# Patient Record
Sex: FEMALE | Race: OTHER | Employment: UNEMPLOYED | URBAN - METROPOLITAN AREA
[De-identification: names, ages, dates, MRNs, and addresses within clinical notes are randomized per-mention and may not be internally consistent; named-entity substitution may affect disease eponyms.]

---

## 2017-09-18 ENCOUNTER — APPOINTMENT (OUTPATIENT)
Dept: GENERAL RADIOLOGY | Age: 82
DRG: 871 | End: 2017-09-18
Payer: MEDICARE

## 2017-09-18 ENCOUNTER — HOSPITAL ENCOUNTER (INPATIENT)
Age: 82
LOS: 6 days | Discharge: HOME HEALTH | DRG: 871 | End: 2017-09-24
Admitting: INTERNAL MEDICINE
Payer: MEDICARE

## 2017-09-18 DIAGNOSIS — A41.9 SEPSIS, DUE TO UNSPECIFIED ORGANISM: ICD-10-CM

## 2017-09-18 DIAGNOSIS — J18.9 PNEUMONIA DUE TO ORGANISM: ICD-10-CM

## 2017-09-18 DIAGNOSIS — J44.1 COPD WITH ACUTE EXACERBATION (HCC): Primary | ICD-10-CM

## 2017-09-18 PROBLEM — R77.8 ELEVATED TROPONIN: Status: ACTIVE | Noted: 2017-09-18

## 2017-09-18 LAB
-: ABNORMAL
-: NORMAL
ABSOLUTE EOS #: 0.3 K/UL (ref 0–0.4)
ABSOLUTE LYMPH #: 2.5 K/UL (ref 1–4.8)
ABSOLUTE MONO #: 1.8 K/UL (ref 0.2–0.8)
AMORPHOUS: ABNORMAL
AMORPHOUS: NORMAL
ANION GAP SERPL CALCULATED.3IONS-SCNC: 18 MMOL/L (ref 9–17)
ANION GAP SERPL CALCULATED.3IONS-SCNC: 22 MMOL/L (ref 9–17)
BACTERIA: ABNORMAL
BACTERIA: NORMAL
BASOPHILS # BLD: 1 %
BASOPHILS ABSOLUTE: 0.2 K/UL (ref 0–0.2)
BILIRUBIN URINE: NEGATIVE
BILIRUBIN URINE: NEGATIVE
BNP INTERPRETATION: ABNORMAL
BUN BLDV-MCNC: 21 MG/DL (ref 8–23)
BUN BLDV-MCNC: 29 MG/DL (ref 8–23)
BUN/CREAT BLD: 24 (ref 9–20)
BUN/CREAT BLD: 25 (ref 9–20)
CALCIUM SERPL-MCNC: 8.4 MG/DL (ref 8.6–10.4)
CALCIUM SERPL-MCNC: 8.9 MG/DL (ref 8.6–10.4)
CASTS UA: ABNORMAL /LPF
CASTS UA: NORMAL /LPF
CHLORIDE BLD-SCNC: 92 MMOL/L (ref 98–107)
CHLORIDE BLD-SCNC: 96 MMOL/L (ref 98–107)
CK MB: 3.1 NG/ML
CO2: 19 MMOL/L (ref 20–31)
CO2: 23 MMOL/L (ref 20–31)
COLOR: YELLOW
COLOR: YELLOW
COMMENT UA: ABNORMAL
COMMENT UA: ABNORMAL
CREAT SERPL-MCNC: 0.83 MG/DL (ref 0.5–0.9)
CREAT SERPL-MCNC: 1.23 MG/DL (ref 0.5–0.9)
CRYSTALS, UA: ABNORMAL /HPF
CRYSTALS, UA: NORMAL /HPF
D-DIMER QUANTITATIVE: 0.51 MG/L FEU
DIFFERENTIAL TYPE: ABNORMAL
EOSINOPHILS RELATIVE PERCENT: 2 %
EPITHELIAL CELLS UA: ABNORMAL /HPF
EPITHELIAL CELLS UA: NORMAL /HPF
GFR AFRICAN AMERICAN: 49 ML/MIN
GFR AFRICAN AMERICAN: >60 ML/MIN
GFR NON-AFRICAN AMERICAN: 41 ML/MIN
GFR NON-AFRICAN AMERICAN: >60 ML/MIN
GFR SERPL CREATININE-BSD FRML MDRD: ABNORMAL ML/MIN/{1.73_M2}
GLUCOSE BLD-MCNC: 241 MG/DL (ref 70–99)
GLUCOSE BLD-MCNC: 333 MG/DL (ref 65–105)
GLUCOSE BLD-MCNC: 362 MG/DL (ref 65–105)
GLUCOSE BLD-MCNC: 384 MG/DL (ref 70–99)
GLUCOSE URINE: ABNORMAL
GLUCOSE URINE: NEGATIVE
HCT VFR BLD CALC: 43.2 % (ref 36–46)
HEMOGLOBIN: 14.5 G/DL (ref 12–16)
INR BLD: 1.1
KETONES, URINE: NEGATIVE
KETONES, URINE: NEGATIVE
LACTIC ACID, WHOLE BLOOD: ABNORMAL MMOL/L (ref 0.7–2.1)
LACTIC ACID: 2.4 MMOL/L (ref 0.5–2.2)
LACTIC ACID: 4.7 MMOL/L (ref 0.5–2.2)
LACTIC ACID: 5.7 MMOL/L (ref 0.5–2.2)
LACTIC ACID: 7.6 MMOL/L (ref 0.5–2.2)
LEUKOCYTE ESTERASE, URINE: NEGATIVE
LEUKOCYTE ESTERASE, URINE: NEGATIVE
LYMPHOCYTES # BLD: 14 %
MAGNESIUM: 2.1 MG/DL (ref 1.6–2.6)
MCH RBC QN AUTO: 32.1 PG (ref 26–34)
MCHC RBC AUTO-ENTMCNC: 33.6 G/DL (ref 31–37)
MCV RBC AUTO: 95.6 FL (ref 80–100)
MONOCYTES # BLD: 10 %
MUCUS: ABNORMAL
MUCUS: NORMAL
MYOGLOBIN: 108 NG/ML (ref 25–58)
NITRITE, URINE: NEGATIVE
NITRITE, URINE: NEGATIVE
OTHER OBSERVATIONS UA: ABNORMAL
OTHER OBSERVATIONS UA: NORMAL
PARTIAL THROMBOPLASTIN TIME: 32.2 SEC (ref 23–31)
PDW BLD-RTO: 13.2 % (ref 11.5–14.5)
PH UA: 5 (ref 5–8)
PH UA: 5.5 (ref 5–8)
PLATELET # BLD: 177 K/UL (ref 130–400)
PLATELET ESTIMATE: ABNORMAL
PMV BLD AUTO: ABNORMAL FL (ref 6–12)
POTASSIUM SERPL-SCNC: 4.3 MMOL/L (ref 3.7–5.3)
POTASSIUM SERPL-SCNC: 4.6 MMOL/L (ref 3.7–5.3)
PRO-BNP: 4442 PG/ML
PROTEIN UA: ABNORMAL
PROTEIN UA: ABNORMAL
PROTHROMBIN TIME: 11.5 SEC (ref 9.7–11.6)
RBC # BLD: 4.52 M/UL (ref 4–5.2)
RBC # BLD: ABNORMAL 10*6/UL
RBC UA: ABNORMAL /HPF (ref 0–2)
RBC UA: NORMAL /HPF (ref 0–2)
RENAL EPITHELIAL, UA: ABNORMAL /HPF
RENAL EPITHELIAL, UA: NORMAL /HPF
SEG NEUTROPHILS: 73 %
SEGMENTED NEUTROPHILS ABSOLUTE COUNT: 13.9 K/UL (ref 1.8–7.7)
SODIUM BLD-SCNC: 133 MMOL/L (ref 135–144)
SODIUM BLD-SCNC: 137 MMOL/L (ref 135–144)
SPECIFIC GRAVITY UA: 1.01 (ref 1–1.03)
SPECIFIC GRAVITY UA: 1.02 (ref 1–1.03)
TOTAL CK: 71 U/L (ref 26–192)
TRICHOMONAS: ABNORMAL
TRICHOMONAS: NORMAL
TROPONIN INTERP: ABNORMAL
TROPONIN INTERP: ABNORMAL
TROPONIN INTERP: NORMAL
TROPONIN INTERP: NORMAL
TROPONIN T: 0.06 NG/ML
TROPONIN T: 0.06 NG/ML
TROPONIN T: <0.03 NG/ML
TROPONIN T: <0.03 NG/ML
TSH SERPL DL<=0.05 MIU/L-ACNC: 0.38 MIU/L (ref 0.3–5)
TSH SERPL DL<=0.05 MIU/L-ACNC: 2.18 MIU/L (ref 0.3–5)
TURBIDITY: ABNORMAL
TURBIDITY: ABNORMAL
URINE HGB: ABNORMAL
URINE HGB: ABNORMAL
UROBILINOGEN, URINE: NORMAL
UROBILINOGEN, URINE: NORMAL
WBC # BLD: 18.7 K/UL (ref 3.5–11)
WBC # BLD: ABNORMAL 10*3/UL
WBC UA: ABNORMAL /HPF (ref 0–5)
WBC UA: NORMAL /HPF (ref 0–5)
YEAST: ABNORMAL
YEAST: NORMAL

## 2017-09-18 PROCEDURE — 87086 URINE CULTURE/COLONY COUNT: CPT

## 2017-09-18 PROCEDURE — 82947 ASSAY GLUCOSE BLOOD QUANT: CPT

## 2017-09-18 PROCEDURE — 99285 EMERGENCY DEPT VISIT HI MDM: CPT

## 2017-09-18 PROCEDURE — 94640 AIRWAY INHALATION TREATMENT: CPT

## 2017-09-18 PROCEDURE — 84484 ASSAY OF TROPONIN QUANT: CPT

## 2017-09-18 PROCEDURE — 83874 ASSAY OF MYOGLOBIN: CPT

## 2017-09-18 PROCEDURE — 36415 COLL VENOUS BLD VENIPUNCTURE: CPT

## 2017-09-18 PROCEDURE — 93005 ELECTROCARDIOGRAM TRACING: CPT

## 2017-09-18 PROCEDURE — 51702 INSERT TEMP BLADDER CATH: CPT

## 2017-09-18 PROCEDURE — 6360000002 HC RX W HCPCS: Performed by: INTERNAL MEDICINE

## 2017-09-18 PROCEDURE — 85730 THROMBOPLASTIN TIME PARTIAL: CPT

## 2017-09-18 PROCEDURE — 6370000000 HC RX 637 (ALT 250 FOR IP): Performed by: INTERNAL MEDICINE

## 2017-09-18 PROCEDURE — 83880 ASSAY OF NATRIURETIC PEPTIDE: CPT

## 2017-09-18 PROCEDURE — 85610 PROTHROMBIN TIME: CPT

## 2017-09-18 PROCEDURE — 71010 XR CHEST PORTABLE: CPT

## 2017-09-18 PROCEDURE — 2500000003 HC RX 250 WO HCPCS: Performed by: INTERNAL MEDICINE

## 2017-09-18 PROCEDURE — 85379 FIBRIN DEGRADATION QUANT: CPT

## 2017-09-18 PROCEDURE — 94761 N-INVAS EAR/PLS OXIMETRY MLT: CPT

## 2017-09-18 PROCEDURE — 82553 CREATINE MB FRACTION: CPT

## 2017-09-18 PROCEDURE — 84443 ASSAY THYROID STIM HORMONE: CPT

## 2017-09-18 PROCEDURE — 2580000003 HC RX 258: Performed by: INTERNAL MEDICINE

## 2017-09-18 PROCEDURE — 71010 XR CHEST LIMITED: CPT

## 2017-09-18 PROCEDURE — 2000000000 HC ICU R&B

## 2017-09-18 PROCEDURE — 87040 BLOOD CULTURE FOR BACTERIA: CPT

## 2017-09-18 PROCEDURE — 81001 URINALYSIS AUTO W/SCOPE: CPT

## 2017-09-18 PROCEDURE — 2580000003 HC RX 258

## 2017-09-18 PROCEDURE — 6360000002 HC RX W HCPCS

## 2017-09-18 PROCEDURE — 82550 ASSAY OF CK (CPK): CPT

## 2017-09-18 PROCEDURE — 96374 THER/PROPH/DIAG INJ IV PUSH: CPT

## 2017-09-18 PROCEDURE — 83605 ASSAY OF LACTIC ACID: CPT

## 2017-09-18 PROCEDURE — 83036 HEMOGLOBIN GLYCOSYLATED A1C: CPT

## 2017-09-18 PROCEDURE — 85025 COMPLETE CBC W/AUTO DIFF WBC: CPT

## 2017-09-18 PROCEDURE — 2700000000 HC OXYGEN THERAPY PER DAY

## 2017-09-18 PROCEDURE — 80048 BASIC METABOLIC PNL TOTAL CA: CPT

## 2017-09-18 PROCEDURE — 83735 ASSAY OF MAGNESIUM: CPT

## 2017-09-18 PROCEDURE — 2500000003 HC RX 250 WO HCPCS

## 2017-09-18 RX ORDER — ALPRAZOLAM 0.25 MG/1
0.25 TABLET ORAL 2 TIMES DAILY PRN
Status: DISCONTINUED | OUTPATIENT
Start: 2017-09-18 | End: 2017-09-24 | Stop reason: HOSPADM

## 2017-09-18 RX ORDER — GLIMEPIRIDE 2 MG/1
2 TABLET ORAL
Status: DISCONTINUED | OUTPATIENT
Start: 2017-09-19 | End: 2017-09-24 | Stop reason: HOSPADM

## 2017-09-18 RX ORDER — GABAPENTIN 100 MG/1
100 CAPSULE ORAL 2 TIMES DAILY
COMMUNITY

## 2017-09-18 RX ORDER — SODIUM CHLORIDE 9 MG/ML
INJECTION, SOLUTION INTRAVENOUS CONTINUOUS
Status: DISCONTINUED | OUTPATIENT
Start: 2017-09-18 | End: 2017-09-21

## 2017-09-18 RX ORDER — ACETAMINOPHEN AND CODEINE PHOSPHATE 300; 30 MG/1; MG/1
1 TABLET ORAL EVERY 4 HOURS PRN
COMMUNITY
End: 2018-08-31 | Stop reason: ALTCHOICE

## 2017-09-18 RX ORDER — SODIUM CHLORIDE 0.9 % (FLUSH) 0.9 %
10 SYRINGE (ML) INJECTION PRN
Status: DISCONTINUED | OUTPATIENT
Start: 2017-09-18 | End: 2017-09-18

## 2017-09-18 RX ORDER — GLIMEPIRIDE 2 MG/1
2 TABLET ORAL
Status: ON HOLD | COMMUNITY
End: 2019-04-09 | Stop reason: HOSPADM

## 2017-09-18 RX ORDER — DILTIAZEM HCL 90 MG
90 TABLET ORAL DAILY
Status: ON HOLD | COMMUNITY
End: 2017-09-20 | Stop reason: DRUGHIGH

## 2017-09-18 RX ORDER — FAMOTIDINE 20 MG/1
20 TABLET, FILM COATED ORAL NIGHTLY
Status: DISCONTINUED | OUTPATIENT
Start: 2017-09-18 | End: 2017-09-24 | Stop reason: HOSPADM

## 2017-09-18 RX ORDER — ATORVASTATIN CALCIUM 20 MG/1
20 TABLET, FILM COATED ORAL NIGHTLY
Status: DISCONTINUED | OUTPATIENT
Start: 2017-09-18 | End: 2017-09-24 | Stop reason: HOSPADM

## 2017-09-18 RX ORDER — ACETAMINOPHEN AND CODEINE PHOSPHATE 300; 30 MG/1; MG/1
1 TABLET ORAL EVERY 8 HOURS PRN
Status: DISCONTINUED | OUTPATIENT
Start: 2017-09-18 | End: 2017-09-24 | Stop reason: HOSPADM

## 2017-09-18 RX ORDER — SODIUM CHLORIDE 0.9 % (FLUSH) 0.9 %
10 SYRINGE (ML) INJECTION PRN
Status: DISCONTINUED | OUTPATIENT
Start: 2017-09-18 | End: 2017-09-24 | Stop reason: HOSPADM

## 2017-09-18 RX ORDER — MONTELUKAST SODIUM 10 MG/1
10 TABLET ORAL NIGHTLY
COMMUNITY

## 2017-09-18 RX ORDER — HYDROCODONE BITARTRATE AND ACETAMINOPHEN 5; 325 MG/1; MG/1
1 TABLET ORAL EVERY 4 HOURS PRN
Status: DISCONTINUED | OUTPATIENT
Start: 2017-09-18 | End: 2017-09-18

## 2017-09-18 RX ORDER — FAMOTIDINE 40 MG/1
40 TABLET, FILM COATED ORAL DAILY
COMMUNITY
End: 2018-11-13 | Stop reason: ALTCHOICE

## 2017-09-18 RX ORDER — ALBUTEROL SULFATE 2.5 MG/3ML
2.5 SOLUTION RESPIRATORY (INHALATION) EVERY 4 HOURS PRN
Status: DISCONTINUED | OUTPATIENT
Start: 2017-09-18 | End: 2017-09-19

## 2017-09-18 RX ORDER — GUAIFENESIN 600 MG/1
600 TABLET, EXTENDED RELEASE ORAL 2 TIMES DAILY
Status: DISCONTINUED | OUTPATIENT
Start: 2017-09-18 | End: 2017-09-20

## 2017-09-18 RX ORDER — MAGNESIUM OXIDE 400 MG/1
400 TABLET ORAL 2 TIMES DAILY
COMMUNITY

## 2017-09-18 RX ORDER — LEVOFLOXACIN 5 MG/ML
750 INJECTION, SOLUTION INTRAVENOUS
Status: DISCONTINUED | OUTPATIENT
Start: 2017-09-20 | End: 2017-09-21

## 2017-09-18 RX ORDER — MULTIVIT-MIN/IRON/FOLIC ACID/K 18-600-40
2000 CAPSULE ORAL 2 TIMES DAILY
COMMUNITY

## 2017-09-18 RX ORDER — MONTELUKAST SODIUM 10 MG/1
10 TABLET ORAL NIGHTLY
Status: DISCONTINUED | OUTPATIENT
Start: 2017-09-18 | End: 2017-09-24 | Stop reason: HOSPADM

## 2017-09-18 RX ORDER — ACETAMINOPHEN 325 MG/1
650 TABLET ORAL EVERY 4 HOURS PRN
Status: DISCONTINUED | OUTPATIENT
Start: 2017-09-18 | End: 2017-09-24 | Stop reason: HOSPADM

## 2017-09-18 RX ORDER — ATORVASTATIN CALCIUM 20 MG/1
20 TABLET, FILM COATED ORAL DAILY
COMMUNITY

## 2017-09-18 RX ORDER — HYDROCODONE BITARTRATE AND ACETAMINOPHEN 5; 325 MG/1; MG/1
2 TABLET ORAL EVERY 4 HOURS PRN
Status: DISCONTINUED | OUTPATIENT
Start: 2017-09-18 | End: 2017-09-18

## 2017-09-18 RX ORDER — DEXTROSE MONOHYDRATE 50 MG/ML
100 INJECTION, SOLUTION INTRAVENOUS PRN
Status: DISCONTINUED | OUTPATIENT
Start: 2017-09-18 | End: 2017-09-24 | Stop reason: HOSPADM

## 2017-09-18 RX ORDER — GABAPENTIN 100 MG/1
100 CAPSULE ORAL 2 TIMES DAILY
Status: DISCONTINUED | OUTPATIENT
Start: 2017-09-18 | End: 2017-09-24 | Stop reason: HOSPADM

## 2017-09-18 RX ORDER — BUMETANIDE 1 MG/1
1 TABLET ORAL DAILY
Status: ON HOLD | COMMUNITY
End: 2017-09-24 | Stop reason: HOSPADM

## 2017-09-18 RX ORDER — METHYLPREDNISOLONE SODIUM SUCCINATE 125 MG/2ML
125 INJECTION, POWDER, LYOPHILIZED, FOR SOLUTION INTRAMUSCULAR; INTRAVENOUS ONCE
Status: COMPLETED | OUTPATIENT
Start: 2017-09-18 | End: 2017-09-18

## 2017-09-18 RX ORDER — SODIUM CHLORIDE 0.9 % (FLUSH) 0.9 %
10 SYRINGE (ML) INJECTION EVERY 12 HOURS SCHEDULED
Status: DISCONTINUED | OUTPATIENT
Start: 2017-09-18 | End: 2017-09-19 | Stop reason: SDUPTHER

## 2017-09-18 RX ORDER — SODIUM CHLORIDE 0.9 % (FLUSH) 0.9 %
10 SYRINGE (ML) INJECTION EVERY 12 HOURS SCHEDULED
Status: DISCONTINUED | OUTPATIENT
Start: 2017-09-18 | End: 2017-09-24 | Stop reason: HOSPADM

## 2017-09-18 RX ORDER — BUMETANIDE 0.25 MG/ML
0.5 INJECTION, SOLUTION INTRAMUSCULAR; INTRAVENOUS 2 TIMES DAILY
Status: DISCONTINUED | OUTPATIENT
Start: 2017-09-18 | End: 2017-09-22

## 2017-09-18 RX ORDER — RISEDRONATE SODIUM 35 MG/1
35 TABLET, FILM COATED ORAL
Status: ON HOLD | COMMUNITY
End: 2019-04-09 | Stop reason: HOSPADM

## 2017-09-18 RX ORDER — LEVOFLOXACIN 5 MG/ML
750 INJECTION, SOLUTION INTRAVENOUS ONCE
Status: COMPLETED | OUTPATIENT
Start: 2017-09-18 | End: 2017-09-18

## 2017-09-18 RX ORDER — 0.9 % SODIUM CHLORIDE 0.9 %
30 INTRAVENOUS SOLUTION INTRAVENOUS ONCE
Status: DISCONTINUED | OUTPATIENT
Start: 2017-09-18 | End: 2017-09-24 | Stop reason: HOSPADM

## 2017-09-18 RX ORDER — ALBUTEROL SULFATE 2.5 MG/3ML
2.5 SOLUTION RESPIRATORY (INHALATION) EVERY 6 HOURS PRN
Status: DISCONTINUED | OUTPATIENT
Start: 2017-09-18 | End: 2017-09-18

## 2017-09-18 RX ORDER — NICOTINE POLACRILEX 4 MG
15 LOZENGE BUCCAL PRN
Status: DISCONTINUED | OUTPATIENT
Start: 2017-09-18 | End: 2017-09-24 | Stop reason: HOSPADM

## 2017-09-18 RX ORDER — DEXTROSE MONOHYDRATE 25 G/50ML
12.5 INJECTION, SOLUTION INTRAVENOUS PRN
Status: DISCONTINUED | OUTPATIENT
Start: 2017-09-18 | End: 2017-09-24 | Stop reason: HOSPADM

## 2017-09-18 RX ADMIN — GUAIFENESIN 600 MG: 600 TABLET, EXTENDED RELEASE ORAL at 21:36

## 2017-09-18 RX ADMIN — MONTELUKAST SODIUM 10 MG: 10 TABLET, FILM COATED ORAL at 21:35

## 2017-09-18 RX ADMIN — AZTREONAM 1 G: 1 INJECTION, POWDER, LYOPHILIZED, FOR SOLUTION INTRAMUSCULAR; INTRAVENOUS at 21:38

## 2017-09-18 RX ADMIN — ATORVASTATIN CALCIUM 20 MG: 20 TABLET, FILM COATED ORAL at 21:54

## 2017-09-18 RX ADMIN — Medication 4 UNITS: at 18:33

## 2017-09-18 RX ADMIN — ALPRAZOLAM 0.25 MG: 0.25 TABLET ORAL at 21:36

## 2017-09-18 RX ADMIN — INSULIN LISPRO 3 UNITS: 100 INJECTION, SOLUTION INTRAVENOUS; SUBCUTANEOUS at 21:58

## 2017-09-18 RX ADMIN — GABAPENTIN 100 MG: 100 CAPSULE ORAL at 21:37

## 2017-09-18 RX ADMIN — DILTIAZEM HYDROCHLORIDE 90 MG: 60 TABLET, FILM COATED ORAL at 18:32

## 2017-09-18 RX ADMIN — LEVOFLOXACIN 750 MG: 5 INJECTION, SOLUTION INTRAVENOUS at 15:02

## 2017-09-18 RX ADMIN — AZTREONAM 1 G: 1 INJECTION, POWDER, LYOPHILIZED, FOR SOLUTION INTRAMUSCULAR; INTRAVENOUS at 14:06

## 2017-09-18 RX ADMIN — SODIUM CHLORIDE: 9 INJECTION, SOLUTION INTRAVENOUS at 16:15

## 2017-09-18 RX ADMIN — FAMOTIDINE 20 MG: 20 TABLET ORAL at 21:37

## 2017-09-18 RX ADMIN — APIXABAN 2.5 MG: 2.5 TABLET, FILM COATED ORAL at 21:36

## 2017-09-18 RX ADMIN — ALBUTEROL SULFATE 2.5 MG: 2.5 SOLUTION RESPIRATORY (INHALATION) at 17:33

## 2017-09-18 RX ADMIN — ALBUTEROL SULFATE 2.5 MG: 2.5 SOLUTION RESPIRATORY (INHALATION) at 12:01

## 2017-09-18 RX ADMIN — BUMETANIDE 0.5 MG: 0.25 INJECTION INTRAMUSCULAR; INTRAVENOUS at 21:38

## 2017-09-18 RX ADMIN — ALBUTEROL SULFATE 2.5 MG: 2.5 SOLUTION RESPIRATORY (INHALATION) at 20:25

## 2017-09-18 RX ADMIN — METHYLPREDNISOLONE SODIUM SUCCINATE 125 MG: 125 INJECTION, POWDER, FOR SOLUTION INTRAMUSCULAR; INTRAVENOUS at 10:53

## 2017-09-18 ASSESSMENT — ENCOUNTER SYMPTOMS
CHEST TIGHTNESS: 1
ABDOMINAL PAIN: 0
VOMITING: 0
ABDOMINAL DISTENTION: 0
CONSTIPATION: 0
NAUSEA: 1
COUGH: 1
PHOTOPHOBIA: 0
WHEEZING: 1
SORE THROAT: 0
SHORTNESS OF BREATH: 1

## 2017-09-18 ASSESSMENT — PAIN SCALES - GENERAL
PAINLEVEL_OUTOF10: 0
PAINLEVEL_OUTOF10: 0

## 2017-09-19 ENCOUNTER — APPOINTMENT (OUTPATIENT)
Dept: GENERAL RADIOLOGY | Age: 82
DRG: 871 | End: 2017-09-19
Payer: MEDICARE

## 2017-09-19 LAB
ABSOLUTE EOS #: 0 K/UL (ref 0–0.4)
ABSOLUTE LYMPH #: 0.8 K/UL (ref 1–4.8)
ABSOLUTE MONO #: 0.4 K/UL (ref 0.2–0.8)
ALBUMIN SERPL-MCNC: 3.7 G/DL (ref 3.5–5.2)
ALBUMIN/GLOBULIN RATIO: ABNORMAL (ref 1–2.5)
ALP BLD-CCNC: 80 U/L (ref 35–104)
ALT SERPL-CCNC: 26 U/L (ref 5–33)
ANION GAP SERPL CALCULATED.3IONS-SCNC: 14 MMOL/L (ref 9–17)
AST SERPL-CCNC: 39 U/L
BASOPHILS # BLD: 0 %
BASOPHILS ABSOLUTE: 0 K/UL (ref 0–0.2)
BILIRUB SERPL-MCNC: 0.38 MG/DL (ref 0.3–1.2)
BUN BLDV-MCNC: 29 MG/DL (ref 8–23)
BUN/CREAT BLD: 30 (ref 9–20)
CALCIUM IONIZED: 1.21 MMOL/L (ref 1.13–1.33)
CALCIUM SERPL-MCNC: 8.7 MG/DL (ref 8.6–10.4)
CHLORIDE BLD-SCNC: 97 MMOL/L (ref 98–107)
CHOLESTEROL/HDL RATIO: 1.5
CHOLESTEROL: 104 MG/DL
CO2: 22 MMOL/L (ref 20–31)
CREAT SERPL-MCNC: 0.97 MG/DL (ref 0.5–0.9)
DIFFERENTIAL TYPE: ABNORMAL
EOSINOPHILS RELATIVE PERCENT: 0 %
ESTIMATED AVERAGE GLUCOSE: 171 MG/DL
GFR AFRICAN AMERICAN: >60 ML/MIN
GFR NON-AFRICAN AMERICAN: 54 ML/MIN
GFR SERPL CREATININE-BSD FRML MDRD: ABNORMAL ML/MIN/{1.73_M2}
GFR SERPL CREATININE-BSD FRML MDRD: ABNORMAL ML/MIN/{1.73_M2}
GLUCOSE BLD-MCNC: 190 MG/DL (ref 65–105)
GLUCOSE BLD-MCNC: 300 MG/DL (ref 70–99)
GLUCOSE BLD-MCNC: 313 MG/DL (ref 65–105)
GLUCOSE BLD-MCNC: 97 MG/DL (ref 65–105)
HBA1C MFR BLD: 7.6 % (ref 4–6)
HCT VFR BLD CALC: 36.7 % (ref 36–46)
HDLC SERPL-MCNC: 71 MG/DL
HEMOGLOBIN: 12.4 G/DL (ref 12–16)
INR BLD: 1.1
LACTIC ACID: 2.2 MMOL/L (ref 0.5–2.2)
LDL CHOLESTEROL: 22 MG/DL (ref 0–130)
LV EF: 48 %
LVEF MODALITY: NORMAL
LYMPHOCYTES # BLD: 14 %
MAGNESIUM: 1.9 MG/DL (ref 1.6–2.6)
MCH RBC QN AUTO: 32.8 PG (ref 26–34)
MCHC RBC AUTO-ENTMCNC: 33.9 G/DL (ref 31–37)
MCV RBC AUTO: 97 FL (ref 80–100)
MONOCYTES # BLD: 7 %
MYOGLOBIN: 142 NG/ML (ref 25–58)
MYOGLOBIN: 93 NG/ML (ref 25–58)
PDW BLD-RTO: 13.6 % (ref 11.5–14.5)
PLATELET # BLD: 123 K/UL (ref 130–400)
PLATELET ESTIMATE: ABNORMAL
PMV BLD AUTO: 8.9 FL (ref 6–12)
POTASSIUM SERPL-SCNC: 4.2 MMOL/L (ref 3.7–5.3)
PROTHROMBIN TIME: 11.9 SEC (ref 9.7–11.6)
RBC # BLD: 3.78 M/UL (ref 4–5.2)
RBC # BLD: ABNORMAL 10*6/UL
SEG NEUTROPHILS: 79 %
SEGMENTED NEUTROPHILS ABSOLUTE COUNT: 4.7 K/UL (ref 1.8–7.7)
SODIUM BLD-SCNC: 133 MMOL/L (ref 135–144)
TOTAL PROTEIN: 6.4 G/DL (ref 6.4–8.3)
TRIGL SERPL-MCNC: 55 MG/DL
TROPONIN INTERP: ABNORMAL
TROPONIN INTERP: ABNORMAL
TROPONIN T: 0.05 NG/ML
TROPONIN T: 0.07 NG/ML
VLDLC SERPL CALC-MCNC: NORMAL MG/DL (ref 1–30)
WBC # BLD: 5.9 K/UL (ref 3.5–11)
WBC # BLD: ABNORMAL 10*3/UL

## 2017-09-19 PROCEDURE — 6360000002 HC RX W HCPCS: Performed by: INTERNAL MEDICINE

## 2017-09-19 PROCEDURE — 82330 ASSAY OF CALCIUM: CPT

## 2017-09-19 PROCEDURE — 71010 XR CHEST PORTABLE: CPT

## 2017-09-19 PROCEDURE — 80053 COMPREHEN METABOLIC PANEL: CPT

## 2017-09-19 PROCEDURE — 6370000000 HC RX 637 (ALT 250 FOR IP): Performed by: INTERNAL MEDICINE

## 2017-09-19 PROCEDURE — 36415 COLL VENOUS BLD VENIPUNCTURE: CPT

## 2017-09-19 PROCEDURE — 85025 COMPLETE CBC W/AUTO DIFF WBC: CPT

## 2017-09-19 PROCEDURE — 80061 LIPID PANEL: CPT

## 2017-09-19 PROCEDURE — 82947 ASSAY GLUCOSE BLOOD QUANT: CPT

## 2017-09-19 PROCEDURE — 83735 ASSAY OF MAGNESIUM: CPT

## 2017-09-19 PROCEDURE — 2700000000 HC OXYGEN THERAPY PER DAY

## 2017-09-19 PROCEDURE — 2580000003 HC RX 258

## 2017-09-19 PROCEDURE — 94761 N-INVAS EAR/PLS OXIMETRY MLT: CPT

## 2017-09-19 PROCEDURE — 2000000000 HC ICU R&B

## 2017-09-19 PROCEDURE — 85610 PROTHROMBIN TIME: CPT

## 2017-09-19 PROCEDURE — 2580000003 HC RX 258: Performed by: INTERNAL MEDICINE

## 2017-09-19 PROCEDURE — 94640 AIRWAY INHALATION TREATMENT: CPT

## 2017-09-19 PROCEDURE — 93306 TTE W/DOPPLER COMPLETE: CPT

## 2017-09-19 PROCEDURE — 2500000003 HC RX 250 WO HCPCS: Performed by: INTERNAL MEDICINE

## 2017-09-19 PROCEDURE — 83605 ASSAY OF LACTIC ACID: CPT

## 2017-09-19 RX ORDER — ALBUTEROL SULFATE 2.5 MG/3ML
2.5 SOLUTION RESPIRATORY (INHALATION) EVERY 4 HOURS
Status: DISCONTINUED | OUTPATIENT
Start: 2017-09-19 | End: 2017-09-21

## 2017-09-19 RX ORDER — DIGOXIN 0.25 MG/ML
250 INJECTION INTRAMUSCULAR; INTRAVENOUS ONCE
Status: COMPLETED | OUTPATIENT
Start: 2017-09-19 | End: 2017-09-19

## 2017-09-19 RX ORDER — ALBUTEROL SULFATE 2.5 MG/3ML
2.5 SOLUTION RESPIRATORY (INHALATION)
Status: DISCONTINUED | OUTPATIENT
Start: 2017-09-19 | End: 2017-09-24 | Stop reason: HOSPADM

## 2017-09-19 RX ORDER — DILTIAZEM HYDROCHLORIDE 120 MG/1
120 CAPSULE, COATED, EXTENDED RELEASE ORAL DAILY
Status: DISCONTINUED | OUTPATIENT
Start: 2017-09-19 | End: 2017-09-24 | Stop reason: HOSPADM

## 2017-09-19 RX ADMIN — GABAPENTIN 100 MG: 100 CAPSULE ORAL at 08:56

## 2017-09-19 RX ADMIN — BUMETANIDE 0.5 MG: 0.25 INJECTION INTRAMUSCULAR; INTRAVENOUS at 00:00

## 2017-09-19 RX ADMIN — APIXABAN 2.5 MG: 2.5 TABLET, FILM COATED ORAL at 20:25

## 2017-09-19 RX ADMIN — Medication 4 UNITS: at 11:49

## 2017-09-19 RX ADMIN — ALBUTEROL SULFATE 2.5 MG: 2.5 SOLUTION RESPIRATORY (INHALATION) at 11:21

## 2017-09-19 RX ADMIN — MONTELUKAST SODIUM 10 MG: 10 TABLET, FILM COATED ORAL at 20:23

## 2017-09-19 RX ADMIN — ACETAMINOPHEN AND CODEINE PHOSPHATE 1 TABLET: 300; 30 TABLET ORAL at 10:32

## 2017-09-19 RX ADMIN — Medication 10 ML: at 20:24

## 2017-09-19 RX ADMIN — ALBUTEROL SULFATE 2.5 MG: 2.5 SOLUTION RESPIRATORY (INHALATION) at 07:29

## 2017-09-19 RX ADMIN — Medication 4 UNITS: at 08:59

## 2017-09-19 RX ADMIN — APIXABAN 2.5 MG: 2.5 TABLET, FILM COATED ORAL at 08:56

## 2017-09-19 RX ADMIN — AZTREONAM 1 G: 1 INJECTION, POWDER, LYOPHILIZED, FOR SOLUTION INTRAMUSCULAR; INTRAVENOUS at 21:23

## 2017-09-19 RX ADMIN — GABAPENTIN 100 MG: 100 CAPSULE ORAL at 20:23

## 2017-09-19 RX ADMIN — ATORVASTATIN CALCIUM 20 MG: 20 TABLET, FILM COATED ORAL at 20:24

## 2017-09-19 RX ADMIN — DIGOXIN 250 MCG: 0.25 INJECTION INTRAMUSCULAR; INTRAVENOUS at 09:08

## 2017-09-19 RX ADMIN — ALPRAZOLAM 0.25 MG: 0.25 TABLET ORAL at 21:23

## 2017-09-19 RX ADMIN — AZTREONAM 1 G: 1 INJECTION, POWDER, LYOPHILIZED, FOR SOLUTION INTRAMUSCULAR; INTRAVENOUS at 05:36

## 2017-09-19 RX ADMIN — DILTIAZEM HYDROCHLORIDE 120 MG: 120 CAPSULE, COATED, EXTENDED RELEASE ORAL at 09:16

## 2017-09-19 RX ADMIN — ALBUTEROL SULFATE 2.5 MG: 2.5 SOLUTION RESPIRATORY (INHALATION) at 23:56

## 2017-09-19 RX ADMIN — AZTREONAM 1 G: 1 INJECTION, POWDER, LYOPHILIZED, FOR SOLUTION INTRAMUSCULAR; INTRAVENOUS at 13:41

## 2017-09-19 RX ADMIN — INSULIN LISPRO 1 UNITS: 100 INJECTION, SOLUTION INTRAVENOUS; SUBCUTANEOUS at 22:10

## 2017-09-19 RX ADMIN — BUMETANIDE 0.5 MG: 0.25 INJECTION INTRAMUSCULAR; INTRAVENOUS at 20:24

## 2017-09-19 RX ADMIN — FAMOTIDINE 20 MG: 20 TABLET ORAL at 20:24

## 2017-09-19 RX ADMIN — ALBUTEROL SULFATE 2.5 MG: 2.5 SOLUTION RESPIRATORY (INHALATION) at 03:29

## 2017-09-19 RX ADMIN — GLIMEPIRIDE 2 MG: 2 TABLET ORAL at 08:57

## 2017-09-19 RX ADMIN — GUAIFENESIN 600 MG: 600 TABLET, EXTENDED RELEASE ORAL at 08:57

## 2017-09-19 RX ADMIN — GUAIFENESIN 600 MG: 600 TABLET, EXTENDED RELEASE ORAL at 20:23

## 2017-09-19 RX ADMIN — ALBUTEROL SULFATE 2.5 MG: 2.5 SOLUTION RESPIRATORY (INHALATION) at 16:04

## 2017-09-19 RX ADMIN — ALBUTEROL SULFATE 2.5 MG: 2.5 SOLUTION RESPIRATORY (INHALATION) at 21:34

## 2017-09-19 RX ADMIN — MOMETASONE FUROATE AND FORMOTEROL FUMARATE DIHYDRATE 2 PUFF: 100; 5 AEROSOL RESPIRATORY (INHALATION) at 21:34

## 2017-09-19 ASSESSMENT — PAIN SCALES - GENERAL
PAINLEVEL_OUTOF10: 0
PAINLEVEL_OUTOF10: 8

## 2017-09-20 LAB
ABSOLUTE EOS #: 0.1 K/UL (ref 0–0.4)
ABSOLUTE LYMPH #: 1.9 K/UL (ref 1–4.8)
ABSOLUTE MONO #: 1.5 K/UL (ref 0.2–0.8)
ALBUMIN SERPL-MCNC: 2.6 G/DL (ref 3.5–5.2)
ALBUMIN/GLOBULIN RATIO: ABNORMAL (ref 1–2.5)
ALP BLD-CCNC: 60 U/L (ref 35–104)
ALT SERPL-CCNC: 34 U/L (ref 5–33)
ANION GAP SERPL CALCULATED.3IONS-SCNC: 12 MMOL/L (ref 9–17)
AST SERPL-CCNC: 38 U/L
BASOPHILS # BLD: 0 %
BASOPHILS ABSOLUTE: 0 K/UL (ref 0–0.2)
BILIRUB SERPL-MCNC: 0.27 MG/DL (ref 0.3–1.2)
BUN BLDV-MCNC: 22 MG/DL (ref 8–23)
BUN/CREAT BLD: 39 (ref 9–20)
CALCIUM IONIZED: 1.04 MMOL/L (ref 1.13–1.33)
CALCIUM SERPL-MCNC: 6.3 MG/DL (ref 8.6–10.4)
CHLORIDE BLD-SCNC: 113 MMOL/L (ref 98–107)
CO2: 19 MMOL/L (ref 20–31)
CREAT SERPL-MCNC: 0.56 MG/DL (ref 0.5–0.9)
CULTURE: NO GROWTH
CULTURE: NORMAL
DIFFERENTIAL TYPE: ABNORMAL
EOSINOPHILS RELATIVE PERCENT: 0 %
GFR AFRICAN AMERICAN: >60 ML/MIN
GFR NON-AFRICAN AMERICAN: >60 ML/MIN
GFR SERPL CREATININE-BSD FRML MDRD: ABNORMAL ML/MIN/{1.73_M2}
GFR SERPL CREATININE-BSD FRML MDRD: ABNORMAL ML/MIN/{1.73_M2}
GLUCOSE BLD-MCNC: 131 MG/DL (ref 70–99)
GLUCOSE BLD-MCNC: 154 MG/DL (ref 65–105)
GLUCOSE BLD-MCNC: 164 MG/DL (ref 65–105)
GLUCOSE BLD-MCNC: 165 MG/DL (ref 65–105)
GLUCOSE BLD-MCNC: 93 MG/DL (ref 65–105)
HCT VFR BLD CALC: 33.5 % (ref 36–46)
HEMOGLOBIN: 11.1 G/DL (ref 12–16)
LYMPHOCYTES # BLD: 10 %
Lab: NORMAL
MAGNESIUM: 1.5 MG/DL (ref 1.6–2.6)
MCH RBC QN AUTO: 31.7 PG (ref 26–34)
MCHC RBC AUTO-ENTMCNC: 33.1 G/DL (ref 31–37)
MCV RBC AUTO: 95.6 FL (ref 80–100)
MONOCYTES # BLD: 8 %
PDW BLD-RTO: 12.9 % (ref 11.5–14.5)
PLATELET # BLD: 135 K/UL (ref 130–400)
PLATELET ESTIMATE: ABNORMAL
PMV BLD AUTO: ABNORMAL FL (ref 6–12)
POTASSIUM SERPL-SCNC: 3.2 MMOL/L (ref 3.7–5.3)
RBC # BLD: 3.5 M/UL (ref 4–5.2)
RBC # BLD: ABNORMAL 10*6/UL
SEG NEUTROPHILS: 82 %
SEGMENTED NEUTROPHILS ABSOLUTE COUNT: 14.5 K/UL (ref 1.8–7.7)
SODIUM BLD-SCNC: 144 MMOL/L (ref 135–144)
SPECIMEN DESCRIPTION: NORMAL
SPECIMEN DESCRIPTION: NORMAL
STATUS: NORMAL
TOTAL PROTEIN: 4.5 G/DL (ref 6.4–8.3)
WBC # BLD: 18 K/UL (ref 3.5–11)
WBC # BLD: ABNORMAL 10*3/UL

## 2017-09-20 PROCEDURE — 6370000000 HC RX 637 (ALT 250 FOR IP): Performed by: INTERNAL MEDICINE

## 2017-09-20 PROCEDURE — 6360000002 HC RX W HCPCS: Performed by: INTERNAL MEDICINE

## 2017-09-20 PROCEDURE — 2580000003 HC RX 258: Performed by: INTERNAL MEDICINE

## 2017-09-20 PROCEDURE — 2700000000 HC OXYGEN THERAPY PER DAY

## 2017-09-20 PROCEDURE — 82947 ASSAY GLUCOSE BLOOD QUANT: CPT

## 2017-09-20 PROCEDURE — 83735 ASSAY OF MAGNESIUM: CPT

## 2017-09-20 PROCEDURE — 36415 COLL VENOUS BLD VENIPUNCTURE: CPT

## 2017-09-20 PROCEDURE — 2060000000 HC ICU INTERMEDIATE R&B

## 2017-09-20 PROCEDURE — 2580000003 HC RX 258

## 2017-09-20 PROCEDURE — 2500000003 HC RX 250 WO HCPCS: Performed by: INTERNAL MEDICINE

## 2017-09-20 PROCEDURE — 94640 AIRWAY INHALATION TREATMENT: CPT

## 2017-09-20 PROCEDURE — 82330 ASSAY OF CALCIUM: CPT

## 2017-09-20 PROCEDURE — 80053 COMPREHEN METABOLIC PANEL: CPT

## 2017-09-20 PROCEDURE — 85025 COMPLETE CBC W/AUTO DIFF WBC: CPT

## 2017-09-20 PROCEDURE — 94761 N-INVAS EAR/PLS OXIMETRY MLT: CPT

## 2017-09-20 RX ORDER — POTASSIUM CHLORIDE 20MEQ/15ML
40 LIQUID (ML) ORAL PRN
Status: DISCONTINUED | OUTPATIENT
Start: 2017-09-20 | End: 2017-09-20

## 2017-09-20 RX ORDER — DORZOLAMIDE HYDROCHLORIDE AND TIMOLOL MALEATE 20; 5 MG/ML; MG/ML
1 SOLUTION/ DROPS OPHTHALMIC 2 TIMES DAILY
COMMUNITY
Start: 2017-08-25

## 2017-09-20 RX ORDER — DORZOLAMIDE HYDROCHLORIDE AND TIMOLOL MALEATE 20; 5 MG/ML; MG/ML
1 SOLUTION/ DROPS OPHTHALMIC 2 TIMES DAILY
Status: DISCONTINUED | OUTPATIENT
Start: 2017-09-20 | End: 2017-09-24 | Stop reason: HOSPADM

## 2017-09-20 RX ORDER — METFORMIN HYDROCHLORIDE 500 MG/1
500 TABLET, EXTENDED RELEASE ORAL 2 TIMES DAILY WITH MEALS
Status: ON HOLD | COMMUNITY
End: 2017-09-24 | Stop reason: HOSPADM

## 2017-09-20 RX ORDER — POTASSIUM CHLORIDE 20MEQ/15ML
40 LIQUID (ML) ORAL ONCE
Status: COMPLETED | OUTPATIENT
Start: 2017-09-20 | End: 2017-09-20

## 2017-09-20 RX ORDER — POTASSIUM CHLORIDE 7.45 MG/ML
10 INJECTION INTRAVENOUS PRN
Status: DISCONTINUED | OUTPATIENT
Start: 2017-09-20 | End: 2017-09-20

## 2017-09-20 RX ORDER — POTASSIUM CHLORIDE 20 MEQ/1
40 TABLET, EXTENDED RELEASE ORAL PRN
Status: DISCONTINUED | OUTPATIENT
Start: 2017-09-20 | End: 2017-09-20

## 2017-09-20 RX ORDER — POTASSIUM CHLORIDE 20 MEQ/1
40 TABLET, EXTENDED RELEASE ORAL ONCE
Status: DISCONTINUED | OUTPATIENT
Start: 2017-09-20 | End: 2017-09-20

## 2017-09-20 RX ORDER — DILTIAZEM HYDROCHLORIDE 60 MG/1
60 CAPSULE, EXTENDED RELEASE ORAL DAILY
Status: ON HOLD | COMMUNITY
Start: 2017-06-19 | End: 2017-09-24 | Stop reason: HOSPADM

## 2017-09-20 RX ADMIN — Medication 1 UNITS: at 16:43

## 2017-09-20 RX ADMIN — GLIMEPIRIDE 2 MG: 2 TABLET ORAL at 08:25

## 2017-09-20 RX ADMIN — ALBUTEROL SULFATE 2.5 MG: 2.5 SOLUTION RESPIRATORY (INHALATION) at 07:26

## 2017-09-20 RX ADMIN — Medication 1 UNITS: at 08:25

## 2017-09-20 RX ADMIN — MOMETASONE FUROATE AND FORMOTEROL FUMARATE DIHYDRATE 2 PUFF: 100; 5 AEROSOL RESPIRATORY (INHALATION) at 19:33

## 2017-09-20 RX ADMIN — AZTREONAM 1 G: 1 INJECTION, POWDER, LYOPHILIZED, FOR SOLUTION INTRAMUSCULAR; INTRAVENOUS at 21:28

## 2017-09-20 RX ADMIN — AZTREONAM 1 G: 1 INJECTION, POWDER, LYOPHILIZED, FOR SOLUTION INTRAMUSCULAR; INTRAVENOUS at 05:37

## 2017-09-20 RX ADMIN — APIXABAN 2.5 MG: 2.5 TABLET, FILM COATED ORAL at 08:26

## 2017-09-20 RX ADMIN — APIXABAN 2.5 MG: 2.5 TABLET, FILM COATED ORAL at 21:33

## 2017-09-20 RX ADMIN — ALBUTEROL SULFATE 2.5 MG: 2.5 SOLUTION RESPIRATORY (INHALATION) at 11:23

## 2017-09-20 RX ADMIN — ALPRAZOLAM 0.25 MG: 0.25 TABLET ORAL at 23:25

## 2017-09-20 RX ADMIN — FAMOTIDINE 20 MG: 20 TABLET ORAL at 21:34

## 2017-09-20 RX ADMIN — POTASSIUM CHLORIDE 40 MEQ: 40 SOLUTION ORAL at 12:53

## 2017-09-20 RX ADMIN — ALBUTEROL SULFATE 2.5 MG: 2.5 SOLUTION RESPIRATORY (INHALATION) at 23:05

## 2017-09-20 RX ADMIN — DORZOLAMIDE HYDROCHLORIDE AND TIMOLOL MALEATE 1 DROP: 20; 5 SOLUTION/ DROPS OPHTHALMIC at 21:30

## 2017-09-20 RX ADMIN — DILTIAZEM HYDROCHLORIDE 120 MG: 120 CAPSULE, COATED, EXTENDED RELEASE ORAL at 08:25

## 2017-09-20 RX ADMIN — GABAPENTIN 100 MG: 100 CAPSULE ORAL at 08:25

## 2017-09-20 RX ADMIN — MOMETASONE FUROATE AND FORMOTEROL FUMARATE DIHYDRATE 2 PUFF: 100; 5 AEROSOL RESPIRATORY (INHALATION) at 07:27

## 2017-09-20 RX ADMIN — ALBUTEROL SULFATE 2.5 MG: 2.5 SOLUTION RESPIRATORY (INHALATION) at 14:32

## 2017-09-20 RX ADMIN — MONTELUKAST SODIUM 10 MG: 10 TABLET, FILM COATED ORAL at 21:34

## 2017-09-20 RX ADMIN — GABAPENTIN 100 MG: 100 CAPSULE ORAL at 21:34

## 2017-09-20 RX ADMIN — AZTREONAM 1 G: 1 INJECTION, POWDER, LYOPHILIZED, FOR SOLUTION INTRAMUSCULAR; INTRAVENOUS at 13:49

## 2017-09-20 RX ADMIN — BUMETANIDE 0.5 MG: 0.25 INJECTION INTRAMUSCULAR; INTRAVENOUS at 08:25

## 2017-09-20 RX ADMIN — Medication 2 UNITS: at 12:52

## 2017-09-20 RX ADMIN — ALBUTEROL SULFATE 2.5 MG: 2.5 SOLUTION RESPIRATORY (INHALATION) at 03:55

## 2017-09-20 RX ADMIN — LEVOFLOXACIN 750 MG: 5 INJECTION, SOLUTION INTRAVENOUS at 15:12

## 2017-09-20 RX ADMIN — GUAIFENESIN 600 MG: 600 TABLET, EXTENDED RELEASE ORAL at 08:25

## 2017-09-20 RX ADMIN — ATORVASTATIN CALCIUM 20 MG: 20 TABLET, FILM COATED ORAL at 21:33

## 2017-09-20 RX ADMIN — ALBUTEROL SULFATE 2.5 MG: 2.5 SOLUTION RESPIRATORY (INHALATION) at 19:33

## 2017-09-20 RX ADMIN — SODIUM CHLORIDE: 9 INJECTION, SOLUTION INTRAVENOUS at 04:26

## 2017-09-20 RX ADMIN — Medication 10 ML: at 08:59

## 2017-09-20 RX ADMIN — BUMETANIDE 0.5 MG: 0.25 INJECTION INTRAMUSCULAR; INTRAVENOUS at 21:32

## 2017-09-20 ASSESSMENT — PAIN SCALES - GENERAL: PAINLEVEL_OUTOF10: 0

## 2017-09-21 LAB
ABSOLUTE EOS #: 0 K/UL (ref 0–0.4)
ABSOLUTE LYMPH #: 2.7 K/UL (ref 1–4.8)
ABSOLUTE MONO #: 1.4 K/UL (ref 0.2–0.8)
ALBUMIN SERPL-MCNC: 3.1 G/DL (ref 3.5–5.2)
ALBUMIN/GLOBULIN RATIO: ABNORMAL (ref 1–2.5)
ALP BLD-CCNC: 84 U/L (ref 35–104)
ALT SERPL-CCNC: 44 U/L (ref 5–33)
ANION GAP SERPL CALCULATED.3IONS-SCNC: 12 MMOL/L (ref 9–17)
AST SERPL-CCNC: 41 U/L
BASOPHILS # BLD: 0 %
BASOPHILS ABSOLUTE: 0 K/UL (ref 0–0.2)
BILIRUB SERPL-MCNC: 0.36 MG/DL (ref 0.3–1.2)
BUN BLDV-MCNC: 28 MG/DL (ref 8–23)
BUN/CREAT BLD: 33 (ref 9–20)
CALCIUM IONIZED: 1.2 MMOL/L (ref 1.13–1.33)
CALCIUM SERPL-MCNC: 8.4 MG/DL (ref 8.6–10.4)
CHLORIDE BLD-SCNC: 105 MMOL/L (ref 98–107)
CO2: 25 MMOL/L (ref 20–31)
CREAT SERPL-MCNC: 0.86 MG/DL (ref 0.5–0.9)
DIFFERENTIAL TYPE: ABNORMAL
EOSINOPHILS RELATIVE PERCENT: 0 %
GFR AFRICAN AMERICAN: >60 ML/MIN
GFR NON-AFRICAN AMERICAN: >60 ML/MIN
GFR SERPL CREATININE-BSD FRML MDRD: ABNORMAL ML/MIN/{1.73_M2}
GFR SERPL CREATININE-BSD FRML MDRD: ABNORMAL ML/MIN/{1.73_M2}
GLUCOSE BLD-MCNC: 124 MG/DL (ref 70–99)
GLUCOSE BLD-MCNC: 165 MG/DL (ref 65–105)
GLUCOSE BLD-MCNC: 229 MG/DL (ref 65–105)
GLUCOSE BLD-MCNC: 238 MG/DL (ref 65–105)
HCT VFR BLD CALC: 40.6 % (ref 36–46)
HEMOGLOBIN: 13.7 G/DL (ref 12–16)
LYMPHOCYTES # BLD: 18 %
MAGNESIUM: 1.9 MG/DL (ref 1.6–2.6)
MCH RBC QN AUTO: 32.3 PG (ref 26–34)
MCHC RBC AUTO-ENTMCNC: 33.8 G/DL (ref 31–37)
MCV RBC AUTO: 95.5 FL (ref 80–100)
MONOCYTES # BLD: 9 %
PDW BLD-RTO: 13.2 % (ref 11.5–14.5)
PLATELET # BLD: 161 K/UL (ref 130–400)
PLATELET ESTIMATE: ABNORMAL
PMV BLD AUTO: ABNORMAL FL (ref 6–12)
POTASSIUM SERPL-SCNC: 4.1 MMOL/L (ref 3.7–5.3)
PREALBUMIN: 16.7 MG/DL (ref 20–40)
RBC # BLD: 4.25 M/UL (ref 4–5.2)
RBC # BLD: ABNORMAL 10*6/UL
SEG NEUTROPHILS: 73 %
SEGMENTED NEUTROPHILS ABSOLUTE COUNT: 10.9 K/UL (ref 1.8–7.7)
SODIUM BLD-SCNC: 142 MMOL/L (ref 135–144)
TOTAL PROTEIN: 5.7 G/DL (ref 6.4–8.3)
WBC # BLD: 15 K/UL (ref 3.5–11)
WBC # BLD: ABNORMAL 10*3/UL

## 2017-09-21 PROCEDURE — 36415 COLL VENOUS BLD VENIPUNCTURE: CPT

## 2017-09-21 PROCEDURE — 6370000000 HC RX 637 (ALT 250 FOR IP): Performed by: INTERNAL MEDICINE

## 2017-09-21 PROCEDURE — 84134 ASSAY OF PREALBUMIN: CPT

## 2017-09-21 PROCEDURE — 2580000003 HC RX 258: Performed by: INTERNAL MEDICINE

## 2017-09-21 PROCEDURE — 94761 N-INVAS EAR/PLS OXIMETRY MLT: CPT

## 2017-09-21 PROCEDURE — 83735 ASSAY OF MAGNESIUM: CPT

## 2017-09-21 PROCEDURE — 82330 ASSAY OF CALCIUM: CPT

## 2017-09-21 PROCEDURE — 82947 ASSAY GLUCOSE BLOOD QUANT: CPT

## 2017-09-21 PROCEDURE — 85025 COMPLETE CBC W/AUTO DIFF WBC: CPT

## 2017-09-21 PROCEDURE — 97530 THERAPEUTIC ACTIVITIES: CPT

## 2017-09-21 PROCEDURE — 2700000000 HC OXYGEN THERAPY PER DAY

## 2017-09-21 PROCEDURE — G8979 MOBILITY GOAL STATUS: HCPCS

## 2017-09-21 PROCEDURE — 6360000002 HC RX W HCPCS: Performed by: INTERNAL MEDICINE

## 2017-09-21 PROCEDURE — 94640 AIRWAY INHALATION TREATMENT: CPT

## 2017-09-21 PROCEDURE — 2500000003 HC RX 250 WO HCPCS: Performed by: INTERNAL MEDICINE

## 2017-09-21 PROCEDURE — 97161 PT EVAL LOW COMPLEX 20 MIN: CPT

## 2017-09-21 PROCEDURE — G8978 MOBILITY CURRENT STATUS: HCPCS

## 2017-09-21 PROCEDURE — 2580000003 HC RX 258

## 2017-09-21 PROCEDURE — 80053 COMPREHEN METABOLIC PANEL: CPT

## 2017-09-21 PROCEDURE — 2060000000 HC ICU INTERMEDIATE R&B

## 2017-09-21 PROCEDURE — 97116 GAIT TRAINING THERAPY: CPT

## 2017-09-21 RX ORDER — GUAIFENESIN 600 MG/1
600 TABLET, EXTENDED RELEASE ORAL 2 TIMES DAILY
Status: DISCONTINUED | OUTPATIENT
Start: 2017-09-21 | End: 2017-09-24 | Stop reason: HOSPADM

## 2017-09-21 RX ORDER — LEVOFLOXACIN 250 MG/1
250 TABLET ORAL DAILY
Status: COMPLETED | OUTPATIENT
Start: 2017-09-22 | End: 2017-09-24

## 2017-09-21 RX ORDER — ALBUTEROL SULFATE 2.5 MG/3ML
2.5 SOLUTION RESPIRATORY (INHALATION) 3 TIMES DAILY
Status: DISCONTINUED | OUTPATIENT
Start: 2017-09-21 | End: 2017-09-24 | Stop reason: HOSPADM

## 2017-09-21 RX ADMIN — Medication 2 UNITS: at 17:28

## 2017-09-21 RX ADMIN — FAMOTIDINE 20 MG: 20 TABLET ORAL at 21:07

## 2017-09-21 RX ADMIN — MONTELUKAST SODIUM 10 MG: 10 TABLET, FILM COATED ORAL at 21:07

## 2017-09-21 RX ADMIN — DORZOLAMIDE HYDROCHLORIDE AND TIMOLOL MALEATE 1 DROP: 20; 5 SOLUTION/ DROPS OPHTHALMIC at 09:15

## 2017-09-21 RX ADMIN — AZTREONAM 1 G: 1 INJECTION, POWDER, LYOPHILIZED, FOR SOLUTION INTRAMUSCULAR; INTRAVENOUS at 05:33

## 2017-09-21 RX ADMIN — DORZOLAMIDE HYDROCHLORIDE AND TIMOLOL MALEATE 1 DROP: 20; 5 SOLUTION/ DROPS OPHTHALMIC at 21:08

## 2017-09-21 RX ADMIN — ALPRAZOLAM 0.25 MG: 0.25 TABLET ORAL at 21:11

## 2017-09-21 RX ADMIN — Medication 10 ML: at 09:16

## 2017-09-21 RX ADMIN — ALBUTEROL SULFATE 2.5 MG: 2.5 SOLUTION RESPIRATORY (INHALATION) at 20:25

## 2017-09-21 RX ADMIN — DILTIAZEM HYDROCHLORIDE 120 MG: 120 CAPSULE, COATED, EXTENDED RELEASE ORAL at 09:15

## 2017-09-21 RX ADMIN — BUMETANIDE 0.5 MG: 0.25 INJECTION INTRAMUSCULAR; INTRAVENOUS at 21:08

## 2017-09-21 RX ADMIN — ALBUTEROL SULFATE 2.5 MG: 2.5 SOLUTION RESPIRATORY (INHALATION) at 11:21

## 2017-09-21 RX ADMIN — APIXABAN 2.5 MG: 2.5 TABLET, FILM COATED ORAL at 09:15

## 2017-09-21 RX ADMIN — BUMETANIDE 0.5 MG: 0.25 INJECTION INTRAMUSCULAR; INTRAVENOUS at 09:15

## 2017-09-21 RX ADMIN — GABAPENTIN 100 MG: 100 CAPSULE ORAL at 21:07

## 2017-09-21 RX ADMIN — ATORVASTATIN CALCIUM 20 MG: 20 TABLET, FILM COATED ORAL at 21:07

## 2017-09-21 RX ADMIN — GLIMEPIRIDE 2 MG: 2 TABLET ORAL at 09:15

## 2017-09-21 RX ADMIN — APIXABAN 2.5 MG: 2.5 TABLET, FILM COATED ORAL at 21:07

## 2017-09-21 RX ADMIN — GUAIFENESIN 600 MG: 600 TABLET, EXTENDED RELEASE ORAL at 21:08

## 2017-09-21 RX ADMIN — MOMETASONE FUROATE AND FORMOTEROL FUMARATE DIHYDRATE 2 PUFF: 100; 5 AEROSOL RESPIRATORY (INHALATION) at 20:25

## 2017-09-21 RX ADMIN — Medication 2 UNITS: at 12:15

## 2017-09-21 RX ADMIN — INSULIN LISPRO 1 UNITS: 100 INJECTION, SOLUTION INTRAVENOUS; SUBCUTANEOUS at 21:13

## 2017-09-21 RX ADMIN — GABAPENTIN 100 MG: 100 CAPSULE ORAL at 09:15

## 2017-09-21 RX ADMIN — ALBUTEROL SULFATE 2.5 MG: 2.5 SOLUTION RESPIRATORY (INHALATION) at 07:26

## 2017-09-21 RX ADMIN — MOMETASONE FUROATE AND FORMOTEROL FUMARATE DIHYDRATE 2 PUFF: 100; 5 AEROSOL RESPIRATORY (INHALATION) at 07:26

## 2017-09-21 RX ADMIN — ALBUTEROL SULFATE 2.5 MG: 2.5 SOLUTION RESPIRATORY (INHALATION) at 15:03

## 2017-09-21 ASSESSMENT — PAIN SCALES - GENERAL: PAINLEVEL_OUTOF10: 0

## 2017-09-22 LAB
ABSOLUTE EOS #: 0.2 K/UL (ref 0–0.4)
ABSOLUTE LYMPH #: 3.7 K/UL (ref 1–4.8)
ABSOLUTE MONO #: 1.2 K/UL (ref 0.2–0.8)
ALBUMIN SERPL-MCNC: 3.3 G/DL (ref 3.5–5.2)
ALBUMIN/GLOBULIN RATIO: ABNORMAL (ref 1–2.5)
ALP BLD-CCNC: 86 U/L (ref 35–104)
ALT SERPL-CCNC: 33 U/L (ref 5–33)
ANION GAP SERPL CALCULATED.3IONS-SCNC: 12 MMOL/L (ref 9–17)
AST SERPL-CCNC: 25 U/L
BASOPHILS # BLD: 0 %
BASOPHILS ABSOLUTE: 0.1 K/UL (ref 0–0.2)
BILIRUB SERPL-MCNC: 0.38 MG/DL (ref 0.3–1.2)
BUN BLDV-MCNC: 26 MG/DL (ref 8–23)
BUN/CREAT BLD: 35 (ref 9–20)
CALCIUM IONIZED: 1.2 MMOL/L (ref 1.13–1.33)
CALCIUM SERPL-MCNC: 8.6 MG/DL (ref 8.6–10.4)
CHLORIDE BLD-SCNC: 103 MMOL/L (ref 98–107)
CO2: 26 MMOL/L (ref 20–31)
CREAT SERPL-MCNC: 0.74 MG/DL (ref 0.5–0.9)
DIFFERENTIAL TYPE: ABNORMAL
EOSINOPHILS RELATIVE PERCENT: 2 %
GFR AFRICAN AMERICAN: >60 ML/MIN
GFR NON-AFRICAN AMERICAN: >60 ML/MIN
GFR SERPL CREATININE-BSD FRML MDRD: ABNORMAL ML/MIN/{1.73_M2}
GFR SERPL CREATININE-BSD FRML MDRD: ABNORMAL ML/MIN/{1.73_M2}
GLUCOSE BLD-MCNC: 119 MG/DL (ref 70–99)
GLUCOSE BLD-MCNC: 179 MG/DL (ref 65–105)
GLUCOSE BLD-MCNC: 183 MG/DL (ref 65–105)
GLUCOSE BLD-MCNC: 199 MG/DL (ref 65–105)
HCT VFR BLD CALC: 41.3 % (ref 36–46)
HEMOGLOBIN: 13.6 G/DL (ref 12–16)
LYMPHOCYTES # BLD: 28 %
MAGNESIUM: 1.8 MG/DL (ref 1.6–2.6)
MCH RBC QN AUTO: 31.8 PG (ref 26–34)
MCHC RBC AUTO-ENTMCNC: 32.9 G/DL (ref 31–37)
MCV RBC AUTO: 96.5 FL (ref 80–100)
MONOCYTES # BLD: 9 %
PDW BLD-RTO: 14.1 % (ref 11.5–14.5)
PLATELET # BLD: 186 K/UL (ref 130–400)
PLATELET ESTIMATE: ABNORMAL
PMV BLD AUTO: 8.3 FL (ref 6–12)
POTASSIUM SERPL-SCNC: 3.4 MMOL/L (ref 3.7–5.3)
RBC # BLD: 4.28 M/UL (ref 4–5.2)
RBC # BLD: ABNORMAL 10*6/UL
SEG NEUTROPHILS: 61 %
SEGMENTED NEUTROPHILS ABSOLUTE COUNT: 7.9 K/UL (ref 1.8–7.7)
SODIUM BLD-SCNC: 141 MMOL/L (ref 135–144)
TOTAL PROTEIN: 5.7 G/DL (ref 6.4–8.3)
WBC # BLD: 13.1 K/UL (ref 3.5–11)
WBC # BLD: ABNORMAL 10*3/UL

## 2017-09-22 PROCEDURE — 6370000000 HC RX 637 (ALT 250 FOR IP): Performed by: INTERNAL MEDICINE

## 2017-09-22 PROCEDURE — 36415 COLL VENOUS BLD VENIPUNCTURE: CPT

## 2017-09-22 PROCEDURE — 82330 ASSAY OF CALCIUM: CPT

## 2017-09-22 PROCEDURE — 82947 ASSAY GLUCOSE BLOOD QUANT: CPT

## 2017-09-22 PROCEDURE — 2500000003 HC RX 250 WO HCPCS: Performed by: INTERNAL MEDICINE

## 2017-09-22 PROCEDURE — 80053 COMPREHEN METABOLIC PANEL: CPT

## 2017-09-22 PROCEDURE — 97530 THERAPEUTIC ACTIVITIES: CPT

## 2017-09-22 PROCEDURE — 83735 ASSAY OF MAGNESIUM: CPT

## 2017-09-22 PROCEDURE — 2580000003 HC RX 258

## 2017-09-22 PROCEDURE — 85025 COMPLETE CBC W/AUTO DIFF WBC: CPT

## 2017-09-22 PROCEDURE — 90670 PCV13 VACCINE IM: CPT | Performed by: INTERNAL MEDICINE

## 2017-09-22 PROCEDURE — 97116 GAIT TRAINING THERAPY: CPT

## 2017-09-22 PROCEDURE — 6360000002 HC RX W HCPCS: Performed by: INTERNAL MEDICINE

## 2017-09-22 PROCEDURE — 2060000000 HC ICU INTERMEDIATE R&B

## 2017-09-22 PROCEDURE — 94760 N-INVAS EAR/PLS OXIMETRY 1: CPT

## 2017-09-22 PROCEDURE — 94640 AIRWAY INHALATION TREATMENT: CPT

## 2017-09-22 PROCEDURE — G0009 ADMIN PNEUMOCOCCAL VACCINE: HCPCS | Performed by: INTERNAL MEDICINE

## 2017-09-22 RX ORDER — BUMETANIDE 1 MG/1
0.5 TABLET ORAL 2 TIMES DAILY
Status: DISCONTINUED | OUTPATIENT
Start: 2017-09-22 | End: 2017-09-24 | Stop reason: HOSPADM

## 2017-09-22 RX ORDER — POTASSIUM CHLORIDE 20 MEQ/1
40 TABLET, EXTENDED RELEASE ORAL ONCE
Status: COMPLETED | OUTPATIENT
Start: 2017-09-22 | End: 2017-09-22

## 2017-09-22 RX ORDER — POTASSIUM CHLORIDE 750 MG/1
20 CAPSULE, EXTENDED RELEASE ORAL ONCE
Status: COMPLETED | OUTPATIENT
Start: 2017-09-22 | End: 2017-09-22

## 2017-09-22 RX ADMIN — GUAIFENESIN 600 MG: 600 TABLET, EXTENDED RELEASE ORAL at 09:10

## 2017-09-22 RX ADMIN — ALPRAZOLAM 0.25 MG: 0.25 TABLET ORAL at 22:01

## 2017-09-22 RX ADMIN — ALBUTEROL SULFATE 2.5 MG: 2.5 SOLUTION RESPIRATORY (INHALATION) at 20:03

## 2017-09-22 RX ADMIN — APIXABAN 2.5 MG: 2.5 TABLET, FILM COATED ORAL at 09:10

## 2017-09-22 RX ADMIN — FAMOTIDINE 20 MG: 20 TABLET ORAL at 21:50

## 2017-09-22 RX ADMIN — POTASSIUM CHLORIDE 20 MEQ: 750 CAPSULE, EXTENDED RELEASE ORAL at 21:50

## 2017-09-22 RX ADMIN — POTASSIUM CHLORIDE 40 MEQ: 20 TABLET, EXTENDED RELEASE ORAL at 10:01

## 2017-09-22 RX ADMIN — Medication 10 ML: at 21:57

## 2017-09-22 RX ADMIN — BUMETANIDE 0.5 MG: 1 TABLET ORAL at 18:00

## 2017-09-22 RX ADMIN — GLIMEPIRIDE 2 MG: 2 TABLET ORAL at 09:10

## 2017-09-22 RX ADMIN — ATORVASTATIN CALCIUM 20 MG: 20 TABLET, FILM COATED ORAL at 21:50

## 2017-09-22 RX ADMIN — LEVOFLOXACIN 250 MG: 250 TABLET, FILM COATED ORAL at 09:10

## 2017-09-22 RX ADMIN — DORZOLAMIDE HYDROCHLORIDE AND TIMOLOL MALEATE 1 DROP: 20; 5 SOLUTION/ DROPS OPHTHALMIC at 09:09

## 2017-09-22 RX ADMIN — PNEUMOCOCCAL 13-VALENT CONJUGATE VACCINE 0.5 ML: 2.2; 2.2; 2.2; 2.2; 2.2; 4.4; 2.2; 2.2; 2.2; 2.2; 2.2; 2.2; 2.2 INJECTION, SUSPENSION INTRAMUSCULAR at 18:27

## 2017-09-22 RX ADMIN — Medication 1 UNITS: at 18:01

## 2017-09-22 RX ADMIN — BUMETANIDE 0.5 MG: 0.25 INJECTION INTRAMUSCULAR; INTRAVENOUS at 09:09

## 2017-09-22 RX ADMIN — DILTIAZEM HYDROCHLORIDE 120 MG: 120 CAPSULE, COATED, EXTENDED RELEASE ORAL at 09:10

## 2017-09-22 RX ADMIN — Medication 1 UNITS: at 13:09

## 2017-09-22 RX ADMIN — GABAPENTIN 100 MG: 100 CAPSULE ORAL at 21:50

## 2017-09-22 RX ADMIN — Medication 10 ML: at 09:23

## 2017-09-22 RX ADMIN — APIXABAN 2.5 MG: 2.5 TABLET, FILM COATED ORAL at 21:50

## 2017-09-22 RX ADMIN — MONTELUKAST SODIUM 10 MG: 10 TABLET, FILM COATED ORAL at 21:50

## 2017-09-22 RX ADMIN — GUAIFENESIN 600 MG: 600 TABLET, EXTENDED RELEASE ORAL at 21:50

## 2017-09-22 RX ADMIN — ALBUTEROL SULFATE 2.5 MG: 2.5 SOLUTION RESPIRATORY (INHALATION) at 15:27

## 2017-09-22 RX ADMIN — INSULIN LISPRO 1 UNITS: 100 INJECTION, SOLUTION INTRAVENOUS; SUBCUTANEOUS at 21:49

## 2017-09-22 RX ADMIN — DORZOLAMIDE HYDROCHLORIDE AND TIMOLOL MALEATE 1 DROP: 20; 5 SOLUTION/ DROPS OPHTHALMIC at 21:48

## 2017-09-22 RX ADMIN — GABAPENTIN 100 MG: 100 CAPSULE ORAL at 09:10

## 2017-09-22 ASSESSMENT — PAIN SCALES - GENERAL
PAINLEVEL_OUTOF10: 0
PAINLEVEL_OUTOF10: 0

## 2017-09-23 PROBLEM — I48.20 CHRONIC ATRIAL FIBRILLATION (HCC): Status: ACTIVE | Noted: 2017-09-23

## 2017-09-23 PROBLEM — E44.1 MILD MALNUTRITION (HCC): Status: ACTIVE | Noted: 2017-09-23

## 2017-09-23 PROBLEM — I27.20 PULMONARY HTN (HCC): Status: ACTIVE | Noted: 2017-09-23

## 2017-09-23 PROBLEM — E11.65 TYPE 2 DIABETES MELLITUS WITH HYPERGLYCEMIA (HCC): Status: ACTIVE | Noted: 2017-09-23

## 2017-09-23 PROBLEM — I50.43 ACUTE ON CHRONIC COMBINED SYSTOLIC AND DIASTOLIC CONGESTIVE HEART FAILURE (HCC): Status: ACTIVE | Noted: 2017-09-23

## 2017-09-23 PROBLEM — I34.0 NON-RHEUMATIC MITRAL REGURGITATION: Status: ACTIVE | Noted: 2017-09-23

## 2017-09-23 LAB
ABSOLUTE EOS #: 0.3 K/UL (ref 0–0.4)
ABSOLUTE LYMPH #: 3.3 K/UL (ref 1–4.8)
ABSOLUTE MONO #: 1.7 K/UL (ref 0.2–0.8)
ALBUMIN SERPL-MCNC: 3.6 G/DL (ref 3.5–5.2)
ALBUMIN/GLOBULIN RATIO: ABNORMAL (ref 1–2.5)
ALP BLD-CCNC: 89 U/L (ref 35–104)
ALT SERPL-CCNC: 31 U/L (ref 5–33)
ANION GAP SERPL CALCULATED.3IONS-SCNC: 13 MMOL/L (ref 9–17)
AST SERPL-CCNC: 26 U/L
BASOPHILS # BLD: 1 %
BASOPHILS ABSOLUTE: 0.1 K/UL (ref 0–0.2)
BILIRUB SERPL-MCNC: 0.66 MG/DL (ref 0.3–1.2)
BUN BLDV-MCNC: 26 MG/DL (ref 8–23)
BUN/CREAT BLD: 35 (ref 9–20)
CALCIUM IONIZED: 1.25 MMOL/L (ref 1.13–1.33)
CALCIUM SERPL-MCNC: 9.4 MG/DL (ref 8.6–10.4)
CHLORIDE BLD-SCNC: 102 MMOL/L (ref 98–107)
CO2: 26 MMOL/L (ref 20–31)
CREAT SERPL-MCNC: 0.75 MG/DL (ref 0.5–0.9)
DIFFERENTIAL TYPE: ABNORMAL
EOSINOPHILS RELATIVE PERCENT: 2 %
GFR AFRICAN AMERICAN: >60 ML/MIN
GFR NON-AFRICAN AMERICAN: >60 ML/MIN
GFR SERPL CREATININE-BSD FRML MDRD: ABNORMAL ML/MIN/{1.73_M2}
GFR SERPL CREATININE-BSD FRML MDRD: ABNORMAL ML/MIN/{1.73_M2}
GLUCOSE BLD-MCNC: 131 MG/DL (ref 65–105)
GLUCOSE BLD-MCNC: 143 MG/DL (ref 70–99)
GLUCOSE BLD-MCNC: 203 MG/DL (ref 65–105)
GLUCOSE BLD-MCNC: 223 MG/DL (ref 65–105)
GLUCOSE BLD-MCNC: 225 MG/DL (ref 65–105)
HCT VFR BLD CALC: 42.3 % (ref 36–46)
HEMOGLOBIN: 14.1 G/DL (ref 12–16)
LYMPHOCYTES # BLD: 22 %
MAGNESIUM: 1.8 MG/DL (ref 1.6–2.6)
MCH RBC QN AUTO: 32.2 PG (ref 26–34)
MCHC RBC AUTO-ENTMCNC: 33.3 G/DL (ref 31–37)
MCV RBC AUTO: 96.7 FL (ref 80–100)
MONOCYTES # BLD: 11 %
PDW BLD-RTO: 14.1 % (ref 11.5–14.5)
PLATELET # BLD: 207 K/UL (ref 130–400)
PLATELET ESTIMATE: ABNORMAL
PMV BLD AUTO: 8.5 FL (ref 6–12)
POTASSIUM SERPL-SCNC: 4.2 MMOL/L (ref 3.7–5.3)
RBC # BLD: 4.38 M/UL (ref 4–5.2)
RBC # BLD: ABNORMAL 10*6/UL
SEG NEUTROPHILS: 64 %
SEGMENTED NEUTROPHILS ABSOLUTE COUNT: 9.7 K/UL (ref 1.8–7.7)
SODIUM BLD-SCNC: 141 MMOL/L (ref 135–144)
TOTAL PROTEIN: 6.4 G/DL (ref 6.4–8.3)
WBC # BLD: 15.1 K/UL (ref 3.5–11)
WBC # BLD: ABNORMAL 10*3/UL

## 2017-09-23 PROCEDURE — 2060000000 HC ICU INTERMEDIATE R&B

## 2017-09-23 PROCEDURE — G8987 SELF CARE CURRENT STATUS: HCPCS

## 2017-09-23 PROCEDURE — 6370000000 HC RX 637 (ALT 250 FOR IP): Performed by: INTERNAL MEDICINE

## 2017-09-23 PROCEDURE — 83735 ASSAY OF MAGNESIUM: CPT

## 2017-09-23 PROCEDURE — 85025 COMPLETE CBC W/AUTO DIFF WBC: CPT

## 2017-09-23 PROCEDURE — 82330 ASSAY OF CALCIUM: CPT

## 2017-09-23 PROCEDURE — 82947 ASSAY GLUCOSE BLOOD QUANT: CPT

## 2017-09-23 PROCEDURE — G8988 SELF CARE GOAL STATUS: HCPCS

## 2017-09-23 PROCEDURE — 94762 N-INVAS EAR/PLS OXIMTRY CONT: CPT

## 2017-09-23 PROCEDURE — 2580000003 HC RX 258

## 2017-09-23 PROCEDURE — 97530 THERAPEUTIC ACTIVITIES: CPT

## 2017-09-23 PROCEDURE — 94640 AIRWAY INHALATION TREATMENT: CPT

## 2017-09-23 PROCEDURE — 97116 GAIT TRAINING THERAPY: CPT

## 2017-09-23 PROCEDURE — 36415 COLL VENOUS BLD VENIPUNCTURE: CPT

## 2017-09-23 PROCEDURE — 6360000002 HC RX W HCPCS: Performed by: INTERNAL MEDICINE

## 2017-09-23 PROCEDURE — 97535 SELF CARE MNGMENT TRAINING: CPT

## 2017-09-23 PROCEDURE — 97166 OT EVAL MOD COMPLEX 45 MIN: CPT

## 2017-09-23 PROCEDURE — 94620 HC 6-MINUTE WALK TEST/PULM STRESS TEST SIMPLE: CPT

## 2017-09-23 PROCEDURE — 80053 COMPREHEN METABOLIC PANEL: CPT

## 2017-09-23 RX ADMIN — GABAPENTIN 100 MG: 100 CAPSULE ORAL at 09:01

## 2017-09-23 RX ADMIN — ATORVASTATIN CALCIUM 20 MG: 20 TABLET, FILM COATED ORAL at 20:48

## 2017-09-23 RX ADMIN — Medication 2 UNITS: at 12:24

## 2017-09-23 RX ADMIN — INSULIN LISPRO 1 UNITS: 100 INJECTION, SOLUTION INTRAVENOUS; SUBCUTANEOUS at 20:58

## 2017-09-23 RX ADMIN — BUMETANIDE 0.5 MG: 1 TABLET ORAL at 09:02

## 2017-09-23 RX ADMIN — APIXABAN 2.5 MG: 2.5 TABLET, FILM COATED ORAL at 09:01

## 2017-09-23 RX ADMIN — GABAPENTIN 100 MG: 100 CAPSULE ORAL at 20:49

## 2017-09-23 RX ADMIN — Medication 10 ML: at 09:03

## 2017-09-23 RX ADMIN — DILTIAZEM HYDROCHLORIDE 120 MG: 120 CAPSULE, COATED, EXTENDED RELEASE ORAL at 09:01

## 2017-09-23 RX ADMIN — FAMOTIDINE 20 MG: 20 TABLET ORAL at 20:49

## 2017-09-23 RX ADMIN — Medication 2 UNITS: at 17:25

## 2017-09-23 RX ADMIN — DORZOLAMIDE HYDROCHLORIDE AND TIMOLOL MALEATE 1 DROP: 20; 5 SOLUTION/ DROPS OPHTHALMIC at 09:02

## 2017-09-23 RX ADMIN — GLIMEPIRIDE 2 MG: 2 TABLET ORAL at 09:01

## 2017-09-23 RX ADMIN — GUAIFENESIN 600 MG: 600 TABLET, EXTENDED RELEASE ORAL at 20:49

## 2017-09-23 RX ADMIN — ALBUTEROL SULFATE 2.5 MG: 2.5 SOLUTION RESPIRATORY (INHALATION) at 20:00

## 2017-09-23 RX ADMIN — LEVOFLOXACIN 250 MG: 250 TABLET, FILM COATED ORAL at 09:01

## 2017-09-23 RX ADMIN — APIXABAN 2.5 MG: 2.5 TABLET, FILM COATED ORAL at 20:48

## 2017-09-23 RX ADMIN — ALBUTEROL SULFATE 2.5 MG: 2.5 SOLUTION RESPIRATORY (INHALATION) at 14:15

## 2017-09-23 RX ADMIN — Medication 10 ML: at 20:49

## 2017-09-23 RX ADMIN — MONTELUKAST SODIUM 10 MG: 10 TABLET, FILM COATED ORAL at 20:49

## 2017-09-23 RX ADMIN — DORZOLAMIDE HYDROCHLORIDE AND TIMOLOL MALEATE 1 DROP: 20; 5 SOLUTION/ DROPS OPHTHALMIC at 20:49

## 2017-09-23 RX ADMIN — ALPRAZOLAM 0.25 MG: 0.25 TABLET ORAL at 20:49

## 2017-09-23 RX ADMIN — ALBUTEROL SULFATE 2.5 MG: 2.5 SOLUTION RESPIRATORY (INHALATION) at 09:45

## 2017-09-23 RX ADMIN — GUAIFENESIN 600 MG: 600 TABLET, EXTENDED RELEASE ORAL at 09:01

## 2017-09-24 ENCOUNTER — APPOINTMENT (OUTPATIENT)
Dept: GENERAL RADIOLOGY | Age: 82
DRG: 871 | End: 2017-09-24
Payer: MEDICARE

## 2017-09-24 VITALS
HEIGHT: 59 IN | WEIGHT: 102.95 LBS | HEART RATE: 91 BPM | TEMPERATURE: 98.1 F | OXYGEN SATURATION: 94 % | DIASTOLIC BLOOD PRESSURE: 54 MMHG | BODY MASS INDEX: 20.76 KG/M2 | RESPIRATION RATE: 20 BRPM | SYSTOLIC BLOOD PRESSURE: 95 MMHG

## 2017-09-24 PROBLEM — J20.9 ACUTE BRONCHITIS: Status: ACTIVE | Noted: 2017-09-24

## 2017-09-24 PROBLEM — E44.0 MODERATE MALNUTRITION (HCC): Status: ACTIVE | Noted: 2017-09-23

## 2017-09-24 LAB
ABSOLUTE EOS #: 0.3 K/UL (ref 0–0.4)
ABSOLUTE LYMPH #: 2.7 K/UL (ref 1–4.8)
ABSOLUTE MONO #: 1.5 K/UL (ref 0.2–0.8)
ALBUMIN SERPL-MCNC: 3.4 G/DL (ref 3.5–5.2)
ALBUMIN/GLOBULIN RATIO: ABNORMAL (ref 1–2.5)
ALP BLD-CCNC: 81 U/L (ref 35–104)
ALT SERPL-CCNC: 24 U/L (ref 5–33)
ANION GAP SERPL CALCULATED.3IONS-SCNC: 16 MMOL/L (ref 9–17)
AST SERPL-CCNC: 24 U/L
BASOPHILS # BLD: 0 %
BASOPHILS ABSOLUTE: 0 K/UL (ref 0–0.2)
BILIRUB SERPL-MCNC: 0.67 MG/DL (ref 0.3–1.2)
BUN BLDV-MCNC: 21 MG/DL (ref 8–23)
BUN/CREAT BLD: 30 (ref 9–20)
CALCIUM IONIZED: 1.26 MMOL/L (ref 1.13–1.33)
CALCIUM SERPL-MCNC: 9.2 MG/DL (ref 8.6–10.4)
CHLORIDE BLD-SCNC: 98 MMOL/L (ref 98–107)
CO2: 24 MMOL/L (ref 20–31)
CREAT SERPL-MCNC: 0.69 MG/DL (ref 0.5–0.9)
CULTURE: NORMAL
DIFFERENTIAL TYPE: ABNORMAL
EOSINOPHILS RELATIVE PERCENT: 2 %
GFR AFRICAN AMERICAN: >60 ML/MIN
GFR NON-AFRICAN AMERICAN: >60 ML/MIN
GFR SERPL CREATININE-BSD FRML MDRD: ABNORMAL ML/MIN/{1.73_M2}
GFR SERPL CREATININE-BSD FRML MDRD: ABNORMAL ML/MIN/{1.73_M2}
GLUCOSE BLD-MCNC: 122 MG/DL (ref 70–99)
GLUCOSE BLD-MCNC: 134 MG/DL (ref 65–105)
GLUCOSE BLD-MCNC: 352 MG/DL (ref 65–105)
HCT VFR BLD CALC: 42.5 % (ref 36–46)
HEMOGLOBIN: 14.2 G/DL (ref 12–16)
LYMPHOCYTES # BLD: 20 %
Lab: NORMAL
Lab: NORMAL
MAGNESIUM: 1.8 MG/DL (ref 1.6–2.6)
MCH RBC QN AUTO: 32.4 PG (ref 26–34)
MCHC RBC AUTO-ENTMCNC: 33.5 G/DL (ref 31–37)
MCV RBC AUTO: 96.9 FL (ref 80–100)
MONOCYTES # BLD: 11 %
PDW BLD-RTO: 14.1 % (ref 11.5–14.5)
PLATELET # BLD: 213 K/UL (ref 130–400)
PLATELET ESTIMATE: ABNORMAL
PMV BLD AUTO: 7.9 FL (ref 6–12)
POTASSIUM SERPL-SCNC: 4 MMOL/L (ref 3.7–5.3)
RBC # BLD: 4.39 M/UL (ref 4–5.2)
RBC # BLD: ABNORMAL 10*6/UL
SEG NEUTROPHILS: 67 %
SEGMENTED NEUTROPHILS ABSOLUTE COUNT: 9.4 K/UL (ref 1.8–7.7)
SODIUM BLD-SCNC: 138 MMOL/L (ref 135–144)
SPECIMEN DESCRIPTION: NORMAL
STATUS: NORMAL
STATUS: NORMAL
TOTAL PROTEIN: 6.4 G/DL (ref 6.4–8.3)
WBC # BLD: 13.9 K/UL (ref 3.5–11)
WBC # BLD: ABNORMAL 10*3/UL

## 2017-09-24 PROCEDURE — 6370000000 HC RX 637 (ALT 250 FOR IP): Performed by: INTERNAL MEDICINE

## 2017-09-24 PROCEDURE — 36415 COLL VENOUS BLD VENIPUNCTURE: CPT

## 2017-09-24 PROCEDURE — 80053 COMPREHEN METABOLIC PANEL: CPT

## 2017-09-24 PROCEDURE — 82947 ASSAY GLUCOSE BLOOD QUANT: CPT

## 2017-09-24 PROCEDURE — 83735 ASSAY OF MAGNESIUM: CPT

## 2017-09-24 PROCEDURE — 82330 ASSAY OF CALCIUM: CPT

## 2017-09-24 PROCEDURE — 2580000003 HC RX 258

## 2017-09-24 PROCEDURE — 85025 COMPLETE CBC W/AUTO DIFF WBC: CPT

## 2017-09-24 PROCEDURE — 71010 XR CHEST PORTABLE: CPT

## 2017-09-24 RX ORDER — GUAIFENESIN 600 MG/1
600 TABLET, EXTENDED RELEASE ORAL 2 TIMES DAILY
Qty: 20 TABLET | Refills: 0 | Status: SHIPPED | OUTPATIENT
Start: 2017-09-24 | End: 2018-08-31

## 2017-09-24 RX ORDER — DILTIAZEM HYDROCHLORIDE 120 MG/1
120 CAPSULE, COATED, EXTENDED RELEASE ORAL DAILY
Qty: 30 CAPSULE | Refills: 0 | Status: SHIPPED | OUTPATIENT
Start: 2017-09-24 | End: 2018-08-31 | Stop reason: DRUGHIGH

## 2017-09-24 RX ORDER — ALBUTEROL SULFATE 90 UG/1
2 AEROSOL, METERED RESPIRATORY (INHALATION) EVERY 6 HOURS PRN
Qty: 1 INHALER | Refills: 3 | Status: SHIPPED | OUTPATIENT
Start: 2017-09-24

## 2017-09-24 RX ORDER — BUMETANIDE 0.5 MG/1
0.5 TABLET ORAL 2 TIMES DAILY
Qty: 60 TABLET | Refills: 0 | Status: SHIPPED | OUTPATIENT
Start: 2017-09-24 | End: 2018-08-31 | Stop reason: DRUGHIGH

## 2017-09-24 RX ORDER — LEVOFLOXACIN 250 MG/1
250 TABLET ORAL DAILY
Qty: 4 TABLET | Refills: 0 | Status: SHIPPED | OUTPATIENT
Start: 2017-09-24 | End: 2017-10-04

## 2017-09-24 RX ADMIN — BUMETANIDE 0.5 MG: 1 TABLET ORAL at 08:49

## 2017-09-24 RX ADMIN — GUAIFENESIN 600 MG: 600 TABLET, EXTENDED RELEASE ORAL at 08:49

## 2017-09-24 RX ADMIN — APIXABAN 2.5 MG: 2.5 TABLET, FILM COATED ORAL at 08:49

## 2017-09-24 RX ADMIN — GLIMEPIRIDE 2 MG: 2 TABLET ORAL at 08:49

## 2017-09-24 RX ADMIN — Medication 5 UNITS: at 12:55

## 2017-09-24 RX ADMIN — LEVOFLOXACIN 250 MG: 250 TABLET, FILM COATED ORAL at 08:49

## 2017-09-24 RX ADMIN — DILTIAZEM HYDROCHLORIDE 120 MG: 120 CAPSULE, COATED, EXTENDED RELEASE ORAL at 08:49

## 2017-09-24 RX ADMIN — Medication 10 ML: at 09:50

## 2017-09-24 RX ADMIN — GABAPENTIN 100 MG: 100 CAPSULE ORAL at 08:49

## 2017-09-24 RX ADMIN — DORZOLAMIDE HYDROCHLORIDE AND TIMOLOL MALEATE 1 DROP: 20; 5 SOLUTION/ DROPS OPHTHALMIC at 08:49

## 2017-09-25 LAB
EKG ATRIAL RATE: 133 BPM
EKG Q-T INTERVAL: 310 MS
EKG QRS DURATION: 72 MS
EKG QTC CALCULATION (BAZETT): 436 MS
EKG R AXIS: -20 DEGREES
EKG T AXIS: -40 DEGREES
EKG VENTRICULAR RATE: 119 BPM

## 2018-04-12 PROBLEM — R77.8 ELEVATED TROPONIN: Status: RESOLVED | Noted: 2017-09-18 | Resolved: 2018-04-12

## 2018-05-09 ENCOUNTER — HOSPITAL ENCOUNTER (OUTPATIENT)
Age: 83
Discharge: HOME | End: 2018-05-09
Payer: MEDICARE

## 2018-05-09 LAB
-: NORMAL
ABSOLUTE EOS #: 0.3 K/UL (ref 0–0.4)
ABSOLUTE IMMATURE GRANULOCYTE: ABNORMAL K/UL (ref 0–0.3)
ABSOLUTE LYMPH #: 2.7 K/UL (ref 1–4.8)
ABSOLUTE MONO #: 1.2 K/UL (ref 0.2–0.8)
ALBUMIN SERPL-MCNC: 4.3 G/DL (ref 3.5–5.2)
ALBUMIN/GLOBULIN RATIO: ABNORMAL (ref 1–2.5)
ALP BLD-CCNC: 96 U/L (ref 35–104)
ALT SERPL-CCNC: 14 U/L (ref 5–33)
AMORPHOUS: NORMAL
ANION GAP SERPL CALCULATED.3IONS-SCNC: 16 MMOL/L (ref 9–17)
AST SERPL-CCNC: 23 U/L
BACTERIA: NORMAL
BASOPHILS # BLD: 1 % (ref 0–2)
BASOPHILS ABSOLUTE: 0 K/UL (ref 0–0.2)
BILIRUB SERPL-MCNC: 0.51 MG/DL (ref 0.3–1.2)
BILIRUBIN URINE: NEGATIVE
BUN BLDV-MCNC: 25 MG/DL (ref 8–23)
BUN/CREAT BLD: 28 (ref 9–20)
CALCIUM SERPL-MCNC: 9.4 MG/DL (ref 8.6–10.4)
CASTS UA: NORMAL /LPF
CHLORIDE BLD-SCNC: 99 MMOL/L (ref 98–107)
CHOLESTEROL/HDL RATIO: 1.7
CHOLESTEROL: 132 MG/DL
CO2: 26 MMOL/L (ref 20–31)
COLOR: YELLOW
COMMENT UA: ABNORMAL
CREAT SERPL-MCNC: 0.9 MG/DL (ref 0.5–0.9)
CRYSTALS, UA: NORMAL /HPF
DIFFERENTIAL TYPE: ABNORMAL
EOSINOPHILS RELATIVE PERCENT: 2 % (ref 1–4)
EPITHELIAL CELLS UA: NORMAL /HPF (ref 0–5)
ESTIMATED AVERAGE GLUCOSE: 163 MG/DL
GFR AFRICAN AMERICAN: >60 ML/MIN
GFR NON-AFRICAN AMERICAN: 58 ML/MIN
GFR SERPL CREATININE-BSD FRML MDRD: ABNORMAL ML/MIN/{1.73_M2}
GFR SERPL CREATININE-BSD FRML MDRD: ABNORMAL ML/MIN/{1.73_M2}
GLUCOSE BLD-MCNC: 138 MG/DL (ref 70–99)
GLUCOSE URINE: NEGATIVE
HBA1C MFR BLD: 7.3 % (ref 4–6)
HCT VFR BLD CALC: 41.8 % (ref 36–46)
HDLC SERPL-MCNC: 80 MG/DL
HEMOGLOBIN: 13.7 G/DL (ref 12–16)
IMMATURE GRANULOCYTES: ABNORMAL %
KETONES, URINE: NEGATIVE
LDL CHOLESTEROL: 35 MG/DL (ref 0–130)
LEUKOCYTE ESTERASE, URINE: NEGATIVE
LYMPHOCYTES # BLD: 26 % (ref 24–44)
MCH RBC QN AUTO: 32.2 PG (ref 26–34)
MCHC RBC AUTO-ENTMCNC: 32.7 G/DL (ref 31–37)
MCV RBC AUTO: 98.5 FL (ref 80–100)
MONOCYTES # BLD: 12 % (ref 1–7)
MUCUS: NORMAL
NITRITE, URINE: NEGATIVE
NRBC AUTOMATED: ABNORMAL PER 100 WBC
OTHER OBSERVATIONS UA: NORMAL
PDW BLD-RTO: 14.8 % (ref 11.5–14.5)
PH UA: 8 (ref 5–8)
PLATELET # BLD: 201 K/UL (ref 130–400)
PLATELET ESTIMATE: ABNORMAL
PMV BLD AUTO: 8.1 FL (ref 6–12)
POTASSIUM SERPL-SCNC: 4.3 MMOL/L (ref 3.7–5.3)
PROTEIN UA: ABNORMAL
RBC # BLD: 4.25 M/UL (ref 4–5.2)
RBC # BLD: ABNORMAL 10*6/UL
RBC UA: NORMAL /HPF (ref 0–2)
RENAL EPITHELIAL, UA: NORMAL /HPF
SEG NEUTROPHILS: 59 % (ref 36–66)
SEGMENTED NEUTROPHILS ABSOLUTE COUNT: 6.2 K/UL (ref 1.8–7.7)
SODIUM BLD-SCNC: 141 MMOL/L (ref 135–144)
SPECIFIC GRAVITY UA: 1.01 (ref 1–1.03)
TOTAL PROTEIN: 7.5 G/DL (ref 6.4–8.3)
TRICHOMONAS: NORMAL
TRIGL SERPL-MCNC: 85 MG/DL
TSH SERPL DL<=0.05 MIU/L-ACNC: 1.06 MIU/L (ref 0.3–5)
TURBIDITY: CLEAR
URINE HGB: ABNORMAL
UROBILINOGEN, URINE: NORMAL
VITAMIN D 25-HYDROXY: 80.1 NG/ML (ref 30–100)
VLDLC SERPL CALC-MCNC: NORMAL MG/DL (ref 1–30)
WBC # BLD: 10.5 K/UL (ref 3.5–11)
WBC # BLD: ABNORMAL 10*3/UL
WBC UA: NORMAL /HPF (ref 0–5)
YEAST: NORMAL

## 2018-05-09 PROCEDURE — 80053 COMPREHEN METABOLIC PANEL: CPT

## 2018-05-09 PROCEDURE — 80061 LIPID PANEL: CPT

## 2018-05-09 PROCEDURE — 85025 COMPLETE CBC W/AUTO DIFF WBC: CPT

## 2018-05-09 PROCEDURE — 81001 URINALYSIS AUTO W/SCOPE: CPT

## 2018-05-09 PROCEDURE — 84443 ASSAY THYROID STIM HORMONE: CPT

## 2018-05-09 PROCEDURE — 36415 COLL VENOUS BLD VENIPUNCTURE: CPT

## 2018-05-09 PROCEDURE — 82306 VITAMIN D 25 HYDROXY: CPT

## 2018-05-09 PROCEDURE — 83036 HEMOGLOBIN GLYCOSYLATED A1C: CPT

## 2018-08-31 ENCOUNTER — HOSPITAL ENCOUNTER (INPATIENT)
Age: 83
LOS: 5 days | Discharge: HOME HEALTH | DRG: 291 | End: 2018-09-05
Attending: EMERGENCY MEDICINE | Admitting: FAMILY MEDICINE
Payer: MEDICARE

## 2018-08-31 ENCOUNTER — APPOINTMENT (OUTPATIENT)
Dept: GENERAL RADIOLOGY | Age: 83
DRG: 291 | End: 2018-08-31
Payer: MEDICARE

## 2018-08-31 DIAGNOSIS — I50.9 ACUTE ON CHRONIC CONGESTIVE HEART FAILURE, UNSPECIFIED HEART FAILURE TYPE (HCC): Primary | ICD-10-CM

## 2018-08-31 LAB
-: ABNORMAL
ABSOLUTE EOS #: 0 K/UL (ref 0–0.4)
ABSOLUTE IMMATURE GRANULOCYTE: ABNORMAL K/UL (ref 0–0.3)
ABSOLUTE LYMPH #: 3.32 K/UL (ref 1–4.8)
ABSOLUTE MONO #: 0.95 K/UL (ref 0.2–0.8)
AMORPHOUS: ABNORMAL
ANION GAP SERPL CALCULATED.3IONS-SCNC: 16 MMOL/L (ref 9–17)
BACTERIA: ABNORMAL
BASOPHILS # BLD: 0 %
BASOPHILS ABSOLUTE: 0 K/UL (ref 0–0.2)
BILIRUBIN URINE: NEGATIVE
BNP INTERPRETATION: ABNORMAL
BUN BLDV-MCNC: 16 MG/DL (ref 8–23)
BUN/CREAT BLD: 25 (ref 9–20)
C-PEPTIDE: 1.7 NG/ML (ref 1.1–4.4)
CALCIUM SERPL-MCNC: 8.4 MG/DL (ref 8.6–10.4)
CASTS UA: ABNORMAL /LPF
CHLORIDE BLD-SCNC: 98 MMOL/L (ref 98–107)
CO2: 24 MMOL/L (ref 20–31)
COLOR: YELLOW
COMMENT UA: ABNORMAL
CREAT SERPL-MCNC: 0.65 MG/DL (ref 0.5–0.9)
CRYSTALS, UA: ABNORMAL /HPF
DIFFERENTIAL TYPE: ABNORMAL
EKG ATRIAL RATE: 131 BPM
EKG Q-T INTERVAL: 310 MS
EKG QRS DURATION: 72 MS
EKG QTC CALCULATION (BAZETT): 404 MS
EKG R AXIS: -20 DEGREES
EKG T AXIS: -127 DEGREES
EKG VENTRICULAR RATE: 102 BPM
EOSINOPHILS RELATIVE PERCENT: 0 % (ref 1–4)
EPITHELIAL CELLS UA: ABNORMAL /HPF (ref 0–5)
GFR AFRICAN AMERICAN: >60 ML/MIN
GFR NON-AFRICAN AMERICAN: >60 ML/MIN
GFR SERPL CREATININE-BSD FRML MDRD: ABNORMAL ML/MIN/{1.73_M2}
GFR SERPL CREATININE-BSD FRML MDRD: ABNORMAL ML/MIN/{1.73_M2}
GLUCOSE BLD-MCNC: 207 MG/DL (ref 70–99)
GLUCOSE BLD-MCNC: 298 MG/DL (ref 65–105)
GLUCOSE BLD-MCNC: 299 MG/DL (ref 65–105)
GLUCOSE URINE: NEGATIVE
HCT VFR BLD CALC: 36.1 % (ref 36–46)
HEMOGLOBIN: 11.5 G/DL (ref 12–16)
IMMATURE GRANULOCYTES: ABNORMAL %
KETONES, URINE: NEGATIVE
LEUKOCYTE ESTERASE, URINE: NEGATIVE
LV EF: 65 %
LVEF MODALITY: NORMAL
LYMPHOCYTES # BLD: 14 % (ref 24–44)
MCH RBC QN AUTO: 31.8 PG (ref 26–34)
MCHC RBC AUTO-ENTMCNC: 31.8 G/DL (ref 31–37)
MCV RBC AUTO: 99.9 FL (ref 80–100)
MONOCYTES # BLD: 4 % (ref 1–7)
MUCUS: ABNORMAL
NITRITE, URINE: NEGATIVE
NRBC AUTOMATED: ABNORMAL PER 100 WBC
OTHER OBSERVATIONS UA: ABNORMAL
PDW BLD-RTO: 16.2 % (ref 11.5–14.5)
PH UA: 8 (ref 5–8)
PLATELET # BLD: 235 K/UL (ref 130–400)
PLATELET ESTIMATE: ABNORMAL
PMV BLD AUTO: 7.7 FL (ref 6–12)
POTASSIUM SERPL-SCNC: 5 MMOL/L (ref 3.7–5.3)
PRO-BNP: 2380 PG/ML
PROTEIN UA: ABNORMAL
RBC # BLD: 3.61 M/UL (ref 4–5.2)
RBC # BLD: ABNORMAL 10*6/UL
RBC UA: ABNORMAL /HPF (ref 0–2)
RENAL EPITHELIAL, UA: ABNORMAL /HPF
SEG NEUTROPHILS: 82 % (ref 36–66)
SEGMENTED NEUTROPHILS ABSOLUTE COUNT: 19.43 K/UL (ref 1.8–7.7)
SODIUM BLD-SCNC: 138 MMOL/L (ref 135–144)
SPECIFIC GRAVITY UA: 1.02 (ref 1–1.03)
TRICHOMONAS: ABNORMAL
TROPONIN INTERP: NORMAL
TROPONIN T: <0.03 NG/ML
TURBIDITY: CLEAR
URINE HGB: NEGATIVE
UROBILINOGEN, URINE: NORMAL
WBC # BLD: 23.7 K/UL (ref 3.5–11)
WBC # BLD: ABNORMAL 10*3/UL
WBC UA: ABNORMAL /HPF (ref 0–5)
YEAST: ABNORMAL

## 2018-08-31 PROCEDURE — 94660 CPAP INITIATION&MGMT: CPT

## 2018-08-31 PROCEDURE — 96374 THER/PROPH/DIAG INJ IV PUSH: CPT

## 2018-08-31 PROCEDURE — 51702 INSERT TEMP BLADDER CATH: CPT

## 2018-08-31 PROCEDURE — 94761 N-INVAS EAR/PLS OXIMETRY MLT: CPT

## 2018-08-31 PROCEDURE — 71045 X-RAY EXAM CHEST 1 VIEW: CPT

## 2018-08-31 PROCEDURE — 93306 TTE W/DOPPLER COMPLETE: CPT

## 2018-08-31 PROCEDURE — 2500000003 HC RX 250 WO HCPCS: Performed by: INTERNAL MEDICINE

## 2018-08-31 PROCEDURE — 84484 ASSAY OF TROPONIN QUANT: CPT

## 2018-08-31 PROCEDURE — 6370000000 HC RX 637 (ALT 250 FOR IP): Performed by: INTERNAL MEDICINE

## 2018-08-31 PROCEDURE — 6370000000 HC RX 637 (ALT 250 FOR IP): Performed by: FAMILY MEDICINE

## 2018-08-31 PROCEDURE — 6360000002 HC RX W HCPCS: Performed by: EMERGENCY MEDICINE

## 2018-08-31 PROCEDURE — 99285 EMERGENCY DEPT VISIT HI MDM: CPT

## 2018-08-31 PROCEDURE — 94640 AIRWAY INHALATION TREATMENT: CPT

## 2018-08-31 PROCEDURE — 82947 ASSAY GLUCOSE BLOOD QUANT: CPT

## 2018-08-31 PROCEDURE — 83036 HEMOGLOBIN GLYCOSYLATED A1C: CPT

## 2018-08-31 PROCEDURE — 81001 URINALYSIS AUTO W/SCOPE: CPT

## 2018-08-31 PROCEDURE — 80048 BASIC METABOLIC PNL TOTAL CA: CPT

## 2018-08-31 PROCEDURE — 2500000003 HC RX 250 WO HCPCS: Performed by: EMERGENCY MEDICINE

## 2018-08-31 PROCEDURE — 83880 ASSAY OF NATRIURETIC PEPTIDE: CPT

## 2018-08-31 PROCEDURE — 2580000003 HC RX 258: Performed by: INTERNAL MEDICINE

## 2018-08-31 PROCEDURE — 85025 COMPLETE CBC W/AUTO DIFF WBC: CPT

## 2018-08-31 PROCEDURE — 93005 ELECTROCARDIOGRAM TRACING: CPT

## 2018-08-31 PROCEDURE — 2060000000 HC ICU INTERMEDIATE R&B

## 2018-08-31 PROCEDURE — 84681 ASSAY OF C-PEPTIDE: CPT

## 2018-08-31 RX ORDER — NICOTINE POLACRILEX 4 MG
15 LOZENGE BUCCAL PRN
Status: DISCONTINUED | OUTPATIENT
Start: 2018-08-31 | End: 2018-09-05 | Stop reason: HOSPADM

## 2018-08-31 RX ORDER — ALBUTEROL SULFATE 2.5 MG/3ML
5 SOLUTION RESPIRATORY (INHALATION)
Status: DISCONTINUED | OUTPATIENT
Start: 2018-08-31 | End: 2018-08-31

## 2018-08-31 RX ORDER — DILTIAZEM HYDROCHLORIDE 180 MG/1
180 CAPSULE, EXTENDED RELEASE ORAL DAILY
Status: ON HOLD | COMMUNITY
End: 2019-04-09 | Stop reason: HOSPADM

## 2018-08-31 RX ORDER — ONDANSETRON 4 MG/1
4 TABLET, ORALLY DISINTEGRATING ORAL EVERY 6 HOURS PRN
Status: DISCONTINUED | OUTPATIENT
Start: 2018-08-31 | End: 2018-09-05 | Stop reason: HOSPADM

## 2018-08-31 RX ORDER — BISACODYL 10 MG
10 SUPPOSITORY, RECTAL RECTAL DAILY PRN
Status: DISCONTINUED | OUTPATIENT
Start: 2018-08-31 | End: 2018-08-31

## 2018-08-31 RX ORDER — DILTIAZEM HYDROCHLORIDE 5 MG/ML
15 INJECTION INTRAVENOUS ONCE
Status: COMPLETED | OUTPATIENT
Start: 2018-08-31 | End: 2018-08-31

## 2018-08-31 RX ORDER — SODIUM CHLORIDE 0.9 % (FLUSH) 0.9 %
10 SYRINGE (ML) INJECTION PRN
Status: DISCONTINUED | OUTPATIENT
Start: 2018-08-31 | End: 2018-09-05 | Stop reason: HOSPADM

## 2018-08-31 RX ORDER — NICOTINE 21 MG/24HR
1 PATCH, TRANSDERMAL 24 HOURS TRANSDERMAL DAILY PRN
Status: DISCONTINUED | OUTPATIENT
Start: 2018-08-31 | End: 2018-08-31

## 2018-08-31 RX ORDER — ALBUTEROL SULFATE 2.5 MG/3ML
2.5 SOLUTION RESPIRATORY (INHALATION)
Status: DISCONTINUED | OUTPATIENT
Start: 2018-08-31 | End: 2018-08-31

## 2018-08-31 RX ORDER — LISINOPRIL 10 MG/1
5 TABLET ORAL DAILY
Status: DISCONTINUED | OUTPATIENT
Start: 2018-09-01 | End: 2018-08-31

## 2018-08-31 RX ORDER — CARVEDILOL 6.25 MG/1
6.25 TABLET ORAL 2 TIMES DAILY WITH MEALS
Status: DISCONTINUED | OUTPATIENT
Start: 2018-08-31 | End: 2018-09-05 | Stop reason: HOSPADM

## 2018-08-31 RX ORDER — IPRATROPIUM BROMIDE AND ALBUTEROL SULFATE 2.5; .5 MG/3ML; MG/3ML
1 SOLUTION RESPIRATORY (INHALATION)
Status: DISCONTINUED | OUTPATIENT
Start: 2018-08-31 | End: 2018-08-31

## 2018-08-31 RX ORDER — SODIUM CHLORIDE 0.9 % (FLUSH) 0.9 %
10 SYRINGE (ML) INJECTION PRN
Status: DISCONTINUED | OUTPATIENT
Start: 2018-08-31 | End: 2018-08-31

## 2018-08-31 RX ORDER — ALBUTEROL SULFATE 90 UG/1
2 AEROSOL, METERED RESPIRATORY (INHALATION)
Status: DISCONTINUED | OUTPATIENT
Start: 2018-08-31 | End: 2018-08-31

## 2018-08-31 RX ORDER — ONDANSETRON 2 MG/ML
4 INJECTION INTRAMUSCULAR; INTRAVENOUS EVERY 6 HOURS PRN
Status: DISCONTINUED | OUTPATIENT
Start: 2018-08-31 | End: 2018-09-05 | Stop reason: HOSPADM

## 2018-08-31 RX ORDER — HYDROCODONE BITARTRATE AND ACETAMINOPHEN 5; 325 MG/1; MG/1
1 TABLET ORAL EVERY 4 HOURS PRN
Status: DISCONTINUED | OUTPATIENT
Start: 2018-08-31 | End: 2018-08-31

## 2018-08-31 RX ORDER — FAMOTIDINE 20 MG/1
40 TABLET, FILM COATED ORAL DAILY
Status: DISCONTINUED | OUTPATIENT
Start: 2018-08-31 | End: 2018-08-31

## 2018-08-31 RX ORDER — BRIMONIDINE TARTRATE 2 MG/ML
1 SOLUTION/ DROPS OPHTHALMIC 2 TIMES DAILY
COMMUNITY

## 2018-08-31 RX ORDER — DEXTROSE MONOHYDRATE 50 MG/ML
100 INJECTION, SOLUTION INTRAVENOUS PRN
Status: DISCONTINUED | OUTPATIENT
Start: 2018-08-31 | End: 2018-09-05 | Stop reason: HOSPADM

## 2018-08-31 RX ORDER — ACETAMINOPHEN 325 MG/1
650 TABLET ORAL EVERY 4 HOURS PRN
Status: DISCONTINUED | OUTPATIENT
Start: 2018-08-31 | End: 2018-08-31

## 2018-08-31 RX ORDER — NICOTINE POLACRILEX 4 MG
15 LOZENGE BUCCAL PRN
Status: DISCONTINUED | OUTPATIENT
Start: 2018-08-31 | End: 2018-08-31

## 2018-08-31 RX ORDER — GLIMEPIRIDE 2 MG/1
2 TABLET ORAL
Status: DISCONTINUED | OUTPATIENT
Start: 2018-08-31 | End: 2018-08-31

## 2018-08-31 RX ORDER — RISEDRONATE SODIUM 35 MG/1
35 TABLET, FILM COATED ORAL
Status: DISCONTINUED | OUTPATIENT
Start: 2018-08-31 | End: 2018-08-31

## 2018-08-31 RX ORDER — DORZOLAMIDE HYDROCHLORIDE AND TIMOLOL MALEATE 20; 5 MG/ML; MG/ML
1 SOLUTION/ DROPS OPHTHALMIC 2 TIMES DAILY
Status: DISCONTINUED | OUTPATIENT
Start: 2018-08-31 | End: 2018-08-31

## 2018-08-31 RX ORDER — DEXTROSE MONOHYDRATE 50 MG/ML
100 INJECTION, SOLUTION INTRAVENOUS PRN
Status: DISCONTINUED | OUTPATIENT
Start: 2018-08-31 | End: 2018-08-31

## 2018-08-31 RX ORDER — DEXTROSE MONOHYDRATE 25 G/50ML
12.5 INJECTION, SOLUTION INTRAVENOUS PRN
Status: DISCONTINUED | OUTPATIENT
Start: 2018-08-31 | End: 2018-08-31

## 2018-08-31 RX ORDER — SODIUM CHLORIDE 0.9 % (FLUSH) 0.9 %
10 SYRINGE (ML) INJECTION EVERY 12 HOURS SCHEDULED
Status: DISCONTINUED | OUTPATIENT
Start: 2018-08-31 | End: 2018-09-05 | Stop reason: HOSPADM

## 2018-08-31 RX ORDER — METFORMIN HYDROCHLORIDE 500 MG/1
500 TABLET, EXTENDED RELEASE ORAL 2 TIMES DAILY WITH MEALS
Status: ON HOLD | COMMUNITY
End: 2018-09-05 | Stop reason: HOSPADM

## 2018-08-31 RX ORDER — FUROSEMIDE 10 MG/ML
40 INJECTION INTRAMUSCULAR; INTRAVENOUS 2 TIMES DAILY
Status: DISCONTINUED | OUTPATIENT
Start: 2018-08-31 | End: 2018-08-31

## 2018-08-31 RX ORDER — DOCUSATE SODIUM 100 MG/1
100 CAPSULE, LIQUID FILLED ORAL 2 TIMES DAILY
Status: DISCONTINUED | OUTPATIENT
Start: 2018-08-31 | End: 2018-08-31

## 2018-08-31 RX ORDER — ACETAMINOPHEN AND CODEINE PHOSPHATE 300; 60 MG/1; MG/1
1 TABLET ORAL DAILY PRN
Status: ON HOLD | COMMUNITY
End: 2019-06-25 | Stop reason: HOSPADM

## 2018-08-31 RX ORDER — ATORVASTATIN CALCIUM 20 MG/1
20 TABLET, FILM COATED ORAL DAILY
Status: DISCONTINUED | OUTPATIENT
Start: 2018-08-31 | End: 2018-08-31

## 2018-08-31 RX ORDER — GABAPENTIN 100 MG/1
100 CAPSULE ORAL 2 TIMES DAILY
Status: DISCONTINUED | OUTPATIENT
Start: 2018-08-31 | End: 2018-08-31

## 2018-08-31 RX ORDER — BUMETANIDE 0.25 MG/ML
1 INJECTION, SOLUTION INTRAMUSCULAR; INTRAVENOUS DAILY
Status: DISCONTINUED | OUTPATIENT
Start: 2018-08-31 | End: 2018-09-02

## 2018-08-31 RX ORDER — IPRATROPIUM BROMIDE AND ALBUTEROL SULFATE 2.5; .5 MG/3ML; MG/3ML
1 SOLUTION RESPIRATORY (INHALATION) EVERY 4 HOURS PRN
Status: DISCONTINUED | OUTPATIENT
Start: 2018-08-31 | End: 2018-09-03

## 2018-08-31 RX ORDER — BUMETANIDE 0.25 MG/ML
1 INJECTION, SOLUTION INTRAMUSCULAR; INTRAVENOUS ONCE
Status: COMPLETED | OUTPATIENT
Start: 2018-08-31 | End: 2018-08-31

## 2018-08-31 RX ORDER — DEXTROSE MONOHYDRATE 25 G/50ML
12.5 INJECTION, SOLUTION INTRAVENOUS PRN
Status: DISCONTINUED | OUTPATIENT
Start: 2018-08-31 | End: 2018-09-05 | Stop reason: HOSPADM

## 2018-08-31 RX ORDER — LISINOPRIL 5 MG/1
5 TABLET ORAL DAILY
Status: DISCONTINUED | OUTPATIENT
Start: 2018-08-31 | End: 2018-08-31

## 2018-08-31 RX ORDER — MAGNESIUM OXIDE 400 MG/1
400 TABLET ORAL 2 TIMES DAILY
Status: DISCONTINUED | OUTPATIENT
Start: 2018-08-31 | End: 2018-08-31

## 2018-08-31 RX ORDER — MONTELUKAST SODIUM 10 MG/1
10 TABLET ORAL NIGHTLY
Status: DISCONTINUED | OUTPATIENT
Start: 2018-08-31 | End: 2018-08-31

## 2018-08-31 RX ORDER — MULTIVIT-MIN/IRON/FOLIC ACID/K 18-600-40
2000 CAPSULE ORAL 2 TIMES DAILY
Status: DISCONTINUED | OUTPATIENT
Start: 2018-08-31 | End: 2018-08-31

## 2018-08-31 RX ORDER — BUMETANIDE 1 MG/1
0.5 TABLET ORAL 2 TIMES DAILY
Status: DISCONTINUED | OUTPATIENT
Start: 2018-08-31 | End: 2018-08-31

## 2018-08-31 RX ORDER — SODIUM CHLORIDE 0.9 % (FLUSH) 0.9 %
10 SYRINGE (ML) INJECTION EVERY 12 HOURS SCHEDULED
Status: DISCONTINUED | OUTPATIENT
Start: 2018-08-31 | End: 2018-08-31

## 2018-08-31 RX ORDER — BUMETANIDE 1 MG/1
1 TABLET ORAL DAILY
Status: ON HOLD | COMMUNITY
End: 2019-06-25 | Stop reason: HOSPADM

## 2018-08-31 RX ORDER — ONDANSETRON 2 MG/ML
4 INJECTION INTRAMUSCULAR; INTRAVENOUS EVERY 6 HOURS PRN
Status: DISCONTINUED | OUTPATIENT
Start: 2018-08-31 | End: 2018-08-31

## 2018-08-31 RX ORDER — DILTIAZEM HYDROCHLORIDE 120 MG/1
120 CAPSULE, COATED, EXTENDED RELEASE ORAL DAILY
Status: DISCONTINUED | OUTPATIENT
Start: 2018-08-31 | End: 2018-08-31

## 2018-08-31 RX ADMIN — INSULIN LISPRO 3 UNITS: 100 INJECTION, SOLUTION INTRAVENOUS; SUBCUTANEOUS at 21:28

## 2018-08-31 RX ADMIN — CARVEDILOL 6.25 MG: 6.25 TABLET, FILM COATED ORAL at 17:56

## 2018-08-31 RX ADMIN — ALBUTEROL SULFATE 5 MG: 5 SOLUTION RESPIRATORY (INHALATION) at 05:25

## 2018-08-31 RX ADMIN — BUMETANIDE 1 MG: 0.25 INJECTION INTRAMUSCULAR; INTRAVENOUS at 17:55

## 2018-08-31 RX ADMIN — INSULIN LISPRO 6 UNITS: 100 INJECTION, SOLUTION INTRAVENOUS; SUBCUTANEOUS at 17:56

## 2018-08-31 RX ADMIN — IPRATROPIUM BROMIDE AND ALBUTEROL SULFATE 1 AMPULE: .5; 3 SOLUTION RESPIRATORY (INHALATION) at 21:37

## 2018-08-31 RX ADMIN — DILTIAZEM HYDROCHLORIDE 5 MG/HR: 5 INJECTION INTRAVENOUS at 16:36

## 2018-08-31 RX ADMIN — DILTIAZEM HYDROCHLORIDE 15 MG: 5 INJECTION INTRAVENOUS at 16:15

## 2018-08-31 RX ADMIN — APIXABAN 2.5 MG: 2.5 TABLET, FILM COATED ORAL at 21:28

## 2018-08-31 RX ADMIN — BUMETANIDE 1 MG: 0.25 INJECTION INTRAMUSCULAR; INTRAVENOUS at 06:13

## 2018-08-31 RX ADMIN — ALBUTEROL SULFATE 5 MG: 5 SOLUTION RESPIRATORY (INHALATION) at 05:15

## 2018-08-31 ASSESSMENT — PAIN SCALES - GENERAL: PAINLEVEL_OUTOF10: 0

## 2018-08-31 NOTE — PROGRESS NOTES
Transitions of Care Pharmacy Service   Medication Review    The patient's list of current home medications has been reviewed. Source(s) of information: Patient, [facility], [facility]     Based on information provided by the above source(s), I have updated the patient's home med list as described below. Please review the ACTION REQUESTED BY PHYSICIAN section of this note for any discrepancies on current hospital orders. I changed or updated the following medications on the patient's home medication list:  Discontinued · NA     Added · Brimonidine 0.2% eye drops 1 drop in both eyes twice daily      Adjusted   · Tylenol #3 changed from 1 tab every 4 hours as needed for pain to Tylenol #4 1 tab three times daily as needed for pain   · Bumex 0.5mg twice daily changed to Bumex 1mg daily   · Diltiazem 120mg daily changed to Diltiazem 60mg 12hr ER once daily (exact capsule verified with [facility])   · Metformin 500mg tab twice daily changed to Metformin 500mg ER tab twice daily with meals      Other Notes · Patient has a incruse elipta inhaler at home filled 8/22/18 but does not use because she \"does not like it\"  · Patient states that they take Gabapentin 100mg capsules 1 twice daily but last fill was 11/7/17 #90           PHYSICIAN ACTION REQUESTED  Discrepancies on current hospital orders that need to be addressed by a physician:    Medication Action Requested        NA         Please feel free to call me with any questions about this encounter. Thank you. This note will be reviewed and co-signed by the [facility] Pharmacist.    Felicity King, PharmD student  [facility] [place] Pharmacy Service  Phone:  [phone]  Fax: [phone]      Electronically signed by Felicity King on 8/31/2018 at 9:51 AM       Prior to Admission medications    Medication Sig Start Date End Date Taking?  Authorizing Provider   acetaminophen-codeine (TYLENOL/CODEINE #4) 300-60 MG per tablet Take 1 tablet by mouth 3 times

## 2018-08-31 NOTE — CONSULTS
nondistended, no masses or organomegaly  Neurologic - CN II-XII are grossly intact. There are no focal motor or sensory deficits  Skin - No bruising or bleeding  Extremities - No cyanosis, clubbing or edema    Labs  - Old records and notes have been reviewed in [facility]   CBC   Lab Results   Component Value Date    WBC 23.7 08/31/2018    RBC 3.61 08/31/2018    HGB 11.5 08/31/2018    HCT 36.1 08/31/2018     08/31/2018    MCV 99.9 08/31/2018    MCH 31.8 08/31/2018    MCHC 31.8 08/31/2018    RDW 16.2 08/31/2018    LYMPHOPCT 14 08/31/2018    MONOPCT 4 08/31/2018    BASOPCT 0 08/31/2018    MONOSABS 0.95 08/31/2018    LYMPHSABS 3.32 08/31/2018    EOSABS 0.00 08/31/2018    BASOSABS 0.00 08/31/2018    DIFFTYPE NOT REPORTED 08/31/2018     BMP Lab Results   Component Value Date     08/31/2018    K 5.0 08/31/2018    CL 98 08/31/2018    CO2 24 08/31/2018    BUN 16 08/31/2018    CREATININE 0.65 08/31/2018    GLUCOSE 207 08/31/2018    GLUCOSE 117 09/02/2011    CALCIUM 8.4 08/31/2018    MG 1.8 09/24/2017     LFTS  Lab Results   Component Value Date    ALKPHOS 96 05/09/2018    ALT 14 05/09/2018    AST 23 05/09/2018    PROT 7.5 05/09/2018    BILITOT 0.51 05/09/2018    LABALBU 4.3 05/09/2018     ABG   No results found for: PHART, PSB3JPB, PO2ART, RKW6ZRF, BEART, THGBART, ZXE7ITX, T2HIYKYF  PTT  Lab Results   Component Value Date    APTT 32.2 (H) 09/18/2017     INR   Lab Results   Component Value Date    INR 1.1 09/19/2017    INR 1.1 09/18/2017    PROTIME 11.9 (H) 09/19/2017    PROTIME 11.5 09/18/2017       Radiology    CXR  8/31/18     (See actual reports for details)    \"Thank you for asking us to see this patient\"    Case discussed with nurse and patient. Questions and concerns addressed.     Electronically signed by     Po Hilario MD on 8/31/2018 at 1:41 PM  Pulmonary Critical Care and Sleep Medicine,  [facility]  Cell: [phone]  Office: [phone]

## 2018-08-31 NOTE — CONSULTS
Section of Cardiology   Consult Note      Reason for Consult:  Congestive heart failure  Requesting Physician: Cary Jonas MD    CHIEF COMPLAINT:  Shortness of breath    History Obtained From:  patient, family member - daughter, electronic medical record    HISTORY OF PRESENT ILLNESS:      The patient is a 80 y.o. female with significant past medical history of atrial fibrillation who presents with palpitation and increased swelling lower extremities. The patient follows with Dr. Alberta Mcdermott, she noticed increased swelling of lower extremities. There was increased dyspnea during last night. She was not able to sleep well and not able to lay flat. There was also increased swelling in both lower extremities patient on the left side. Denies any chest pain. She has palpitation. She has no dizziness or syncope or falls. Denies deviation from her medication or low-salt diet. No bleeding. No recent falls. She has no nausea vomiting or diaphoresis. She does not know if she gained weight. In the emergency room the patient received IV Bumex. She feels better but not completely back to normal.    Previous diagnostic testing for coronary artery disease includes: echocardiogram.   Previous history of cardiac disease includes: atrial fibrillation and CHF. Coronary artery disease risk factors include: advanced age (older than 54 for men, 72 for women) and sedentary lifestyle.     Past Medical History:    Past Medical History:   Diagnosis Date    Arthritis     [facility]     CAD (coronary artery disease)     CHF (congestive heart failure) (HCC)     COPD (chronic obstructive pulmonary disease) [facility]     Diabetes mellitus ([facility]     [facility]     Hyperlipidemia     Hypertension      Past Surgical History:    Past Surgical History:   Procedure Laterality Date    TOTAL HIP ARTHROPLASTY Left      Home Medications:  Prior to Admission medications    Medication Sig Start Date End Date Taking? Authorizing Provider   acetaminophen-codeine (TYLENOL/CODEINE #4) 300-60 MG per tablet Take 1 tablet by mouth 3 times daily as needed for Pain. .   Yes Historical Provider, MD   bumetanide (BUMEX) 1 MG tablet Take 1 mg by mouth daily   Yes Historical Provider, MD   diltiazem (CARDIZEM 12 HR) 60 MG extended release capsule Take 60 mg by mouth daily   Yes Historical Provider, MD   metFORMIN (GLUCOPHAGE XR) 500 MG extended release tablet Take 500 mg by mouth 2 times daily (with meals)   Yes Historical Provider, MD   brimonidine (ALPHAGAN) 0.2 % ophthalmic solution Place 1 drop into both eyes 2 times daily   Yes Historical Provider, MD   albuterol sulfate HFA (PROAIR HFA) 108 (90 Base) MCG/ACT inhaler Inhale 2 puffs into the lungs every 6 hours as needed for Wheezing 9/24/17  Yes Amada Bentley MD   dorzolamide-timolol (COSOPT) 22.3-6.8 MG/ML ophthalmic solution Place 1 drop into both eyes 2 times daily 8/25/17  Yes Historical Provider, MD   glimepiride (AMARYL) 2 MG tablet Take 2 mg by mouth every morning (before breakfast)   Yes Historical Provider, MD   gabapentin (NEURONTIN) 100 MG capsule Take 100 mg by mouth 2 times daily   Yes Historical Provider, MD   montelukast (SINGULAIR) 10 MG tablet Take 10 mg by mouth nightly   Yes Historical Provider, MD   atorvastatin (LIPITOR) 20 MG tablet Take 20 mg by mouth daily   Yes Historical Provider, MD   apixaban (ELIQUIS) 2.5 MG TABS tablet Take 2.5 mg by mouth 2 times daily    Yes Historical Provider, MD   famotidine (PEPCID) 40 MG tablet Take 40 mg by mouth daily   Yes Historical Provider, MD   Cholecalciferol (VITAMIN D) 2000 units CAPS capsule Take 2,000 Units by mouth 2 times daily    Yes Historical Provider, MD   magnesium oxide (MAG-OX) 400 MG tablet Take 400 mg by mouth 2 times daily   Yes Historical Provider, MD   risedronate (ACTONEL) 35 MG tablet Take 35 mg by mouth every 7 days   Yes Historical Provider, MD     Current Medications:    Current TSH 1.06 05/09/2018     BMP:  Lab Results   Component Value Date     08/31/2018    K 5.0 08/31/2018    CL 98 08/31/2018    CO2 24 08/31/2018    BUN 16 08/31/2018    LABALBU 4.3 05/09/2018    CREATININE 0.65 08/31/2018    CALCIUM 8.4 08/31/2018    GFRAA >60 08/31/2018    LABGLOM >60 08/31/2018    GLUCOSE 207 08/31/2018    GLUCOSE 117 09/02/2011     LIVER PROFILE:No results for input(s): AST, ALT, LABALBU, ALKPHOS, BILITOT, BILIDIR, IBILI, PROT, GLOB, ALBUMIN in the last 72 hours. FLP:  Lab Results   Component Value Date    CHOL 132 05/09/2018    TRIG 85 05/09/2018    HDL 80 05/09/2018    LDLCHOLESTEROL 35 05/09/2018       IMPRESSION  Patient Active Problem List   Diagnosis    Pneumonia    Sepsis ([address]    Moderate malnutrition ([address]    Pulmonary HTN ([address]    Non-rheumatic mitral regurgitation    Acute on chronic combined systolic and diastolic congestive heart failure (HCC)    Chronic atrial fibrillation (HCC)    Type 2 diabetes mellitus with hyperglycemia (HCC)    Acute bronchitis    Acute congestive heart failure ([address]           RECOMMENDATIONS:     Continue current medications  Management plan was discussed with patient     Medical therapy will be reinstituted. I will hold off on her oral Cardizem and start IV Cardizem with IV Cardizem bolus for better rate control which will be helpful in treating her CHF as well. Start IV Bumex 1 mg daily and adjusted according to her response  Add Coreg to her regimen due to diastolic CHF  Continue anticoagulation  CHF teaching  I told the daughter to weigh her mom on daily basis and if there is increased more than 2 pounds per day to give extra dose of diuretic. Case discussed with the patient's nurse. Thank you for the consultation.       Electronically signed by Blanche Grover MD on 8/31/2018 at 4:02 PM     CC: Dianne Katz MD

## 2018-08-31 NOTE — H&P
4' 3\" (1.295 m)   Wt 110 lb (49.9 kg)   SpO2 90%   BMI 29.73 kg/m²     CONSTITUTIONAL:  awake, alert, cooperative, no apparent distress, and appears stated age  EYES:  Lids and lashes normal, pupils equal, round and reactive to light, extra ocular muscles intact, sclera clear, conjunctiva normal  ENT:  Normocephalic, without obvious abnormality, atraumatic, sinuses nontender on palpation, external ears without lesions, oral pharynx with moist mucus membranes, tonsils without erythema or exudates, gums normal and good dentition. NECK:  Supple, symmetrical, trachea midline, no adenopathy, thyroid symmetric, not enlarged and no tenderness, skin normal  HEMATOLOGIC/LYMPHATICS:  no cervical lymphadenopathy and no supraclavicular lymphadenopathy  BACK:  Symmetric, no curvature, spinous processes are non-tender on palpation, paraspinous muscles are non-tender on palpation, no costal vertebral tenderness  LUNGS:  No increased work of breathing, good air exchange, clear to auscultation bilaterally, no crackles or wheezing  CARDIOVASCULAR:  Irregularly irregular rithm  ABDOMEN:  No scars, normal bowel sounds, soft, non-distended, non-tender, no masses palpated, no hepatosplenomegally  CHEST/BREASTS:  deferred  GENITAL/URINARY:  deferred  MUSCULOSKELETAL:  There is no redness, warmth, or swelling of the joints. Full range of motion noted. Motor strength is 5 out of 5 all extremities bilaterally. Tone is normal.  NEUROLOGIC:  Awake, alert, oriented to name, place and time. Cranial nerves II-XII are grossly intact. Motor is 5 out of 5 bilaterally. Cerebellar finger to nose, heel to shin intact. Sensory is intact.   Babinski down going, Romberg negative, and gait is normal.  SKIN:  normal skin color, texture, turgor  RECTAL: deferred    Labs:  Hematology:  Recent Labs      08/31/18   0528   WBC  23.7*   HGB  11.5*   HCT  36.1   PLT  235     Chemistry:  Recent Labs      08/31/18 0528   NA  138   K  5.0   CL  98   CO2

## 2018-08-31 NOTE — ED PROVIDER NOTES
ophthalmic solution Place 1 drop into both eyes 2 times daily      glimepiride (AMARYL) 2 MG tablet Take 2 mg by mouth every morning (before breakfast)      gabapentin (NEURONTIN) 100 MG capsule Take 100 mg by mouth 2 times daily      montelukast (SINGULAIR) 10 MG tablet Take 10 mg by mouth nightly      atorvastatin (LIPITOR) 20 MG tablet Take 20 mg by mouth daily      apixaban (ELIQUIS) 2.5 MG TABS tablet Take 2.5 mg by mouth 2 times daily       famotidine (PEPCID) 40 MG tablet Take 40 mg by mouth daily      Cholecalciferol (VITAMIN D) 2000 units CAPS capsule Take 2,000 Units by mouth 2 times daily       magnesium oxide (MAG-OX) 400 MG tablet Take 400 mg by mouth 2 times daily      risedronate (ACTONEL) 35 MG tablet Take 35 mg by mouth every 7 days             PAST MEDICAL HISTORY         Diagnosis Date    Arthritis     [facility]     CAD (coronary artery disease)     [facility]     COPD [facility]chronic obstructive [facility] disease) [facility]     Diabetes mellitus ([facility]     [facility]     Hyperlipidemia     Hypertension        SURGICAL HISTORY           Procedure Laterality Date    TOTAL HIP ARTHROPLASTY Left        FAMILY HISTORY     History reviewed. No pertinent family history. No family status information on file. SOCIAL HISTORY      reports that she has never smoked. She has never used smokeless tobacco. She reports that she does not drink alcohol or use drugs. REVIEW OF SYSTEMS    (2-9 systems for level 4, 10 or more for level 5)     Review of Systems  GEN: no fevers  CV: No CP  Pulm: +SOB, +wheezing, No chest tightness, No cough  GI: No abdominal pain, No Nausea, No Vomiting  MSK: No msk pain, No msk injuries    Except as noted above the remainder of the review of systems was reviewed and negative.      PHYSICAL EXAM    (up to 7 for level 4, 8 or more for level 5)     ED Triage Vitals   BP Temp Temp src Pulse Resp SpO2 Height Weight   -- -- -- -- -- -- -- --     Physical Exam   Constitutional: She is oriented to person, place, and time. She appears well-developed and well-nourished. No distress. HENT:   Head: Normocephalic and atraumatic. Eyes: EOM are normal.   Neck: Normal range of motion. Neck supple. Cardiovascular: Normal rate, regular rhythm, normal heart sounds and intact distal pulses. Pulmonary/Chest: No respiratory distress. She has wheezes (end expiratory). Prolonged expiratory phase   Abdominal: Soft. There is no tenderness. Musculoskeletal: Normal range of motion. She exhibits edema (b/l 2+ LE edema up to upper shins). She exhibits no deformity. Neurological: She is alert and oriented to person, place, and time. Skin: Skin is warm and dry. Psychiatric: She has a normal mood and affect. Her behavior is normal. Judgment and thought content normal.   Nursing note and vitals reviewed. DIAGNOSTIC RESULTS     EKG: All EKG's are interpreted by the Emergency Department Physician who either signs or Co-signs this chart in the absence of a cardiologist.    Interpretation: Atrial fibrillation with a rate of 101. Normal Axis. Normal intervals. No ST elevations. RADIOLOGY:   Non-plain film images such as CT, Ultrasound and MRI are read by the radiologist. Plain radiographic images are visualized and preliminarily interpreted by the emergency physician with the below findings:    Interpretation per the Radiologist below, if available at the time of this note:    XR CHEST PORTABLE   Final Result   Findings compatible with moderate CHF. Moderate bilateral pleural effusions   and bibasilar airspace disease are identified.          XR CHEST PORTABLE    (Results Pending)     ED BEDSIDE ULTRASOUND:   Performed by ED Physician - none    LABS:  Labs Reviewed   CBC WITH AUTO DIFFERENTIAL - Abnormal; Notable for the following:        Result Value    WBC 23.7 (*)     RBC 3.61 (*)     Hemoglobin 11.5 (*)     RDW 16.2 (*)     Seg Neutrophils has good O2 saturations. She is already received a dose of steroids for possible COPD he has a component of her shortness of breath. We'll admit to the inpatient unit for further care. CONSULTS:  IP CONSULT TO DIETITIAN  IP CONSULT TO CARDIOLOGY    PROCEDURES:  CRITICAL CARE TIME     Due to the high probability of sudden and clinically significant deterioration in the patient's condition she required highest level of my preparedness to intervene urgently. I provided critical care time including documentation time, medication orders and management, reevaluation, vital sign assessment, ordering and reviewing of of lab tests ordering and reviewing of x-ray studies, and admission orders. Aggregate critical care time is between 45-60 minutes including only time during which I was engaged in work directly related to her care and did not include time spent treating other patients simultaneously. FINAL IMPRESSION      1.  Acute on chronic congestive heart failure, unspecified heart failure type ([facility]        DISPOSITION/PLAN   DISPOSITION      (Please note that portions of this note were completed with a voice recognition program.  Efforts were made to edit the dictations but occasionally words are mis-transcribed.)    Reno Acosta MD  Attending Emergency Physician     Reno Acosta MD  08/31/18 2018

## 2018-08-31 NOTE — FLOWSHEET NOTE
Patient not available having a procedure done. Writer gave a prayer of comfort and rest.    Spiritual care will follow up as needed.      08/31/18 1619   Encounter Summary   Services provided to: Patient   Referral/Consult From: [facility] Family members   Continue Visiting (8/31/2018)   Complexity of Encounter Low   Length of Encounter 15 minutes   Routine   Type Initial   Assessment (Patient not available)   Intervention Prayer

## 2018-09-01 ENCOUNTER — APPOINTMENT (OUTPATIENT)
Dept: GENERAL RADIOLOGY | Age: 83
DRG: 291 | End: 2018-09-01
Payer: MEDICARE

## 2018-09-01 LAB
ABSOLUTE EOS #: 0 K/UL (ref 0–0.4)
ABSOLUTE IMMATURE GRANULOCYTE: ABNORMAL K/UL (ref 0–0.3)
ABSOLUTE LYMPH #: 1.2 K/UL (ref 1–4.8)
ABSOLUTE MONO #: 1.4 K/UL (ref 0.2–0.8)
ANION GAP SERPL CALCULATED.3IONS-SCNC: 14 MMOL/L (ref 9–17)
BASOPHILS # BLD: 0 % (ref 0–2)
BASOPHILS ABSOLUTE: 0 K/UL (ref 0–0.2)
BNP INTERPRETATION: ABNORMAL
BUN BLDV-MCNC: 23 MG/DL (ref 8–23)
BUN/CREAT BLD: 26 (ref 9–20)
CALCIUM SERPL-MCNC: 9.1 MG/DL (ref 8.6–10.4)
CHLORIDE BLD-SCNC: 96 MMOL/L (ref 98–107)
CO2: 29 MMOL/L (ref 20–31)
CREAT SERPL-MCNC: 0.87 MG/DL (ref 0.5–0.9)
DIFFERENTIAL TYPE: ABNORMAL
EOSINOPHILS RELATIVE PERCENT: 0 % (ref 1–4)
GFR AFRICAN AMERICAN: >60 ML/MIN
GFR NON-AFRICAN AMERICAN: >60 ML/MIN
GFR SERPL CREATININE-BSD FRML MDRD: ABNORMAL ML/MIN/{1.73_M2}
GFR SERPL CREATININE-BSD FRML MDRD: ABNORMAL ML/MIN/{1.73_M2}
GLUCOSE BLD-MCNC: 173 MG/DL (ref 65–105)
GLUCOSE BLD-MCNC: 208 MG/DL (ref 70–99)
GLUCOSE BLD-MCNC: 253 MG/DL (ref 65–105)
GLUCOSE BLD-MCNC: 87 MG/DL (ref 65–105)
HCT VFR BLD CALC: 37.9 % (ref 36–46)
HEMOGLOBIN: 12.3 G/DL (ref 12–16)
IMMATURE GRANULOCYTES: ABNORMAL %
LYMPHOCYTES # BLD: 9 % (ref 24–44)
MAGNESIUM: 2.2 MG/DL (ref 1.6–2.6)
MCH RBC QN AUTO: 32.2 PG (ref 26–34)
MCHC RBC AUTO-ENTMCNC: 32.3 G/DL (ref 31–37)
MCV RBC AUTO: 99.4 FL (ref 80–100)
MONOCYTES # BLD: 10 % (ref 1–7)
NRBC AUTOMATED: ABNORMAL PER 100 WBC
PDW BLD-RTO: 16.5 % (ref 11.5–14.5)
PLATELET # BLD: 261 K/UL (ref 130–400)
PLATELET ESTIMATE: ABNORMAL
PMV BLD AUTO: 7.6 FL (ref 6–12)
POTASSIUM SERPL-SCNC: 4.9 MMOL/L (ref 3.7–5.3)
PRO-BNP: 4536 PG/ML
RBC # BLD: 3.81 M/UL (ref 4–5.2)
RBC # BLD: ABNORMAL 10*6/UL
SEG NEUTROPHILS: 81 % (ref 36–66)
SEGMENTED NEUTROPHILS ABSOLUTE COUNT: 10.9 K/UL (ref 1.8–7.7)
SODIUM BLD-SCNC: 139 MMOL/L (ref 135–144)
WBC # BLD: 13.5 K/UL (ref 3.5–11)
WBC # BLD: ABNORMAL 10*3/UL

## 2018-09-01 PROCEDURE — 83880 ASSAY OF NATRIURETIC PEPTIDE: CPT

## 2018-09-01 PROCEDURE — 82947 ASSAY GLUCOSE BLOOD QUANT: CPT

## 2018-09-01 PROCEDURE — 85025 COMPLETE CBC W/AUTO DIFF WBC: CPT

## 2018-09-01 PROCEDURE — 71045 X-RAY EXAM CHEST 1 VIEW: CPT

## 2018-09-01 PROCEDURE — 2060000000 HC ICU INTERMEDIATE R&B

## 2018-09-01 PROCEDURE — 2500000003 HC RX 250 WO HCPCS: Performed by: INTERNAL MEDICINE

## 2018-09-01 PROCEDURE — 36415 COLL VENOUS BLD VENIPUNCTURE: CPT

## 2018-09-01 PROCEDURE — 83735 ASSAY OF MAGNESIUM: CPT

## 2018-09-01 PROCEDURE — 6360000002 HC RX W HCPCS

## 2018-09-01 PROCEDURE — 6370000000 HC RX 637 (ALT 250 FOR IP): Performed by: FAMILY MEDICINE

## 2018-09-01 PROCEDURE — 6370000000 HC RX 637 (ALT 250 FOR IP): Performed by: INTERNAL MEDICINE

## 2018-09-01 PROCEDURE — 80048 BASIC METABOLIC PNL TOTAL CA: CPT

## 2018-09-01 PROCEDURE — 2580000003 HC RX 258: Performed by: FAMILY MEDICINE

## 2018-09-01 RX ORDER — DILTIAZEM HYDROCHLORIDE 60 MG/1
60 CAPSULE, EXTENDED RELEASE ORAL DAILY
Status: DISCONTINUED | OUTPATIENT
Start: 2018-09-01 | End: 2018-09-05 | Stop reason: HOSPADM

## 2018-09-01 RX ORDER — INSULIN GLARGINE 100 [IU]/ML
15 INJECTION, SOLUTION SUBCUTANEOUS EVERY MORNING
Status: DISCONTINUED | OUTPATIENT
Start: 2018-09-01 | End: 2018-09-05 | Stop reason: HOSPADM

## 2018-09-01 RX ADMIN — INSULIN LISPRO 1 UNITS: 100 INJECTION, SOLUTION INTRAVENOUS; SUBCUTANEOUS at 21:20

## 2018-09-01 RX ADMIN — BUMETANIDE 1 MG: 0.25 INJECTION INTRAMUSCULAR; INTRAVENOUS at 07:56

## 2018-09-01 RX ADMIN — INSULIN LISPRO 6 UNITS: 100 INJECTION, SOLUTION INTRAVENOUS; SUBCUTANEOUS at 13:47

## 2018-09-01 RX ADMIN — INSULIN LISPRO 2 UNITS: 100 INJECTION, SOLUTION INTRAVENOUS; SUBCUTANEOUS at 09:48

## 2018-09-01 RX ADMIN — DILTIAZEM HYDROCHLORIDE 60 MG: 60 CAPSULE, EXTENDED RELEASE ORAL at 12:02

## 2018-09-01 RX ADMIN — CARVEDILOL 6.25 MG: 6.25 TABLET, FILM COATED ORAL at 07:56

## 2018-09-01 RX ADMIN — APIXABAN 2.5 MG: 2.5 TABLET, FILM COATED ORAL at 07:56

## 2018-09-01 RX ADMIN — ONDANSETRON 4 MG: 2 INJECTION INTRAMUSCULAR; INTRAVENOUS at 13:22

## 2018-09-01 RX ADMIN — CARVEDILOL 6.25 MG: 6.25 TABLET, FILM COATED ORAL at 18:00

## 2018-09-01 RX ADMIN — APIXABAN 2.5 MG: 2.5 TABLET, FILM COATED ORAL at 21:19

## 2018-09-01 RX ADMIN — INSULIN GLARGINE 15 UNITS: 100 INJECTION, SOLUTION SUBCUTANEOUS at 13:47

## 2018-09-01 RX ADMIN — Medication 10 ML: at 07:56

## 2018-09-01 ASSESSMENT — PAIN SCALES - GENERAL: PAINLEVEL_OUTOF10: 0

## 2018-09-01 NOTE — PROGRESS NOTES
[facility] Cardiology  Attending Progress Note    Chief Complaint: Shortness of breath    S: In bed, comfortable, denies chest pain, breathing is improving    O: Comfortable and not in distress    BP (!) 141/57   Pulse 88   Temp 98.1 °F (36.7 °C) (Oral)   Resp 16   Ht 4' 3\" (1.295 m)   Wt 110 lb (49.9 kg)   SpO2 92%   BMI 29.73 kg/m²     Intake/Output Summary (Last 24 hours) at 09/01/18 1059  Last data filed at 09/01/18 0947   Gross per 24 hour   Intake              680 ml   Output             2025 ml   Net            -1345 ml       CONSTITUTIONAL:  awake, alert, cooperative, no apparent distress, and appears stated age  NECK:  No JVD  LUNGS:  No crackles or wheezes. Clear to auscultation. CARDIOVASCULAR:  Irregular, S1 plus S2 with no gallops appreciated. 1/6 systolic murmur in the left sternal border. ABDOMEN:  Nontender with positive bowel sounds. MUSCULOSKELETAL:  Trace edema in both lower extremities. No tenderness. NEUROLOGIC:  Awake, alert, oriented ×3. No gross deficit.     Labs:  Lab Results   Component Value Date    WBC 13.5 09/01/2018    HGB 12.3 09/01/2018     09/01/2018     Lab Results   Component Value Date     09/01/2018    K 4.9 09/01/2018    CO2 29 09/01/2018    BUN 23 09/01/2018    CREATININE 0.87 09/01/2018     Lab Results   Component Value Date    PROTIME 11.9 09/19/2017    INR 1.1 09/19/2017     Lab Results   Component Value Date    MG 2.2 09/01/2018     Lab Results   Component Value Date     09/02/2011     Scheduled Meds:   sodium chloride flush  10 mL Intravenous 2 times per day    carvedilol  6.25 mg Oral BID WC    bumetanide  1 mg Intravenous Daily    apixaban  2.5 mg Oral BID    insulin lispro  0-12 Units Subcutaneous TID WC    insulin lispro  0-6 Units Subcutaneous Nightly     Continuous Infusions:   diltiazem (CARDIZEM) 125 mg in dextrose 5% 125 mL infusion 5 mg/hr (08/31/18 1636)    dextrose       PRN Meds:.sodium chloride flush, magnesium

## 2018-09-01 NOTE — PROGRESS NOTES
entry  Heart - normal rate, regular rhythm, normal S1, S2, no murmurs, rubs, clicks or gallops  Abdomen - soft, nontender, nondistended, no masses or organomegaly  Back exam - full range of motion, no tenderness, palpable spasm or pain on motion  Neurological - alert, oriented, normal speech, no focal findings or movement disorder noted  Musculoskeletal - no joint tenderness, deformity or swelling  Extremities - peripheral pulses normal, no pedal edema, no clubbing or cyanosis  Skin - normal coloration and turgor, no rashes, no suspicious skin lesions noted   Gu - deferred  Rectal - deferred    Labs:  Hematology:  Recent Labs      08/31/18 0528 09/01/18   0556   WBC  23.7*  13.5*   HGB  11.5*  12.3   HCT  36.1  37.9   PLT  235  261     Chemistry:  Recent Labs      08/31/18 0528  09/01/18   0556   NA  138  139   K  5.0  4.9   CL  98  96*   CO2  24  29   GLUCOSE  207*  208*   BUN  16  23   CREATININE  0.65  0.87   MG   --   2.2   CALCIUM  8.4*  9.1     No results for input(s): PROT, LABALBU, LABA1C, I9UQGUV, E4ITPCR, FT4, TSH, AST, ALT, LDH, GGT, ALKPHOS, BILITOT, BILIDIR, AMMONIA, AMYLASE, LIPASE, LACTATE, CHOL, HDL, LDLCHOLESTEROL, CHOLHDLRATIO, TRIG, VLDL, BNP, TROPONINI, CKTOTAL, CKMB, CKMBINDEX, RF, JARED in the last 72 hours. Plan:work up in progress. Start on Levamir 15 units qAM.  Electronically signed by Tyron Albright MD on 9/1/2018 at 10:42 AM

## 2018-09-02 ENCOUNTER — APPOINTMENT (OUTPATIENT)
Dept: GENERAL RADIOLOGY | Age: 83
DRG: 291 | End: 2018-09-02
Payer: MEDICARE

## 2018-09-02 LAB
-: NORMAL
ABSOLUTE EOS #: 0 K/UL (ref 0–0.4)
ABSOLUTE IMMATURE GRANULOCYTE: ABNORMAL K/UL (ref 0–0.3)
ABSOLUTE LYMPH #: 1.9 K/UL (ref 1–4.8)
ABSOLUTE MONO #: 1.6 K/UL (ref 0.2–0.8)
AMORPHOUS: NORMAL
ANION GAP SERPL CALCULATED.3IONS-SCNC: 11 MMOL/L (ref 9–17)
BACTERIA: NORMAL
BASOPHILS # BLD: 0 % (ref 0–2)
BASOPHILS ABSOLUTE: 0 K/UL (ref 0–0.2)
BILIRUBIN URINE: NEGATIVE
BUN BLDV-MCNC: 20 MG/DL (ref 8–23)
BUN/CREAT BLD: 27 (ref 9–20)
CALCIUM SERPL-MCNC: 8.9 MG/DL (ref 8.6–10.4)
CASTS UA: NORMAL /LPF
CHLORIDE BLD-SCNC: 99 MMOL/L (ref 98–107)
CO2: 30 MMOL/L (ref 20–31)
COLOR: YELLOW
COMMENT UA: ABNORMAL
CREAT SERPL-MCNC: 0.73 MG/DL (ref 0.5–0.9)
CRYSTALS, UA: NORMAL /HPF
DIFFERENTIAL TYPE: ABNORMAL
EOSINOPHILS RELATIVE PERCENT: 0 % (ref 1–4)
EPITHELIAL CELLS UA: NORMAL /HPF (ref 0–5)
ESTIMATED AVERAGE GLUCOSE: 169 MG/DL
GFR AFRICAN AMERICAN: >60 ML/MIN
GFR NON-AFRICAN AMERICAN: >60 ML/MIN
GFR SERPL CREATININE-BSD FRML MDRD: ABNORMAL ML/MIN/{1.73_M2}
GFR SERPL CREATININE-BSD FRML MDRD: ABNORMAL ML/MIN/{1.73_M2}
GLUCOSE BLD-MCNC: 144 MG/DL (ref 65–105)
GLUCOSE BLD-MCNC: 149 MG/DL (ref 65–105)
GLUCOSE BLD-MCNC: 187 MG/DL (ref 65–105)
GLUCOSE BLD-MCNC: 210 MG/DL (ref 65–105)
GLUCOSE BLD-MCNC: 85 MG/DL (ref 70–99)
GLUCOSE BLD-MCNC: 89 MG/DL (ref 65–105)
GLUCOSE URINE: NEGATIVE
HBA1C MFR BLD: 7.5 % (ref 4–6)
HCT VFR BLD CALC: 39.2 % (ref 36–46)
HEMOGLOBIN: 12.7 G/DL (ref 12–16)
IMMATURE GRANULOCYTES: ABNORMAL %
KETONES, URINE: NEGATIVE
LEUKOCYTE ESTERASE, URINE: ABNORMAL
LYMPHOCYTES # BLD: 10 % (ref 24–44)
MAGNESIUM: 1.9 MG/DL (ref 1.6–2.6)
MCH RBC QN AUTO: 32.1 PG (ref 26–34)
MCHC RBC AUTO-ENTMCNC: 32.3 G/DL (ref 31–37)
MCV RBC AUTO: 99.2 FL (ref 80–100)
MONOCYTES # BLD: 8 % (ref 1–7)
MUCUS: NORMAL
NITRITE, URINE: NEGATIVE
NRBC AUTOMATED: ABNORMAL PER 100 WBC
OTHER OBSERVATIONS UA: NORMAL
PDW BLD-RTO: 16.9 % (ref 11.5–14.5)
PH UA: 7 (ref 5–8)
PLATELET # BLD: 295 K/UL (ref 130–400)
PLATELET ESTIMATE: ABNORMAL
PMV BLD AUTO: 7.6 FL (ref 6–12)
POTASSIUM SERPL-SCNC: 3.6 MMOL/L (ref 3.7–5.3)
PROCALCITONIN: 0.26 NG/ML
PROTEIN UA: NEGATIVE
RBC # BLD: 3.96 M/UL (ref 4–5.2)
RBC # BLD: ABNORMAL 10*6/UL
RBC UA: NORMAL /HPF (ref 0–2)
RENAL EPITHELIAL, UA: NORMAL /HPF
SEG NEUTROPHILS: 82 % (ref 36–66)
SEGMENTED NEUTROPHILS ABSOLUTE COUNT: 15.8 K/UL (ref 1.8–7.7)
SODIUM BLD-SCNC: 140 MMOL/L (ref 135–144)
SPECIFIC GRAVITY UA: 1.01 (ref 1–1.03)
TRICHOMONAS: NORMAL
TURBIDITY: ABNORMAL
URINE HGB: ABNORMAL
UROBILINOGEN, URINE: NORMAL
WBC # BLD: 19.4 K/UL (ref 3.5–11)
WBC # BLD: ABNORMAL 10*3/UL
WBC UA: NORMAL /HPF (ref 0–5)
YEAST: NORMAL

## 2018-09-02 PROCEDURE — 87077 CULTURE AEROBIC IDENTIFY: CPT

## 2018-09-02 PROCEDURE — 2060000000 HC ICU INTERMEDIATE R&B

## 2018-09-02 PROCEDURE — 83735 ASSAY OF MAGNESIUM: CPT

## 2018-09-02 PROCEDURE — 84145 PROCALCITONIN (PCT): CPT

## 2018-09-02 PROCEDURE — 6370000000 HC RX 637 (ALT 250 FOR IP): Performed by: FAMILY MEDICINE

## 2018-09-02 PROCEDURE — 87086 URINE CULTURE/COLONY COUNT: CPT

## 2018-09-02 PROCEDURE — 2580000003 HC RX 258: Performed by: FAMILY MEDICINE

## 2018-09-02 PROCEDURE — 85025 COMPLETE CBC W/AUTO DIFF WBC: CPT

## 2018-09-02 PROCEDURE — 36415 COLL VENOUS BLD VENIPUNCTURE: CPT

## 2018-09-02 PROCEDURE — 80048 BASIC METABOLIC PNL TOTAL CA: CPT

## 2018-09-02 PROCEDURE — 6370000000 HC RX 637 (ALT 250 FOR IP): Performed by: INTERNAL MEDICINE

## 2018-09-02 PROCEDURE — 71045 X-RAY EXAM CHEST 1 VIEW: CPT

## 2018-09-02 PROCEDURE — 87186 SC STD MICRODIL/AGAR DIL: CPT

## 2018-09-02 PROCEDURE — 81001 URINALYSIS AUTO W/SCOPE: CPT

## 2018-09-02 PROCEDURE — 2500000003 HC RX 250 WO HCPCS: Performed by: INTERNAL MEDICINE

## 2018-09-02 RX ORDER — LORAZEPAM 0.5 MG/1
0.5 TABLET ORAL ONCE
Status: COMPLETED | OUTPATIENT
Start: 2018-09-02 | End: 2018-09-02

## 2018-09-02 RX ORDER — BUMETANIDE 1 MG/1
1 TABLET ORAL DAILY
Status: DISCONTINUED | OUTPATIENT
Start: 2018-09-03 | End: 2018-09-05 | Stop reason: HOSPADM

## 2018-09-02 RX ORDER — LEVOFLOXACIN 500 MG/1
500 TABLET, FILM COATED ORAL DAILY
Status: DISCONTINUED | OUTPATIENT
Start: 2018-09-02 | End: 2018-09-05 | Stop reason: HOSPADM

## 2018-09-02 RX ADMIN — Medication 10 ML: at 22:43

## 2018-09-02 RX ADMIN — INSULIN LISPRO 2 UNITS: 100 INJECTION, SOLUTION INTRAVENOUS; SUBCUTANEOUS at 17:53

## 2018-09-02 RX ADMIN — Medication 10 ML: at 10:24

## 2018-09-02 RX ADMIN — APIXABAN 2.5 MG: 2.5 TABLET, FILM COATED ORAL at 21:51

## 2018-09-02 RX ADMIN — INSULIN GLARGINE 15 UNITS: 100 INJECTION, SOLUTION SUBCUTANEOUS at 08:36

## 2018-09-02 RX ADMIN — INSULIN LISPRO 2 UNITS: 100 INJECTION, SOLUTION INTRAVENOUS; SUBCUTANEOUS at 21:51

## 2018-09-02 RX ADMIN — CARVEDILOL 6.25 MG: 6.25 TABLET, FILM COATED ORAL at 08:36

## 2018-09-02 RX ADMIN — DILTIAZEM HYDROCHLORIDE 60 MG: 60 CAPSULE, EXTENDED RELEASE ORAL at 08:36

## 2018-09-02 RX ADMIN — INSULIN LISPRO 2 UNITS: 100 INJECTION, SOLUTION INTRAVENOUS; SUBCUTANEOUS at 12:30

## 2018-09-02 RX ADMIN — LORAZEPAM 0.5 MG: 0.5 TABLET ORAL at 22:43

## 2018-09-02 RX ADMIN — APIXABAN 2.5 MG: 2.5 TABLET, FILM COATED ORAL at 08:36

## 2018-09-02 RX ADMIN — CARVEDILOL 6.25 MG: 6.25 TABLET, FILM COATED ORAL at 17:44

## 2018-09-02 RX ADMIN — BUMETANIDE 1 MG: 0.25 INJECTION INTRAMUSCULAR; INTRAVENOUS at 08:36

## 2018-09-02 RX ADMIN — LEVOFLOXACIN 500 MG: 500 TABLET, FILM COATED ORAL at 15:34

## 2018-09-02 ASSESSMENT — PAIN SCALES - GENERAL
PAINLEVEL_OUTOF10: 0
PAINLEVEL_OUTOF10: 0

## 2018-09-02 NOTE — PROGRESS NOTES
Pulmonary Critical Care Progress Note  Laurence Marie CNP / Dr. Bin Jimenez M.D. Patient seen for the follow up of Respiratory failure, pleural effusion, pleural edema, heart failure    Subjective:  She is feeling fairly comfortable. Her shortness of breath is still present though seems to be hearing. No significant cough, no chest pain. Examination:  Vitals: /61   Pulse 87   Temp 97.7 °F (36.5 °C) (Oral)   Resp 16   Ht 4' 3\" (1.295 m)   Wt 107 lb 11.2 oz (48.9 kg)   SpO2 98%   BMI 29.11 kg/m²     General appearance: alert and cooperative with exam, language barrier  Neck: No JVD  Lungs: Decreased air exchange  Heart: regular rate and rhythm, S1, S2 normal, no gallop  Abdomen: Soft, non tender, + BS  Extremities: no cyanosis or clubbing. + edema    LABs:  CBC:   Recent Labs      09/01/18   0556  09/02/18   0700   WBC  13.5*  19.4*   HGB  12.3  12.7   HCT  37.9  39.2   PLT  261  295     BMP:   Recent Labs      09/01/18   0556  09/02/18   0700   NA  139  140   K  4.9  3.6*   CO2  29  30   BUN  23  20   CREATININE  0.87  0.73   LABGLOM  >60  >60   GLUCOSE  208*  85     Radiology:  9/2/18      Impression:  · Acute respiratory failure  · Decompensated CHF  · Bilateral pleural effusion/pulmonary edema secondary to above  · Hypokalemia  · History of CAD/A.  Fib  · Diabetes mellitus  · History of HTN/HDL     Recommendations:  · 2 liters/min via nasal cannula  · Home O2 evaluation prior to discharge to determine the need for oxygen during the day, she currently has oxygen while sleeping  · Continue diuresis, switched to oral Bumex  · No need for thoracentesis at this time, will check decubitus films in the morning  · Check pro calcitonin  · Albuterol and Ipratropium Q 4 hours and prn  · Oral Levaquin empirically  · Cardizem drip  · Labs: CBC and BMP in am  · BiPAP, when necessary  · DVT prophylaxis, on Eliquis  · Will follow with you    Electronically signed by     AIDEE Menjivar - JOHNATHAN on

## 2018-09-03 ENCOUNTER — APPOINTMENT (OUTPATIENT)
Dept: GENERAL RADIOLOGY | Age: 83
DRG: 291 | End: 2018-09-03
Payer: MEDICARE

## 2018-09-03 LAB
BNP INTERPRETATION: ABNORMAL
GLUCOSE BLD-MCNC: 115 MG/DL (ref 65–105)
GLUCOSE BLD-MCNC: 294 MG/DL (ref 65–105)
GLUCOSE BLD-MCNC: 81 MG/DL (ref 65–105)
GLUCOSE BLD-MCNC: 84 MG/DL (ref 65–105)
MAGNESIUM: 1.8 MG/DL (ref 1.6–2.6)
PRO-BNP: 2068 PG/ML

## 2018-09-03 PROCEDURE — 2580000003 HC RX 258: Performed by: FAMILY MEDICINE

## 2018-09-03 PROCEDURE — 0W993ZZ DRAINAGE OF RIGHT PLEURAL CAVITY, PERCUTANEOUS APPROACH: ICD-10-PCS | Performed by: RADIOLOGY

## 2018-09-03 PROCEDURE — 6370000000 HC RX 637 (ALT 250 FOR IP): Performed by: INTERNAL MEDICINE

## 2018-09-03 PROCEDURE — 83735 ASSAY OF MAGNESIUM: CPT

## 2018-09-03 PROCEDURE — 6370000000 HC RX 637 (ALT 250 FOR IP): Performed by: FAMILY MEDICINE

## 2018-09-03 PROCEDURE — 2060000000 HC ICU INTERMEDIATE R&B

## 2018-09-03 PROCEDURE — 71045 X-RAY EXAM CHEST 1 VIEW: CPT

## 2018-09-03 PROCEDURE — 82947 ASSAY GLUCOSE BLOOD QUANT: CPT

## 2018-09-03 PROCEDURE — 36415 COLL VENOUS BLD VENIPUNCTURE: CPT

## 2018-09-03 PROCEDURE — 83880 ASSAY OF NATRIURETIC PEPTIDE: CPT

## 2018-09-03 RX ORDER — LEVALBUTEROL 1.25 MG/.5ML
1.25 SOLUTION, CONCENTRATE RESPIRATORY (INHALATION) 4 TIMES DAILY PRN
Status: DISCONTINUED | OUTPATIENT
Start: 2018-09-03 | End: 2018-09-05 | Stop reason: HOSPADM

## 2018-09-03 RX ORDER — LEVALBUTEROL 1.25 MG/.5ML
1.25 SOLUTION, CONCENTRATE RESPIRATORY (INHALATION) 4 TIMES DAILY
Status: DISCONTINUED | OUTPATIENT
Start: 2018-09-03 | End: 2018-09-03

## 2018-09-03 RX ORDER — LORAZEPAM 0.5 MG/1
0.5 TABLET ORAL NIGHTLY
Status: DISCONTINUED | OUTPATIENT
Start: 2018-09-03 | End: 2018-09-05 | Stop reason: HOSPADM

## 2018-09-03 RX ADMIN — Medication 10 ML: at 09:43

## 2018-09-03 RX ADMIN — CARVEDILOL 6.25 MG: 6.25 TABLET, FILM COATED ORAL at 17:11

## 2018-09-03 RX ADMIN — APIXABAN 2.5 MG: 2.5 TABLET, FILM COATED ORAL at 09:43

## 2018-09-03 RX ADMIN — LEVOFLOXACIN 500 MG: 500 TABLET, FILM COATED ORAL at 09:43

## 2018-09-03 RX ADMIN — INSULIN LISPRO 6 UNITS: 100 INJECTION, SOLUTION INTRAVENOUS; SUBCUTANEOUS at 12:13

## 2018-09-03 RX ADMIN — Medication 10 ML: at 22:47

## 2018-09-03 RX ADMIN — CARVEDILOL 6.25 MG: 6.25 TABLET, FILM COATED ORAL at 09:43

## 2018-09-03 RX ADMIN — BUMETANIDE 1 MG: 1 TABLET ORAL at 09:43

## 2018-09-03 RX ADMIN — DILTIAZEM HYDROCHLORIDE 60 MG: 60 CAPSULE, EXTENDED RELEASE ORAL at 09:42

## 2018-09-03 RX ADMIN — INSULIN GLARGINE 15 UNITS: 100 INJECTION, SOLUTION SUBCUTANEOUS at 09:42

## 2018-09-03 RX ADMIN — LORAZEPAM 0.5 MG: 0.5 TABLET ORAL at 22:45

## 2018-09-03 NOTE — PROGRESS NOTES
Progress Notes        PCP: Zhnae Berger MD  9/3/2018  9:34 AM    Admitting Diagnosis:  Dyspnea    Problem List:  Acute diastolic heart failure  AF w/ RVR  DM,type2,uncontrolled  Pulmonary hypertension  Mitral regurgitation,moderate  Multifocal airspace disease  Hematuria    Subjective: no complains;no physical or respiratory distress;talked to his son;u/c-Gram negative rods;on levaquin. Allergies:   Allergies   Allergen Reactions    Pcn [Penicillins]        Current Medications:    bumetanide (BUMEX) tablet 1 mg Daily   levofloxacin (LEVAQUIN) tablet 500 mg Daily   insulin glargine (LANTUS) injection vial 15 Units QAM   diltiazem (CARDIZEM 12 HR) extended release capsule 60 mg Daily   sodium chloride flush 0.9 % injection 10 mL 2 times per day   sodium chloride flush 0.9 % injection 10 mL PRN   magnesium hydroxide (MILK OF MAGNESIA) 400 MG/5ML suspension 30 mL Daily PRN   ondansetron (ZOFRAN-ODT) disintegrating tablet 4 mg Q6H PRN   Or    ondansetron (ZOFRAN) injection 4 mg Q6H PRN   ipratropium-albuterol (DUONEB) nebulizer solution 1 ampule Q4H PRN   diltiazem 125 mg in dextrose 5 % 125 mL infusion Continuous   carvedilol (COREG) tablet 6.25 mg BID WC   apixaban (ELIQUIS) tablet 2.5 mg BID   glucose (GLUTOSE) 40 % oral gel 15 g PRN   dextrose 50 % solution 12.5 g PRN   glucagon (rDNA) injection 1 mg PRN   dextrose 5 % solution PRN   insulin lispro (HUMALOG) injection vial 0-12 Units TID WC   insulin lispro (HUMALOG) injection vial 0-6 Units Nightly       Physical Examination:  BP (!) 177/63   Pulse 97   Temp 97.3 °F (36.3 °C) (Oral)   Resp 18   Ht 4' 3\" (1.295 m)   Wt 104 lb 11.2 oz (47.5 kg)   SpO2 99%   BMI 28.30 kg/m²     General appearance - alert, well appearing, and in no distress  Mental status - alert, oriented to person, place, and time  Eyes - pupils equal and reactive, extraocular eye movements intact  Nose - normal and patent, no erythema, discharge or polyps  Mouth - mucous membranes moist, pharynx normal without lesions  Neck - supple, no significant adenopathy  Lymphatics - no palpable lymphadenopathy, no hepatosplenomegaly  Chest - clear to auscultation, no wheezes, rales or rhonchi, symmetric air entry  Heart - normal rate, regular rhythm, normal S1, S2, no murmurs, rubs, clicks or gallops  Abdomen - soft, nontender, nondistended, no masses or organomegaly  Back exam - full range of motion, no tenderness, palpable spasm or pain on motion  Neurological - alert, oriented, normal speech, no focal findings or movement disorder noted  Musculoskeletal - no joint tenderness, deformity or swelling  Extremities - peripheral pulses normal, no pedal edema, no clubbing or cyanosis  Skin - normal coloration and turgor, no rashes, no suspicious skin lesions noted   Gu - deferred  Rectal - deferred    Labs:  Hematology:  Recent Labs      09/01/18   0556  09/02/18   0700   WBC  13.5*  19.4*   HGB  12.3  12.7   HCT  37.9  39.2   PLT  261  295     Chemistry:  Recent Labs      09/01/18   0556  09/02/18   0700  09/03/18   0600   NA  139  140   --    K  4.9  3.6*   --    CL  96*  99   --    CO2  29  30   --    GLUCOSE  208*  85   --    BUN  23  20   --    CREATININE  0.87  0.73   --    MG  2.2  1.9  1.8   CALCIUM  9.1  8.9   --      No results for input(s): PROT, LABALBU, LABA1C, I6VCNCD, W0EWIGG, FT4, TSH, AST, ALT, LDH, GGT, ALKPHOS, BILITOT, BILIDIR, AMMONIA, AMYLASE, LIPASE, LACTATE, CHOL, HDL, LDLCHOLESTEROL, CHOLHDLRATIO, TRIG, VLDL, BNP, TROPONINI, CKTOTAL, CKMB, CKMBINDEX, RF, JARED in the last 72 hours. Plan:same management.   Electronically signed by Mary Nugent MD on 9/3/2018 at 9:34 AM

## 2018-09-03 NOTE — PLAN OF CARE
Problem: Falls - Risk of:  Goal: Will remain free from falls  Will remain free from falls   Outcome: Ongoing  Pt fall risk, fall band present, falling star, safety alarm activated and in use as needed. Bed in lowest position, call light within reach. Hourly rounding performed. Pt encouraged to use call light. See Casandra March fall risk assessment. Patient offered toileting assistance during rounding. Hourly rounds performed.

## 2018-09-03 NOTE — PROGRESS NOTES
Pulmonary Critical Care Progress Note       Patient seen for the follow up of Respiratory failure, pleural effusion, pleural edema, heart failure    Subjective:    She has improved dyspnea. She has no cough . She has poor appetite . No chest pain . She  Feels weak    Examination:  Vitals: BP (!) 124/53   Pulse 98   Temp 97.3 °F (36.3 °C) (Oral)   Resp 18   Ht 4' 3\" (1.295 m)   Wt 104 lb 11.2 oz (47.5 kg)   SpO2 99%   BMI 28.30 kg/m²     General appearance: alert and cooperative with exam  Neck: No JVD  Lungs: Decreased air entry no crackles  Heart: regular rate and rhythm/ tachycardic , S1, S2 normal, no gallop  Abdomen: Soft, non tender, + BS  Extremities: no cyanosis or clubbing. 1+ edema    LABs:  CBC:   Recent Labs      09/01/18   0556  09/02/18   0700   WBC  13.5*  19.4*   HGB  12.3  12.7   HCT  37.9  39.2   PLT  261  295     BMP:   Recent Labs      09/01/18   0556  09/02/18   0700   NA  139  140   K  4.9  3.6*   CO2  29  30   BUN  23  20   CREATININE  0.87  0.73   LABGLOM  >60  >60   GLUCOSE  208*  85     Radiology:  9/2/18    CXR  Increasing multifocal airspace disease bilaterally.  Bilateral effusions                       Impression:  · Acute and chronic respiratory failure  · Decompensated CHF  · Bilateral pleural effusion/pulmonary edema   · Hypokalemia  · History of CAD/A.  Fib  · Diabetes mellitus  · History of HTN/HDL     Recommendations:  · O2 2 liters/min via nasal cannula  · Home O2 evaluation prior to discharge   · Continue diuresis/ Bumex  · Right thoracenteses by IR  · Check pro calcitonin  · Stop Albuterol and Ipratropium for elevated HR  · Xopenex and Atrovent every 4 hours PRN  · Oral Levaquin empirically  · Cardizem drip per cardiology  · hold Eliquis  · DVT prophylaxis  · Discussed with daughter        Gabby Blanca MD on 9/3/2018 at 1:49 PM  Pulmonary Critical Care and Sleep Medicine,  [facility] AT [place]: [phone]

## 2018-09-04 ENCOUNTER — APPOINTMENT (OUTPATIENT)
Dept: GENERAL RADIOLOGY | Age: 83
DRG: 291 | End: 2018-09-04
Payer: MEDICARE

## 2018-09-04 ENCOUNTER — APPOINTMENT (OUTPATIENT)
Dept: INTERVENTIONAL RADIOLOGY/VASCULAR | Age: 83
DRG: 291 | End: 2018-09-04
Payer: MEDICARE

## 2018-09-04 LAB
ABSOLUTE EOS #: 0.1 K/UL (ref 0–0.4)
ABSOLUTE IMMATURE GRANULOCYTE: ABNORMAL K/UL (ref 0–0.3)
ABSOLUTE LYMPH #: 2.1 K/UL (ref 1–4.8)
ABSOLUTE MONO #: 1.7 K/UL (ref 0.2–0.8)
BASOPHILS # BLD: 0 % (ref 0–2)
BASOPHILS ABSOLUTE: 0 K/UL (ref 0–0.2)
CASE NUMBER:: NORMAL
CULTURE: ABNORMAL
DIFFERENTIAL TYPE: ABNORMAL
EOSINOPHILS RELATIVE PERCENT: 1 % (ref 1–4)
GLUCOSE BLD-MCNC: 120 MG/DL (ref 65–105)
GLUCOSE BLD-MCNC: 156 MG/DL (ref 65–105)
GLUCOSE BLD-MCNC: 256 MG/DL (ref 65–105)
GLUCOSE BLD-MCNC: 55 MG/DL (ref 65–105)
GLUCOSE BLD-MCNC: 98 MG/DL (ref 65–105)
GLUCOSE, FLUID: 171 MG/DL
HCT VFR BLD CALC: 42.9 % (ref 36–46)
HEMOGLOBIN: 13.9 G/DL (ref 12–16)
IMMATURE GRANULOCYTES: ABNORMAL %
INR BLD: 1.2
LACTATE DEHYDROGENASE, FLUID: 118 U/L
LYMPHOCYTES # BLD: 15 % (ref 24–44)
Lab: ABNORMAL
MAGNESIUM: 1.7 MG/DL (ref 1.6–2.6)
MCH RBC QN AUTO: 32 PG (ref 26–34)
MCHC RBC AUTO-ENTMCNC: 32.3 G/DL (ref 31–37)
MCV RBC AUTO: 99.1 FL (ref 80–100)
MONOCYTES # BLD: 12 % (ref 1–7)
NRBC AUTOMATED: ABNORMAL PER 100 WBC
ORGANISM: ABNORMAL
PDW BLD-RTO: 16.6 % (ref 11.5–14.5)
PLATELET # BLD: 280 K/UL (ref 130–400)
PLATELET # BLD: 291 K/UL (ref 130–400)
PLATELET ESTIMATE: ABNORMAL
PMV BLD AUTO: 8 FL (ref 6–12)
PROTHROMBIN TIME: 12.5 SEC (ref 9.7–11.6)
RBC # BLD: 4.33 M/UL (ref 4–5.2)
RBC # BLD: ABNORMAL 10*6/UL
SEG NEUTROPHILS: 72 % (ref 36–66)
SEGMENTED NEUTROPHILS ABSOLUTE COUNT: 10.4 K/UL (ref 1.8–7.7)
SPECIMEN DESCRIPTION: ABNORMAL
SPECIMEN DESCRIPTION: NORMAL
SPECIMEN TYPE: NORMAL
STATUS: ABNORMAL
TOTAL PROTEIN, BODY FLUID: 2.7 G/DL
WBC # BLD: 14.4 K/UL (ref 3.5–11)
WBC # BLD: ABNORMAL 10*3/UL

## 2018-09-04 PROCEDURE — 6370000000 HC RX 637 (ALT 250 FOR IP): Performed by: INTERNAL MEDICINE

## 2018-09-04 PROCEDURE — 2580000003 HC RX 258: Performed by: FAMILY MEDICINE

## 2018-09-04 PROCEDURE — 32555 ASPIRATE PLEURA W/ IMAGING: CPT

## 2018-09-04 PROCEDURE — 83615 LACTATE (LD) (LDH) ENZYME: CPT

## 2018-09-04 PROCEDURE — 85610 PROTHROMBIN TIME: CPT

## 2018-09-04 PROCEDURE — 82945 GLUCOSE OTHER FLUID: CPT

## 2018-09-04 PROCEDURE — 87015 SPECIMEN INFECT AGNT CONCNTJ: CPT

## 2018-09-04 PROCEDURE — 88112 CYTOPATH CELL ENHANCE TECH: CPT

## 2018-09-04 PROCEDURE — 71045 X-RAY EXAM CHEST 1 VIEW: CPT

## 2018-09-04 PROCEDURE — 2060000000 HC ICU INTERMEDIATE R&B

## 2018-09-04 PROCEDURE — 83735 ASSAY OF MAGNESIUM: CPT

## 2018-09-04 PROCEDURE — 36415 COLL VENOUS BLD VENIPUNCTURE: CPT

## 2018-09-04 PROCEDURE — C1729 CATH, DRAINAGE: HCPCS

## 2018-09-04 PROCEDURE — 87116 MYCOBACTERIA CULTURE: CPT

## 2018-09-04 PROCEDURE — 85025 COMPLETE CBC W/AUTO DIFF WBC: CPT

## 2018-09-04 PROCEDURE — 87075 CULTR BACTERIA EXCEPT BLOOD: CPT

## 2018-09-04 PROCEDURE — 87102 FUNGUS ISOLATION CULTURE: CPT

## 2018-09-04 PROCEDURE — 87070 CULTURE OTHR SPECIMN AEROBIC: CPT

## 2018-09-04 PROCEDURE — 88305 TISSUE EXAM BY PATHOLOGIST: CPT

## 2018-09-04 PROCEDURE — 84157 ASSAY OF PROTEIN OTHER: CPT

## 2018-09-04 PROCEDURE — 87206 SMEAR FLUORESCENT/ACID STAI: CPT

## 2018-09-04 PROCEDURE — 85049 AUTOMATED PLATELET COUNT: CPT

## 2018-09-04 PROCEDURE — 89051 BODY FLUID CELL COUNT: CPT

## 2018-09-04 PROCEDURE — 6370000000 HC RX 637 (ALT 250 FOR IP): Performed by: FAMILY MEDICINE

## 2018-09-04 PROCEDURE — 87205 SMEAR GRAM STAIN: CPT

## 2018-09-04 PROCEDURE — 82947 ASSAY GLUCOSE BLOOD QUANT: CPT

## 2018-09-04 RX ADMIN — LEVOFLOXACIN 500 MG: 500 TABLET, FILM COATED ORAL at 09:16

## 2018-09-04 RX ADMIN — Medication 10 ML: at 22:30

## 2018-09-04 RX ADMIN — Medication 10 ML: at 09:16

## 2018-09-04 RX ADMIN — CARVEDILOL 6.25 MG: 6.25 TABLET, FILM COATED ORAL at 09:19

## 2018-09-04 RX ADMIN — DILTIAZEM HYDROCHLORIDE 60 MG: 60 CAPSULE, EXTENDED RELEASE ORAL at 09:16

## 2018-09-04 RX ADMIN — LORAZEPAM 0.5 MG: 0.5 TABLET ORAL at 22:30

## 2018-09-04 RX ADMIN — INSULIN LISPRO 6 UNITS: 100 INJECTION, SOLUTION INTRAVENOUS; SUBCUTANEOUS at 12:21

## 2018-09-04 RX ADMIN — APIXABAN 2.5 MG: 2.5 TABLET, FILM COATED ORAL at 22:30

## 2018-09-04 RX ADMIN — BUMETANIDE 1 MG: 1 TABLET ORAL at 09:20

## 2018-09-04 RX ADMIN — INSULIN GLARGINE 15 UNITS: 100 INJECTION, SOLUTION SUBCUTANEOUS at 09:16

## 2018-09-04 ASSESSMENT — PAIN SCALES - GENERAL
PAINLEVEL_OUTOF10: 0

## 2018-09-04 NOTE — PROGRESS NOTES
movements intact  Nose - normal and patent, no erythema, discharge or polyps  Mouth - mucous membranes moist, pharynx normal without lesions  Neck - supple, no significant adenopathy  Lymphatics - no palpable lymphadenopathy, no hepatosplenomegaly  Chest - clear to auscultation, no wheezes, rales or rhonchi, symmetric air entry  Heart - normal rate, regular rhythm, normal S1, S2, no murmurs, rubs, clicks or gallops  Abdomen - soft, nontender, nondistended, no masses or organomegaly  Back exam - full range of motion, no tenderness, palpable spasm or pain on motion  Neurological - alert, oriented, normal speech, no focal findings or movement disorder noted  Musculoskeletal - no joint tenderness, deformity or swelling  Extremities - peripheral pulses normal, no pedal edema, no clubbing or cyanosis  Skin - normal coloration and turgor, no rashes, no suspicious skin lesions noted   Gu - deferred  Rectal - deferred    Labs:  Hematology:  Recent Labs      09/02/18   0700 09/04/18   0556   WBC  19.4*   --    HGB  12.7   --    HCT  39.2   --    PLT  295  291   INR   --   1.2     Chemistry:  Recent Labs      09/02/18   0700 09/03/18   0600  09/04/18   0556   NA  140   --    --    K  3.6*   --    --    CL  99   --    --    CO2  30   --    --    GLUCOSE  85   --    --    BUN  20   --    --    CREATININE  0.73   --    --    MG  1.9  1.8  1.7   CALCIUM  8.9   --    --      No results for input(s): PROT, LABALBU, LABA1C, F2YNGNW, A1CSILZ, FT4, TSH, AST, ALT, LDH, GGT, ALKPHOS, BILITOT, BILIDIR, AMMONIA, AMYLASE, LIPASE, LACTATE, CHOL, HDL, LDLCHOLESTEROL, CHOLHDLRATIO, TRIG, VLDL, BNP, TROPONINI, CKTOTAL, CKMB, CKMBINDEX, RF, JARED in the last 72 hours. Plan:same management. Waiting for right thoracentesis.   Electronically signed by Jay Meredith MD on 9/4/2018 at 9:55 AM

## 2018-09-04 NOTE — BRIEF OP NOTE
Brief Postoperative Note    Tenzin Santiago  YOB: 1923  [id number]    Pre-operative Diagnosis: Right pleural effusion    Post-operative Diagnosis: Same    Procedure: US guided right thoracentesis    Anesthesia: Local    Surgeons/Assistants: Vnicent    Estimated Blood Loss: less than 50     Complications: None    Specimens: Was Obtained: 300 cc of clear serosanguinous fluid    Electronically signed by Samina Acevedo MD on 9/4/2018 at 11:06 AM

## 2018-09-04 NOTE — PLAN OF CARE
Problem: Falls - Risk of:  Goal: Will remain free from falls  Will remain free from falls   Outcome: Ongoing  Pt fall risk, fall band present, falling star, safety alarm activated and in use as needed. Hourly rounding performed. Pt encouraged to use call light. Bedside table and call button within reach; patient calls out appropriately. See Aubrie Miu fall risk assessment. Will continue to monitor closely.     Goal: Absence of physical injury  Absence of physical injury   Outcome: Ongoing

## 2018-09-04 NOTE — PROGRESS NOTES
Pulmonary Critical Care Progress Note       Patient seen for the follow up of Respiratory failure, pleural effusion, pleural edema, heart failure    Subjective:    She has improved dyspnea after thoracentesis today with 300 ,l of fluid removed . She has no cough . She has poor appetite . No chest pain . She  Feels weak and has not been ambultating    Examination:  Vitals: BP (!) 99/58   Pulse 92   Temp 98.3 °F (36.8 °C) (Oral)   Resp 16   Ht 4' 3\" (1.295 m)   Wt 104 lb 11.2 oz (47.5 kg)   SpO2 96%   BMI 28.30 kg/m²     General appearance: alert and cooperative with exam  Neck: No JVD  Lungs: Decreased air entry no crackles  Heart: regular rate and rhythm/ tachycardic , S1, S2 normal, no gallop  Abdomen: Soft, non tender, + BS  Extremities: no cyanosis or clubbing. 1+ edema    LABs:  CBC:   Recent Labs      09/02/18   0700  09/04/18   0556   WBC  19.4*  14.4*   HGB  12.7  13.9   HCT  39.2  42.9   PLT  295  280  291     BMP:   Recent Labs      09/02/18   0700   NA  140   K  3.6*   CO2  30   BUN  20   CREATININE  0.73   LABGLOM  >60   GLUCOSE  85     Radiology:  9/4/18    CXR    1. No pneumothorax. 2. Persistent opacification at the left lung base most likely representing a combination of atelectasis and pleural fluid.                          Impression:  · Acute and chronic respiratory failure  · Decompensated CHF  · Bilateral pleural effusion/pulmonary edema   · Hypokalemia  · History of CAD/A.  Fib  · Diabetes mellitus  · History of HTN/HDL     Recommendations:  · O2 2 liters/min via nasal cannula  · Home O2 evaluation prior to discharge   · Continue diuresis/ Bumex  · check PF studies  · Xopenex and Atrovent every 4 hours PRN  · Oral Levaquin empirically  · Cardizem drip per cardiology  · resume Eliquis  · Discussed with daughter  · DVT prophylaxis  · Discussed with daughter; consider rehab        Garry Lawson MD on 9/4/2018 at 5:07 PM  Pulmonary Critical Care and Sleep Medicine,  [facility]  Office: [phone]

## 2018-09-05 VITALS
BODY MASS INDEX: 23.57 KG/M2 | TEMPERATURE: 98.4 F | OXYGEN SATURATION: 97 % | HEIGHT: 55 IN | WEIGHT: 101.85 LBS | SYSTOLIC BLOOD PRESSURE: 98 MMHG | HEART RATE: 86 BPM | DIASTOLIC BLOOD PRESSURE: 55 MMHG | RESPIRATION RATE: 12 BRPM

## 2018-09-05 LAB
APPEARANCE FLUID: NORMAL
BASO FLUID: NORMAL %
BNP INTERPRETATION: ABNORMAL
COLOR FLUID: NORMAL
DIRECT EXAM: NORMAL
EOSINOPHIL FLUID: NORMAL %
FLUID DIFF COMMENT: NORMAL
GLUCOSE BLD-MCNC: 104 MG/DL (ref 65–105)
GLUCOSE BLD-MCNC: 222 MG/DL (ref 65–105)
GLUCOSE BLD-MCNC: 86 MG/DL (ref 65–105)
LYMPHOCYTES, BODY FLUID: 73 %
Lab: NORMAL
MAGNESIUM: 1.7 MG/DL (ref 1.6–2.6)
MONOCYTE, FLUID: NORMAL %
NEUTROPHIL, FLUID: 13 %
OTHER CELLS FLUID: NORMAL %
PRO-BNP: 1326 PG/ML
RBC FLUID: 6000 /MM3
SPECIMEN DESCRIPTION: NORMAL
SPECIMEN TYPE: NORMAL
STATUS: NORMAL
SURGICAL PATHOLOGY REPORT: NORMAL
WBC FLUID: 466 /MM3

## 2018-09-05 PROCEDURE — 2580000003 HC RX 258: Performed by: FAMILY MEDICINE

## 2018-09-05 PROCEDURE — 36415 COLL VENOUS BLD VENIPUNCTURE: CPT

## 2018-09-05 PROCEDURE — 83880 ASSAY OF NATRIURETIC PEPTIDE: CPT

## 2018-09-05 PROCEDURE — 97162 PT EVAL MOD COMPLEX 30 MIN: CPT

## 2018-09-05 PROCEDURE — 97535 SELF CARE MNGMENT TRAINING: CPT

## 2018-09-05 PROCEDURE — G8978 MOBILITY CURRENT STATUS: HCPCS

## 2018-09-05 PROCEDURE — G8988 SELF CARE GOAL STATUS: HCPCS

## 2018-09-05 PROCEDURE — 6370000000 HC RX 637 (ALT 250 FOR IP): Performed by: FAMILY MEDICINE

## 2018-09-05 PROCEDURE — 6370000000 HC RX 637 (ALT 250 FOR IP): Performed by: INTERNAL MEDICINE

## 2018-09-05 PROCEDURE — 97166 OT EVAL MOD COMPLEX 45 MIN: CPT

## 2018-09-05 PROCEDURE — 82947 ASSAY GLUCOSE BLOOD QUANT: CPT

## 2018-09-05 PROCEDURE — G8979 MOBILITY GOAL STATUS: HCPCS

## 2018-09-05 PROCEDURE — G8987 SELF CARE CURRENT STATUS: HCPCS

## 2018-09-05 PROCEDURE — 83735 ASSAY OF MAGNESIUM: CPT

## 2018-09-05 PROCEDURE — 97116 GAIT TRAINING THERAPY: CPT

## 2018-09-05 RX ORDER — LEVOFLOXACIN 500 MG/1
500 TABLET, FILM COATED ORAL DAILY
Qty: 3 TABLET | Refills: 0 | Status: SHIPPED | OUTPATIENT
Start: 2018-09-05 | End: 2018-09-08

## 2018-09-05 RX ADMIN — LEVOFLOXACIN 500 MG: 500 TABLET, FILM COATED ORAL at 11:25

## 2018-09-05 RX ADMIN — INSULIN GLARGINE 15 UNITS: 100 INJECTION, SOLUTION SUBCUTANEOUS at 11:25

## 2018-09-05 RX ADMIN — BUMETANIDE 1 MG: 1 TABLET ORAL at 11:24

## 2018-09-05 RX ADMIN — INSULIN LISPRO 4 UNITS: 100 INJECTION, SOLUTION INTRAVENOUS; SUBCUTANEOUS at 11:25

## 2018-09-05 RX ADMIN — CARVEDILOL 6.25 MG: 6.25 TABLET, FILM COATED ORAL at 11:25

## 2018-09-05 RX ADMIN — DILTIAZEM HYDROCHLORIDE 60 MG: 60 CAPSULE, EXTENDED RELEASE ORAL at 11:25

## 2018-09-05 RX ADMIN — APIXABAN 2.5 MG: 2.5 TABLET, FILM COATED ORAL at 11:25

## 2018-09-05 RX ADMIN — Medication 10 ML: at 11:28

## 2018-09-05 NOTE — PROGRESS NOTES
Pt discharged via wheelchair with Daughter. Discharge paperwork and instructions given to patient and daughter. Patient and daughter acknowledges understanding. Scripts given to daughter for levoquin and follow up chest xray at the [facility] clinic. Follow up appointments reviewed and directions to make follow up appointment given   Any questions answered.

## 2018-09-05 NOTE — PROGRESS NOTES
Pulmonary Critical Care Progress Note  Abel Camarillo CNP / Dr. Janee Barillas M.D. Patient seen for the follow up of Acute on chronic respiratory failure, pleural effusion, pleural edema, heart failure    Subjective:  She went for thoracentesis yesterday tolerated procedure well. At this time she is resting comfortably without any signs of distress. She has been restarted on her Eliquis. Plan will be to evaluate the need for home oxygen prior to her discharge. No acute events noted overnight. Examination:  Vitals: /63   Pulse 101   Temp 98.2 °F (36.8 °C) (Oral)   Resp 16   Ht 4' 3\" (1.295 m)   Wt 100 lb 4.8 oz (45.5 kg)   SpO2 98%   BMI 27.11 kg/m²     General appearance: alert and cooperative with exam, language barrier  Neck: No JVD  Lungs: Rhonchi posteriorly, no wheeze  Heart: regular rate and rhythm, S1, S2 normal, no gallop  Abdomen: Soft, non tender, + BS  Extremities: no cyanosis or clubbing. No significant edema    LABs:  CBC:   Recent Labs      09/04/18   0556   WBC  14.4*   HGB  13.9   HCT  42.9   PLT  280  291     PT/INR:   Recent Labs      09/04/18   0556   PROTIME  12.5*   INR  1.2       THORACENTESIS FLUID (RIGHT):  NEGATIVE FOR MALIGNANCY. Impression:  · Acute and chronic respiratory failure  · Decompensated CHF  · Bilateral pleural effusion/pulmonary edema   · S/p Right thoracentesis 9/4/18 with 300 mL of fluid removed  · Hypokalemia  · History of CAD/A.  Fib  · Diabetes mellitus  · History of HTN/HDL    Recommendations:  · O2 2 liters/min via nasal cannula  · Home O2 evaluation prior to discharge   · Continue diuresis/ Bumex  · Xopenex and Atrovent every 4 hours PRN  · CBC and BMP in a.m.  · Oral Levaquin empirically  · Off Cardizem drip, on oral Cardizem  · DVT prophylaxis with Eliquis   · Discharge planning ok from pulmonary standpoint    Electronically signed by     AIDEE Oliver CNP on 9/5/2018 at 2:16 PM  Patient was seen under the supervision of  Dorene Trimble  Pulmonary Critical Care and Sleep Medicine,    [facility] AT [place]: [phone]

## 2018-09-05 NOTE — CARE COORDINATION
Discharge planning    Asked per pulmonary regarding home 02 evauation. Call to [facility] and their most current order is for home 02 24/7.  Will place note in dc instructions for family to follow up if need any portability tanks
initiated.          Electronically signed by Aracelis Charles RN on 9/1/18 at 11:48 AM

## 2018-09-05 NOTE — PROGRESS NOTES
Progress Notes        PCP: Alba Menendez MD  9/5/2018  9:57 AM    Admitting Diagnosis:  Dyspnea    Problem List:  Acute diastolic heart failure  AF w/ RVR  DM,type2,uncontrolled  Pulmonary hypertension  Mitral regurgitation,moderate  Multifocal airspace disease  Hematuria  UTI due to E. Coli  S/F right thoracentesis  Subjective: no complains;no physical or respiratory distress; Thoracentesis fluid-no malignancy. Allergies:   Allergies   Allergen Reactions    Pcn [Penicillins]        Current Medications:    apixaban (ELIQUIS) tablet 2.5 mg BID   ipratropium (ATROVENT) 0.02 % nebulizer solution 0.5 mg 4x Daily PRN   levalbuterol (XOPENEX) nebulizer solution 1.25 mg 4x Daily PRN   LORazepam (ATIVAN) tablet 0.5 mg Nightly   bumetanide (BUMEX) tablet 1 mg Daily   levofloxacin (LEVAQUIN) tablet 500 mg Daily   insulin glargine (LANTUS) injection vial 15 Units QAM   diltiazem (CARDIZEM 12 HR) extended release capsule 60 mg Daily   sodium chloride flush 0.9 % injection 10 mL 2 times per day   sodium chloride flush 0.9 % injection 10 mL PRN   magnesium hydroxide (MILK OF MAGNESIA) 400 MG/5ML suspension 30 mL Daily PRN   ondansetron (ZOFRAN-ODT) disintegrating tablet 4 mg Q6H PRN   Or    ondansetron (ZOFRAN) injection 4 mg Q6H PRN   diltiazem 125 mg in dextrose 5 % 125 mL infusion Continuous   carvedilol (COREG) tablet 6.25 mg BID WC   glucose (GLUTOSE) 40 % oral gel 15 g PRN   dextrose 50 % solution 12.5 g PRN   glucagon (rDNA) injection 1 mg PRN   dextrose 5 % solution PRN   insulin lispro (HUMALOG) injection vial 0-12 Units TID WC   insulin lispro (HUMALOG) injection vial 0-6 Units Nightly       Physical Examination:  BP (!) 146/72   Pulse 92   Temp 98.2 °F (36.8 °C) (Oral)   Resp 16   Ht 4' 3\" (1.295 m)   Wt 100 lb 4.8 oz (45.5 kg)   SpO2 98%   BMI 27.11 kg/m²     General appearance - alert, well appearing, and in no distress  Mental status - alert, oriented to person, place, and time  Eyes - pupils equal and reactive, extraocular eye movements intact  Nose - normal and patent, no erythema, discharge or polyps  Mouth - mucous membranes moist, pharynx normal without lesions  Neck - supple, no significant adenopathy  Lymphatics - no palpable lymphadenopathy, no hepatosplenomegaly  Chest - clear to auscultation, no wheezes, rales or rhonchi, symmetric air entry  Heart - normal rate, regular rhythm, normal S1, S2, no murmurs, rubs, clicks or gallops  Abdomen - soft, nontender, nondistended, no masses or organomegaly  Back exam - full range of motion, no tenderness, palpable spasm or pain on motion  Neurological - alert, oriented, normal speech, no focal findings or movement disorder noted  Musculoskeletal - no joint tenderness, deformity or swelling  Extremities - peripheral pulses normal, no pedal edema, no clubbing or cyanosis  Skin - normal coloration and turgor, no rashes, no suspicious skin lesions noted   Gu - deferred  Rectal - deferred    Labs:  Hematology:  Recent Labs      09/04/18   0556   WBC  14.4*   HGB  13.9   HCT  42.9   PLT  280  291   INR  1.2     Chemistry:  Recent Labs      09/03/18   0600  09/04/18   0556  09/05/18   0540   MG  1.8  1.7  1.7     No results for input(s): PROT, LABALBU, LABA1C, J4SEEUD, P6CBZDC, FT4, TSH, AST, ALT, LDH, GGT, ALKPHOS, BILITOT, BILIDIR, AMMONIA, AMYLASE, LIPASE, LACTATE, CHOL, HDL, LDLCHOLESTEROL, CHOLHDLRATIO, TRIG, VLDL, BNP, TROPONINI, CKTOTAL, CKMB, CKMBINDEX, RF, JARED in the last 72 hours. Plan:same management. PT/OT.   Electronically signed by Denisha Jimenes MD on 9/5/2018 at 9:57 AM

## 2018-09-05 NOTE — PROGRESS NOTES
Follow up appointment made with Dr.D Omaira Garcia Sept. 14th @ 2:50pm     wanted F/U chest xray at [facility]. Attempted to make appointment.  They do walk in only

## 2018-09-05 NOTE — PROGRESS NOTES
Michelle on unit. Unable to see pt at this time.  Okay to D/C patient from cardiology standpoint if not able to round before pulm rounds

## 2018-09-05 NOTE — PLAN OF CARE
Problem: Falls - Risk of:  Goal: Will remain free from falls  Will remain free from falls   Outcome: Ongoing  Siderails up x 2  Hourly rounding. Call light in reach. Instructed to call for assist before attempting out of bed. Remains free from falls and accidental injury at this time. Floor free from obstacles, and bed is locked and in lowest position. Adequate lighting provided. Bed alarm on. Fall sticker on wristband.  Fall Sign posted in doorway    Goal: Absence of physical injury  Absence of physical injury   Outcome: Ongoing      Problem: SAFETY  Goal: Free from accidental physical injury  Outcome: Ongoing    Goal: Free from intentional harm  Outcome: Ongoing      Problem: DAILY CARE  Goal: Daily care needs are met  Outcome: Ongoing      Problem: PAIN  Goal: Patient's pain/discomfort is manageable  Outcome: Ongoing      Problem: DISCHARGE BARRIERS  Goal: Patient's continuum of care needs are met  Outcome: Ongoing

## 2018-09-05 NOTE — PROGRESS NOTES
Occupational Therapy   Occupational Therapy Initial Assessment  Date: 2018   Patient Name: Issa Galeas  MRN: [id number]     : 1923    Date of Service: 2018    Discharge Recommendations:  Home with assist PRN, Home with Home health OT     RN reports patient is medically stable for therapy treatment this date. Chart reviewed prior to treatment and patient is agreeable for therapy. All lines intact and patient positioned comfortably at end of treatment. All patient needs addressed prior to ending therapy session. Patient Diagnosis(es): The encounter diagnosis was Acute on chronic congestive heart failure, unspecified heart failure type ([facility] has a past medical history of Arthritis; Atrial fibrillation ([facility] CAD (coronary artery disease); CHF (congestive heart failure) ([facility] COPD (chronic obstructive pulmonary disease) ([facility] Diabetes mellitus ([facility] Hyperlipidemia; and Hypertension. has a past surgical history that includes Total hip arthroplasty (Left).            Restrictions  Restrictions/Precautions  Restrictions/Precautions: General Precautions, Fall Risk    Subjective   General  Patient assessed for rehabilitation services?: Yes  Family / Caregiver Present: Yes (daughter)  Pain Assessment  Patient Currently in Pain: Denies  Pain Assessment: 0-10  Pain Level: 0  Oxygen Therapy  SpO2: 98 %  O2 Device: Nasal cannula  Social/Functional History  Social/Functional History  Lives With: Daughter  Type of Home: House  Home Layout: One level  Home Access: Level entry  Bathroom Shower/Tub: Tub/Shower unit, Shower chair with back  Bathroom Toilet: Standard  Bathroom Equipment: Grab bars in shower  Home Equipment: Rolling walker, Cane, [employer]  ADL Assistance: Needs assistance  Homemaking Assistance: Needs assistance  Gardening: Stand by  Ambulation Assistance: Independent (RW)  Transfer Assistance: Independent  Active : No  Occupation: Retired  Additional Comments: 2 liters 02 at all times       Objective   Vision: Impaired (glaucoma)  Hearing: Within functional limits    Orientation  Overall Orientation Status: Within Functional Limits  Observation/Palpation  Posture: Poor (Kyphosis)  Balance  Sitting Balance: Stand by assistance  Standing Balance: Contact guard assistance  Standing Balance  Sit to stand: Contact guard assistance  Stand to sit: Contact guard assistance  Functional Mobility  Functional - Mobility Device: Rolling Walker  Assist Level: Contact guard assistance  Functional Mobility Comments: pt completed functional mob in room with CGA, min cues for safety but limited by lang barrier  Toilet Transfers  Equipment Used: Standard toilet  Toilet Transfer: Contact guard assistance  ADL  Feeding: Setup  Grooming: Minimal assistance;Stand by assistance  UE Bathing: Minimal assistance  LE Bathing: Minimal assistance  UE Dressing: Minimal assistance  LE Dressing: Minimal assistance  Toileting: Minimal assistance  Tone RUE  RUE Tone: Normotonic  Tone LUE  LUE Tone: Normotonic  Coordination  Movements Are Fluid And Coordinated: Yes     Bed mobility  Rolling to Left: Minimal assistance  Rolling to Right: Minimal assistance  Supine to Sit: Minimal assistance  Scooting: Minimal assistance  Transfers  Sit to stand: Contact guard assistance  Stand to sit: Contact guard assistance     Cognition  Overall Cognitive Status: WFL  Cognition Comment: difficult to fully assess d/t lang barrier but appears grossly intact  Perception  Overall Perceptual Status: WFL     Sensation  Overall Sensation Status: Impaired (altered sensation in feet)        LUE AROM (degrees)  LUE AROM : WFL  RUE AROM (degrees)  RUE AROM : WFL  LUE Strength  Gross LUE Strength: WFL (B UE's 4/5)  RUE Strength  Gross RUE Strength: WFL                  Assessment   Performance deficits / Impairments: Decreased functional mobility ; Decreased ADL status; Decreased safe awareness;Decreased Concurrent Group Co-treatment   Time In 1034 (+10 min chart review)         Time Out 1050         Minutes 16              Pt would benefit from skilled home OT services in order to maximize occupational performance, safety, and independence in the home environment.      Lluvia Graves, OT

## 2018-09-10 LAB
CULTURE: ABNORMAL
DIRECT EXAM: ABNORMAL
Lab: ABNORMAL
SPECIMEN DESCRIPTION: ABNORMAL
STATUS: ABNORMAL

## 2018-10-08 LAB
CULTURE: NORMAL
Lab: NORMAL
SPECIMEN DESCRIPTION: NORMAL
STATUS: NORMAL

## 2018-10-22 LAB
CULTURE: NORMAL
DIRECT EXAM: NORMAL
Lab: NORMAL
SPECIMEN DESCRIPTION: NORMAL
STATUS: NORMAL

## 2018-11-13 ENCOUNTER — APPOINTMENT (OUTPATIENT)
Dept: GENERAL RADIOLOGY | Age: 83
DRG: 291 | End: 2018-11-13
Payer: MEDICARE

## 2018-11-13 ENCOUNTER — HOSPITAL ENCOUNTER (INPATIENT)
Age: 83
LOS: 6 days | Discharge: HOME HEALTH | DRG: 291 | End: 2018-11-19
Attending: EMERGENCY MEDICINE | Admitting: FAMILY MEDICINE
Payer: MEDICARE

## 2018-11-13 DIAGNOSIS — N39.0 URINARY TRACT INFECTION WITHOUT HEMATURIA, SITE UNSPECIFIED: ICD-10-CM

## 2018-11-13 DIAGNOSIS — I95.9 HYPOTENSION, UNSPECIFIED HYPOTENSION TYPE: Primary | ICD-10-CM

## 2018-11-13 DIAGNOSIS — R06.02 SHORTNESS OF BREATH: ICD-10-CM

## 2018-11-13 DIAGNOSIS — E87.20 LACTIC ACIDOSIS: ICD-10-CM

## 2018-11-13 LAB
-: ABNORMAL
ABSOLUTE EOS #: 0.1 K/UL (ref 0–0.4)
ABSOLUTE IMMATURE GRANULOCYTE: ABNORMAL K/UL (ref 0–0.3)
ABSOLUTE LYMPH #: 1.4 K/UL (ref 1–4.8)
ABSOLUTE MONO #: 1.3 K/UL (ref 0.2–0.8)
ALBUMIN SERPL-MCNC: 4.2 G/DL (ref 3.5–5.2)
ALBUMIN/GLOBULIN RATIO: ABNORMAL (ref 1–2.5)
ALP BLD-CCNC: 108 U/L (ref 35–104)
ALT SERPL-CCNC: 21 U/L (ref 5–33)
AMORPHOUS: ABNORMAL
ANION GAP SERPL CALCULATED.3IONS-SCNC: 18 MMOL/L (ref 9–17)
AST SERPL-CCNC: 32 U/L
BACTERIA: ABNORMAL
BASOPHILS # BLD: 0 % (ref 0–2)
BASOPHILS ABSOLUTE: 0 K/UL (ref 0–0.2)
BILIRUB SERPL-MCNC: 0.84 MG/DL (ref 0.3–1.2)
BILIRUBIN URINE: NEGATIVE
BNP INTERPRETATION: ABNORMAL
BUN BLDV-MCNC: 31 MG/DL (ref 8–23)
BUN/CREAT BLD: 40 (ref 9–20)
CALCIUM SERPL-MCNC: 9.2 MG/DL (ref 8.6–10.4)
CASTS UA: ABNORMAL /LPF
CHLORIDE BLD-SCNC: 94 MMOL/L (ref 98–107)
CO2: 22 MMOL/L (ref 20–31)
COLOR: YELLOW
COMMENT UA: ABNORMAL
CREAT SERPL-MCNC: 0.78 MG/DL (ref 0.5–0.9)
CRYSTALS, UA: ABNORMAL /HPF
DIFFERENTIAL TYPE: ABNORMAL
EKG ATRIAL RATE: 267 BPM
EKG Q-T INTERVAL: 332 MS
EKG QRS DURATION: 78 MS
EKG QTC CALCULATION (BAZETT): 406 MS
EKG R AXIS: -25 DEGREES
EKG T AXIS: 2 DEGREES
EKG VENTRICULAR RATE: 90 BPM
EOSINOPHILS RELATIVE PERCENT: 1 % (ref 1–4)
EPITHELIAL CELLS UA: ABNORMAL /HPF (ref 0–5)
FIO2: ABNORMAL
GFR AFRICAN AMERICAN: >60 ML/MIN
GFR NON-AFRICAN AMERICAN: >60 ML/MIN
GFR SERPL CREATININE-BSD FRML MDRD: ABNORMAL ML/MIN/{1.73_M2}
GFR SERPL CREATININE-BSD FRML MDRD: ABNORMAL ML/MIN/{1.73_M2}
GLUCOSE BLD-MCNC: 160 MG/DL (ref 65–105)
GLUCOSE BLD-MCNC: 212 MG/DL (ref 65–105)
GLUCOSE BLD-MCNC: 316 MG/DL (ref 70–99)
GLUCOSE BLD-MCNC: 53 MG/DL (ref 65–105)
GLUCOSE URINE: NEGATIVE
HCO3 VENOUS: 24.3 MMOL/L (ref 24–30)
HCT VFR BLD CALC: 39.9 % (ref 36–46)
HEMOGLOBIN: 12.9 G/DL (ref 12–16)
IMMATURE GRANULOCYTES: ABNORMAL %
INR BLD: 1.2
KETONES, URINE: ABNORMAL
LACTIC ACID: 2.8 MMOL/L (ref 0.5–2.2)
LEUKOCYTE ESTERASE, URINE: ABNORMAL
LYMPHOCYTES # BLD: 8 % (ref 24–44)
MCH RBC QN AUTO: 31.6 PG (ref 26–34)
MCHC RBC AUTO-ENTMCNC: 32.4 G/DL (ref 31–37)
MCV RBC AUTO: 97.5 FL (ref 80–100)
MONOCYTES # BLD: 7 % (ref 1–7)
MUCUS: ABNORMAL
NEGATIVE BASE EXCESS, VEN: 1 (ref 0–2)
NITRITE, URINE: POSITIVE
NRBC AUTOMATED: ABNORMAL PER 100 WBC
O2 DEVICE/FLOW/%: ABNORMAL
O2 SAT, VEN: 49 %
OTHER OBSERVATIONS UA: ABNORMAL
PATIENT TEMP: ABNORMAL
PCO2, VEN: 43 MM HG (ref 39–55)
PDW BLD-RTO: 14.5 % (ref 11.5–14.5)
PH UA: 7 (ref 5–8)
PH VENOUS: 7.37 (ref 7.32–7.42)
PLATELET # BLD: 222 K/UL (ref 130–400)
PLATELET ESTIMATE: ABNORMAL
PMV BLD AUTO: 8.2 FL (ref 6–12)
PO2, VEN: 27 MM HG (ref 30–50)
POC PCO2 TEMP: ABNORMAL MM HG
POC PH TEMP: ABNORMAL
POC PO2 TEMP: ABNORMAL MM HG
POSITIVE BASE EXCESS, VEN: ABNORMAL (ref 0–2)
POTASSIUM SERPL-SCNC: 4.9 MMOL/L (ref 3.7–5.3)
PRO-BNP: 2094 PG/ML
PROTEIN UA: NEGATIVE
PROTHROMBIN TIME: 12.6 SEC (ref 9.7–11.6)
RBC # BLD: 4.09 M/UL (ref 4–5.2)
RBC # BLD: ABNORMAL 10*6/UL
RBC UA: ABNORMAL /HPF (ref 0–2)
RENAL EPITHELIAL, UA: ABNORMAL /HPF
SEG NEUTROPHILS: 84 % (ref 36–66)
SEGMENTED NEUTROPHILS ABSOLUTE COUNT: 14.6 K/UL (ref 1.8–7.7)
SODIUM BLD-SCNC: 134 MMOL/L (ref 135–144)
SPECIFIC GRAVITY UA: 1.02 (ref 1–1.03)
TOTAL CO2, VENOUS: 26 MMOL/L (ref 25–31)
TOTAL PROTEIN: 7.9 G/DL (ref 6.4–8.3)
TRICHOMONAS: ABNORMAL
TURBIDITY: ABNORMAL
URINE HGB: ABNORMAL
UROBILINOGEN, URINE: NORMAL
WBC # BLD: 17.3 K/UL (ref 3.5–11)
WBC # BLD: ABNORMAL 10*3/UL
WBC UA: ABNORMAL /HPF (ref 0–5)
YEAST: ABNORMAL

## 2018-11-13 PROCEDURE — 2580000003 HC RX 258: Performed by: EMERGENCY MEDICINE

## 2018-11-13 PROCEDURE — 87086 URINE CULTURE/COLONY COUNT: CPT

## 2018-11-13 PROCEDURE — 94760 N-INVAS EAR/PLS OXIMETRY 1: CPT

## 2018-11-13 PROCEDURE — 2580000003 HC RX 258: Performed by: FAMILY MEDICINE

## 2018-11-13 PROCEDURE — 80053 COMPREHEN METABOLIC PANEL: CPT

## 2018-11-13 PROCEDURE — 83880 ASSAY OF NATRIURETIC PEPTIDE: CPT

## 2018-11-13 PROCEDURE — 81001 URINALYSIS AUTO W/SCOPE: CPT

## 2018-11-13 PROCEDURE — 82947 ASSAY GLUCOSE BLOOD QUANT: CPT

## 2018-11-13 PROCEDURE — 85610 PROTHROMBIN TIME: CPT

## 2018-11-13 PROCEDURE — 85025 COMPLETE CBC W/AUTO DIFF WBC: CPT

## 2018-11-13 PROCEDURE — 6370000000 HC RX 637 (ALT 250 FOR IP): Performed by: FAMILY MEDICINE

## 2018-11-13 PROCEDURE — 87186 SC STD MICRODIL/AGAR DIL: CPT

## 2018-11-13 PROCEDURE — 99285 EMERGENCY DEPT VISIT HI MDM: CPT

## 2018-11-13 PROCEDURE — 93005 ELECTROCARDIOGRAM TRACING: CPT

## 2018-11-13 PROCEDURE — 82803 BLOOD GASES ANY COMBINATION: CPT

## 2018-11-13 PROCEDURE — 87077 CULTURE AEROBIC IDENTIFY: CPT

## 2018-11-13 PROCEDURE — 2060000000 HC ICU INTERMEDIATE R&B

## 2018-11-13 PROCEDURE — 94640 AIRWAY INHALATION TREATMENT: CPT

## 2018-11-13 PROCEDURE — 96374 THER/PROPH/DIAG INJ IV PUSH: CPT

## 2018-11-13 PROCEDURE — 6370000000 HC RX 637 (ALT 250 FOR IP): Performed by: EMERGENCY MEDICINE

## 2018-11-13 PROCEDURE — 94664 DEMO&/EVAL PT USE INHALER: CPT

## 2018-11-13 PROCEDURE — 6360000002 HC RX W HCPCS: Performed by: EMERGENCY MEDICINE

## 2018-11-13 PROCEDURE — 71045 X-RAY EXAM CHEST 1 VIEW: CPT

## 2018-11-13 PROCEDURE — 83605 ASSAY OF LACTIC ACID: CPT

## 2018-11-13 RX ORDER — 0.9 % SODIUM CHLORIDE 0.9 %
250 INTRAVENOUS SOLUTION INTRAVENOUS ONCE
Status: COMPLETED | OUTPATIENT
Start: 2018-11-13 | End: 2018-11-13

## 2018-11-13 RX ORDER — ATORVASTATIN CALCIUM 20 MG/1
20 TABLET, FILM COATED ORAL DAILY
Status: DISCONTINUED | OUTPATIENT
Start: 2018-11-13 | End: 2018-11-19 | Stop reason: HOSPADM

## 2018-11-13 RX ORDER — BRIMONIDINE TARTRATE 2 MG/ML
1 SOLUTION/ DROPS OPHTHALMIC 2 TIMES DAILY
Status: DISCONTINUED | OUTPATIENT
Start: 2018-11-13 | End: 2018-11-19 | Stop reason: HOSPADM

## 2018-11-13 RX ORDER — IPRATROPIUM BROMIDE AND ALBUTEROL SULFATE 2.5; .5 MG/3ML; MG/3ML
1 SOLUTION RESPIRATORY (INHALATION) EVERY 8 HOURS
Status: ON HOLD | COMMUNITY
End: 2018-11-19 | Stop reason: HOSPADM

## 2018-11-13 RX ORDER — DILTIAZEM HYDROCHLORIDE 60 MG/1
60 CAPSULE, EXTENDED RELEASE ORAL DAILY
Status: DISCONTINUED | OUTPATIENT
Start: 2018-11-13 | End: 2018-11-16

## 2018-11-13 RX ORDER — ACETAMINOPHEN 325 MG/1
650 TABLET ORAL EVERY 4 HOURS PRN
Status: DISCONTINUED | OUTPATIENT
Start: 2018-11-13 | End: 2018-11-13

## 2018-11-13 RX ORDER — TIMOLOL MALEATE 5 MG/ML
1 SOLUTION/ DROPS OPHTHALMIC 2 TIMES DAILY
Status: DISCONTINUED | OUTPATIENT
Start: 2018-11-13 | End: 2018-11-19 | Stop reason: HOSPADM

## 2018-11-13 RX ORDER — DORZOLAMIDE HCL 20 MG/ML
1 SOLUTION/ DROPS OPHTHALMIC 2 TIMES DAILY
Status: DISCONTINUED | OUTPATIENT
Start: 2018-11-13 | End: 2018-11-19 | Stop reason: HOSPADM

## 2018-11-13 RX ORDER — MONTELUKAST SODIUM 10 MG/1
10 TABLET ORAL NIGHTLY
Status: DISCONTINUED | OUTPATIENT
Start: 2018-11-13 | End: 2018-11-19 | Stop reason: HOSPADM

## 2018-11-13 RX ORDER — ONDANSETRON 2 MG/ML
4 INJECTION INTRAMUSCULAR; INTRAVENOUS EVERY 8 HOURS PRN
Status: DISCONTINUED | OUTPATIENT
Start: 2018-11-13 | End: 2018-11-13

## 2018-11-13 RX ORDER — IPRATROPIUM BROMIDE AND ALBUTEROL SULFATE 2.5; .5 MG/3ML; MG/3ML
1 SOLUTION RESPIRATORY (INHALATION) EVERY 4 HOURS PRN
Status: DISCONTINUED | OUTPATIENT
Start: 2018-11-13 | End: 2018-11-19

## 2018-11-13 RX ORDER — ONDANSETRON 2 MG/ML
4 INJECTION INTRAMUSCULAR; INTRAVENOUS EVERY 6 HOURS PRN
Status: DISCONTINUED | OUTPATIENT
Start: 2018-11-13 | End: 2018-11-19 | Stop reason: HOSPADM

## 2018-11-13 RX ORDER — MORPHINE SULFATE 4 MG/ML
4 INJECTION, SOLUTION INTRAMUSCULAR; INTRAVENOUS
Status: DISCONTINUED | OUTPATIENT
Start: 2018-11-13 | End: 2018-11-19 | Stop reason: HOSPADM

## 2018-11-13 RX ORDER — BUMETANIDE 1 MG/1
1 TABLET ORAL DAILY
Status: DISCONTINUED | OUTPATIENT
Start: 2018-11-13 | End: 2018-11-14

## 2018-11-13 RX ORDER — IPRATROPIUM BROMIDE AND ALBUTEROL SULFATE 2.5; .5 MG/3ML; MG/3ML
1 SOLUTION RESPIRATORY (INHALATION) EVERY 4 HOURS PRN
Status: COMPLETED | OUTPATIENT
Start: 2018-11-13 | End: 2018-11-13

## 2018-11-13 RX ORDER — METFORMIN HYDROCHLORIDE 500 MG/1
500 TABLET, EXTENDED RELEASE ORAL 2 TIMES DAILY WITH MEALS
Status: DISCONTINUED | OUTPATIENT
Start: 2018-11-13 | End: 2018-11-19 | Stop reason: HOSPADM

## 2018-11-13 RX ORDER — CEFTRIAXONE 1 G/1
1 INJECTION, POWDER, FOR SOLUTION INTRAMUSCULAR; INTRAVENOUS ONCE
Status: DISCONTINUED | OUTPATIENT
Start: 2018-11-13 | End: 2018-11-13

## 2018-11-13 RX ORDER — GABAPENTIN 100 MG/1
100 CAPSULE ORAL 2 TIMES DAILY PRN
Status: DISCONTINUED | OUTPATIENT
Start: 2018-11-13 | End: 2018-11-19 | Stop reason: HOSPADM

## 2018-11-13 RX ORDER — SODIUM CHLORIDE 0.9 % (FLUSH) 0.9 %
10 SYRINGE (ML) INJECTION PRN
Status: DISCONTINUED | OUTPATIENT
Start: 2018-11-13 | End: 2018-11-19 | Stop reason: HOSPADM

## 2018-11-13 RX ORDER — ACETAMINOPHEN AND CODEINE PHOSPHATE 300; 30 MG/1; MG/1
1 TABLET ORAL EVERY 8 HOURS PRN
Status: DISCONTINUED | OUTPATIENT
Start: 2018-11-13 | End: 2018-11-19 | Stop reason: HOSPADM

## 2018-11-13 RX ORDER — DORZOLAMIDE HYDROCHLORIDE AND TIMOLOL MALEATE 20; 5 MG/ML; MG/ML
1 SOLUTION/ DROPS OPHTHALMIC 2 TIMES DAILY
Status: DISCONTINUED | OUTPATIENT
Start: 2018-11-13 | End: 2018-11-13

## 2018-11-13 RX ORDER — MORPHINE SULFATE 2 MG/ML
2 INJECTION, SOLUTION INTRAMUSCULAR; INTRAVENOUS
Status: DISCONTINUED | OUTPATIENT
Start: 2018-11-13 | End: 2018-11-19 | Stop reason: HOSPADM

## 2018-11-13 RX ORDER — ONDANSETRON 4 MG/1
4 TABLET, ORALLY DISINTEGRATING ORAL EVERY 6 HOURS PRN
Status: DISCONTINUED | OUTPATIENT
Start: 2018-11-13 | End: 2018-11-19 | Stop reason: HOSPADM

## 2018-11-13 RX ORDER — SODIUM CHLORIDE 0.9 % (FLUSH) 0.9 %
10 SYRINGE (ML) INJECTION EVERY 12 HOURS SCHEDULED
Status: DISCONTINUED | OUTPATIENT
Start: 2018-11-13 | End: 2018-11-19 | Stop reason: HOSPADM

## 2018-11-13 RX ORDER — RISEDRONATE SODIUM 35 MG/1
35 TABLET, FILM COATED ORAL
Status: DISCONTINUED | OUTPATIENT
Start: 2018-11-13 | End: 2018-11-13

## 2018-11-13 RX ORDER — GLIMEPIRIDE 2 MG/1
2 TABLET ORAL
Status: DISCONTINUED | OUTPATIENT
Start: 2018-11-14 | End: 2018-11-19 | Stop reason: HOSPADM

## 2018-11-13 RX ORDER — ACETAMINOPHEN AND CODEINE PHOSPHATE 300; 60 MG/1; MG/1
1 TABLET ORAL 3 TIMES DAILY PRN
Status: DISCONTINUED | OUTPATIENT
Start: 2018-11-13 | End: 2018-11-13

## 2018-11-13 RX ORDER — MAGNESIUM OXIDE 400 MG/1
400 TABLET ORAL 2 TIMES DAILY
Status: DISCONTINUED | OUTPATIENT
Start: 2018-11-13 | End: 2018-11-13

## 2018-11-13 RX ORDER — METFORMIN HYDROCHLORIDE 500 MG/1
500 TABLET, EXTENDED RELEASE ORAL 2 TIMES DAILY WITH MEALS
Status: ON HOLD | COMMUNITY
End: 2019-04-09 | Stop reason: HOSPADM

## 2018-11-13 RX ADMIN — Medication 10 ML: at 20:12

## 2018-11-13 RX ADMIN — DORZOLAMIDE HYDROCHLORIDE 1 DROP: 20 SOLUTION/ DROPS OPHTHALMIC at 22:48

## 2018-11-13 RX ADMIN — VITAMIN D, TAB 1000IU (100/BT) 2000 UNITS: 25 TAB at 20:12

## 2018-11-13 RX ADMIN — METFORMIN HYDROCHLORIDE 500 MG: 500 TABLET, EXTENDED RELEASE ORAL at 18:29

## 2018-11-13 RX ADMIN — SODIUM CHLORIDE 250 ML: 0.9 INJECTION, SOLUTION INTRAVENOUS at 10:59

## 2018-11-13 RX ADMIN — APIXABAN 2.5 MG: 2.5 TABLET, FILM COATED ORAL at 20:12

## 2018-11-13 RX ADMIN — CEFTRIAXONE SODIUM 1 G: 1 INJECTION, POWDER, FOR SOLUTION INTRAMUSCULAR; INTRAVENOUS at 15:43

## 2018-11-13 RX ADMIN — BRIMONIDINE TARTRATE 1 DROP: 2 SOLUTION OPHTHALMIC at 22:49

## 2018-11-13 RX ADMIN — IPRATROPIUM BROMIDE AND ALBUTEROL SULFATE 1 AMPULE: .5; 3 SOLUTION RESPIRATORY (INHALATION) at 10:20

## 2018-11-13 RX ADMIN — TIMOLOL MALEATE 1 DROP: 5 SOLUTION/ DROPS OPHTHALMIC at 22:49

## 2018-11-13 RX ADMIN — ATORVASTATIN CALCIUM 20 MG: 20 TABLET, FILM COATED ORAL at 20:12

## 2018-11-13 RX ADMIN — MONTELUKAST SODIUM 10 MG: 10 TABLET, FILM COATED ORAL at 20:12

## 2018-11-13 RX ADMIN — INSULIN HUMAN 5 UNITS: 100 INJECTION, SOLUTION PARENTERAL at 10:39

## 2018-11-13 ASSESSMENT — PAIN SCALES - GENERAL
PAINLEVEL_OUTOF10: 0

## 2018-11-13 NOTE — ED NOTES
Report called to [facility] transported to [address] [place] [address] [facility]  11/13/18 4415

## 2018-11-13 NOTE — PROGRESS NOTES
Pharmacy Note  You patient, Marques Solis, has an order for the bisphosphonate Risedronate(Actonel)    Per [facility] approved policy, this maintenance medication is not continued while the patient is inpatient. Please resume this upon discharge if it remains appropriate.     Jenna Guan, PharmD   11/13/2018  4:31 PM

## 2018-11-14 ENCOUNTER — APPOINTMENT (OUTPATIENT)
Dept: GENERAL RADIOLOGY | Age: 83
DRG: 291 | End: 2018-11-14
Payer: MEDICARE

## 2018-11-14 LAB
ABSOLUTE EOS #: 0.2 K/UL (ref 0–0.4)
ABSOLUTE IMMATURE GRANULOCYTE: ABNORMAL K/UL (ref 0–0.3)
ABSOLUTE LYMPH #: 1.5 K/UL (ref 1–4.8)
ABSOLUTE MONO #: 1.3 K/UL (ref 0.2–0.8)
ANION GAP SERPL CALCULATED.3IONS-SCNC: 13 MMOL/L (ref 9–17)
BASOPHILS # BLD: 0 % (ref 0–2)
BASOPHILS ABSOLUTE: 0 K/UL (ref 0–0.2)
BUN BLDV-MCNC: 27 MG/DL (ref 8–23)
BUN/CREAT BLD: 35 (ref 9–20)
CALCIUM SERPL-MCNC: 9.2 MG/DL (ref 8.6–10.4)
CHLORIDE BLD-SCNC: 99 MMOL/L (ref 98–107)
CO2: 24 MMOL/L (ref 20–31)
CREAT SERPL-MCNC: 0.78 MG/DL (ref 0.5–0.9)
DIFFERENTIAL TYPE: ABNORMAL
EOSINOPHILS RELATIVE PERCENT: 2 % (ref 1–4)
GFR AFRICAN AMERICAN: >60 ML/MIN
GFR NON-AFRICAN AMERICAN: >60 ML/MIN
GFR SERPL CREATININE-BSD FRML MDRD: ABNORMAL ML/MIN/{1.73_M2}
GFR SERPL CREATININE-BSD FRML MDRD: ABNORMAL ML/MIN/{1.73_M2}
GLUCOSE BLD-MCNC: 124 MG/DL (ref 70–99)
GLUCOSE BLD-MCNC: 148 MG/DL (ref 65–105)
GLUCOSE BLD-MCNC: 204 MG/DL (ref 65–105)
GLUCOSE BLD-MCNC: 231 MG/DL (ref 65–105)
GLUCOSE BLD-MCNC: 243 MG/DL (ref 65–105)
HCT VFR BLD CALC: 35.8 % (ref 36–46)
HEMOGLOBIN: 11.6 G/DL (ref 12–16)
IMMATURE GRANULOCYTES: ABNORMAL %
LACTIC ACID: 2.8 MMOL/L (ref 0.5–2.2)
LYMPHOCYTES # BLD: 13 % (ref 24–44)
MCH RBC QN AUTO: 31.8 PG (ref 26–34)
MCHC RBC AUTO-ENTMCNC: 32.5 G/DL (ref 31–37)
MCV RBC AUTO: 97.9 FL (ref 80–100)
MONOCYTES # BLD: 10 % (ref 1–7)
NRBC AUTOMATED: ABNORMAL PER 100 WBC
PDW BLD-RTO: 14.2 % (ref 11.5–14.5)
PLATELET # BLD: 167 K/UL (ref 130–400)
PLATELET ESTIMATE: ABNORMAL
PMV BLD AUTO: 8.3 FL (ref 6–12)
POTASSIUM SERPL-SCNC: 5 MMOL/L (ref 3.7–5.3)
RBC # BLD: 3.66 M/UL (ref 4–5.2)
RBC # BLD: ABNORMAL 10*6/UL
SEG NEUTROPHILS: 75 % (ref 36–66)
SEGMENTED NEUTROPHILS ABSOLUTE COUNT: 9.1 K/UL (ref 1.8–7.7)
SODIUM BLD-SCNC: 136 MMOL/L (ref 135–144)
WBC # BLD: 12.1 K/UL (ref 3.5–11)
WBC # BLD: ABNORMAL 10*3/UL

## 2018-11-14 PROCEDURE — 2580000003 HC RX 258: Performed by: FAMILY MEDICINE

## 2018-11-14 PROCEDURE — 6370000000 HC RX 637 (ALT 250 FOR IP): Performed by: INTERNAL MEDICINE

## 2018-11-14 PROCEDURE — 2580000003 HC RX 258: Performed by: EMERGENCY MEDICINE

## 2018-11-14 PROCEDURE — 85025 COMPLETE CBC W/AUTO DIFF WBC: CPT

## 2018-11-14 PROCEDURE — 2060000000 HC ICU INTERMEDIATE R&B

## 2018-11-14 PROCEDURE — 6360000002 HC RX W HCPCS: Performed by: EMERGENCY MEDICINE

## 2018-11-14 PROCEDURE — 2700000000 HC OXYGEN THERAPY PER DAY

## 2018-11-14 PROCEDURE — 80048 BASIC METABOLIC PNL TOTAL CA: CPT

## 2018-11-14 PROCEDURE — 6370000000 HC RX 637 (ALT 250 FOR IP): Performed by: FAMILY MEDICINE

## 2018-11-14 PROCEDURE — 83605 ASSAY OF LACTIC ACID: CPT

## 2018-11-14 PROCEDURE — 94640 AIRWAY INHALATION TREATMENT: CPT

## 2018-11-14 PROCEDURE — 36415 COLL VENOUS BLD VENIPUNCTURE: CPT

## 2018-11-14 PROCEDURE — 94760 N-INVAS EAR/PLS OXIMETRY 1: CPT

## 2018-11-14 PROCEDURE — 71045 X-RAY EXAM CHEST 1 VIEW: CPT

## 2018-11-14 RX ORDER — BUMETANIDE 1 MG/1
1 TABLET ORAL 2 TIMES DAILY
Status: DISCONTINUED | OUTPATIENT
Start: 2018-11-14 | End: 2018-11-15

## 2018-11-14 RX ADMIN — MONTELUKAST SODIUM 10 MG: 10 TABLET, FILM COATED ORAL at 22:25

## 2018-11-14 RX ADMIN — GLIMEPIRIDE 2 MG: 2 TABLET ORAL at 09:20

## 2018-11-14 RX ADMIN — BUMETANIDE 1 MG: 1 TABLET ORAL at 18:44

## 2018-11-14 RX ADMIN — DORZOLAMIDE HYDROCHLORIDE 1 DROP: 20 SOLUTION/ DROPS OPHTHALMIC at 22:26

## 2018-11-14 RX ADMIN — APIXABAN 2.5 MG: 2.5 TABLET, FILM COATED ORAL at 09:10

## 2018-11-14 RX ADMIN — BRIMONIDINE TARTRATE 1 DROP: 2 SOLUTION OPHTHALMIC at 09:09

## 2018-11-14 RX ADMIN — DILTIAZEM HYDROCHLORIDE 60 MG: 60 CAPSULE, EXTENDED RELEASE ORAL at 09:10

## 2018-11-14 RX ADMIN — VITAMIN D, TAB 1000IU (100/BT) 2000 UNITS: 25 TAB at 22:25

## 2018-11-14 RX ADMIN — METFORMIN HYDROCHLORIDE 500 MG: 500 TABLET, EXTENDED RELEASE ORAL at 17:38

## 2018-11-14 RX ADMIN — BRIMONIDINE TARTRATE 1 DROP: 2 SOLUTION OPHTHALMIC at 22:26

## 2018-11-14 RX ADMIN — Medication 10 ML: at 22:26

## 2018-11-14 RX ADMIN — APIXABAN 2.5 MG: 2.5 TABLET, FILM COATED ORAL at 22:25

## 2018-11-14 RX ADMIN — TIMOLOL MALEATE 1 DROP: 5 SOLUTION/ DROPS OPHTHALMIC at 22:26

## 2018-11-14 RX ADMIN — DORZOLAMIDE HYDROCHLORIDE 1 DROP: 20 SOLUTION/ DROPS OPHTHALMIC at 09:10

## 2018-11-14 RX ADMIN — ATORVASTATIN CALCIUM 20 MG: 20 TABLET, FILM COATED ORAL at 22:25

## 2018-11-14 RX ADMIN — CEFTRIAXONE SODIUM 1 G: 1 INJECTION, POWDER, FOR SOLUTION INTRAMUSCULAR; INTRAVENOUS at 15:40

## 2018-11-14 RX ADMIN — MAGNESIUM OXIDE TAB 400 MG (241.3 MG ELEMENTAL MG) 400 MG: 400 (241.3 MG) TAB at 09:09

## 2018-11-14 RX ADMIN — IPRATROPIUM BROMIDE AND ALBUTEROL SULFATE 1 AMPULE: .5; 3 SOLUTION RESPIRATORY (INHALATION) at 09:28

## 2018-11-14 RX ADMIN — Medication 10 ML: at 09:10

## 2018-11-14 RX ADMIN — METFORMIN HYDROCHLORIDE 500 MG: 500 TABLET, EXTENDED RELEASE ORAL at 09:21

## 2018-11-14 RX ADMIN — VITAMIN D, TAB 1000IU (100/BT) 2000 UNITS: 25 TAB at 09:08

## 2018-11-14 RX ADMIN — BUMETANIDE 1 MG: 1 TABLET ORAL at 09:10

## 2018-11-14 RX ADMIN — TIMOLOL MALEATE 1 DROP: 5 SOLUTION/ DROPS OPHTHALMIC at 09:10

## 2018-11-14 ASSESSMENT — PAIN SCALES - WONG BAKER
WONGBAKER_NUMERICALRESPONSE: 0

## 2018-11-14 NOTE — CARE COORDINATION
and spoke with [facility] and patient has both portability and concentrator. Last delivered portable tanks on in June ( E tanks). Did discuss with patient and daughter that she should be wearing 02 all the time. Patient has also had home care with [facility] in the past and is agreeable again but only wants PT and RN. Epic referral sent and DANIELLA in and will need completed and signed.      Electronically signed by Mag Heath RN on 11/14/18 at 11:49 AM

## 2018-11-14 NOTE — PROGRESS NOTES
Pt admitted to room 1006, pt brought to unit wearing 2L oxygen per nasal cannula, telemetry monitor applied, pt's family at bedside, pt oriented to room and call light, bed alarm activated, will continue to monitor.

## 2018-11-14 NOTE — CONSULTS
Reason for Consult:  CHF  Requesting Physician: Jean Banks MD    CHIEF COMPLAINT:  Shortness of breath    History Obtained From:  patient, family at the bedside to help translate, and EMR    HISTORY OF PRESENT ILLNESS:      The patient is a 80 y.o. female with significant past medical history of CAD and diastolic heart failure that is a known patient to Dr. Rocky Aguilera was seen by Dr. Terrance Toth during a hospitalization in September for shortness of breath and CHF who presents with shortness of breath and edema. She reports her breathing has been worse for the past couple of weeks and requiring more oxygen. She then awoke from sleep yesterday with shortness of breath and worsening edema and congestive hospital for further treatment. Her breathing has improved since her admission. Denies any chest pain, palpitations, singly, or near-syncope. She has chronic atrial fibrillation on anticoagulation. She has not followed up with a cardiologist since her last discharge. Past Medical History:    Past Medical History:   Diagnosis Date    Arthritis     [facility]rial fibrillation [facility] [address] [facility]     CAD (coronary artery disease)     CHF (congestive heart failure) (HCC)     COPD (chronic obstructive pulmonary disease) ([facility] [address] [facility]     Diabetes mellitus ([facility] [address]     Femur fracture ([facility] [address]     Hyperlipidemia     Hypertension      Past Surgical History:    Past Surgical History:   Procedure Laterality Date    TOTAL HIP ARTHROPLASTY Left      Home Medications:  Prior to Admission medications    Medication Sig Start Date End Date Taking?  Authorizing Provider   metFORMIN (GLUCOPHAGE-XR) 500 MG extended release tablet Take 500 mg by mouth 2 times daily (with meals)   Yes Historical Provider, MD   ipratropium-albuterol (DUONEB) 0.5-2.5 (3) MG/3ML SOLN nebulizer solution Inhale 1 vial into the lungs every 8 hours   Yes Historical Provider, MD   acetaminophen-codeine (TYLENOL/CODEINE #4) 300-60 MG per tablet Take 1 tablet by mouth 3 times daily as needed for Pain. .   Yes Historical Provider, MD   bumetanide (BUMEX) 1 MG tablet Take 1 mg by mouth daily   Yes Historical Provider, MD   diltiazem (CARDIZEM 12 HR) 60 MG extended release capsule Take 60 mg by mouth daily   Yes Historical Provider, MD   albuterol sulfate HFA (PROAIR HFA) 108 (90 Base) MCG/ACT inhaler Inhale 2 puffs into the lungs every 6 hours as needed for Wheezing 9/24/17  Yes Sarah Majano MD   dorzolamide-timolol (COSOPT) 22.3-6.8 MG/ML ophthalmic solution Place 1 drop into both eyes 2 times daily 8/25/17  Yes Historical Provider, MD   glimepiride (AMARYL) 2 MG tablet Take 2 mg by mouth every morning (before breakfast)   Yes Historical Provider, MD   gabapentin (NEURONTIN) 100 MG capsule Take 100 mg by mouth 2 times daily as needed (nerve pain).  .   Yes Historical Provider, MD   montelukast (SINGULAIR) 10 MG tablet Take 10 mg by mouth nightly   Yes Historical Provider, MD   atorvastatin (LIPITOR) 20 MG tablet Take 20 mg by mouth daily   Yes Historical Provider, MD   apixaban (ELIQUIS) 2.5 MG TABS tablet Take 2.5 mg by mouth 2 times daily    Yes Historical Provider, MD   Cholecalciferol (VITAMIN D) 2000 units CAPS capsule Take 2,000 Units by mouth 2 times daily    Yes Historical Provider, MD   magnesium oxide (MAG-OX) 400 MG tablet Take 400 mg by mouth 2 times daily   Yes Historical Provider, MD   risedronate (ACTONEL) 35 MG tablet Take 35 mg by mouth every 7 days Wednesdays   Yes Historical Provider, MD   brimonidine (ALPHAGAN) 0.2 % ophthalmic solution Place 1 drop into both eyes 2 times daily    Historical Provider, MD     Current Medications:    Current Facility-Administered Medications   Medication Dose Route Frequency Provider Last Rate Last Dose    apixaban (ELIQUIS) tablet 2.5 mg  2.5 mg Oral BID Phuc Tirado MD   2.5 mg at 11/14/18 0910    atorvastatin (LIPITOR) tablet 20 mg  20 mg Oral Daily Phuc Tirado MD   20 mg at 11/13/18 2012    brimonidine (ALPHAGAN) 0.2 % ophthalmic

## 2018-11-14 NOTE — H&P
times daily 8/25/17  Yes Historical Provider, MD   glimepiride (AMARYL) 2 MG tablet Take 2 mg by mouth every morning (before breakfast)   Yes Historical Provider, MD   gabapentin (NEURONTIN) 100 MG capsule Take 100 mg by mouth 2 times daily as needed (nerve pain). .   Yes Historical Provider, MD   montelukast (SINGULAIR) 10 MG tablet Take 10 mg by mouth nightly   Yes Historical Provider, MD   atorvastatin (LIPITOR) 20 MG tablet Take 20 mg by mouth daily   Yes Historical Provider, MD   apixaban (ELIQUIS) 2.5 MG TABS tablet Take 2.5 mg by mouth 2 times daily    Yes Historical Provider, MD   Cholecalciferol (VITAMIN D) 2000 units CAPS capsule Take 2,000 Units by mouth 2 times daily    Yes Historical Provider, MD   magnesium oxide (MAG-OX) 400 MG tablet Take 400 mg by mouth 2 times daily   Yes Historical Provider, MD   risedronate (ACTONEL) 35 MG tablet Take 35 mg by mouth every 7 days Wednesdays   Yes Historical Provider, MD   brimonidine (ALPHAGAN) 0.2 % ophthalmic solution Place 1 drop into both eyes 2 times daily    Historical Provider, MD        Social History:   Social History     Social History    Marital status:      Spouse name: N/A    Number of children: N/A    Years of education: N/A     Occupational History    Not on file. Social History Main Topics    Smoking status: Never Smoker    Smokeless tobacco: Never Used    Alcohol use No    Drug use: No    Sexual activity: Not on file     Other Topics Concern    Not on file     Social History Narrative    No narrative on file        Family History:   History reviewed. No pertinent family history.     REVIEW OF SYSTEMS:  CONSTITUTIONAL:  negative  EYES:  negative  HEENT:  negative  RESPIRATORY:  negative  CARDIOVASCULAR:  negative  GASTROINTESTINAL:  negative  GENITOURINARY:  negative  INTEGUMENT/BREAST:  negative  HEMATOLOGIC/LYMPHATIC:  negative  ALLERGIC/IMMUNOLOGIC:  negative  ENDOCRINE:  negative  MUSCULOSKELETAL:  negative  NEUROLOGICAL: negative  BEHAVIOR/PSYCH:  negative    PHYSICAL EXAM:  Vitals:  /69   Pulse 89   Temp 98.3 °F (36.8 °C) (Oral)   Resp 16   Ht 4' 1.2\" (1.25 m)   Wt 110 lb 11.2 oz (50.2 kg)   SpO2 96%   BMI 32.15 kg/m²     CONSTITUTIONAL:  awake, alert, cooperative, no apparent distress, and appears stated age  EYES:  Lids and lashes normal, pupils equal, round and reactive to light, extra ocular muscles intact, sclera clear, conjunctiva normal  ENT:  Normocephalic, without obvious abnormality, atraumatic, sinuses nontender on palpation, external ears without lesions, oral pharynx with moist mucus membranes, tonsils without erythema or exudates, gums normal and good dentition. NECK:  Supple, symmetrical, trachea midline, no adenopathy, thyroid symmetric, not enlarged and no tenderness, skin normal  HEMATOLOGIC/LYMPHATICS:  no cervical lymphadenopathy and no supraclavicular lymphadenopathy  BACK:  kyphotic  LUNGS:  No increased work of breathing, good air exchange, clear to auscultation bilaterally, no crackles or wheezing  CARDIOVASCULAR:  Normal apical impulse, irregular rate and rhythm, normal S1 and S2, no S3 or S4, and no murmur noted  ABDOMEN:  No scars, normal bowel sounds, soft, non-distended, non-tender, no masses palpated, no hepatosplenomegally  CHEST/BREASTS:  deferred  GENITAL/URINARY:  deferred  MUSCULOSKELETAL:  There is no redness, warmth, or swelling of the joints. Full range of motion noted. Motor strength is 5 out of 5 all extremities bilaterally. Tone is normal.  NEUROLOGIC:  Awake, alert, oriented to name, place and time. Cranial nerves II-XII are grossly intact. Motor is 5 out of 5 bilaterally. Cerebellar finger to nose, heel to shin intact. Sensory is intact.   Babinski down going, Romberg negative, and gait is normal.  SKIN:  normal skin color, texture, turgor  RECTAL: deferred    Labs:  Hematology:  Recent Labs      11/13/18   0930  11/14/18   0603   WBC  17.3*  12.1*   HGB  12.9  11.6* HCT  39.9  35.8*   PLT  222  167   INR  1.2   --      Chemistry:  Recent Labs      11/13/18   0930  11/14/18   0603   NA  134*  136   K  4.9  5.0   CL  94*  99   CO2  22  24   GLUCOSE  316*  124*   BUN  31*  27*   CREATININE  0.78  0.78   CALCIUM  9.2  9.2     Recent Labs      11/13/18   0930   PROT  7.9   LABALBU  4.2   AST  32*   ALT  21   ALKPHOS  108*   BILITOT  0.84       ASSESSMENT:    Active Hospital Problems    Diagnosis Date Noted    Hypotensive episode [I95.9] 11/13/2018         AF w/ controlled rhythm        Chronic respiratory failure        Chronic anticoagulation    PLAN:  1. Continue present management,waiting for pulmonary and cardiac consultation.       Electronically signed by Carey Michele MD on 11/14/2018 at 3:37 PM     Copy sent to Paul Curry MD

## 2018-11-14 NOTE — DISCHARGE INSTR - COC
 Acute congestive heart failure (HCC) I50.9    Hypotensive episode I95.9       Isolation/Infection:   Isolation          No Isolation            Nurse Assessment:  Last Vital Signs: /69   Pulse 89   Temp 98.3 °F (36.8 °C) (Oral)   Resp 16   Ht 4' 1.2\" (1.25 m)   Wt 110 lb 11.2 oz (50.2 kg)   SpO2 96%   BMI 32.15 kg/m²     Last documented pain score (0-10 scale): Pain Level: 0  Last Weight:   Wt Readings from Last 1 Encounters:   11/14/18 110 lb 11.2 oz (50.2 kg)     Mental Status:  oriented and alert    IV Access:  - None    Nursing Mobility/ADLs:  Walking   Assisted  Transfer  Assisted  Bathing  Dependent  Dressing  Dependent  Toileting  Assisted  Feeding  Assisted  Med Admin  Independent  Med Delivery   whole    Wound Care Documentation and Therapy:        Elimination:  Continence:   · Bowel: Yes  · Bladder: Yes  Urinary Catheter: None   Colostomy/Ileostomy/Ileal Conduit: No       Date of Last BM: ***    Intake/Output Summary (Last 24 hours) at 11/14/18 1201  Last data filed at 11/14/18 1049   Gross per 24 hour   Intake              135 ml   Output              300 ml   Net             -165 ml     I/O last 3 completed shifts: In: 61 [P.O.:50; I.V.:10]  Out: -     Safety Concerns: At Risk for Falls    Impairments/Disabilities:      Language Barrier - Tajik     Nutrition Therapy:  Current Nutrition Therapy:   - Oral Diet:  Carb Control 4 carbs/meal (1800kcals/day) and Cardiac    Routes of Feeding: Oral  Liquids: Thin Liquids  Daily Fluid Restriction: no  Last Modified Barium Swallow with Video (Video Swallowing Test): not done    Treatments at the Time of Hospital Discharge:   Respiratory Treatments: neb as ordered   Oxygen Therapy:  is on oxygen at 2 L/min per nasal cannula. You have qualified for home oxygen all the time.    Ventilator:    - No ventilator support    Rehab Therapies: Physical Therapy  Weight Bearing Status/Restrictions: No weight bearing restirctions  Other Medical Equipment (for information only, NOT a DME order):  wheelchair, walker, bath bench and bedside commode  Other Treatments:   1. Skilled RN assessment   2. Medication reconciliation   3. When discharge from [facility] please [facility] with [facility] as prior to [facility]  4. Follow up with pcp on 11/29 at 120 if unable to keep please cancel      Patient's personal belongings (please select all that are sent with patient):  Glasses    RN SIGNATURE:  Electronically signed by Estela Quinones RN on 11/19/18 at 5:35 PM    CASE MANAGEMENT/SOCIAL WORK SECTION    Inpatient Status Date: 11/13    Readmission Risk Assessment Score:  Readmission Risk              Risk of Unplanned Readmission:        19          Discharging to Facility/ Agency   · Name: [facility]  · Phone: [phone]  · Fax: [phone]    Discharging to Facility/ Agency   · Name: [facility]   · Phone: [phone]  · Fax: [phone]    Dialysis Facility (if applicable)   · Name:  · Address:  · Dialysis Schedule:  · Phone:  · Fax:    / signature: Electronically signed by Efrem Sebastian RN on 11/14/18 at 12:08 PM    PHYSICIAN SECTION    Prognosis: Fair    Condition at Discharge: Stable    Rehab Potential (if transferring to Rehab): Good    Recommended Labs or Other Treatments After Discharge: BMP in a week    Physician Certification: I certify the above information and transfer of Vasyl Jain  is necessary for the continuing treatment of the diagnosis listed and that she requires [place] for less 30 days.      Update Admission H&P: No change in H&P    PHYSICIAN SIGNATURE:  Electronically signed by Arianna Mccarthy MD on 11/19/18 at 11:34 AM

## 2018-11-15 ENCOUNTER — APPOINTMENT (OUTPATIENT)
Dept: GENERAL RADIOLOGY | Age: 83
DRG: 291 | End: 2018-11-15
Payer: MEDICARE

## 2018-11-15 LAB
ABSOLUTE EOS #: 0.2 K/UL (ref 0–0.4)
ABSOLUTE IMMATURE GRANULOCYTE: ABNORMAL K/UL (ref 0–0.3)
ABSOLUTE LYMPH #: 2.3 K/UL (ref 1–4.8)
ABSOLUTE MONO #: 1.2 K/UL (ref 0.2–0.8)
ANION GAP SERPL CALCULATED.3IONS-SCNC: 14 MMOL/L (ref 9–17)
BASOPHILS # BLD: 0 % (ref 0–2)
BASOPHILS ABSOLUTE: 0 K/UL (ref 0–0.2)
BUN BLDV-MCNC: 22 MG/DL (ref 8–23)
BUN/CREAT BLD: 26 (ref 9–20)
CALCIUM SERPL-MCNC: 9 MG/DL (ref 8.6–10.4)
CHLORIDE BLD-SCNC: 98 MMOL/L (ref 98–107)
CO2: 28 MMOL/L (ref 20–31)
CREAT SERPL-MCNC: 0.84 MG/DL (ref 0.5–0.9)
CULTURE: ABNORMAL
CULTURE: ABNORMAL
DIFFERENTIAL TYPE: ABNORMAL
EOSINOPHILS RELATIVE PERCENT: 1 % (ref 1–4)
GFR AFRICAN AMERICAN: >60 ML/MIN
GFR NON-AFRICAN AMERICAN: >60 ML/MIN
GFR SERPL CREATININE-BSD FRML MDRD: ABNORMAL ML/MIN/{1.73_M2}
GFR SERPL CREATININE-BSD FRML MDRD: ABNORMAL ML/MIN/{1.73_M2}
GLUCOSE BLD-MCNC: 136 MG/DL (ref 65–105)
GLUCOSE BLD-MCNC: 149 MG/DL (ref 65–105)
GLUCOSE BLD-MCNC: 155 MG/DL (ref 65–105)
GLUCOSE BLD-MCNC: 157 MG/DL (ref 70–99)
GLUCOSE BLD-MCNC: 195 MG/DL (ref 65–105)
GLUCOSE BLD-MCNC: 215 MG/DL (ref 65–105)
HCT VFR BLD CALC: 37.5 % (ref 36–46)
HEMOGLOBIN: 12.4 G/DL (ref 12–16)
IMMATURE GRANULOCYTES: ABNORMAL %
LYMPHOCYTES # BLD: 18 % (ref 24–44)
Lab: ABNORMAL
MCH RBC QN AUTO: 31.6 PG (ref 26–34)
MCHC RBC AUTO-ENTMCNC: 33 G/DL (ref 31–37)
MCV RBC AUTO: 95.7 FL (ref 80–100)
MONOCYTES # BLD: 10 % (ref 1–7)
NRBC AUTOMATED: ABNORMAL PER 100 WBC
ORGANISM: ABNORMAL
ORGANISM: ABNORMAL
PDW BLD-RTO: 14.4 % (ref 11.5–14.5)
PLATELET # BLD: 215 K/UL (ref 130–400)
PLATELET ESTIMATE: ABNORMAL
PMV BLD AUTO: 8.3 FL (ref 6–12)
POTASSIUM SERPL-SCNC: 4.2 MMOL/L (ref 3.7–5.3)
PROCALCITONIN: 0.44 NG/ML
RBC # BLD: 3.92 M/UL (ref 4–5.2)
RBC # BLD: ABNORMAL 10*6/UL
SEG NEUTROPHILS: 71 % (ref 36–66)
SEGMENTED NEUTROPHILS ABSOLUTE COUNT: 9.1 K/UL (ref 1.8–7.7)
SODIUM BLD-SCNC: 140 MMOL/L (ref 135–144)
SPECIMEN DESCRIPTION: ABNORMAL
STATUS: ABNORMAL
WBC # BLD: 12.8 K/UL (ref 3.5–11)
WBC # BLD: ABNORMAL 10*3/UL

## 2018-11-15 PROCEDURE — 2580000003 HC RX 258: Performed by: NURSE PRACTITIONER

## 2018-11-15 PROCEDURE — 36415 COLL VENOUS BLD VENIPUNCTURE: CPT

## 2018-11-15 PROCEDURE — 80048 BASIC METABOLIC PNL TOTAL CA: CPT

## 2018-11-15 PROCEDURE — 2580000003 HC RX 258: Performed by: EMERGENCY MEDICINE

## 2018-11-15 PROCEDURE — 6360000002 HC RX W HCPCS: Performed by: EMERGENCY MEDICINE

## 2018-11-15 PROCEDURE — 84145 PROCALCITONIN (PCT): CPT

## 2018-11-15 PROCEDURE — 94664 DEMO&/EVAL PT USE INHALER: CPT

## 2018-11-15 PROCEDURE — 94640 AIRWAY INHALATION TREATMENT: CPT

## 2018-11-15 PROCEDURE — 82947 ASSAY GLUCOSE BLOOD QUANT: CPT

## 2018-11-15 PROCEDURE — 6370000000 HC RX 637 (ALT 250 FOR IP): Performed by: NURSE PRACTITIONER

## 2018-11-15 PROCEDURE — 85025 COMPLETE CBC W/AUTO DIFF WBC: CPT

## 2018-11-15 PROCEDURE — 6370000000 HC RX 637 (ALT 250 FOR IP): Performed by: INTERNAL MEDICINE

## 2018-11-15 PROCEDURE — 71045 X-RAY EXAM CHEST 1 VIEW: CPT

## 2018-11-15 PROCEDURE — 6360000002 HC RX W HCPCS: Performed by: NURSE PRACTITIONER

## 2018-11-15 PROCEDURE — 2060000000 HC ICU INTERMEDIATE R&B

## 2018-11-15 PROCEDURE — 6370000000 HC RX 637 (ALT 250 FOR IP): Performed by: FAMILY MEDICINE

## 2018-11-15 PROCEDURE — 2580000003 HC RX 258: Performed by: FAMILY MEDICINE

## 2018-11-15 PROCEDURE — 2700000000 HC OXYGEN THERAPY PER DAY

## 2018-11-15 RX ORDER — ALBUTEROL SULFATE 2.5 MG/3ML
2.5 SOLUTION RESPIRATORY (INHALATION) EVERY 4 HOURS PRN
Status: DISCONTINUED | OUTPATIENT
Start: 2018-11-15 | End: 2018-11-19 | Stop reason: HOSPADM

## 2018-11-15 RX ORDER — IPRATROPIUM BROMIDE AND ALBUTEROL SULFATE 2.5; .5 MG/3ML; MG/3ML
1 SOLUTION RESPIRATORY (INHALATION) 3 TIMES DAILY
Status: DISCONTINUED | OUTPATIENT
Start: 2018-11-15 | End: 2018-11-17

## 2018-11-15 RX ORDER — ALBUTEROL SULFATE 2.5 MG/3ML
2.5 SOLUTION RESPIRATORY (INHALATION)
Status: DISCONTINUED | OUTPATIENT
Start: 2018-11-15 | End: 2018-11-15

## 2018-11-15 RX ORDER — BUMETANIDE 1 MG/1
1 TABLET ORAL DAILY
Status: DISCONTINUED | OUTPATIENT
Start: 2018-11-16 | End: 2018-11-19 | Stop reason: HOSPADM

## 2018-11-15 RX ADMIN — VITAMIN D, TAB 1000IU (100/BT) 2000 UNITS: 25 TAB at 08:48

## 2018-11-15 RX ADMIN — APIXABAN 2.5 MG: 2.5 TABLET, FILM COATED ORAL at 20:46

## 2018-11-15 RX ADMIN — AZITHROMYCIN MONOHYDRATE 500 MG: 500 INJECTION, POWDER, LYOPHILIZED, FOR SOLUTION INTRAVENOUS at 10:40

## 2018-11-15 RX ADMIN — DORZOLAMIDE HYDROCHLORIDE 1 DROP: 20 SOLUTION/ DROPS OPHTHALMIC at 20:46

## 2018-11-15 RX ADMIN — MONTELUKAST SODIUM 10 MG: 10 TABLET, FILM COATED ORAL at 20:46

## 2018-11-15 RX ADMIN — ATORVASTATIN CALCIUM 20 MG: 20 TABLET, FILM COATED ORAL at 20:46

## 2018-11-15 RX ADMIN — TIMOLOL MALEATE 1 DROP: 5 SOLUTION/ DROPS OPHTHALMIC at 20:46

## 2018-11-15 RX ADMIN — GLIMEPIRIDE 2 MG: 2 TABLET ORAL at 06:26

## 2018-11-15 RX ADMIN — CEFTRIAXONE SODIUM 1 G: 1 INJECTION, POWDER, FOR SOLUTION INTRAMUSCULAR; INTRAVENOUS at 15:19

## 2018-11-15 RX ADMIN — IPRATROPIUM BROMIDE AND ALBUTEROL SULFATE 1 AMPULE: .5; 3 SOLUTION RESPIRATORY (INHALATION) at 14:02

## 2018-11-15 RX ADMIN — DORZOLAMIDE HYDROCHLORIDE 1 DROP: 20 SOLUTION/ DROPS OPHTHALMIC at 08:49

## 2018-11-15 RX ADMIN — METFORMIN HYDROCHLORIDE 500 MG: 500 TABLET, EXTENDED RELEASE ORAL at 08:48

## 2018-11-15 RX ADMIN — APIXABAN 2.5 MG: 2.5 TABLET, FILM COATED ORAL at 08:48

## 2018-11-15 RX ADMIN — BUMETANIDE 1 MG: 1 TABLET ORAL at 08:48

## 2018-11-15 RX ADMIN — BRIMONIDINE TARTRATE 1 DROP: 2 SOLUTION OPHTHALMIC at 08:49

## 2018-11-15 RX ADMIN — DILTIAZEM HYDROCHLORIDE 60 MG: 60 CAPSULE, EXTENDED RELEASE ORAL at 08:48

## 2018-11-15 RX ADMIN — VITAMIN D, TAB 1000IU (100/BT) 2000 UNITS: 25 TAB at 20:46

## 2018-11-15 RX ADMIN — MAGNESIUM OXIDE TAB 400 MG (241.3 MG ELEMENTAL MG) 400 MG: 400 (241.3 MG) TAB at 08:48

## 2018-11-15 RX ADMIN — IPRATROPIUM BROMIDE AND ALBUTEROL SULFATE 1 AMPULE: .5; 3 SOLUTION RESPIRATORY (INHALATION) at 18:53

## 2018-11-15 RX ADMIN — BRIMONIDINE TARTRATE 1 DROP: 2 SOLUTION OPHTHALMIC at 20:46

## 2018-11-15 RX ADMIN — Medication 10 ML: at 08:55

## 2018-11-15 RX ADMIN — METFORMIN HYDROCHLORIDE 500 MG: 500 TABLET, EXTENDED RELEASE ORAL at 17:07

## 2018-11-15 RX ADMIN — Medication 10 ML: at 20:46

## 2018-11-15 RX ADMIN — TIMOLOL MALEATE 1 DROP: 5 SOLUTION/ DROPS OPHTHALMIC at 08:49

## 2018-11-15 NOTE — PROGRESS NOTES
Pulmonary Critical Care Progress Note  Vladislav Skelton CNP / Dr. Scottie Brown M.D. Patient seen for the follow up of Chronic respiratory failure, acute on chronic diastolic heart failure, elevated lactic acid, pulmonary hypertension, possible pneumonia    Subjective:  She continues to have complaints of a cough, occasional sputum production. Her shortness of breath is still present, though improved compared to yesterday. No obvious signs of pain. Examination:  Vitals: /80   Pulse 102   Temp 97.5 °F (36.4 °C) (Oral)   Resp 16   Ht 4' 1.2\" (1.25 m)   Wt 100 lb (45.4 kg)   SpO2 100%   BMI 29.05 kg/m²     General appearance: alert and cooperative with exam, no distress  Neck: No JVD  Lungs: Crackles posteriorly, no wheezing  Heart: Irregular rhythm/A. fib  Abdomen: Soft, non tender, + BS  Extremities: no cyanosis or clubbing. Mild lower extremity edema    LABs:  CBC:   Recent Labs      11/14/18   0603  11/15/18   0517   WBC  12.1*  12.8*   HGB  11.6*  12.4   HCT  35.8*  37.5   PLT  167  215     BMP:   Recent Labs      11/14/18   0603  11/15/18   0517   NA  136  140   K  5.0  4.2   CO2  24  28   BUN  27*  22   CREATININE  0.78  0.84   LABGLOM  >60  >60   GLUCOSE  124*  157*     PT/INR:   Recent Labs      11/13/18   0930   PROTIME  12.6*   INR  1.2     LIVER PROFILE:  Recent Labs      11/13/18   0930   AST  32*   ALT  21   LABALBU  4.2     Lactic Acid 2.8   0.5 - 2.2 mmol/L Final 11/14/2018  4:26      Radiology:  11/15/18      Impression:  · Chronic respiratory failure  · Acute on chronic diastolic heart failure  · New Left upper lobe pulmonary infiltrate/pneumonia? 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  Fib  · History of HTN/HDL/DM    Recommendations:  · 2 liters/min via nasal cannula  · Continue IV Rocephin  · Add Zithromax  · Diuresis with PO

## 2018-11-15 NOTE — PLAN OF CARE
Problem: Falls - Risk of:  Goal: Will remain free from falls  Will remain free from falls   Outcome: Ongoing      Problem: HEMODYNAMIC STATUS  Goal: Patient has stable vital signs and fluid balance  Outcome: Ongoing

## 2018-11-16 LAB
BNP INTERPRETATION: ABNORMAL
LACTIC ACID: 2.4 MMOL/L (ref 0.5–2.2)
PRO-BNP: 8900 PG/ML

## 2018-11-16 PROCEDURE — 6370000000 HC RX 637 (ALT 250 FOR IP): Performed by: NURSE PRACTITIONER

## 2018-11-16 PROCEDURE — 2580000003 HC RX 258: Performed by: NURSE PRACTITIONER

## 2018-11-16 PROCEDURE — 6370000000 HC RX 637 (ALT 250 FOR IP): Performed by: FAMILY MEDICINE

## 2018-11-16 PROCEDURE — 83880 ASSAY OF NATRIURETIC PEPTIDE: CPT

## 2018-11-16 PROCEDURE — 2580000003 HC RX 258: Performed by: FAMILY MEDICINE

## 2018-11-16 PROCEDURE — 94640 AIRWAY INHALATION TREATMENT: CPT

## 2018-11-16 PROCEDURE — 2060000000 HC ICU INTERMEDIATE R&B

## 2018-11-16 PROCEDURE — 6360000002 HC RX W HCPCS: Performed by: NURSE PRACTITIONER

## 2018-11-16 PROCEDURE — 6370000000 HC RX 637 (ALT 250 FOR IP): Performed by: INTERNAL MEDICINE

## 2018-11-16 PROCEDURE — 36415 COLL VENOUS BLD VENIPUNCTURE: CPT

## 2018-11-16 PROCEDURE — 2580000003 HC RX 258: Performed by: EMERGENCY MEDICINE

## 2018-11-16 PROCEDURE — 83605 ASSAY OF LACTIC ACID: CPT

## 2018-11-16 PROCEDURE — 2700000000 HC OXYGEN THERAPY PER DAY

## 2018-11-16 PROCEDURE — 94760 N-INVAS EAR/PLS OXIMETRY 1: CPT

## 2018-11-16 PROCEDURE — 6360000002 HC RX W HCPCS: Performed by: EMERGENCY MEDICINE

## 2018-11-16 RX ORDER — DILTIAZEM HYDROCHLORIDE 60 MG/1
60 CAPSULE, EXTENDED RELEASE ORAL 2 TIMES DAILY
Status: DISCONTINUED | OUTPATIENT
Start: 2018-11-16 | End: 2018-11-19 | Stop reason: HOSPADM

## 2018-11-16 RX ADMIN — DORZOLAMIDE HYDROCHLORIDE 1 DROP: 20 SOLUTION/ DROPS OPHTHALMIC at 09:06

## 2018-11-16 RX ADMIN — MONTELUKAST SODIUM 10 MG: 10 TABLET, FILM COATED ORAL at 20:17

## 2018-11-16 RX ADMIN — AZITHROMYCIN MONOHYDRATE 500 MG: 500 INJECTION, POWDER, LYOPHILIZED, FOR SOLUTION INTRAVENOUS at 11:09

## 2018-11-16 RX ADMIN — DORZOLAMIDE HYDROCHLORIDE 1 DROP: 20 SOLUTION/ DROPS OPHTHALMIC at 20:25

## 2018-11-16 RX ADMIN — APIXABAN 2.5 MG: 2.5 TABLET, FILM COATED ORAL at 09:03

## 2018-11-16 RX ADMIN — VITAMIN D, TAB 1000IU (100/BT) 2000 UNITS: 25 TAB at 20:17

## 2018-11-16 RX ADMIN — ATORVASTATIN CALCIUM 20 MG: 20 TABLET, FILM COATED ORAL at 20:19

## 2018-11-16 RX ADMIN — MAGNESIUM OXIDE TAB 400 MG (241.3 MG ELEMENTAL MG) 400 MG: 400 (241.3 MG) TAB at 09:03

## 2018-11-16 RX ADMIN — APIXABAN 2.5 MG: 2.5 TABLET, FILM COATED ORAL at 20:17

## 2018-11-16 RX ADMIN — TIMOLOL MALEATE 1 DROP: 5 SOLUTION/ DROPS OPHTHALMIC at 09:06

## 2018-11-16 RX ADMIN — IPRATROPIUM BROMIDE AND ALBUTEROL SULFATE 1 AMPULE: .5; 3 SOLUTION RESPIRATORY (INHALATION) at 15:11

## 2018-11-16 RX ADMIN — GLIMEPIRIDE 2 MG: 2 TABLET ORAL at 06:27

## 2018-11-16 RX ADMIN — METFORMIN HYDROCHLORIDE 500 MG: 500 TABLET, EXTENDED RELEASE ORAL at 17:40

## 2018-11-16 RX ADMIN — BUMETANIDE 1 MG: 1 TABLET ORAL at 09:03

## 2018-11-16 RX ADMIN — IPRATROPIUM BROMIDE AND ALBUTEROL SULFATE 1 AMPULE: .5; 3 SOLUTION RESPIRATORY (INHALATION) at 08:16

## 2018-11-16 RX ADMIN — DILTIAZEM HYDROCHLORIDE 60 MG: 60 CAPSULE, EXTENDED RELEASE ORAL at 09:03

## 2018-11-16 RX ADMIN — BRIMONIDINE TARTRATE 1 DROP: 2 SOLUTION OPHTHALMIC at 20:19

## 2018-11-16 RX ADMIN — DILTIAZEM HYDROCHLORIDE 60 MG: 60 CAPSULE, EXTENDED RELEASE ORAL at 20:17

## 2018-11-16 RX ADMIN — Medication 10 ML: at 09:05

## 2018-11-16 RX ADMIN — TIMOLOL MALEATE 1 DROP: 5 SOLUTION/ DROPS OPHTHALMIC at 20:21

## 2018-11-16 RX ADMIN — CEFTRIAXONE SODIUM 1 G: 1 INJECTION, POWDER, FOR SOLUTION INTRAMUSCULAR; INTRAVENOUS at 17:40

## 2018-11-16 RX ADMIN — VITAMIN D, TAB 1000IU (100/BT) 2000 UNITS: 25 TAB at 09:03

## 2018-11-16 RX ADMIN — BRIMONIDINE TARTRATE 1 DROP: 2 SOLUTION OPHTHALMIC at 09:06

## 2018-11-16 RX ADMIN — METFORMIN HYDROCHLORIDE 500 MG: 500 TABLET, EXTENDED RELEASE ORAL at 09:03

## 2018-11-16 ASSESSMENT — PULMONARY FUNCTION TESTS: PEFR_L/MIN: 18

## 2018-11-16 NOTE — PROGRESS NOTES
apixaban  2.5 mg Oral BID    atorvastatin  20 mg Oral Daily    brimonidine  1 drop Both Eyes BID    vitamin D  2,000 Units Oral BID    diltiazem  60 mg Oral Daily    glimepiride  2 mg Oral QAM AC    montelukast  10 mg Oral Nightly    metFORMIN  500 mg Oral BID WC    sodium chloride flush  10 mL Intravenous 2 times per day    cefTRIAXone (ROCEPHIN) IV  1 g Intravenous Q24H    magnesium oxide  400 mg Oral Daily    dorzolamide  1 drop Both Eyes BID    timolol  1 drop Both Eyes BID       IV Infusions (if any):      Diagnostics:   EKG: . ECHO: .   Ejection fraction: %  Stress Test: .  Cardiac Angiography: .    Labs:   CBC:   Recent Labs      11/14/18   0603  11/15/18   0517   WBC  12.1*  12.8*   HGB  11.6*  12.4   HCT  35.8*  37.5   PLT  167  215     BMP: Recent Labs      11/14/18   0603  11/15/18   0517   NA  136  140   K  5.0  4.2   CO2  24  28   BUN  27*  22   CREATININE  0.78  0.84   LABGLOM  >60  >60   GLUCOSE  124*  157*     Lab Results   Component Value Date     09/02/2011     PT/INR:   Recent Labs      11/13/18   0930   PROTIME  12.6*   INR  1.2     APTT:No results for input(s): APTT in the last 72 hours. CARDIAC ENZYMES:No results for input(s): CKTOTAL, CKMB, CKMBINDEX, TROPONINT in the last 72 hours. FASTING LIPID PANEL:  Lab Results   Component Value Date    HDL 80 05/09/2018    TRIG 85 05/09/2018     LIVER PROFILE:  Recent Labs      11/13/18   0930   AST  32*   ALT  21   LABALBU  4.2       ASSESSMENT:  1. Acute on chronic diastolic heart failure, clinically improving and is back to her normal weight, preserved LV systolic function on echocardiogram done August 2018  2. Chronic atrial fibrillation with controlled ventricular response on chronic Eliquis therapy, asymptomatic  3. Valvular heart disease, history of mild to moderate mitral regurgitation and moderate tricuspid regurgitation with moderate pulmonary hypertension on echocardiogram done August 2018  4.  Essential hypertension, 5. Coronary artery disease, remains free of angina  6. UTI/possible pneumonia, managed by others    Patient Active Problem List   Diagnosis    Pneumonia    [facility] [address] [facility]    Moderate malnutrition ([facility] [address] [facility]    Pulmonary HTN ([facility] [address] [facility]    Non-rheumatic mitral regurgitation    Acute on chronic combined systolic and diastolic congestive heart failure (HCC)    Chronic atrial fibrillation (HCC)    Type 2 diabetes mellitus with hyperglycemia (HCC)    Acute bronchitis    Acute congestive heart failure (HCC)    Hypotensive episode       PLAN:    1. Recommend to ambulate  2. Increase Cardizem to twice a day to better control her hypertension and heart rate. Please see orders. Discussed with patient and other pul  service and nursing.     Rita Lindo MD

## 2018-11-16 NOTE — FLOWSHEET NOTE
·  rounding on unit. · Patient was sitting in chair and smiled at . Patient stated that she did not speak [place]. ·  expressed words of support and shook patient's hand. Patient seemed grateful for visit. Plan: Chaplains will remain available to offer spiritual and emotional support as needed.        11/16/18 1346   Encounter Summary   Services provided to: Patient   Referral/Consult From: Rounding   Continue Visiting (11/16/18)   Complexity of Encounter Low   Length of Encounter 15 minutes   Spiritual/Mu-ism   Type Spiritual support   Assessment Approachable;Calm   Intervention Active listening   Outcome Receptive     Electronically signed by Fly Jacob on 11/16/2018 at 1:51 PM

## 2018-11-17 ENCOUNTER — APPOINTMENT (OUTPATIENT)
Dept: GENERAL RADIOLOGY | Age: 83
DRG: 291 | End: 2018-11-17
Payer: MEDICARE

## 2018-11-17 PROCEDURE — 6370000000 HC RX 637 (ALT 250 FOR IP): Performed by: NURSE PRACTITIONER

## 2018-11-17 PROCEDURE — 2580000003 HC RX 258: Performed by: EMERGENCY MEDICINE

## 2018-11-17 PROCEDURE — 6360000002 HC RX W HCPCS: Performed by: EMERGENCY MEDICINE

## 2018-11-17 PROCEDURE — 2580000003 HC RX 258: Performed by: NURSE PRACTITIONER

## 2018-11-17 PROCEDURE — 2580000003 HC RX 258: Performed by: FAMILY MEDICINE

## 2018-11-17 PROCEDURE — 2060000000 HC ICU INTERMEDIATE R&B

## 2018-11-17 PROCEDURE — 71046 X-RAY EXAM CHEST 2 VIEWS: CPT

## 2018-11-17 PROCEDURE — 6360000002 HC RX W HCPCS: Performed by: NURSE PRACTITIONER

## 2018-11-17 PROCEDURE — 6370000000 HC RX 637 (ALT 250 FOR IP): Performed by: INTERNAL MEDICINE

## 2018-11-17 PROCEDURE — 6370000000 HC RX 637 (ALT 250 FOR IP): Performed by: FAMILY MEDICINE

## 2018-11-17 RX ORDER — GLIMEPIRIDE 1 MG/1
1 TABLET ORAL
Status: DISCONTINUED | OUTPATIENT
Start: 2018-11-17 | End: 2018-11-19 | Stop reason: HOSPADM

## 2018-11-17 RX ORDER — NICOTINE POLACRILEX 4 MG
15 LOZENGE BUCCAL PRN
Status: DISCONTINUED | OUTPATIENT
Start: 2018-11-17 | End: 2018-11-19 | Stop reason: HOSPADM

## 2018-11-17 RX ORDER — DEXTROSE MONOHYDRATE 25 G/50ML
12.5 INJECTION, SOLUTION INTRAVENOUS PRN
Status: DISCONTINUED | OUTPATIENT
Start: 2018-11-17 | End: 2018-11-19 | Stop reason: HOSPADM

## 2018-11-17 RX ORDER — DEXTROSE MONOHYDRATE 50 MG/ML
100 INJECTION, SOLUTION INTRAVENOUS PRN
Status: DISCONTINUED | OUTPATIENT
Start: 2018-11-17 | End: 2018-11-19 | Stop reason: HOSPADM

## 2018-11-17 RX ADMIN — MAGNESIUM OXIDE TAB 400 MG (241.3 MG ELEMENTAL MG) 400 MG: 400 (241.3 MG) TAB at 09:06

## 2018-11-17 RX ADMIN — BRIMONIDINE TARTRATE 1 DROP: 2 SOLUTION OPHTHALMIC at 09:18

## 2018-11-17 RX ADMIN — TIMOLOL MALEATE 1 DROP: 5 SOLUTION/ DROPS OPHTHALMIC at 20:41

## 2018-11-17 RX ADMIN — BUMETANIDE 1 MG: 1 TABLET ORAL at 09:06

## 2018-11-17 RX ADMIN — GLIMEPIRIDE 1 MG: 1 TABLET ORAL at 16:24

## 2018-11-17 RX ADMIN — MONTELUKAST SODIUM 10 MG: 10 TABLET, FILM COATED ORAL at 20:21

## 2018-11-17 RX ADMIN — DORZOLAMIDE HYDROCHLORIDE 1 DROP: 20 SOLUTION/ DROPS OPHTHALMIC at 09:16

## 2018-11-17 RX ADMIN — ACETAMINOPHEN AND CODEINE PHOSPHATE 1 TABLET: 300; 30 TABLET ORAL at 20:22

## 2018-11-17 RX ADMIN — BRIMONIDINE TARTRATE 1 DROP: 2 SOLUTION OPHTHALMIC at 20:28

## 2018-11-17 RX ADMIN — DILTIAZEM HYDROCHLORIDE 60 MG: 60 CAPSULE, EXTENDED RELEASE ORAL at 09:06

## 2018-11-17 RX ADMIN — GLIMEPIRIDE 2 MG: 2 TABLET ORAL at 09:16

## 2018-11-17 RX ADMIN — METFORMIN HYDROCHLORIDE 500 MG: 500 TABLET, EXTENDED RELEASE ORAL at 16:24

## 2018-11-17 RX ADMIN — Medication 10 ML: at 09:07

## 2018-11-17 RX ADMIN — AZITHROMYCIN MONOHYDRATE 500 MG: 500 INJECTION, POWDER, LYOPHILIZED, FOR SOLUTION INTRAVENOUS at 12:33

## 2018-11-17 RX ADMIN — ATORVASTATIN CALCIUM 20 MG: 20 TABLET, FILM COATED ORAL at 20:21

## 2018-11-17 RX ADMIN — TIMOLOL MALEATE 1 DROP: 5 SOLUTION/ DROPS OPHTHALMIC at 09:16

## 2018-11-17 RX ADMIN — Medication 10 ML: at 20:41

## 2018-11-17 RX ADMIN — VITAMIN D, TAB 1000IU (100/BT) 2000 UNITS: 25 TAB at 20:21

## 2018-11-17 RX ADMIN — APIXABAN 2.5 MG: 2.5 TABLET, FILM COATED ORAL at 20:21

## 2018-11-17 RX ADMIN — CEFTRIAXONE SODIUM 1 G: 1 INJECTION, POWDER, FOR SOLUTION INTRAMUSCULAR; INTRAVENOUS at 16:24

## 2018-11-17 RX ADMIN — VITAMIN D, TAB 1000IU (100/BT) 2000 UNITS: 25 TAB at 09:06

## 2018-11-17 RX ADMIN — DORZOLAMIDE HYDROCHLORIDE 1 DROP: 20 SOLUTION/ DROPS OPHTHALMIC at 20:43

## 2018-11-17 RX ADMIN — APIXABAN 2.5 MG: 2.5 TABLET, FILM COATED ORAL at 09:06

## 2018-11-17 RX ADMIN — DILTIAZEM HYDROCHLORIDE 60 MG: 60 CAPSULE, EXTENDED RELEASE ORAL at 20:22

## 2018-11-17 RX ADMIN — METFORMIN HYDROCHLORIDE 500 MG: 500 TABLET, EXTENDED RELEASE ORAL at 09:16

## 2018-11-17 ASSESSMENT — PAIN SCALES - GENERAL
PAINLEVEL_OUTOF10: 0

## 2018-11-17 NOTE — PROGRESS NOTES
Provider, MD   magnesium oxide (MAG-OX) 400 MG tablet Take 400 mg by mouth 2 times daily   Yes Historical Provider, MD   risedronate (ACTONEL) 35 MG tablet Take 35 mg by mouth every 7 days Wednesdays   Yes Historical Provider, MD   brimonidine (ALPHAGAN) 0.2 % ophthalmic solution Place 1 drop into both eyes 2 times daily    Historical Provider, MD   .  Recent Surgical History: None = 0     Assessment     Peak Flow (asthma only)    Predicted:   Personal Best:   PEF   % Predicted   Peak Flow :     FEV1/FVC    FEV1 Predicted       FEV1 unable does not speak english    FEV1 % Predicted   FVC   IS volume   IBW   FIO2% 2lpm  SPO2 100%  RR 18  Breath Sounds: Diminished clear      · Bronchodilator assessment at level 1 takes prn at home  · Hyperinflation assessment at level   · Secretion Management assessment at level    ·   · []    Bronchodilator Assessment  BRONCHODILATOR ASSESSMENT SCORE  Score 0 1 2 3 4 5   Breath Sounds   [x]  Patient Baseline []  No Wheeze good aeration []  Faint, scattered wheezing, good aeration []  Expiratory Wheezing and or moderately diminished []  Insp/Exp wheeze and/or very diminished []  Insp/Exp and/ or marked distress   Respiratory Rate   [x]  Patient Baseline []  Less than 20 []  Less than 20 []  20-25 []  Greater than 25 []  Greater than 25   Peak flow % of Pred or PB [x]  NA   []  Greater than 90%  []  81-90% []  71-80% []  Less than or equal to 70%  or unable to perform []  Unable due to Respiratory Distress   Dyspnea re []  Patient Baseline []  No SOB [x]  No SOB []  SOB on exertion []  SOB min activity []  At rest/acute   e FEV% Predicted       []  NA []  Above 69%  []  Unable []  Above 60-69%  [x]  Unable []  Above 50-59%  []  Unable []  Above 35-49%  []  Unable []  Less than 35%  []  Unable                 []  Hyperinflation Assessment  Score 1 2 3   CXR and Breath Sounds   []  Clear []  No atelectasis  Basilar aeration []  Atelectasis or absent basilar breath sounds   Incentive Spirometry Volume  (Per IBW)   []  Greater than or equal to 15ml/Kg []  less than 15ml/Kg []  less than 15ml/Kg   Surgery within last 2 weeks []  None or general   []  Abdominal or thoracic surgery  []  Abdominal or thoracic   Chronic Pulmonary Historyre []  No []  Yes []  Yes     []  Secretion Management Assessment  Score 1 2 3   Bilateral Breath Sounds   []  Occasional Rhonchi []  Scattered Rhonchi []  Course Rhonchi and/or poor aeration   Sputum    []  Small amount of thin secretions []  Moderate amount of viscous secretions []  Copius, Viscious Yellow/ Secretions   CXR as reported by physician []  clear  []  Unavailable []  Infiltrates and/or consolidation  []  Unavailable []  Mucus Plugging and or lobar consolidation  []  Unavailable   Cough []  Strong, productive cough []  Weak productive cough []  No cough or weak non-productive cough

## 2018-11-17 NOTE — PROGRESS NOTES
[facility]   Section of Cardiology  Progress Note      Date:  11/17/2018  Patient: Darline Burton  Admission:  11/13/2018  9:19 AM  Admit DX: Hypotensive episode [I95.9]  Hypotensive episode [I95.9]  Age:  80 y.o., 12/31/1923     LOS: 4 days     Reason for evaluation:   CHF      SUBJECTIVE:     The patient was seen and examined. Notes and labs reviewed. There were not complications over night. Patient's cardiac review of systems: negative for chest pain, dyspnea and palpitations. Sitting up in chair. The patient is generally feeling gradually improving. OBJECTIVE:    Telemetry: Atrial fibrillation with controlled VR  /75   Pulse 97   Temp 97.3 °F (36.3 °C) (Oral)   Resp 18   Ht 4' 1.2\" (1.25 m)   Wt 100 lb (45.4 kg)   SpO2 97%   BMI 29.05 kg/m²   No intake or output data in the 24 hours ending 11/17/18 0804    EXAM:   CONSTITUTIONAL:  awake, alert, cooperative, no apparent distress, and appears stated age. HEENT: Normal jugular venous pulsations  LUNGS: Good respiratory effort. No for increased work of breathing. On auscultation: with rales in the left base and diminished breath sounds in the right base. CARDIOVASCULAR:  Normal apical impulse, irregular rate and rhythm, normal S1 and S2, 2/6 MR murmur  ABDOMEN: Soft, nontender, nondistended. Bowel sounds present. SKIN: Warm and dry. EXTREMITIES: No lower extremity edema.      Current Inpatient Medications:   diltiazem  60 mg Oral BID    azithromycin  500 mg Intravenous Q24H    ipratropium-albuterol  1 ampule Inhalation TID    bumetanide  1 mg Oral Daily    apixaban  2.5 mg Oral BID    atorvastatin  20 mg Oral Daily    brimonidine  1 drop Both Eyes BID    vitamin D  2,000 Units Oral BID    glimepiride  2 mg Oral QAM AC    montelukast  10 mg Oral Nightly    metFORMIN  500 mg Oral BID WC    sodium chloride flush  10 mL Intravenous 2 times per day    cefTRIAXone (ROCEPHIN) IV  1 g Intravenous Q24H    magnesium oxide PLAN:    1. Continue current meds. 2. I&O's and weights    Discussed with patient and nursing. I have discussed the care of Ms. Nakia Ackerman, including pertinent history and exam findings with my Affiliate Brook Gaitan RN. I have seen and examined Yvonne and the elements of all parts of the encounter have been performed by me. I agree with the assessment, plan, and orders as documented by Brook Gaitan RN; after I have modified the exam findings, the plan of treatment, and the final version is my approved version of the assessment. GARRETT Torres, RN, CCRN/ ANTONELLA Rey M.D. Patient seen and examined. Discussed the case with the patient's son who was at bedside. It's hard to get a straight answer from the patient. She walks to the bathroom and she feels tired. Heart rate is stable. Stable cardiac status at this point in time. I explained to the son that she has multiple valvular disease and that can affect her CHF recurrence. We need to restrict her diet with very low salt at home. He admitted that she is eating more chips lately.

## 2018-11-17 NOTE — PROGRESS NOTES
Pulmonary Critical Care Progress Note       Patient seen for the follow up of Chronic respiratory failure, acute on chronic diastolic heart failure, elevated lactic acid, pulmonary hypertension, possible pneumonia    Subjective:  She has improved dyspnea but still feels short of breath on ambulation . Matt Martinez She has minimal occasional cough. She has fair appetite. .  She denies any pain. Examination:  Vitals: /75   Pulse 97   Temp 97.3 °F (36.3 °C) (Oral)   Resp 18   Ht 4' 1.2\" (1.25 m)   Wt 100 lb (45.4 kg)   SpO2 97%   BMI 29.05 kg/m²     General appearance: alert and cooperative with exam, no distress  Neck: No JVD  Lungs: decreased breath sounds mild Crackles  Heart: Irregular rhythm/A. Fib no murmur  Abdomen: Soft, non tender, + BS  Extremities: no cyanosis or clubbing. no edema    LABs:  CBC:   Recent Labs      11/15/18   0517   WBC  12.8*   HGB  12.4   HCT  37.5   PLT  215     BMP:   Recent Labs      11/15/18   0517   NA  140   K  4.2   CO2  28   BUN  22   CREATININE  0.84   LABGLOM  >60   GLUCOSE  157*         CXR 11/15  Interval resolution of small right effusion.  Improved opacity right lung   base.  Persistent left effusion and left basilar opacity with interval   development of left upper lobe opacity.               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  Fib  · History of HTN/HDL/DM    Recommendations:  · O2 2 liters/min via nasal cannula  · Incentive Spirometer every hour WA  ·  IV Rocephin/Zithromax  · DuoNeb aerosols every 8 hours   · CXR   · PO Bumex  · Cardiology following   · Albuterol and Ipratropium Q 4 hours prn  · Singulair   · DVT prophylaxis, on Eliquis   · PTOT ; refuses inpatient Rehab   · Discharge

## 2018-11-18 LAB
ABSOLUTE EOS #: 0.2 K/UL (ref 0–0.4)
ABSOLUTE IMMATURE GRANULOCYTE: ABNORMAL K/UL (ref 0–0.3)
ABSOLUTE LYMPH #: 1.5 K/UL (ref 1–4.8)
ABSOLUTE MONO #: 1.1 K/UL (ref 0.2–0.8)
ANION GAP SERPL CALCULATED.3IONS-SCNC: 16 MMOL/L (ref 9–17)
BASOPHILS # BLD: 0 % (ref 0–2)
BASOPHILS ABSOLUTE: 0.1 K/UL (ref 0–0.2)
BUN BLDV-MCNC: 18 MG/DL (ref 8–23)
BUN/CREAT BLD: 24 (ref 9–20)
CALCIUM SERPL-MCNC: 9.1 MG/DL (ref 8.6–10.4)
CHLORIDE BLD-SCNC: 92 MMOL/L (ref 98–107)
CO2: 28 MMOL/L (ref 20–31)
CREAT SERPL-MCNC: 0.76 MG/DL (ref 0.5–0.9)
DIFFERENTIAL TYPE: ABNORMAL
EOSINOPHILS RELATIVE PERCENT: 1 % (ref 1–4)
GFR AFRICAN AMERICAN: >60 ML/MIN
GFR NON-AFRICAN AMERICAN: >60 ML/MIN
GFR SERPL CREATININE-BSD FRML MDRD: ABNORMAL ML/MIN/{1.73_M2}
GFR SERPL CREATININE-BSD FRML MDRD: ABNORMAL ML/MIN/{1.73_M2}
GLUCOSE BLD-MCNC: 355 MG/DL (ref 70–99)
HCT VFR BLD CALC: 40.3 % (ref 36–46)
HEMOGLOBIN: 13 G/DL (ref 12–16)
IMMATURE GRANULOCYTES: ABNORMAL %
LYMPHOCYTES # BLD: 11 % (ref 24–44)
MCH RBC QN AUTO: 31.9 PG (ref 26–34)
MCHC RBC AUTO-ENTMCNC: 32.3 G/DL (ref 31–37)
MCV RBC AUTO: 98.9 FL (ref 80–100)
MONOCYTES # BLD: 8 % (ref 1–7)
NRBC AUTOMATED: ABNORMAL PER 100 WBC
PDW BLD-RTO: 15.2 % (ref 11.5–14.5)
PLATELET # BLD: 223 K/UL (ref 130–400)
PLATELET ESTIMATE: ABNORMAL
PMV BLD AUTO: 7.9 FL (ref 6–12)
POTASSIUM SERPL-SCNC: 4.1 MMOL/L (ref 3.7–5.3)
RBC # BLD: 4.08 M/UL (ref 4–5.2)
RBC # BLD: ABNORMAL 10*6/UL
SEG NEUTROPHILS: 80 % (ref 36–66)
SEGMENTED NEUTROPHILS ABSOLUTE COUNT: 11.1 K/UL (ref 1.8–7.7)
SODIUM BLD-SCNC: 136 MMOL/L (ref 135–144)
WBC # BLD: 13.9 K/UL (ref 3.5–11)
WBC # BLD: ABNORMAL 10*3/UL

## 2018-11-18 PROCEDURE — 6360000002 HC RX W HCPCS: Performed by: NURSE PRACTITIONER

## 2018-11-18 PROCEDURE — 2580000003 HC RX 258: Performed by: NURSE PRACTITIONER

## 2018-11-18 PROCEDURE — 6360000002 HC RX W HCPCS: Performed by: INTERNAL MEDICINE

## 2018-11-18 PROCEDURE — 80048 BASIC METABOLIC PNL TOTAL CA: CPT

## 2018-11-18 PROCEDURE — 2580000003 HC RX 258: Performed by: EMERGENCY MEDICINE

## 2018-11-18 PROCEDURE — 36415 COLL VENOUS BLD VENIPUNCTURE: CPT

## 2018-11-18 PROCEDURE — 85025 COMPLETE CBC W/AUTO DIFF WBC: CPT

## 2018-11-18 PROCEDURE — 6370000000 HC RX 637 (ALT 250 FOR IP): Performed by: FAMILY MEDICINE

## 2018-11-18 PROCEDURE — 6370000000 HC RX 637 (ALT 250 FOR IP): Performed by: NURSE PRACTITIONER

## 2018-11-18 PROCEDURE — 6360000002 HC RX W HCPCS: Performed by: EMERGENCY MEDICINE

## 2018-11-18 PROCEDURE — 6370000000 HC RX 637 (ALT 250 FOR IP): Performed by: INTERNAL MEDICINE

## 2018-11-18 PROCEDURE — 2580000003 HC RX 258: Performed by: FAMILY MEDICINE

## 2018-11-18 PROCEDURE — 2060000000 HC ICU INTERMEDIATE R&B

## 2018-11-18 PROCEDURE — 2700000000 HC OXYGEN THERAPY PER DAY

## 2018-11-18 RX ORDER — FUROSEMIDE 10 MG/ML
20 INJECTION INTRAMUSCULAR; INTRAVENOUS ONCE
Status: COMPLETED | OUTPATIENT
Start: 2018-11-18 | End: 2018-11-18

## 2018-11-18 RX ADMIN — MONTELUKAST SODIUM 10 MG: 10 TABLET, FILM COATED ORAL at 20:38

## 2018-11-18 RX ADMIN — TIMOLOL MALEATE 1 DROP: 5 SOLUTION/ DROPS OPHTHALMIC at 09:47

## 2018-11-18 RX ADMIN — VITAMIN D, TAB 1000IU (100/BT) 2000 UNITS: 25 TAB at 20:38

## 2018-11-18 RX ADMIN — AZITHROMYCIN MONOHYDRATE 500 MG: 500 INJECTION, POWDER, LYOPHILIZED, FOR SOLUTION INTRAVENOUS at 10:17

## 2018-11-18 RX ADMIN — BRIMONIDINE TARTRATE 1 DROP: 2 SOLUTION OPHTHALMIC at 20:38

## 2018-11-18 RX ADMIN — APIXABAN 2.5 MG: 2.5 TABLET, FILM COATED ORAL at 20:38

## 2018-11-18 RX ADMIN — Medication 10 ML: at 09:01

## 2018-11-18 RX ADMIN — DORZOLAMIDE HYDROCHLORIDE 1 DROP: 20 SOLUTION/ DROPS OPHTHALMIC at 20:38

## 2018-11-18 RX ADMIN — GLIMEPIRIDE 1 MG: 1 TABLET ORAL at 16:56

## 2018-11-18 RX ADMIN — CEFTRIAXONE SODIUM 1 G: 1 INJECTION, POWDER, FOR SOLUTION INTRAMUSCULAR; INTRAVENOUS at 15:34

## 2018-11-18 RX ADMIN — Medication 10 ML: at 20:38

## 2018-11-18 RX ADMIN — BUMETANIDE 1 MG: 1 TABLET ORAL at 09:01

## 2018-11-18 RX ADMIN — BRIMONIDINE TARTRATE 1 DROP: 2 SOLUTION OPHTHALMIC at 09:48

## 2018-11-18 RX ADMIN — DORZOLAMIDE HYDROCHLORIDE 1 DROP: 20 SOLUTION/ DROPS OPHTHALMIC at 09:47

## 2018-11-18 RX ADMIN — MAGNESIUM OXIDE TAB 400 MG (241.3 MG ELEMENTAL MG) 400 MG: 400 (241.3 MG) TAB at 09:01

## 2018-11-18 RX ADMIN — DILTIAZEM HYDROCHLORIDE 60 MG: 60 CAPSULE, EXTENDED RELEASE ORAL at 09:01

## 2018-11-18 RX ADMIN — METFORMIN HYDROCHLORIDE 500 MG: 500 TABLET, EXTENDED RELEASE ORAL at 09:01

## 2018-11-18 RX ADMIN — DILTIAZEM HYDROCHLORIDE 60 MG: 60 CAPSULE, EXTENDED RELEASE ORAL at 20:38

## 2018-11-18 RX ADMIN — ACETAMINOPHEN AND CODEINE PHOSPHATE 1 TABLET: 300; 30 TABLET ORAL at 22:25

## 2018-11-18 RX ADMIN — METFORMIN HYDROCHLORIDE 500 MG: 500 TABLET, EXTENDED RELEASE ORAL at 16:56

## 2018-11-18 RX ADMIN — APIXABAN 2.5 MG: 2.5 TABLET, FILM COATED ORAL at 09:01

## 2018-11-18 RX ADMIN — GLIMEPIRIDE 2 MG: 2 TABLET ORAL at 06:48

## 2018-11-18 RX ADMIN — FUROSEMIDE 20 MG: 10 INJECTION, SOLUTION INTRAMUSCULAR; INTRAVENOUS at 13:35

## 2018-11-18 RX ADMIN — ATORVASTATIN CALCIUM 20 MG: 20 TABLET, FILM COATED ORAL at 20:38

## 2018-11-18 RX ADMIN — TIMOLOL MALEATE 1 DROP: 5 SOLUTION/ DROPS OPHTHALMIC at 20:38

## 2018-11-18 RX ADMIN — VITAMIN D, TAB 1000IU (100/BT) 2000 UNITS: 25 TAB at 09:01

## 2018-11-18 ASSESSMENT — PAIN SCALES - GENERAL: PAINLEVEL_OUTOF10: 2

## 2018-11-19 ENCOUNTER — APPOINTMENT (OUTPATIENT)
Dept: GENERAL RADIOLOGY | Age: 83
DRG: 291 | End: 2018-11-19
Payer: MEDICARE

## 2018-11-19 VITALS
HEIGHT: 55 IN | RESPIRATION RATE: 16 BRPM | HEART RATE: 91 BPM | WEIGHT: 103.31 LBS | DIASTOLIC BLOOD PRESSURE: 62 MMHG | SYSTOLIC BLOOD PRESSURE: 121 MMHG | OXYGEN SATURATION: 99 % | TEMPERATURE: 98.4 F | BODY MASS INDEX: 23.91 KG/M2

## 2018-11-19 LAB
GLUCOSE BLD-MCNC: 100 MG/DL (ref 65–105)
GLUCOSE BLD-MCNC: 103 MG/DL (ref 65–105)
GLUCOSE BLD-MCNC: 367 MG/DL (ref 65–105)
GLUCOSE BLD-MCNC: 62 MG/DL (ref 65–105)

## 2018-11-19 PROCEDURE — 6370000000 HC RX 637 (ALT 250 FOR IP): Performed by: FAMILY MEDICINE

## 2018-11-19 PROCEDURE — 6370000000 HC RX 637 (ALT 250 FOR IP): Performed by: INTERNAL MEDICINE

## 2018-11-19 PROCEDURE — 97116 GAIT TRAINING THERAPY: CPT

## 2018-11-19 PROCEDURE — 6360000002 HC RX W HCPCS: Performed by: EMERGENCY MEDICINE

## 2018-11-19 PROCEDURE — 6360000002 HC RX W HCPCS: Performed by: NURSE PRACTITIONER

## 2018-11-19 PROCEDURE — 2580000003 HC RX 258: Performed by: EMERGENCY MEDICINE

## 2018-11-19 PROCEDURE — G8988 SELF CARE GOAL STATUS: HCPCS

## 2018-11-19 PROCEDURE — 94640 AIRWAY INHALATION TREATMENT: CPT

## 2018-11-19 PROCEDURE — 97535 SELF CARE MNGMENT TRAINING: CPT

## 2018-11-19 PROCEDURE — 94150 VITAL CAPACITY TEST: CPT

## 2018-11-19 PROCEDURE — 82947 ASSAY GLUCOSE BLOOD QUANT: CPT

## 2018-11-19 PROCEDURE — 6370000000 HC RX 637 (ALT 250 FOR IP): Performed by: NURSE PRACTITIONER

## 2018-11-19 PROCEDURE — 2700000000 HC OXYGEN THERAPY PER DAY

## 2018-11-19 PROCEDURE — 2580000003 HC RX 258: Performed by: NURSE PRACTITIONER

## 2018-11-19 PROCEDURE — 97162 PT EVAL MOD COMPLEX 30 MIN: CPT

## 2018-11-19 PROCEDURE — 2580000003 HC RX 258: Performed by: FAMILY MEDICINE

## 2018-11-19 PROCEDURE — 71046 X-RAY EXAM CHEST 2 VIEWS: CPT

## 2018-11-19 PROCEDURE — 97166 OT EVAL MOD COMPLEX 45 MIN: CPT

## 2018-11-19 PROCEDURE — G8987 SELF CARE CURRENT STATUS: HCPCS

## 2018-11-19 RX ORDER — DORZOLAMIDE HCL 20 MG/ML
1 SOLUTION/ DROPS OPHTHALMIC 2 TIMES DAILY
Qty: 10 ML | Refills: 2 | Status: ON HOLD | OUTPATIENT
Start: 2018-11-19 | End: 2019-04-04 | Stop reason: ALTCHOICE

## 2018-11-19 RX ORDER — IPRATROPIUM BROMIDE AND ALBUTEROL SULFATE 2.5; .5 MG/3ML; MG/3ML
1 SOLUTION RESPIRATORY (INHALATION) EVERY 8 HOURS
Status: DISCONTINUED | OUTPATIENT
Start: 2018-11-19 | End: 2018-11-19 | Stop reason: HOSPADM

## 2018-11-19 RX ORDER — GLIMEPIRIDE 1 MG/1
1 TABLET ORAL
Qty: 30 TABLET | Refills: 3 | Status: SHIPPED | OUTPATIENT
Start: 2018-11-19 | End: 2019-06-15

## 2018-11-19 RX ADMIN — METFORMIN HYDROCHLORIDE 500 MG: 500 TABLET, EXTENDED RELEASE ORAL at 08:45

## 2018-11-19 RX ADMIN — APIXABAN 2.5 MG: 2.5 TABLET, FILM COATED ORAL at 08:45

## 2018-11-19 RX ADMIN — BRIMONIDINE TARTRATE 1 DROP: 2 SOLUTION OPHTHALMIC at 08:48

## 2018-11-19 RX ADMIN — GLIMEPIRIDE 2 MG: 2 TABLET ORAL at 06:34

## 2018-11-19 RX ADMIN — Medication 10 ML: at 08:46

## 2018-11-19 RX ADMIN — MAGNESIUM OXIDE TAB 400 MG (241.3 MG ELEMENTAL MG) 400 MG: 400 (241.3 MG) TAB at 08:45

## 2018-11-19 RX ADMIN — BUMETANIDE 1 MG: 1 TABLET ORAL at 08:45

## 2018-11-19 RX ADMIN — DILTIAZEM HYDROCHLORIDE 60 MG: 60 CAPSULE, EXTENDED RELEASE ORAL at 08:45

## 2018-11-19 RX ADMIN — IPRATROPIUM BROMIDE AND ALBUTEROL SULFATE 1 AMPULE: .5; 3 SOLUTION RESPIRATORY (INHALATION) at 15:52

## 2018-11-19 RX ADMIN — TIMOLOL MALEATE 1 DROP: 5 SOLUTION/ DROPS OPHTHALMIC at 08:48

## 2018-11-19 RX ADMIN — DORZOLAMIDE HYDROCHLORIDE 1 DROP: 20 SOLUTION/ DROPS OPHTHALMIC at 08:48

## 2018-11-19 RX ADMIN — CEFTRIAXONE SODIUM 1 G: 1 INJECTION, POWDER, FOR SOLUTION INTRAMUSCULAR; INTRAVENOUS at 14:48

## 2018-11-19 RX ADMIN — AZITHROMYCIN MONOHYDRATE 500 MG: 500 INJECTION, POWDER, LYOPHILIZED, FOR SOLUTION INTRAVENOUS at 12:08

## 2018-11-19 RX ADMIN — VITAMIN D, TAB 1000IU (100/BT) 2000 UNITS: 25 TAB at 08:45

## 2018-11-19 RX ADMIN — INSULIN LISPRO 10 UNITS: 100 INJECTION, SOLUTION INTRAVENOUS; SUBCUTANEOUS at 13:46

## 2018-11-19 NOTE — PROGRESS NOTES
Section of Cardiology  Progress Note      Date:  11/19/2018  Patient: Cora Fernandes  Admission:  11/13/2018  9:19 AM  Admit DX: Hypotensive episode [I95.9]  Hypotensive episode [I95.9]  Age:  80 y.o., 12/31/1923     LOS: 6 days     Reason for evaluation:   CHF      SUBJECTIVE:     The patient was seen and examined. Notes and labs reviewed. There were not complications over night. Patient still complains of legs fatigue. No significant shortness breath on walking. Repeat x-ray today showed stability with small pleural effusion. Patient's cardiac review of systems: negative for chest pain. The patient is generally feeling stable. OBJECTIVE:    Telemetry: Atrial fibrillation  /62   Pulse 91   Temp 98.4 °F (36.9 °C) (Oral)   Resp 16   Ht 4' 1.2\" (1.25 m)   Wt 103 lb 5 oz (46.9 kg)   SpO2 99%   BMI 30.01 kg/m²     Intake/Output Summary (Last 24 hours) at 11/19/18 1657  Last data filed at 11/19/18 0500   Gross per 24 hour   Intake              723 ml   Output                0 ml   Net              723 ml       EXAM:   CONSTITUTIONAL:  awake, alert, cooperative, no apparent distress, and appears stated age. HEENT: Normal jugular venous pulsations, no carotid bruits. Head is atraumatic, normocephalic. Eyes and oral mucosa are normal.  LUNGS: Good respiratory effort. No for increased work of breathing. On auscultation: clear to auscultation bilaterally  CARDIOVASCULAR:  Normal apical impulse, irregular rate and rhythm, normal S1 and S2, no S3 or S4,   ABDOMEN: Soft, nontender, nondistended. Bowel sounds present. SKIN: Warm and dry. EXTREMITIES: No lower extremity edema. Motor movement grossly intact. No cyanosis or clubbing.     Current Inpatient Medications:   insulin lispro  0-12 Units Subcutaneous TID WC    insulin lispro  0-6 Units Subcutaneous Nightly    ipratropium-albuterol  1 ampule Inhalation Q8H    glimepiride  1 mg Oral Dinner    diltiazem  60 mg Oral BID    bumetanide  1 mg of angina  6. UTI/possible pneumonia, managed by others    Patient Active Problem List   Diagnosis    Pneumonia    Sepsis ([address]    Moderate malnutrition ([address]    [facility]monary HTN [facility]    Non-rheumatic mitral regurgitation    Acute on chronic combined systolic and diastolic congestive heart failure (HCC)    Chronic atrial fibrillation (HCC)    Type 2 diabetes mellitus with hyperglycemia (HCC)    Acute bronchitis    Acute congestive heart failure (HCC)    Hypotensive episode       PLAN:    1. Family refused transfer to rehab and they will get home nursing care. I explained to the daughter that it is not surprising if her mother feels tired. She has been in the hospital for 8 days and that can course weakness in her muscular system. I really doubt that the extra Lasix to care of the pleural effusion. Patient can be discharged today. Please see orders. Discussed with patient and adult child and nursing.     Nia Landry MD

## 2018-11-19 NOTE — PLAN OF CARE
Problem: Falls - Risk of:  Goal: Will remain free from falls  Will remain free from falls   Outcome: Ongoing  Patient will use call light prior to ambulation. Bed in lowest position, wheels locked, call light within reach. Bed alarm on. RN will continue to monitor on hourly rounds and as needed.      Comments: Careplan reviewed with patient

## 2019-04-04 ENCOUNTER — APPOINTMENT (OUTPATIENT)
Dept: GENERAL RADIOLOGY | Age: 84
DRG: 291 | End: 2019-04-04
Payer: MEDICARE

## 2019-04-04 ENCOUNTER — HOSPITAL ENCOUNTER (INPATIENT)
Age: 84
LOS: 5 days | Discharge: HOME | DRG: 291 | End: 2019-04-09
Attending: EMERGENCY MEDICINE | Admitting: INTERNAL MEDICINE
Payer: MEDICARE

## 2019-04-04 DIAGNOSIS — I50.9 ACUTE ON CHRONIC CONGESTIVE HEART FAILURE, UNSPECIFIED HEART FAILURE TYPE (HCC): ICD-10-CM

## 2019-04-04 DIAGNOSIS — R54 ADVANCED AGE: ICD-10-CM

## 2019-04-04 DIAGNOSIS — J96.01 ACUTE RESPIRATORY FAILURE WITH HYPOXIA AND HYPERCAPNIA (HCC): Primary | ICD-10-CM

## 2019-04-04 DIAGNOSIS — J96.02 ACUTE RESPIRATORY FAILURE WITH HYPOXIA AND HYPERCAPNIA (HCC): Primary | ICD-10-CM

## 2019-04-04 DIAGNOSIS — J44.1 COPD WITH ACUTE EXACERBATION (HCC): ICD-10-CM

## 2019-04-04 PROBLEM — J96.00 ACUTE RESPIRATORY FAILURE (HCC): Status: ACTIVE | Noted: 2019-04-04

## 2019-04-04 LAB
% CKMB: 4.6 % (ref 0–3)
ABSOLUTE EOS #: 1 K/UL (ref 0–0.4)
ABSOLUTE IMMATURE GRANULOCYTE: ABNORMAL K/UL (ref 0–0.3)
ABSOLUTE LYMPH #: 4.2 K/UL (ref 1–4.8)
ABSOLUTE MONO #: 1.5 K/UL (ref 0.2–0.8)
ANION GAP SERPL CALCULATED.3IONS-SCNC: 14 MMOL/L (ref 9–17)
BASOPHILS # BLD: 0 % (ref 0–2)
BASOPHILS ABSOLUTE: 0 K/UL (ref 0–0.2)
BILIRUBIN URINE: NEGATIVE
BNP INTERPRETATION: ABNORMAL
BUN BLDV-MCNC: 19 MG/DL (ref 8–23)
BUN/CREAT BLD: 25 (ref 9–20)
CALCIUM SERPL-MCNC: 9.1 MG/DL (ref 8.6–10.4)
CHLORIDE BLD-SCNC: 98 MMOL/L (ref 98–107)
CK MB: 2.2 NG/ML
CKMB INTERPRETATION: ABNORMAL
CO2: 25 MMOL/L (ref 20–31)
COLOR: YELLOW
COMMENT UA: NORMAL
CREAT SERPL-MCNC: 0.77 MG/DL (ref 0.5–0.9)
DIFFERENTIAL TYPE: ABNORMAL
EKG ATRIAL RATE: 108 BPM
EKG Q-T INTERVAL: 312 MS
EKG QRS DURATION: 80 MS
EKG QTC CALCULATION (BAZETT): 425 MS
EKG R AXIS: 65 DEGREES
EKG T AXIS: 11 DEGREES
EKG VENTRICULAR RATE: 112 BPM
EOSINOPHILS RELATIVE PERCENT: 6 % (ref 1–4)
FIO2: 50
GFR AFRICAN AMERICAN: >60 ML/MIN
GFR NON-AFRICAN AMERICAN: >60 ML/MIN
GFR SERPL CREATININE-BSD FRML MDRD: ABNORMAL ML/MIN/{1.73_M2}
GFR SERPL CREATININE-BSD FRML MDRD: ABNORMAL ML/MIN/{1.73_M2}
GLUCOSE BLD-MCNC: 165 MG/DL (ref 70–99)
GLUCOSE BLD-MCNC: 276 MG/DL (ref 65–105)
GLUCOSE BLD-MCNC: 316 MG/DL (ref 65–105)
GLUCOSE BLD-MCNC: 332 MG/DL (ref 65–105)
GLUCOSE URINE: NEGATIVE
HCT VFR BLD CALC: 32.7 % (ref 36–46)
HEMOGLOBIN: 10.8 G/DL (ref 12–16)
IMMATURE GRANULOCYTES: ABNORMAL %
INR BLD: 1.2
KETONES, URINE: NEGATIVE
LACTIC ACID: 2.1 MMOL/L (ref 0.5–2.2)
LEUKOCYTE ESTERASE, URINE: NEGATIVE
LV EF: 40 %
LVEF MODALITY: NORMAL
LYMPHOCYTES # BLD: 24 % (ref 24–44)
MAGNESIUM: 2.7 MG/DL (ref 1.6–2.6)
MCH RBC QN AUTO: 31.3 PG (ref 26–34)
MCHC RBC AUTO-ENTMCNC: 33 G/DL (ref 31–37)
MCV RBC AUTO: 95 FL (ref 80–100)
MONOCYTES # BLD: 9 % (ref 1–7)
MYOGLOBIN: 52 NG/ML (ref 25–58)
NEGATIVE BASE EXCESS, ART: ABNORMAL (ref 0–2)
NITRITE, URINE: NEGATIVE
NRBC AUTOMATED: ABNORMAL PER 100 WBC
O2 DEVICE/FLOW/%: ABNORMAL
PARTIAL THROMBOPLASTIN TIME: 29.9 SEC (ref 23–31)
PATIENT TEMP: ABNORMAL
PDW BLD-RTO: 16.2 % (ref 11.5–14.5)
PH UA: 8 (ref 5–8)
PLATELET # BLD: 253 K/UL (ref 130–400)
PLATELET ESTIMATE: ABNORMAL
PMV BLD AUTO: 7.8 FL (ref 6–12)
POC HCO3: 28.6 MMOL/L (ref 22–27)
POC O2 SATURATION: 99 %
POC PCO2 TEMP: ABNORMAL MM HG
POC PCO2: 65 MM HG (ref 32–45)
POC PH TEMP: ABNORMAL
POC PH: 7.25 (ref 7.35–7.45)
POC PO2 TEMP: ABNORMAL MM HG
POC PO2: 154 MM HG (ref 75–95)
POSITIVE BASE EXCESS, ART: 1 (ref 0–2)
POTASSIUM SERPL-SCNC: 4.5 MMOL/L (ref 3.7–5.3)
PRO-BNP: 2271 PG/ML
PROCALCITONIN: 0.2 NG/ML
PROTEIN UA: NEGATIVE
PROTHROMBIN TIME: 12 SEC (ref 9.7–11.6)
RBC # BLD: 3.45 M/UL (ref 4–5.2)
RBC # BLD: ABNORMAL 10*6/UL
SEG NEUTROPHILS: 61 % (ref 36–66)
SEGMENTED NEUTROPHILS ABSOLUTE COUNT: 10.4 K/UL (ref 1.8–7.7)
SODIUM BLD-SCNC: 137 MMOL/L (ref 135–144)
SPECIFIC GRAVITY UA: 1.01 (ref 1–1.03)
TCO2 (CALC), ART: 31 MMOL/L (ref 23–28)
TOTAL CK: 48 U/L (ref 26–192)
TROPONIN INTERP: ABNORMAL
TROPONIN T: ABNORMAL NG/ML
TROPONIN, HIGH SENSITIVITY: 24 NG/L (ref 0–14)
TROPONIN, HIGH SENSITIVITY: 26 NG/L (ref 0–14)
TROPONIN, HIGH SENSITIVITY: 28 NG/L (ref 0–14)
TURBIDITY: CLEAR
URINE HGB: NEGATIVE
UROBILINOGEN, URINE: NORMAL
WBC # BLD: 17.2 K/UL (ref 3.5–11)
WBC # BLD: ABNORMAL 10*3/UL

## 2019-04-04 PROCEDURE — 83880 ASSAY OF NATRIURETIC PEPTIDE: CPT

## 2019-04-04 PROCEDURE — 36415 COLL VENOUS BLD VENIPUNCTURE: CPT

## 2019-04-04 PROCEDURE — 94640 AIRWAY INHALATION TREATMENT: CPT

## 2019-04-04 PROCEDURE — 96375 TX/PRO/DX INJ NEW DRUG ADDON: CPT

## 2019-04-04 PROCEDURE — 82550 ASSAY OF CK (CPK): CPT

## 2019-04-04 PROCEDURE — 36600 WITHDRAWAL OF ARTERIAL BLOOD: CPT

## 2019-04-04 PROCEDURE — 6360000002 HC RX W HCPCS: Performed by: EMERGENCY MEDICINE

## 2019-04-04 PROCEDURE — 71045 X-RAY EXAM CHEST 1 VIEW: CPT

## 2019-04-04 PROCEDURE — 84484 ASSAY OF TROPONIN QUANT: CPT

## 2019-04-04 PROCEDURE — 81003 URINALYSIS AUTO W/O SCOPE: CPT

## 2019-04-04 PROCEDURE — 87040 BLOOD CULTURE FOR BACTERIA: CPT

## 2019-04-04 PROCEDURE — 2000000000 HC ICU R&B

## 2019-04-04 PROCEDURE — 93306 TTE W/DOPPLER COMPLETE: CPT

## 2019-04-04 PROCEDURE — 93005 ELECTROCARDIOGRAM TRACING: CPT

## 2019-04-04 PROCEDURE — 94660 CPAP INITIATION&MGMT: CPT

## 2019-04-04 PROCEDURE — 85610 PROTHROMBIN TIME: CPT

## 2019-04-04 PROCEDURE — 99285 EMERGENCY DEPT VISIT HI MDM: CPT

## 2019-04-04 PROCEDURE — 83605 ASSAY OF LACTIC ACID: CPT

## 2019-04-04 PROCEDURE — 6370000000 HC RX 637 (ALT 250 FOR IP): Performed by: INTERNAL MEDICINE

## 2019-04-04 PROCEDURE — 2700000000 HC OXYGEN THERAPY PER DAY

## 2019-04-04 PROCEDURE — 2500000003 HC RX 250 WO HCPCS: Performed by: INTERNAL MEDICINE

## 2019-04-04 PROCEDURE — 80048 BASIC METABOLIC PNL TOTAL CA: CPT

## 2019-04-04 PROCEDURE — 94664 DEMO&/EVAL PT USE INHALER: CPT

## 2019-04-04 PROCEDURE — 82947 ASSAY GLUCOSE BLOOD QUANT: CPT

## 2019-04-04 PROCEDURE — 85730 THROMBOPLASTIN TIME PARTIAL: CPT

## 2019-04-04 PROCEDURE — 6360000002 HC RX W HCPCS: Performed by: INTERNAL MEDICINE

## 2019-04-04 PROCEDURE — 85025 COMPLETE CBC W/AUTO DIFF WBC: CPT

## 2019-04-04 PROCEDURE — 87205 SMEAR GRAM STAIN: CPT

## 2019-04-04 PROCEDURE — 83735 ASSAY OF MAGNESIUM: CPT

## 2019-04-04 PROCEDURE — 82803 BLOOD GASES ANY COMBINATION: CPT

## 2019-04-04 PROCEDURE — 83874 ASSAY OF MYOGLOBIN: CPT

## 2019-04-04 PROCEDURE — 2580000003 HC RX 258: Performed by: INTERNAL MEDICINE

## 2019-04-04 PROCEDURE — 82553 CREATINE MB FRACTION: CPT

## 2019-04-04 PROCEDURE — 96365 THER/PROPH/DIAG IV INF INIT: CPT

## 2019-04-04 PROCEDURE — 94761 N-INVAS EAR/PLS OXIMETRY MLT: CPT

## 2019-04-04 PROCEDURE — 84145 PROCALCITONIN (PCT): CPT

## 2019-04-04 PROCEDURE — 6370000000 HC RX 637 (ALT 250 FOR IP): Performed by: EMERGENCY MEDICINE

## 2019-04-04 PROCEDURE — 87086 URINE CULTURE/COLONY COUNT: CPT

## 2019-04-04 PROCEDURE — 2500000003 HC RX 250 WO HCPCS: Performed by: EMERGENCY MEDICINE

## 2019-04-04 RX ORDER — METHYLPREDNISOLONE SODIUM SUCCINATE 125 MG/2ML
125 INJECTION, POWDER, LYOPHILIZED, FOR SOLUTION INTRAMUSCULAR; INTRAVENOUS ONCE
Status: COMPLETED | OUTPATIENT
Start: 2019-04-04 | End: 2019-04-04

## 2019-04-04 RX ORDER — ONDANSETRON 4 MG/1
4 TABLET, ORALLY DISINTEGRATING ORAL EVERY 6 HOURS PRN
Status: DISCONTINUED | OUTPATIENT
Start: 2019-04-04 | End: 2019-04-09 | Stop reason: HOSPADM

## 2019-04-04 RX ORDER — GABAPENTIN 100 MG/1
100 CAPSULE ORAL 2 TIMES DAILY PRN
Status: DISCONTINUED | OUTPATIENT
Start: 2019-04-04 | End: 2019-04-09 | Stop reason: HOSPADM

## 2019-04-04 RX ORDER — ONDANSETRON 2 MG/ML
4 INJECTION INTRAMUSCULAR; INTRAVENOUS EVERY 6 HOURS PRN
Status: DISCONTINUED | OUTPATIENT
Start: 2019-04-04 | End: 2019-04-04 | Stop reason: ALTCHOICE

## 2019-04-04 RX ORDER — TIMOLOL MALEATE 5 MG/ML
1 SOLUTION/ DROPS OPHTHALMIC 2 TIMES DAILY
Status: DISCONTINUED | OUTPATIENT
Start: 2019-04-04 | End: 2019-04-09 | Stop reason: HOSPADM

## 2019-04-04 RX ORDER — MONTELUKAST SODIUM 10 MG/1
10 TABLET ORAL NIGHTLY
Status: DISCONTINUED | OUTPATIENT
Start: 2019-04-04 | End: 2019-04-09 | Stop reason: HOSPADM

## 2019-04-04 RX ORDER — ONDANSETRON 2 MG/ML
4 INJECTION INTRAMUSCULAR; INTRAVENOUS EVERY 6 HOURS PRN
Status: DISCONTINUED | OUTPATIENT
Start: 2019-04-04 | End: 2019-04-09 | Stop reason: HOSPADM

## 2019-04-04 RX ORDER — ATORVASTATIN CALCIUM 20 MG/1
20 TABLET, FILM COATED ORAL DAILY
Status: DISCONTINUED | OUTPATIENT
Start: 2019-04-04 | End: 2019-04-09 | Stop reason: HOSPADM

## 2019-04-04 RX ORDER — BUMETANIDE 0.25 MG/ML
1 INJECTION, SOLUTION INTRAMUSCULAR; INTRAVENOUS 2 TIMES DAILY
Status: COMPLETED | OUTPATIENT
Start: 2019-04-04 | End: 2019-04-06

## 2019-04-04 RX ORDER — IPRATROPIUM BROMIDE AND ALBUTEROL SULFATE 2.5; .5 MG/3ML; MG/3ML
1 SOLUTION RESPIRATORY (INHALATION) ONCE
Status: COMPLETED | OUTPATIENT
Start: 2019-04-04 | End: 2019-04-04

## 2019-04-04 RX ORDER — DEXTROSE MONOHYDRATE 25 G/50ML
12.5 INJECTION, SOLUTION INTRAVENOUS PRN
Status: DISCONTINUED | OUTPATIENT
Start: 2019-04-04 | End: 2019-04-09 | Stop reason: HOSPADM

## 2019-04-04 RX ORDER — DEXTROSE MONOHYDRATE 50 MG/ML
100 INJECTION, SOLUTION INTRAVENOUS PRN
Status: DISCONTINUED | OUTPATIENT
Start: 2019-04-04 | End: 2019-04-09 | Stop reason: HOSPADM

## 2019-04-04 RX ORDER — ALBUTEROL SULFATE 2.5 MG/3ML
2.5 SOLUTION RESPIRATORY (INHALATION) EVERY 4 HOURS
Status: DISCONTINUED | OUTPATIENT
Start: 2019-04-04 | End: 2019-04-04 | Stop reason: ALTCHOICE

## 2019-04-04 RX ORDER — MORPHINE SULFATE 2 MG/ML
2 INJECTION, SOLUTION INTRAMUSCULAR; INTRAVENOUS ONCE
Status: COMPLETED | OUTPATIENT
Start: 2019-04-04 | End: 2019-04-04

## 2019-04-04 RX ORDER — DORZOLAMIDE HYDROCHLORIDE AND TIMOLOL MALEATE 20; 5 MG/ML; MG/ML
1 SOLUTION/ DROPS OPHTHALMIC 2 TIMES DAILY
Status: DISCONTINUED | OUTPATIENT
Start: 2019-04-04 | End: 2019-04-04 | Stop reason: CLARIF

## 2019-04-04 RX ORDER — SODIUM CHLORIDE 0.9 % (FLUSH) 0.9 %
10 SYRINGE (ML) INJECTION PRN
Status: DISCONTINUED | OUTPATIENT
Start: 2019-04-04 | End: 2019-04-09 | Stop reason: HOSPADM

## 2019-04-04 RX ORDER — BUMETANIDE 0.25 MG/ML
1 INJECTION, SOLUTION INTRAMUSCULAR; INTRAVENOUS ONCE
Status: COMPLETED | OUTPATIENT
Start: 2019-04-04 | End: 2019-04-04

## 2019-04-04 RX ORDER — MAGNESIUM SULFATE 1 G/100ML
1 INJECTION INTRAVENOUS ONCE
Status: COMPLETED | OUTPATIENT
Start: 2019-04-04 | End: 2019-04-04

## 2019-04-04 RX ORDER — DORZOLAMIDE HCL 20 MG/ML
1 SOLUTION/ DROPS OPHTHALMIC 2 TIMES DAILY
Status: DISCONTINUED | OUTPATIENT
Start: 2019-04-04 | End: 2019-04-04 | Stop reason: CLARIF

## 2019-04-04 RX ORDER — POTASSIUM CHLORIDE 29.8 MG/ML
20 INJECTION INTRAVENOUS PRN
Status: DISCONTINUED | OUTPATIENT
Start: 2019-04-04 | End: 2019-04-04

## 2019-04-04 RX ORDER — DILTIAZEM HYDROCHLORIDE 60 MG/1
60 CAPSULE, EXTENDED RELEASE ORAL DAILY
Status: DISCONTINUED | OUTPATIENT
Start: 2019-04-04 | End: 2019-04-09 | Stop reason: HOSPADM

## 2019-04-04 RX ORDER — MAGNESIUM SULFATE 1 G/100ML
1 INJECTION INTRAVENOUS PRN
Status: DISCONTINUED | OUTPATIENT
Start: 2019-04-04 | End: 2019-04-04

## 2019-04-04 RX ORDER — BRIMONIDINE TARTRATE 2 MG/ML
1 SOLUTION/ DROPS OPHTHALMIC 2 TIMES DAILY
Status: DISCONTINUED | OUTPATIENT
Start: 2019-04-04 | End: 2019-04-09 | Stop reason: HOSPADM

## 2019-04-04 RX ORDER — ONDANSETRON 2 MG/ML
4 INJECTION INTRAMUSCULAR; INTRAVENOUS ONCE
Status: COMPLETED | OUTPATIENT
Start: 2019-04-04 | End: 2019-04-04

## 2019-04-04 RX ORDER — NICOTINE POLACRILEX 4 MG
15 LOZENGE BUCCAL PRN
Status: DISCONTINUED | OUTPATIENT
Start: 2019-04-04 | End: 2019-04-09 | Stop reason: HOSPADM

## 2019-04-04 RX ORDER — IPRATROPIUM BROMIDE AND ALBUTEROL SULFATE 2.5; .5 MG/3ML; MG/3ML
1 SOLUTION RESPIRATORY (INHALATION)
Status: DISCONTINUED | OUTPATIENT
Start: 2019-04-04 | End: 2019-04-09 | Stop reason: HOSPADM

## 2019-04-04 RX ORDER — DORZOLAMIDE HCL 20 MG/ML
1 SOLUTION/ DROPS OPHTHALMIC 2 TIMES DAILY
Status: DISCONTINUED | OUTPATIENT
Start: 2019-04-04 | End: 2019-04-09 | Stop reason: HOSPADM

## 2019-04-04 RX ORDER — SODIUM CHLORIDE 0.9 % (FLUSH) 0.9 %
10 SYRINGE (ML) INJECTION EVERY 12 HOURS SCHEDULED
Status: DISCONTINUED | OUTPATIENT
Start: 2019-04-04 | End: 2019-04-09 | Stop reason: HOSPADM

## 2019-04-04 RX ADMIN — VITAMIN D 2000 UNITS: 25 TAB ORAL at 09:21

## 2019-04-04 RX ADMIN — IPRATROPIUM BROMIDE AND ALBUTEROL SULFATE 1 AMPULE: .5; 3 SOLUTION RESPIRATORY (INHALATION) at 15:23

## 2019-04-04 RX ADMIN — TIMOLOL MALEATE 1 DROP: 5 SOLUTION OPHTHALMIC at 14:41

## 2019-04-04 RX ADMIN — INSULIN LISPRO 3 UNITS: 100 INJECTION, SOLUTION INTRAVENOUS; SUBCUTANEOUS at 20:34

## 2019-04-04 RX ADMIN — BUMETANIDE 1 MG: 0.25 INJECTION INTRAMUSCULAR; INTRAVENOUS at 10:36

## 2019-04-04 RX ADMIN — BUMETANIDE 1 MG: 0.25 INJECTION INTRAMUSCULAR; INTRAVENOUS at 20:36

## 2019-04-04 RX ADMIN — INSULIN LISPRO 4 UNITS: 100 INJECTION, SOLUTION INTRAVENOUS; SUBCUTANEOUS at 11:57

## 2019-04-04 RX ADMIN — IPRATROPIUM BROMIDE 0.5 MG: 0.5 SOLUTION RESPIRATORY (INHALATION) at 10:16

## 2019-04-04 RX ADMIN — Medication 10 ML: at 10:37

## 2019-04-04 RX ADMIN — BRIMONIDINE TARTRATE 1 DROP: 2 SOLUTION OPHTHALMIC at 20:37

## 2019-04-04 RX ADMIN — ALBUTEROL SULFATE 2.5 MG: 2.5 SOLUTION RESPIRATORY (INHALATION) at 10:16

## 2019-04-04 RX ADMIN — MONTELUKAST 10 MG: 10 TABLET, FILM COATED ORAL at 20:36

## 2019-04-04 RX ADMIN — BRIMONIDINE TARTRATE 1 DROP: 2 SOLUTION OPHTHALMIC at 10:37

## 2019-04-04 RX ADMIN — APIXABAN 2.5 MG: 2.5 TABLET, FILM COATED ORAL at 09:21

## 2019-04-04 RX ADMIN — MORPHINE SULFATE 2 MG: 2 INJECTION, SOLUTION INTRAMUSCULAR; INTRAVENOUS at 05:36

## 2019-04-04 RX ADMIN — BUMETANIDE 1 MG: 0.25 INJECTION INTRAMUSCULAR; INTRAVENOUS at 05:36

## 2019-04-04 RX ADMIN — VITAMIN D 2000 UNITS: 25 TAB ORAL at 20:35

## 2019-04-04 RX ADMIN — FAMOTIDINE 20 MG: 10 INJECTION, SOLUTION INTRAVENOUS at 10:37

## 2019-04-04 RX ADMIN — TIMOLOL MALEATE 1 DROP: 5 SOLUTION OPHTHALMIC at 20:37

## 2019-04-04 RX ADMIN — DORZOLAMIDE HYDROCHLORIDE 1 DROP: 20 SOLUTION/ DROPS OPHTHALMIC at 14:41

## 2019-04-04 RX ADMIN — APIXABAN 2.5 MG: 2.5 TABLET, FILM COATED ORAL at 20:36

## 2019-04-04 RX ADMIN — METHYLPREDNISOLONE SODIUM SUCCINATE 125 MG: 125 INJECTION, POWDER, FOR SOLUTION INTRAMUSCULAR; INTRAVENOUS at 05:36

## 2019-04-04 RX ADMIN — IPRATROPIUM BROMIDE AND ALBUTEROL SULFATE 1 AMPULE: .5; 3 SOLUTION RESPIRATORY (INHALATION) at 05:43

## 2019-04-04 RX ADMIN — MAGNESIUM SULFATE HEPTAHYDRATE 1 G: 1 INJECTION, SOLUTION INTRAVENOUS at 05:36

## 2019-04-04 RX ADMIN — Medication 10 ML: at 20:37

## 2019-04-04 RX ADMIN — ATORVASTATIN CALCIUM 20 MG: 20 TABLET, FILM COATED ORAL at 09:21

## 2019-04-04 RX ADMIN — IPRATROPIUM BROMIDE AND ALBUTEROL SULFATE 1 AMPULE: .5; 3 SOLUTION RESPIRATORY (INHALATION) at 19:50

## 2019-04-04 RX ADMIN — INSULIN LISPRO 3 UNITS: 100 INJECTION, SOLUTION INTRAVENOUS; SUBCUTANEOUS at 18:02

## 2019-04-04 RX ADMIN — ONDANSETRON 4 MG: 2 INJECTION INTRAMUSCULAR; INTRAVENOUS at 05:36

## 2019-04-04 RX ADMIN — DORZOLAMIDE HYDROCHLORIDE 1 DROP: 20 SOLUTION/ DROPS OPHTHALMIC at 20:37

## 2019-04-04 ASSESSMENT — PAIN SCALES - GENERAL: PAINLEVEL_OUTOF10: 5

## 2019-04-04 NOTE — CONSULTS
Consult to Pulmonologist  Consult performed by: Ortega Gil MD  Consult ordered by: Joseph Diallo MD         Pulmonary Medicine and [address] Palmer MD      Patient - Kiko Bravo   MRN -  [id number]   [facility]   - 1923      Date of Admission -  2019  5:24 AM  Date of evaluation -  2019  Room - [address] [place] [address]   Subhash Mota MD Primary Care Physician - Dae Mejia MD     Reason for Consult    Acute on chronic respiratory failure, requiring BiPAP    Assessment   · Acute on chronic respiratory failure  · Bilateral pulmonary infiltrates and pleural effusion, secondary to congestive heart failure, doubt pneumonia  · Moderate to severe secondary pulmonary hypertensionn (RVSP 64 mmHg)  · Moderate TR/moderate MR/severe dilated left and right atrium  · History of CAD/A. fib/HTN/HDL/DM    Recommendations   · BiPAP when necessary  · Diuresis  · Obtain pro-calcitonin level, if elevated will consider empiric antibiotics  · Singulair  · Albuterol and Ipratropium Q 4 hours and prn  · [address]  · X-ray chest in am  · Labs: CBC and BMP in am  · 2 liters/min via nasal cannula  · DVT prophylaxis, on Eliquis  · Cardiology input  · Will follow with you    HPI     Kiko Bravo is 80 y.o., [facility]  female, admitted because of congestive heart failure. Patient woke up with increased shortness of breath and low oxygen saturation was noted on arrival of EMS services. Patient was placed on BiPAP and was brought into emergency room. Patient was continued on BiPAP and was admitted to ICU. Patient denies any chest pain, fever chills, hemoptysis, significant sinus congestion. At this time after being on BiPAP she is feeling better.     PMHx   Past Medical History      Diagnosis Date    Arthritis     Atrial fibrillation ([address]     CAD (coronary artery disease)     CHF (congestive heart failure) (HCC)     COPD (chronic obstructive pulmonary eyes 2 times daily  glimepiride (AMARYL) 2 MG tablet, Take 2 mg by mouth every morning (before breakfast)  gabapentin (NEURONTIN) 100 MG capsule, Take 100 mg by mouth 2 times daily as needed (nerve pain). .  montelukast (SINGULAIR) 10 MG tablet, Take 10 mg by mouth nightly  atorvastatin (LIPITOR) 20 MG tablet, Take 20 mg by mouth daily  apixaban (ELIQUIS) 2.5 MG TABS tablet, Take 2.5 mg by mouth 2 times daily   Cholecalciferol (VITAMIN D) 2000 units CAPS capsule, Take 2,000 Units by mouth 2 times daily   magnesium oxide (MAG-OX) 400 MG tablet, Take 400 mg by mouth 2 times daily  risedronate (ACTONEL) 35 MG tablet, Take 35 mg by mouth every 7 days Wednesdays    Allergies    Penicillins  Social History     Social History     Tobacco Use    Smoking status: Never Smoker    Smokeless tobacco: Never Used   Substance Use Topics    Alcohol use: No     Family History    No family history on file. ROS - 11 systems   General Denies any fever or chills  HEENT Denies any diplopia, tinnitus or vertigo  Resp positive for  dry cough and dyspnea  Cardiac Denies any chest pain, palpitations, claudication or edema  GI Denies any melena, hematochezia, hematemesis or pyrosis   Denies any frequency, urgency, hesitancy or incontinence  Heme Denies bruising or bleeding easily  Endocrine Denies any history of diabetes or thyroid disease  Neuro Denies any focal motor or sensory deficits  Psychiatric Denies anxiety, depression, suicidal ideation  Skin Denies rashes, itching, open sores    Vitals     height is 4' 7\" (1.397 m) and weight is 106 lb 6.4 oz (48.3 kg). Her axillary temperature is 96.6 °F (35.9 °C). Her blood pressure is 118/49 (abnormal) and her pulse is 92. Her respiration is 19 and oxygen saturation is 95%. Body mass index is 24.73 kg/m².   I/O        Intake/Output Summary (Last 24 hours) at 4/4/2019 0951  Last data filed at 4/4/2019 0654  Gross per 24 hour   Intake --   Output 275 ml   Net -275 ml     I/O last 3 completed shifts:  In: -   Out: 275 [Urine:275]   Patient Vitals for the past 96 hrs (Last 3 readings):   Weight   04/04/19 0747 106 lb 6.4 oz (48.3 kg)   04/04/19 0608 100 lb (45.4 kg)     Exam   General Appearance  Awake, alert, oriented, in no acute distress  HEENT - Head is normocephalic, atraumatic. Pupil reactive to light  Neck - Supple, symmetrical, trachea midline and Soft, trachea midline and straight  Lungs - positive findings: rales bibasilar  Cardiovascular - Heart sounds are normal.  Regular rhythm normal rate without murmur, gallop or rub. Abdomen - Soft, nontender, nondistended, no masses or organomegaly  Neurologic - CN II-XII are grossly intact.  There are no focal motor or sensory deficits  Skin - No bruising or bleeding  Extremities - No cyanosis, clubbing or edema    Labs  - Old records and notes have been reviewed in [facility] M[facility]   CBC     Lab Results   Component Value Date    WBC 17.2 04/04/2019    RBC 3.45 04/04/2019    HGB 10.8 04/04/2019    HCT 32.7 04/04/2019     04/04/2019    MCV 95.0 04/04/2019    MCH 31.3 04/04/2019    MCHC 33.0 04/04/2019    RDW 16.2 04/04/2019    LYMPHOPCT 24 04/04/2019    MONOPCT 9 04/04/2019    BASOPCT 0 04/04/2019    MONOSABS 1.50 04/04/2019    LYMPHSABS 4.20 04/04/2019    EOSABS 1.00 04/04/2019    BASOSABS 0.00 04/04/2019    DIFFTYPE NOT REPORTED 04/04/2019     BMP   Lab Results   Component Value Date     04/04/2019    K 4.5 04/04/2019    CL 98 04/04/2019    CO2 25 04/04/2019    BUN 19 04/04/2019    CREATININE 0.77 04/04/2019    GLUCOSE 165 04/04/2019    GLUCOSE 117 09/02/2011    CALCIUM 9.1 04/04/2019    MG 2.7 04/04/2019     LFTS  Lab Results   Component Value Date    ALKPHOS 108 11/13/2018    ALT 21 11/13/2018    AST 32 11/13/2018    PROT 7.9 11/13/2018    BILITOT 0.84 11/13/2018    LABALBU 4.2 11/13/2018     ABG   Lab Results   Component Value Date    YJV3XHJ 31 04/04/2019     PTT  Lab Results   Component Value Date    APTT 29.9 04/04/2019     INR   Lab Results Component Value Date    INR 1.2 04/04/2019    INR 1.2 11/13/2018    INR 1.2 09/04/2018    PROTIME 12.0 (H) 04/04/2019    PROTIME 12.6 (H) 11/13/2018    PROTIME 12.5 (H) 09/04/2018       Radiology    CXR  4/4/19    (See actual reports for details)    \"Thank you for asking us to see this patient\"    Case discussed with nurse and patient/family. Questions and concerns addressed.     Electronically signed by     Synetta Siemens, MD on 4/4/2019 at 9:51 AM  Pulmonary Critical Care and Sleep Medicine,  [facility]  Cell: [phone]  Office: [phone]

## 2019-04-04 NOTE — CARE COORDINATION
Case Management Initial Discharge Plan  Clary Mcpherson,         Readmission Risk              Risk of Unplanned Readmission:        16             Met with: patient and daughter Marcelina Da Silva discuss discharge plans. Information verified: address, contacts, phone number, , insurance Yes  PCP: Radha Avery MD  Date of last visit:  1 month    Insurance Provider: medicare and [address] [place] [address]    Discharge Planning  Current Residence:  house  Living Arrangements:    lives dtr [address] has 1 stories/1 stairs to climb  Support Systems:   dtr  Current Services PTA:   Skilled nursing  Agency:  [facility]    Patient able to perform ADL's:Dependent  DME in home:  Oxygen 24/7( [facility]), nebulizer, w/c, walker, BSC, shower chair  DME used to aid ambulation prior to admission:   Sonia Rater, w/c  DME used during admission:  oxygen    Potential Assistance Needed:   home care    Pharmacy: [facility] on [address] and [address]   Potential Assistance Purchasing Medications:   no  Does patient want to participate in local refill/ meds to beds program?   no    Patient agreeable to home care: Yes  Freedom of choice provided:  yes      Type of Home Care Services:   skilled nursing  Patient expects to be discharged to:   home    Prior SNF/Rehab Placement and Facility: none  Agreeable to SNF/Rehab: No  [place] of choice provided: n/a   Evaluation: no    Expected Discharge date: Follow Up Appointment: Best Day/ Time:      Transportation provider: dtr  Transportation arrangements needed for discharge: TBD    Discharge Plan: met with pt and dtr Lenoard Blocker at bedside. Pt admitted today with CHF. Pt is known to me from prior admissions. Pt lives with her Lenoard Blocker and she provides 24hr care at home. Pt wears oxygen 24/7 and uses walker and w/c in her home. [facility] provides oxygen and pt is awaiting delivery of portable concentrator. Called [facility] and spoke with Jorge Villar. Portable concentrator has been approved and is in transit to [facility].      Pt has

## 2019-04-04 NOTE — DISCHARGE INSTR - COC
Continuity of Care Form    Patient Name: Chaim Valenzuela   :  1923  MRN:  [id number]    Admit date:  2019  Discharge date:  19    Code Status Order: Full Code   Advance Directives:     Admitting Physician:  Bárbara Dietz MD  PCP: Edna Sorensen MD    Discharging Nurse: [facility] Unit/Room#: 1101/1101-01  Discharging Unit Phone Number: [phone]    Emergency Contact:   Extended Emergency Contact Information  Primary Emergency Contact: Nannette Velazquez  Home Phone: [phone]  Relation: Child  Secondary Emergency Contact: Aime Veliz  Address: [address] [place] [address]  Home Phone: [phone]  Relation: Child    Past Surgical History:  Past Surgical History:   Procedure Laterality Date    TOTAL HIP ARTHROPLASTY Left        Immunization History:   Immunization History   Administered Date(s) Administered    Influenza Virus Vaccine 2017    Pneumococcal 13-valent Conjugate (Pruerlg47) 2017    Pneumococcal Polysaccharide (Negfmtemb36) 1988       Active Problems:  Patient Active Problem List   Diagnosis Code    Pneumonia J18.9    Sepsis ([address] A41.9    Moderate malnutrition ([address] E44.0    Pulmonary HTN ([address] I27.20    Non-rheumatic mitral regurgitation I34.0    Acute on chronic combined systolic and diastolic congestive heart failure (HCC) I50.43    Chronic atrial fibrillation (HCC) I48.2    Type 2 diabetes mellitus with hyperglycemia (HCC) E11.65    Acute bronchitis J20.9    Acute congestive heart failure (HCC) I50.9    Hypotensive episode I95.9    Acute respiratory failure ([address] J96.00       Isolation/Infection:   Isolation          No Isolation            Nurse Assessment:  Last Vital Signs: BP (!) 99/49   Pulse 96   Temp 97.5 °F (36.4 °C)   Resp 16   Ht 4' 7\" (1.397 m)   Wt 106 lb 6.4 oz (48.3 kg)   SpO2 96%   BMI 24.73 kg/m²     Last documented pain score (0-10 scale): Pain Level: 5  Last Weight:   Wt Readings from Last 1 Encounters:   04/04/19 106 lb 6.4 oz (48.3 kg)     Mental Status:  oriented and alert    IV Access:  - None    Nursing Mobility/ADLs:  Walking   Assisted  Transfer  Assisted  Bathing  Assisted  Dressing  Assisted  Toileting  Assisted  Feeding  Assisted  Med Admin  Assisted  Med Delivery   whole    Wound Care Documentation and Therapy:        Elimination:  Continence:   · Bowel: Yes  · Bladder: Yes  Urinary Catheter: None   Colostomy/Ileostomy/Ileal Conduit: No       Date of Last BM: ***    Intake/Output Summary (Last 24 hours) at 4/4/2019 1446  Last data filed at 4/4/2019 0654  Gross per 24 hour   Intake --   Output 275 ml   Net -275 ml     I/O last 3 completed shifts:  In: -   Out: 275 [Urine:275]    Safety Concerns: At Risk for Falls    Impairments/Disabilities:      None    Nutrition Therapy:  Current Nutrition Therapy:   - Oral Diet:  Dental Soft  Diabetic diet with nectar thick liquids. No added salt   Fluid restriction 1800 ml    Routes of Feeding: Oral  Liquids: Nectar Thick Liquids  Daily Fluid Restriction:yes 1800 ml  Last Modified Barium Swallow with Video (Video Swallowing Test): done on 4/8/19/2019    Treatments at the Time of Hospital Discharge:   Respiratory Treatments: none  Oxygen Therapy:  is on oxygen at 3 L/min per nasal cannula. Ventilator:    - No ventilator support    Rehab Therapies: Physical Therapy and Occupational Therapy. Speech therapy and dietician teaching.   Weight Bearing Status/Restrictions: No weight bearing restirctions  Other Medical Equipment (for information only, NOT a DME order):  walker  Other Treatments: skilled nursing assessment, CHF teaching and compliance  Medication teaching and compliance  Patient's personal belongings (please select all that are sent with patient):  Jamin    RN SIGNATURE:  Electronically signed by Allen Nguyễn RN on 4/9/19 at 6:47 PM    CASE MANAGEMENT/SOCIAL WORK SECTION    Inpatient Status Date: ***    Readmission Risk Assessment

## 2019-04-04 NOTE — PROGRESS NOTES
The potassium/magnesium sliding scale has been automatically discontinued per [facility] approved policy because the patient has decreased renal function (CrCl<30 ml/min). The patient's current K/Mag levels are currently:    Recent Labs     04/04/19  0530   K 4.5   MG 2.7*       CrCl cannot be calculated (Unknown ideal weight.). For patients with decreased renal function (below 30ml/min) needing potassium/magnesium supplementation, please order individual bolus doses with appropriate monitoring. Please contact the inpatient pharmacy at [phone] with any concerns. Thank you.     Raymon Johnston RPh  4/4/2019  9:35 AM

## 2019-04-04 NOTE — ED PROVIDER NOTES
[facility] ED  eMERGENCY dEPARTMENT eNCOUnter      Pt Name: Hillary Ramos  MRN: [id number]  [place] 12/31/1923  Date of evaluation: 4/4/2019  Provider: Chau Sherwood MD    CHIEF COMPLAINT       Chief Complaint   Patient presents with    Respiratory Distress         HISTORY OF PRESENT ILLNESS  (Location/Symptom, Timing/Onset, Context/Setting, Quality, Duration, Modifying Factors, Severity.)   Hillary Ramos is a 80 y.o. female who presents to the emergency department  via EMS in respiratory distress. Patient has history of CHF and COPD. She awoke this morning very short of breath. She called her daughter for help. She was treated with DuoNeb. Paramedics were called. Her sats on the low 80s upon paramedic arrival to the home. She is placed on CPAP additional nebulizer treatment is continued and she is transported here. Patient does not speak [place]. Daughter is able to tell us that she awoke with her shortness of breath yesterday she had been in her baseline state. With regard to code status daughter states they would prefer not to proceed to mechanical ventilation but if it is absolutely necessary this would be considered. Nursing Notes were reviewed. ALLERGIES     Pcn [penicillins]    CURRENT MEDICATIONS       Previous Medications    ACETAMINOPHEN-CODEINE (TYLENOL/CODEINE #4) 300-60 MG PER TABLET    Take 1 tablet by mouth 3 times daily as needed for Pain. .    ALBUTEROL SULFATE HFA (PROAIR HFA) 108 (90 BASE) MCG/ACT INHALER    Inhale 2 puffs into the lungs every 6 hours as needed for Wheezing    APIXABAN (ELIQUIS) 2.5 MG TABS TABLET    Take 2.5 mg by mouth 2 times daily     ATORVASTATIN (LIPITOR) 20 MG TABLET    Take 20 mg by mouth daily    BRIMONIDINE (ALPHAGAN) 0.2 % OPHTHALMIC SOLUTION    Place 1 drop into both eyes 2 times daily    BUMETANIDE (BUMEX) 1 MG TABLET    Take 1 mg by mouth daily    CHOLECALCIFEROL (VITAMIN D) 2000 UNITS CAPS CAPSULE    Take 2,000 Units by mouth 2 times daily     DILTIAZEM (CARDIZEM 12 HR) 60 MG EXTENDED RELEASE CAPSULE    Take 60 mg by mouth daily    DORZOLAMIDE (TRUSOPT) 2 % OPHTHALMIC SOLUTION    Place 1 drop into both eyes 2 times daily    DORZOLAMIDE-TIMOLOL (COSOPT) 22.3-6.8 MG/ML OPHTHALMIC SOLUTION    Place 1 drop into both eyes 2 times daily    GABAPENTIN (NEURONTIN) 100 MG CAPSULE    Take 100 mg by mouth 2 times daily as needed (nerve pain). Filemon Cali GLIMEPIRIDE (AMARYL) 1 MG TABLET    Take 1 tablet by mouth Daily with supper    GLIMEPIRIDE (AMARYL) 2 MG TABLET    Take 2 mg by mouth every morning (before breakfast)    MAGNESIUM OXIDE (MAG-OX) 400 MG TABLET    Take 400 mg by mouth 2 times daily    METFORMIN (GLUCOPHAGE-XR) 500 MG EXTENDED RELEASE TABLET    Take 500 mg by mouth 2 times daily (with meals)    MONTELUKAST (SINGULAIR) 10 MG TABLET    Take 10 mg by mouth nightly    RISEDRONATE (ACTONEL) 35 MG TABLET    Take 35 mg by mouth every 7 days Wednesdays       PAST MEDICAL HISTORY         Diagnosis Date    Arthritis     [facility]     CAD (coronary artery disease)     CHF (congestive heart failure) (HCC)     COPD (chronic obstructive pulmonary disease) ([facility]     Diabetes mellitus ([facility]     Femur fracture ([facility]     Hyperlipidemia     Hypertension        SURGICAL HISTORY           Procedure Laterality Date    TOTAL HIP ARTHROPLASTY Left          FAMILY HISTORY     No family history on file. No family status information on file. SOCIAL HISTORY      reports that she has never smoked. She has never used smokeless tobacco. She reports that she does not drink alcohol or use drugs. REVIEW OF SYSTEMS    (2-9 systems for level 4, 10 or more for level 5)     Review of Systems   Unable to perform ROS: Severe respiratory distress   and language barrier    Except as noted above the remainder of the review of systems was reviewed and negative.      PHYSICAL EXAM    (up to 7 for level 4, 8 or more for level 5)     Vitals:    04/04/19 0544 04/04/19 0557 04/04/19 0608 04/04/19 0612   BP:  (!) 141/48  (!) 121/50   Pulse:  91  89   Resp: 14 11  12   SpO2: 100% 100%  100%   Weight:   100 lb (45.4 kg)    Height:   4' 1\" (1.245 m)        Physical exam reflects a well-nourished well-hydrated elderly female. She is in respiratory distress on arrival.  Pulse ox 85% on room air. She is hypoxic. She is tachypneic. She is mildly hypertensive. She is alert and responsive. She is mildly pale integument dry. She has significant JVD. Oropharyngeal exam without lesion. Heart tachycardic and irregular. Lungs have tight wheezes noted mid upper lung fields, fields are diminished inferiorly. Abdomen is soft throughout no focal pain. She does have 1-2+ edema to the lower extremities integument without rash or lesion. No neurovascular deficits are noted. DIAGNOSTIC RESULTS     EKG: All EKG's are interpreted by the Emergency Department Physician who either signs or Co-signs this chart in the absence of a cardiologist.    EKG demonstrates A. fib with RVR with ventricular response rate ranging from 110 and 120. Pattern consistent with low voltage noted.     RADIOLOGY:   Non-plain film images such as CT, Ultrasound and MRI are read by the radiologist. Plain radiographic images are visualized and preliminarily interpreted by the emergency physician with the below findings:    XR CHEST PORTABLE   Status: Final result   Order Providers     Authorizing Billing   MD Coleen Goodman MD          Signed by     Signed Date/Time  Phone Pager   Jennifer Dial 4/04/2019 06:13 [phone]    [facility] Radiologists     Read Date Phone Pager   Jennifer Dial Apr 4, 2019 [phone]    Radiation Dose Estimates     No radiation information found for this patient   Narrative   EXAMINATION:   SINGLE XRAY VIEW OF THE CHEST       4/4/2019 5:57 am       COMPARISON:   11/19/2018       HISTORY:   ORDERING SYSTEM PROVIDED HISTORY: Chest Pain   TECHNOLOGIST PROVIDED HISTORY:   Chest Pain       FINDINGS:   Lung volumes are low.  Within this limit, there are interstitial and airspace   opacities in the lower lung zones and probable small pleural effusions.  Mild   cardiomegaly again noted.  No pneumothorax.  Pulmonary vasculature is mildly   prominent.  No acute osseous abnormalities are seen.           Impression   Interstitial opacities and bilateral pleural effusions may reflect congestive   heart failure.             Interpretation per the Radiologist below, if available at the time of this note:    XR CHEST PORTABLE   Final Result   Interstitial opacities and bilateral pleural effusions may reflect congestive   heart failure.                  LABS:  Labs Reviewed   BASIC METABOLIC PANEL - Abnormal; Notable for the following components:       Result Value    Glucose 165 (*)     Bun/Cre Ratio 25 (*)     All other components within normal limits   BRAIN NATRIURETIC PEPTIDE - Abnormal; Notable for the following components:    Pro-BNP 2,271 (*)     All other components within normal limits   CBC WITH AUTO DIFFERENTIAL - Abnormal; Notable for the following components:    WBC 17.2 (*)     RBC 3.45 (*)     Hemoglobin 10.8 (*)     Hematocrit 32.7 (*)     RDW 16.2 (*)     Monocytes 9 (*)     Eosinophils % 6 (*)     Segs Absolute 10.40 (*)     Absolute Mono # 1.50 (*)     Absolute Eos # 1.00 (*)     All other components within normal limits   MAGNESIUM - Abnormal; Notable for the following components:    Magnesium 2.7 (*)     All other components within normal limits   PROTIME-INR - Abnormal; Notable for the following components:    Protime 12.0 (*)     All other components within normal limits   CK ISOENZYMES - Abnormal; Notable for the following components:    % CKMB 4.6 (*)     All other components within normal limits   TROP/MYOGLOBIN - Abnormal; Notable for the following components:    Troponin, High Sensitivity 24 (*)     All other components within normal limits   POC PANEL (G3)-ART - Abnormal; Notable for the following components:    POC pH 7.25 (*)     POC pCO2 65 (*)     POC PO2 154 (*)     TCO2 (calc), Art 31 (*)     POC HCO3 28.6 (*)     All other components within normal limits   URINE CULTURE   CULTURE BLOOD #1   CULTURE BLOOD #1   APTT   URINALYSIS   LACTIC ACID     Results for Mel Prieto (MRN [id number]) as of 4/4/2019 06:21   Ref. Range 4/4/2019 05:30 4/4/2019 05:35 4/4/2019 05:42 4/4/2019 06:00 4/4/2019 06:04   Sodium Latest Ref Range: 135 - 144 mmol/L 137       Potassium Latest Ref Range: 3.7 - 5.3 mmol/L 4.5       Chloride Latest Ref Range: 98 - 107 mmol/L 98       CO2 Latest Ref Range: 20 - 31 mmol/L 25       BUN Latest Ref Range: 8 - 23 mg/dL 19       Creatinine Latest Ref Range: 0.50 - 0.90 mg/dL 0.77       Bun/Cre Ratio Latest Ref Range: 9 - 20  25 (H)       Anion Gap Latest Ref Range: 9 - 17 mmol/L 14       GFR Non- Latest Ref Range: >60 mL/min >60       GFR  Latest Ref Range: >60 mL/min >60       Magnesium Latest Ref Range: 1.6 - 2.6 mg/dL 2.7 (H)       Glucose Latest Ref Range: 70 - 99 mg/dL 165 (H)       Calcium Latest Ref Range: 8.6 - 10.4 mg/dL 9.1       GFR Comment Unknown Pend       GFR Staging Unknown NOT REPORTED       POC HCO3 Latest Ref Range: 22 - 27 mmol/L  28.6 (H)      POC O2 SAT Latest Units: %  99      POC pCO2 Latest Ref Range: 32 - 45 mm Hg  65 (H)      POC pCO2 Temp Latest Units: mm Hg  NOT REPORTED      POC pH Latest Ref Range: 7.35 - 7.45   7.25 (L)      POC PO2 Latest Ref Range: 75 - 95 mm Hg  154 (H)      Total CK Latest Ref Range: 26 - 192 U/L 48       % CKMB Latest Ref Range: 0.0 - 3.0 % 4.6 (H)       CK-MB Latest Ref Range: <5.4 ng/mL 2.2       CKMB Interpretation Unknown NORMAL ISOENZYME . Shan Gu Shan Gu        Myoglobin Latest Ref Range: 25 - 58 ng/mL 52       Pro-BNP Latest Ref Range: <300 pg/mL 2,271 (H)       Troponin T Latest Ref Range: <0.03 ng/mL NOT REPORTED       Troponin Interp Unknown NOT REPORTED Troponin, High Sensitivity Latest Ref Range: 0 - 14 ng/L 24 (H)       BNP Interpretation Unknown Pro-BNP Reference. ..       WBC Latest Ref Range: 3.5 - 11.0 k/uL 17.2 (H)       RBC Latest Ref Range: 4.0 - 5.2 m/uL 3.45 (L)       Hemoglobin Quant Latest Ref Range: 12.0 - 16.0 g/dL 10.8 (L)       Hematocrit Latest Ref Range: 36 - 46 % 32.7 (L)       MCV Latest Ref Range: 80 - 100 fL 95.0       MCH Latest Ref Range: 26 - 34 pg 31.3       MCHC Latest Ref Range: 31 - 37 g/dL 33.0       MPV Latest Ref Range: 6.0 - 12.0 fL 7.8       RDW Latest Ref Range: 11.5 - 14.5 % 16.2 (H)       Platelet Count Latest Ref Range: 130 - 400 k/uL 253       Platelet Estimate Unknown NOT REPORTED       Absolute Mono # Latest Ref Range: 0.2 - 0.8 k/uL 1.50 (H)       Eosinophils % Latest Ref Range: 1 - 4 % 6 (H)       Basophils # Latest Ref Range: 0.0 - 0.2 k/uL 0.00       Differential Type Unknown NOT REPORTED       Seg Neutrophils Latest Ref Range: 36 - 66 % 61       Segs Absolute Latest Ref Range: 1.8 - 7.7 k/uL 10.40 (H)       Lymphocytes Latest Ref Range: 24 - 44 % 24       Absolute Lymph # Latest Ref Range: 1.0 - 4.8 k/uL 4.20       Monocytes Latest Ref Range: 1 - 7 % 9 (H)       Absolute Eos # Latest Ref Range: 0.0 - 0.4 k/uL 1.00 (H)       Basophils Latest Ref Range: 0 - 2 % 0       Immature Granulocytes Latest Ref Range: 0 % NOT REPORTED       WBC Morphology Unknown NOT REPORTED       RBC Morphology Unknown NOT REPORTED       Prothrombin Time Latest Ref Range: 9.7 - 11.6 sec 12.0 (H)       INR Unknown 1.2       PTT Latest Ref Range: 23 - 31 sec 29.9         All other labs were within normal range or not returned as of this dictation.     EMERGENCY DEPARTMENT COURSE and DIFFERENTIAL DIAGNOSIS/MDM:   Vitals:    Vitals:    04/04/19 0544 04/04/19 0557 04/04/19 0608 04/04/19 0612   BP:  (!) 141/48  (!) 121/50   Pulse:  91  89   Resp: 14 11  12   SpO2: 100% 100%  100%   Weight:   100 lb (45.4 kg)    Height:   4' 1\" (1.245 m)      Patient is evaluated on arrival.  She is initially placed on BiPAP. She actually responded quite favorably to BiPAP. Initial blood gases demonstrated her respiratory failure however she had significant positive response to BiPAP management almost immediately. IV access is established. She is treated with regard to CHF and COPD. She did receive IV Solu-Medrol, magnesium, Bumex. With regard to her distress low-dose morphine and Zofran also given. EKG demonstrates A. fib ventricular response rate  approximately 110 which is reasonable. Vital signs otherwise stable. Pulse ox improves to 99% on BiPAP. Laboratory and imaging studies are requested. Juares catheter is placed. Daughter remains at the bedside to serve as . Presentation is consistent with respiratory failure secondary to exacerbation of congestive heart failure and concomitant COPD. Chest x-ray does not reflect pneumonia per radiology. Patient remains doing well on BiPAP. Care is discussed with internal medicine to facilitate admission and additional care. CONSULTS:  IP CONSULT TO INTERNAL MEDICINE    PROCEDURES:  None    FINAL IMPRESSION      1. Acute respiratory failure with hypoxia and hypercapnia (HCC)    2. Acute on chronic congestive heart failure, unspecified heart failure type ([address]    3. COPD with acute exacerbation ([address]    4. Advanced age          DISPOSITION/PLAN   DISPOSITION    Decision to Admit    PATIENT REFERRED TO:   No follow-up provider specified. DISCHARGE MEDICATIONS:     New Prescriptions    No medications on file       CRITICAL CARE TIME   Total Critical Care time was 50 minutes, excluding separately reportable procedures. There was a high probability of clinically significant/life threatening deterioration in the patient's condition which required my urgent intervention.     Care with regard to recurrent evaluation of patient's vital signs and status, review of available past medical records, patient and family counseling, supervision of ventilatory management to include BiPAP, order and review of multiple laboratory and imaging investigations, order and review of multiple medications, coordination of care with emergency department staff, coordination of care with consulting and admitting physicians, coordination of admission arrangements.     (Please note that portions of this note were completed with a voice recognition program.  Efforts were made to edit the dictations but occasionally words are mis-transcribed.)    Chau Sherwood MD  Attending Emergency Physician          Chau Sherwood MD  04/04/19 5418

## 2019-04-04 NOTE — FLOWSHEET NOTE
Patient in bed; daughter at bedside. Patient appears to be either confused or does not understand [place]. Daughter engages briefly with writer; states patient has no spiritual/emotional needs at this time; thanks writer for visiting. Writer provides presence and support. Spiritual Care will follow as needed.      04/04/19 1011   Encounter Summary   Services provided to: Patient and family together   Referral/Consult From: [address] [facility] None   Continue Visiting   (4/4/19)   Complexity of Encounter Moderate   Length of Encounter 15 minutes   Spiritual Assessment Completed Yes   Routine   Type Initial   Assessment Approachable   Intervention Active listening;Sustaining presence/ [facility] of presence   Outcome Expressed gratitude

## 2019-04-05 ENCOUNTER — APPOINTMENT (OUTPATIENT)
Dept: GENERAL RADIOLOGY | Age: 84
DRG: 291 | End: 2019-04-05
Payer: MEDICARE

## 2019-04-05 LAB
ABSOLUTE EOS #: 0.1 K/UL (ref 0–0.4)
ABSOLUTE IMMATURE GRANULOCYTE: ABNORMAL K/UL (ref 0–0.3)
ABSOLUTE LYMPH #: 0.8 K/UL (ref 1–4.8)
ABSOLUTE MONO #: 0.6 K/UL (ref 0.2–0.8)
ANION GAP SERPL CALCULATED.3IONS-SCNC: 14 MMOL/L (ref 9–17)
BASOPHILS # BLD: 0 % (ref 0–2)
BASOPHILS ABSOLUTE: 0 K/UL (ref 0–0.2)
BUN BLDV-MCNC: 30 MG/DL (ref 8–23)
BUN/CREAT BLD: 31 (ref 9–20)
CALCIUM SERPL-MCNC: 9 MG/DL (ref 8.6–10.4)
CHLORIDE BLD-SCNC: 96 MMOL/L (ref 98–107)
CO2: 27 MMOL/L (ref 20–31)
CREAT SERPL-MCNC: 0.98 MG/DL (ref 0.5–0.9)
CULTURE: ABNORMAL
CULTURE: NO GROWTH
DIFFERENTIAL TYPE: ABNORMAL
EOSINOPHILS RELATIVE PERCENT: 1 % (ref 1–4)
GFR AFRICAN AMERICAN: >60 ML/MIN
GFR NON-AFRICAN AMERICAN: 53 ML/MIN
GFR SERPL CREATININE-BSD FRML MDRD: ABNORMAL ML/MIN/{1.73_M2}
GFR SERPL CREATININE-BSD FRML MDRD: ABNORMAL ML/MIN/{1.73_M2}
GLUCOSE BLD-MCNC: 115 MG/DL (ref 65–105)
GLUCOSE BLD-MCNC: 151 MG/DL (ref 70–99)
GLUCOSE BLD-MCNC: 184 MG/DL (ref 65–105)
GLUCOSE BLD-MCNC: 335 MG/DL (ref 65–105)
HCT VFR BLD CALC: 32.9 % (ref 36–46)
HEMOGLOBIN: 11 G/DL (ref 12–16)
IMMATURE GRANULOCYTES: ABNORMAL %
LYMPHOCYTES # BLD: 6 % (ref 24–44)
Lab: ABNORMAL
Lab: NORMAL
MCH RBC QN AUTO: 31.2 PG (ref 26–34)
MCHC RBC AUTO-ENTMCNC: 33.4 G/DL (ref 31–37)
MCV RBC AUTO: 93.7 FL (ref 80–100)
MONOCYTES # BLD: 5 % (ref 1–7)
NRBC AUTOMATED: ABNORMAL PER 100 WBC
PDW BLD-RTO: 16.2 % (ref 11.5–14.5)
PLATELET # BLD: 237 K/UL (ref 130–400)
PLATELET ESTIMATE: ABNORMAL
PMV BLD AUTO: 7.9 FL (ref 6–12)
POTASSIUM SERPL-SCNC: 4.9 MMOL/L (ref 3.7–5.3)
RBC # BLD: 3.51 M/UL (ref 4–5.2)
RBC # BLD: ABNORMAL 10*6/UL
SEG NEUTROPHILS: 88 % (ref 36–66)
SEGMENTED NEUTROPHILS ABSOLUTE COUNT: 10.8 K/UL (ref 1.8–7.7)
SODIUM BLD-SCNC: 137 MMOL/L (ref 135–144)
SPECIMEN DESCRIPTION: ABNORMAL
SPECIMEN DESCRIPTION: NORMAL
WBC # BLD: 12.3 K/UL (ref 3.5–11)
WBC # BLD: ABNORMAL 10*3/UL

## 2019-04-05 PROCEDURE — 6370000000 HC RX 637 (ALT 250 FOR IP): Performed by: INTERNAL MEDICINE

## 2019-04-05 PROCEDURE — 94640 AIRWAY INHALATION TREATMENT: CPT

## 2019-04-05 PROCEDURE — 2580000003 HC RX 258: Performed by: INTERNAL MEDICINE

## 2019-04-05 PROCEDURE — 2500000003 HC RX 250 WO HCPCS: Performed by: INTERNAL MEDICINE

## 2019-04-05 PROCEDURE — 82947 ASSAY GLUCOSE BLOOD QUANT: CPT

## 2019-04-05 PROCEDURE — 80048 BASIC METABOLIC PNL TOTAL CA: CPT

## 2019-04-05 PROCEDURE — 36415 COLL VENOUS BLD VENIPUNCTURE: CPT

## 2019-04-05 PROCEDURE — 85025 COMPLETE CBC W/AUTO DIFF WBC: CPT

## 2019-04-05 PROCEDURE — 2060000000 HC ICU INTERMEDIATE R&B

## 2019-04-05 PROCEDURE — 71045 X-RAY EXAM CHEST 1 VIEW: CPT

## 2019-04-05 PROCEDURE — 2700000000 HC OXYGEN THERAPY PER DAY

## 2019-04-05 PROCEDURE — 93970 EXTREMITY STUDY: CPT

## 2019-04-05 PROCEDURE — 94761 N-INVAS EAR/PLS OXIMETRY MLT: CPT

## 2019-04-05 RX ADMIN — DORZOLAMIDE HYDROCHLORIDE 1 DROP: 20 SOLUTION/ DROPS OPHTHALMIC at 09:40

## 2019-04-05 RX ADMIN — BRIMONIDINE TARTRATE 1 DROP: 2 SOLUTION OPHTHALMIC at 20:12

## 2019-04-05 RX ADMIN — INSULIN LISPRO 1 UNITS: 100 INJECTION, SOLUTION INTRAVENOUS; SUBCUTANEOUS at 21:14

## 2019-04-05 RX ADMIN — MONTELUKAST 10 MG: 10 TABLET, FILM COATED ORAL at 20:12

## 2019-04-05 RX ADMIN — APIXABAN 2.5 MG: 2.5 TABLET, FILM COATED ORAL at 20:12

## 2019-04-05 RX ADMIN — Medication 10 ML: at 09:32

## 2019-04-05 RX ADMIN — BUMETANIDE 1 MG: 0.25 INJECTION INTRAMUSCULAR; INTRAVENOUS at 09:29

## 2019-04-05 RX ADMIN — IPRATROPIUM BROMIDE AND ALBUTEROL SULFATE 1 AMPULE: .5; 3 SOLUTION RESPIRATORY (INHALATION) at 07:06

## 2019-04-05 RX ADMIN — FAMOTIDINE 20 MG: 10 INJECTION, SOLUTION INTRAVENOUS at 09:31

## 2019-04-05 RX ADMIN — DORZOLAMIDE HYDROCHLORIDE 1 DROP: 20 SOLUTION/ DROPS OPHTHALMIC at 20:12

## 2019-04-05 RX ADMIN — IPRATROPIUM BROMIDE AND ALBUTEROL SULFATE 1 AMPULE: .5; 3 SOLUTION RESPIRATORY (INHALATION) at 11:42

## 2019-04-05 RX ADMIN — ATORVASTATIN CALCIUM 20 MG: 20 TABLET, FILM COATED ORAL at 09:27

## 2019-04-05 RX ADMIN — VITAMIN D 2000 UNITS: 25 TAB ORAL at 20:12

## 2019-04-05 RX ADMIN — IPRATROPIUM BROMIDE AND ALBUTEROL SULFATE 1 AMPULE: .5; 3 SOLUTION RESPIRATORY (INHALATION) at 15:15

## 2019-04-05 RX ADMIN — Medication 2 PUFF: at 18:58

## 2019-04-05 RX ADMIN — TIMOLOL MALEATE 1 DROP: 5 SOLUTION OPHTHALMIC at 09:39

## 2019-04-05 RX ADMIN — APIXABAN 2.5 MG: 2.5 TABLET, FILM COATED ORAL at 09:28

## 2019-04-05 RX ADMIN — VITAMIN D 2000 UNITS: 25 TAB ORAL at 09:27

## 2019-04-05 RX ADMIN — BUMETANIDE 1 MG: 0.25 INJECTION INTRAMUSCULAR; INTRAVENOUS at 20:12

## 2019-04-05 RX ADMIN — MAGNESIUM HYDROXIDE 30 ML: 400 SUSPENSION ORAL at 09:45

## 2019-04-05 RX ADMIN — TIMOLOL MALEATE 1 DROP: 5 SOLUTION OPHTHALMIC at 20:12

## 2019-04-05 RX ADMIN — Medication 10 ML: at 20:13

## 2019-04-05 RX ADMIN — DILTIAZEM HYDROCHLORIDE 60 MG: 60 CAPSULE, EXTENDED RELEASE ORAL at 09:28

## 2019-04-05 RX ADMIN — BRIMONIDINE TARTRATE 1 DROP: 2 SOLUTION OPHTHALMIC at 09:38

## 2019-04-05 RX ADMIN — INSULIN LISPRO 4 UNITS: 100 INJECTION, SOLUTION INTRAVENOUS; SUBCUTANEOUS at 12:01

## 2019-04-05 RX ADMIN — IPRATROPIUM BROMIDE AND ALBUTEROL SULFATE 1 AMPULE: .5; 3 SOLUTION RESPIRATORY (INHALATION) at 18:58

## 2019-04-05 ASSESSMENT — PAIN SCALES - GENERAL
PAINLEVEL_OUTOF10: 0
PAINLEVEL_OUTOF10: 0

## 2019-04-05 NOTE — H&P
History and Physical/ admit note      CHIEF COMPLAINT:  dyspnea    History of Present Illness: 80 yr old [address] with acxute dyspnea tachypnea orthopnea x 1 day dyspnea rated 7/10 continuous worse with exertion better with rest , no wheezing no cough no fever no chills , no chest pain no palpitation  , positive pedal edema orthopnea no pnd        Past Medical History:   Diagnosis Date    Arthritis     [facility]rial [facility] ([facility]     CAD (coronary artery disease)     CHF (congestive [facility] failure) ([facility]     COPD (chronic obstructive pulmonary disease) ([facility]     Diabetes mellitus ([facility]     Femur fracture ([facility]     Hyperlipidemia     Hypertension          Past Surgical History:   Procedure Laterality Date    TOTAL HIP ARTHROPLASTY Left        Medications Prior to Admission:    Medications Prior to Admission: glimepiride (AMARYL) 1 MG tablet, Take 1 tablet by mouth Daily with supper  metFORMIN (GLUCOPHAGE-XR) 500 MG extended release tablet, Take 500 mg by mouth 2 times daily (with meals)  acetaminophen-codeine (TYLENOL/CODEINE #4) 300-60 MG per tablet, Take 1 tablet by mouth 2 times daily as needed for Pain. bumetanide (BUMEX) 1 MG tablet, Take 1 mg by mouth daily  diltiazem (DILACOR XR) 180 MG extended release capsule, Take 180 mg by mouth daily   brimonidine (ALPHAGAN) 0.2 % ophthalmic solution, Place 1 drop into both eyes 2 times daily  albuterol sulfate HFA (PROAIR HFA) 108 (90 Base) MCG/ACT inhaler, Inhale 2 puffs into the lungs every 6 hours as needed for Wheezing  dorzolamide-timolol (COSOPT) 22.3-6.8 MG/ML ophthalmic solution, Place 1 drop into both eyes 2 times daily  glimepiride (AMARYL) 2 MG tablet, Take 2 mg by mouth every morning (before breakfast)  gabapentin (NEURONTIN) 100 MG capsule, Take 100 mg by mouth 2 times daily as needed (nerve pain).  .  montelukast (SINGULAIR) 10 MG tablet, Take 10 mg by mouth nightly  atorvastatin (LIPITOR) 20 MG tablet, Take 20 mg by mouth daily  apixaban (ELIQUIS) 2.5 MG TABS tablet, Take 2.5 mg by mouth 2 times daily   Cholecalciferol (VITAMIN D) 2000 units CAPS capsule, Take 2,000 Units by mouth 2 times daily   magnesium oxide (MAG-OX) 400 MG tablet, Take 400 mg by mouth 2 times daily  risedronate (ACTONEL) 35 MG tablet, Take 35 mg by mouth every 7 days Wednesdays    Allergies:    Penicillins    Social History:    reports that she has never smoked. She has never used smokeless tobacco. She reports that she does not drink alcohol or use drugs. Family History:   Family history is unknown by patient.     REVIEW OF SYSTEMS:    Constitutional: negative, no fever no chills  Eyes: negative  Ears, nose, mouth, throat, and face: negative  Respiratory: negative except for dyspnea tachypnea orthopnea  Cardiovascular: negative, no chest pain no palpitation  Gastrointestinal: negative  Genitourinary:negative  Integument/breast: negative  Hematologic/lymphatic: negative  Musculoskeletal:negative  Neurological: negative  Behavioral/Psych: negative  Endocrine: negative  Allergic/Immunologic: negative  PHYSICAL EXAM:  General Appearance: in no acute distress and alert  Skin: warm and dry, no rash or erythema  Head: normocephalic and atraumatic  Eyes: pupils equal, round, and reactive to light, conjunctivae normal and sclera anicteric  neck: neck supple and non tender without mass and positive jvd   Pulmonary/Chest: air entry equal decreased at bases fine crackles  Cardiovascular: normal rate, normal S1 and S2, no gallops, intact distal pulses, no carotid bruits and irregularly irregular rhythm noted  Abdomen: soft, non-tender, non-distended, normal bowel sounds, no masses or organomegaly    Extremities: plus one edema good pulses no lucita sign  Neurologic: alert oriented x 2, no focal deficit    Vitals:  /80   Pulse 99   Temp 98.1 °F (36.7 °C) (Infrared)   Resp 11   Ht 4' 7\" (1.397 m)   Wt 106 lb 6.4 oz (48.3 kg)   SpO2 99%   BMI 24.73 kg/m² LABS:  CBC:   Lab Results   Component Value Date    WBC 17.2 04/04/2019    RBC 3.45 04/04/2019    HGB 10.8 04/04/2019    HCT 32.7 04/04/2019    MCV 95.0 04/04/2019    MCH 31.3 04/04/2019    MCHC 33.0 04/04/2019    RDW 16.2 04/04/2019     04/04/2019    MPV 7.8 04/04/2019     CMP:    Lab Results   Component Value Date     04/04/2019    K 4.5 04/04/2019    CL 98 04/04/2019    CO2 25 04/04/2019    BUN 19 04/04/2019    CREATININE 0.77 04/04/2019    GFRAA >60 04/04/2019    LABGLOM >60 04/04/2019    GLUCOSE 165 04/04/2019    GLUCOSE 117 09/02/2011    PROT 7.9 11/13/2018    LABALBU 4.2 11/13/2018    CALCIUM 9.1 04/04/2019    BILITOT 0.84 11/13/2018    ALKPHOS 108 11/13/2018    AST 32 11/13/2018    ALT 21 11/13/2018         ASSESSMENT:    Acute resp failure    acute on ch chf sys    pulm htn    a fib   Patient Active Problem List   Diagnosis    Pneumonia    Sepsis [facility]    Moderate malnutrition ([facility]    Pulmonary HTN ([facility]    Non-rheumatic mitral regurgitation    Acute on chronic combined systolic and diastolic congestive heart failure (HCC)    Chronic atrial fibrillation (HCC)    Type 2 diabetes mellitus with hyperglycemia (HCC)    Acute bronchitis    Acute congestive heart failure (HCC)    Hypotensive episode    Acute respiratory failure (HCC)       PLAN:     admit to icu  bipap , diuresis 2d echo cardiac enzymes check tsh, check bs ac hs  Insulin coverage see orders, pulmonary cardiology consult  See orders            Joseph Diallo MD  9:46 PM  4/4/2019

## 2019-04-05 NOTE — PROGRESS NOTES
Pulmonary Critical Care Progress Note  Naman Alvarez MD     Patient seen for the follow up of acute on chronic respiratory failure, pulmonary edema, pleural effusion, decompensated CHF    Subjective:  He denies chest pain. Mild occasional cough, mostly dry. Shortness of breath is getting better. No significant overnight events. Examination:  Vitals: /78   Pulse 95   Temp 97.7 °F (36.5 °C) (Infrared)   Resp 22   Ht 4' 7\" (1.397 m)   Wt 106 lb 6.4 oz (48.3 kg)   SpO2 96%   BMI 24.73 kg/m²   General appearance: alert and cooperative with exam  Neck: No JVD  Lungs: Moderate exchange, no wheezing, bibasilar crackles  Heart: regular rate and rhythm, S1, S2 normal, no gallop  Abdomen: Soft, non tender, + BS  Extremities: no cyanosis or clubbing.   Positive edema    LABs:  CBC:   Recent Labs     04/04/19 0530 04/05/19  0536   WBC 17.2* 12.3*   HGB 10.8* 11.0*   HCT 32.7* 32.9*    237     BMP:   Recent Labs     04/04/19  0530 04/05/19  0536    137   K 4.5 4.9   CO2 25 27   BUN 19 30*   CREATININE 0.77 0.98*   LABGLOM >60 53*   GLUCOSE 165* 151*     PT/INR:   Recent Labs     04/04/19  0530   PROTIME 12.0*   INR 1.2     APTT:  Recent Labs     04/04/19  0530   APTT 29.9   ABG:  Lab Results   Component Value Date    RJT2UYF 31 04/04/2019    FIO2 50.0 04/04/2019       Lab Results   Component Value Date    POCPH 7.25 04/04/2019    POCPCO2 65 04/04/2019    POCPO2 154 04/04/2019    POCHCO3 28.6 04/04/2019    NBEA NOT REPORTED 04/04/2019    PBEA 1 04/04/2019    ZZU0XIX 31 04/04/2019    RXMZ5OSD 99 04/04/2019    FIO2 50.0 04/04/2019     Radiology:      Impression:  · Acute on chronic respiratory failure  · Bilateral pulmonary infiltrates/pleural effusions, secondary to congestive heart failure, doubt pneumonia  · Moderate to severe secondary pulmonary hypertension, RVSP 64  · Moderate TR/MR/severe dilated left and right atrium  · Lower extremity edema    Recommendations:  · Diuresis  · Since leukocytosis is improving and there is no fever, inspite of borderline elevated pro-calcitonin we will watch patient off of antibiotics.   · Singulair  · Albuterol and Ipratropium Q 4 hours and prn  · Add Dulera 200  · X-ray chest in am  · Labs: CBC and BMP in am  · BiPAP, when necessary  · 2 liters/min via nasal cannula  · DVT prophylaxis, on Eliquis  · Doppler of lower extremity  · Will follow with you    Michael Javed MD, [facility]  Pulmonary Critical Care and Sleep Medicine,  [facility]  Cell: [phone]  Office: [phone]

## 2019-04-05 NOTE — PROGRESS NOTES
Subjective:    CHF  Less shortness of breath no tachypneapedal edema negative I's and O's  ROS  Fever no chills no GI/ complaints no cardiac complaints no TIA no bleeding no headache no sore throat no skin lesions no hypoglycemia  physical exam  General Appearance: alert and oriented to person, place and time and in no acute distress  Skin: warm and dry, no rash or erythema  Head: normocephalic and atraumatic  Eyes: pupils equal, round, and reactive to light, extraocular eye movements intact, conjunctivae normal  Neck: neck supple and non tender without mass   Pulmonary/Chest: Air entry equal decrease at the bases fine crackles nor rhonchi no use of intercostal muscles  Cardiovascular: normal rate, normal S1 and S2, no gallops, intact distal pulses, no carotid bruits and irregularly irregular rhythm noted  Abdomen: soft, non-tender, non-distended, normal bowel sounds, no masses or organomegaly  Extremities: Trace edema negative Homans sign good pulses  Musculoskeletal: Dorsal kyphosis  Neurologic: Alert oriented ×2 no focal deficit    BP (!) 145/84   Pulse 97   Temp 97.4 °F (36.3 °C) (Infrared)   Resp 17   Ht 4' 7\" (1.397 m)   Wt 106 lb 6.4 oz (48.3 kg)   SpO2 97%   BMI 24.73 kg/m²     CBC:   Lab Results   Component Value Date    WBC 12.3 04/05/2019    RBC 3.51 04/05/2019    HGB 11.0 04/05/2019    HCT 32.9 04/05/2019    MCV 93.7 04/05/2019    MCH 31.2 04/05/2019    MCHC 33.4 04/05/2019    RDW 16.2 04/05/2019     04/05/2019    MPV 7.9 04/05/2019     Hemoglobin/Hematocrit:    Lab Results   Component Value Date    HGB 11.0 04/05/2019    HCT 32.9 04/05/2019     CMP:    Lab Results   Component Value Date     04/05/2019    K 4.9 04/05/2019    CL 96 04/05/2019    CO2 27 04/05/2019    BUN 30 04/05/2019    CREATININE 0.98 04/05/2019    GFRAA >60 04/05/2019    LABGLOM 53 04/05/2019    GLUCOSE 151 04/05/2019    GLUCOSE 117 09/02/2011    PROT 7.9 11/13/2018    LABALBU 4.2 11/13/2018    CALCIUM 9.0 04/05/2019    BILITOT 0.84 11/13/2018    ALKPHOS 108 11/13/2018    AST 32 11/13/2018    ALT 21 11/13/2018        Assessment:  Patient Active Problem List   Diagnosis    Pneumonia    [facility] [facility]    Moderate malnutrition ([facility] [facility]    Pulmonary HTN ([facility] [address] [facility]    Non-rheumatic mitral regurgitation    Acute on chronic combined systolic and diastolic congestive heart failure (HCC)    Chronic atrial fibrillation (HCC)    Type 2 diabetes mellitus with hyperglycemia (HCC)    Acute bronchitis    Acute congestive heart failure (HCC)    Hypotensive episode    Acute respiratory failure (HCC)     Acute combined CHF and chronic continue with diuresis  Acute respiratory failure  Type 2 diabetes with hyperglycemia  LV dysfunction  Atrial fibrillation  Pulmonary hypertension  Plan:    Meds labs reviewed continue with physical with diuresis continue with before meals and at bedtime Accu-Cheks with insulin coverage start physical therapy occupational therapy will ask her cardiologist to see see transfer to [facility] area see orders      Jewell Benites MD  6:06 PM

## 2019-04-05 NOTE — CARE COORDINATION
Discharge Planning    Phone call to dtr Sunita Davila who provides 24 hr care for her mom. Educated on [employer] rights and appeal rights if she feels her mom is being d/c too soon and explained copy of Medicare letter is in pt's room. Explained pt may be d/c 4-6 if blood work in am ok.     Family will transport pt home and dtr denies need for any DME

## 2019-04-05 NOTE — FLOWSHEET NOTE
Patient sleeping; no family present. Writer prays for patient. Spiritual Care will follow as needed.      04/05/19 1418   Encounter Summary   Services provided to: Patient   Referral/Consult From: Odell   Continue Visiting   (4/5/19 sleeping)   Complexity of Encounter Low   Length of Encounter 15 minutes   Routine   Type Follow up

## 2019-04-06 ENCOUNTER — APPOINTMENT (OUTPATIENT)
Dept: GENERAL RADIOLOGY | Age: 84
DRG: 291 | End: 2019-04-06
Payer: MEDICARE

## 2019-04-06 LAB
ABSOLUTE EOS #: 0.2 K/UL (ref 0–0.4)
ABSOLUTE IMMATURE GRANULOCYTE: ABNORMAL K/UL (ref 0–0.3)
ABSOLUTE LYMPH #: 1.6 K/UL (ref 1–4.8)
ABSOLUTE MONO #: 1.5 K/UL (ref 0.2–0.8)
ANION GAP SERPL CALCULATED.3IONS-SCNC: 17 MMOL/L (ref 9–17)
BASOPHILS # BLD: 0 % (ref 0–2)
BASOPHILS ABSOLUTE: 0 K/UL (ref 0–0.2)
BUN BLDV-MCNC: 31 MG/DL (ref 8–23)
BUN/CREAT BLD: 41 (ref 9–20)
CALCIUM SERPL-MCNC: 9.3 MG/DL (ref 8.6–10.4)
CHLORIDE BLD-SCNC: 93 MMOL/L (ref 98–107)
CO2: 25 MMOL/L (ref 20–31)
CREAT SERPL-MCNC: 0.76 MG/DL (ref 0.5–0.9)
DIFFERENTIAL TYPE: ABNORMAL
EOSINOPHILS RELATIVE PERCENT: 1 % (ref 1–4)
GFR AFRICAN AMERICAN: >60 ML/MIN
GFR NON-AFRICAN AMERICAN: >60 ML/MIN
GFR SERPL CREATININE-BSD FRML MDRD: ABNORMAL ML/MIN/{1.73_M2}
GFR SERPL CREATININE-BSD FRML MDRD: ABNORMAL ML/MIN/{1.73_M2}
GLUCOSE BLD-MCNC: 144 MG/DL (ref 65–105)
GLUCOSE BLD-MCNC: 149 MG/DL (ref 70–99)
GLUCOSE BLD-MCNC: 177 MG/DL (ref 65–105)
GLUCOSE BLD-MCNC: 181 MG/DL (ref 65–105)
GLUCOSE BLD-MCNC: 202 MG/DL (ref 65–105)
HCT VFR BLD CALC: 38.3 % (ref 36–46)
HEMOGLOBIN: 12.6 G/DL (ref 12–16)
IMMATURE GRANULOCYTES: ABNORMAL %
LYMPHOCYTES # BLD: 9 % (ref 24–44)
MCH RBC QN AUTO: 31 PG (ref 26–34)
MCHC RBC AUTO-ENTMCNC: 33 G/DL (ref 31–37)
MCV RBC AUTO: 93.8 FL (ref 80–100)
MONOCYTES # BLD: 9 % (ref 1–7)
NRBC AUTOMATED: ABNORMAL PER 100 WBC
PDW BLD-RTO: 16.1 % (ref 11.5–14.5)
PLATELET # BLD: 270 K/UL (ref 130–400)
PLATELET ESTIMATE: ABNORMAL
PMV BLD AUTO: 8.1 FL (ref 6–12)
POTASSIUM SERPL-SCNC: 4.1 MMOL/L (ref 3.7–5.3)
RBC # BLD: 4.09 M/UL (ref 4–5.2)
RBC # BLD: ABNORMAL 10*6/UL
SEG NEUTROPHILS: 81 % (ref 36–66)
SEGMENTED NEUTROPHILS ABSOLUTE COUNT: 14.4 K/UL (ref 1.8–7.7)
SODIUM BLD-SCNC: 135 MMOL/L (ref 135–144)
WBC # BLD: 17.8 K/UL (ref 3.5–11)
WBC # BLD: ABNORMAL 10*3/UL

## 2019-04-06 PROCEDURE — 6370000000 HC RX 637 (ALT 250 FOR IP): Performed by: INTERNAL MEDICINE

## 2019-04-06 PROCEDURE — 87040 BLOOD CULTURE FOR BACTERIA: CPT

## 2019-04-06 PROCEDURE — 2500000003 HC RX 250 WO HCPCS: Performed by: INTERNAL MEDICINE

## 2019-04-06 PROCEDURE — 82947 ASSAY GLUCOSE BLOOD QUANT: CPT

## 2019-04-06 PROCEDURE — 80048 BASIC METABOLIC PNL TOTAL CA: CPT

## 2019-04-06 PROCEDURE — 85025 COMPLETE CBC W/AUTO DIFF WBC: CPT

## 2019-04-06 PROCEDURE — 94760 N-INVAS EAR/PLS OXIMETRY 1: CPT

## 2019-04-06 PROCEDURE — 2060000000 HC ICU INTERMEDIATE R&B

## 2019-04-06 PROCEDURE — 36415 COLL VENOUS BLD VENIPUNCTURE: CPT

## 2019-04-06 PROCEDURE — 94640 AIRWAY INHALATION TREATMENT: CPT

## 2019-04-06 PROCEDURE — 71045 X-RAY EXAM CHEST 1 VIEW: CPT

## 2019-04-06 PROCEDURE — 2700000000 HC OXYGEN THERAPY PER DAY

## 2019-04-06 PROCEDURE — 2580000003 HC RX 258: Performed by: INTERNAL MEDICINE

## 2019-04-06 RX ORDER — GLIMEPIRIDE 1 MG/1
1 TABLET ORAL
Status: DISCONTINUED | OUTPATIENT
Start: 2019-04-06 | End: 2019-04-09 | Stop reason: HOSPADM

## 2019-04-06 RX ORDER — BUMETANIDE 1 MG/1
1 TABLET ORAL DAILY
Status: DISCONTINUED | OUTPATIENT
Start: 2019-04-07 | End: 2019-04-09 | Stop reason: HOSPADM

## 2019-04-06 RX ORDER — METOPROLOL TARTRATE 5 MG/5ML
2.5 INJECTION INTRAVENOUS EVERY 6 HOURS PRN
Status: DISCONTINUED | OUTPATIENT
Start: 2019-04-06 | End: 2019-04-09 | Stop reason: HOSPADM

## 2019-04-06 RX ADMIN — FAMOTIDINE 20 MG: 10 INJECTION, SOLUTION INTRAVENOUS at 08:27

## 2019-04-06 RX ADMIN — INSULIN LISPRO 1 UNITS: 100 INJECTION, SOLUTION INTRAVENOUS; SUBCUTANEOUS at 12:59

## 2019-04-06 RX ADMIN — DORZOLAMIDE HYDROCHLORIDE 1 DROP: 20 SOLUTION/ DROPS OPHTHALMIC at 08:28

## 2019-04-06 RX ADMIN — GLIMEPIRIDE 1 MG: 1 TABLET ORAL at 17:14

## 2019-04-06 RX ADMIN — IPRATROPIUM BROMIDE AND ALBUTEROL SULFATE 1 AMPULE: .5; 3 SOLUTION RESPIRATORY (INHALATION) at 07:32

## 2019-04-06 RX ADMIN — DILTIAZEM HYDROCHLORIDE 60 MG: 60 CAPSULE, EXTENDED RELEASE ORAL at 10:15

## 2019-04-06 RX ADMIN — VITAMIN D 2000 UNITS: 25 TAB ORAL at 08:26

## 2019-04-06 RX ADMIN — Medication 10 ML: at 08:28

## 2019-04-06 RX ADMIN — INSULIN LISPRO 1 UNITS: 100 INJECTION, SOLUTION INTRAVENOUS; SUBCUTANEOUS at 08:27

## 2019-04-06 RX ADMIN — Medication 10 ML: at 21:17

## 2019-04-06 RX ADMIN — DORZOLAMIDE HYDROCHLORIDE 1 DROP: 20 SOLUTION/ DROPS OPHTHALMIC at 21:17

## 2019-04-06 RX ADMIN — MONTELUKAST 10 MG: 10 TABLET, FILM COATED ORAL at 21:16

## 2019-04-06 RX ADMIN — IPRATROPIUM BROMIDE AND ALBUTEROL SULFATE 1 AMPULE: .5; 3 SOLUTION RESPIRATORY (INHALATION) at 19:30

## 2019-04-06 RX ADMIN — VITAMIN D 2000 UNITS: 25 TAB ORAL at 21:16

## 2019-04-06 RX ADMIN — APIXABAN 2.5 MG: 2.5 TABLET, FILM COATED ORAL at 21:16

## 2019-04-06 RX ADMIN — BRIMONIDINE TARTRATE 1 DROP: 2 SOLUTION OPHTHALMIC at 08:28

## 2019-04-06 RX ADMIN — INSULIN LISPRO 1 UNITS: 100 INJECTION, SOLUTION INTRAVENOUS; SUBCUTANEOUS at 17:14

## 2019-04-06 RX ADMIN — APIXABAN 2.5 MG: 2.5 TABLET, FILM COATED ORAL at 08:26

## 2019-04-06 RX ADMIN — Medication 2 PUFF: at 07:32

## 2019-04-06 RX ADMIN — Medication 2 PUFF: at 19:31

## 2019-04-06 RX ADMIN — BUMETANIDE 1 MG: 0.25 INJECTION INTRAMUSCULAR; INTRAVENOUS at 21:16

## 2019-04-06 RX ADMIN — IPRATROPIUM BROMIDE AND ALBUTEROL SULFATE 1 AMPULE: .5; 3 SOLUTION RESPIRATORY (INHALATION) at 11:02

## 2019-04-06 RX ADMIN — ATORVASTATIN CALCIUM 20 MG: 20 TABLET, FILM COATED ORAL at 08:26

## 2019-04-06 RX ADMIN — IPRATROPIUM BROMIDE AND ALBUTEROL SULFATE 1 AMPULE: .5; 3 SOLUTION RESPIRATORY (INHALATION) at 14:52

## 2019-04-06 RX ADMIN — TIMOLOL MALEATE 1 DROP: 5 SOLUTION OPHTHALMIC at 08:28

## 2019-04-06 RX ADMIN — BUMETANIDE 1 MG: 0.25 INJECTION INTRAMUSCULAR; INTRAVENOUS at 08:27

## 2019-04-06 RX ADMIN — TIMOLOL MALEATE 1 DROP: 5 SOLUTION OPHTHALMIC at 21:17

## 2019-04-06 RX ADMIN — BRIMONIDINE TARTRATE 1 DROP: 2 SOLUTION OPHTHALMIC at 21:17

## 2019-04-06 RX ADMIN — INSULIN LISPRO 1 UNITS: 100 INJECTION, SOLUTION INTRAVENOUS; SUBCUTANEOUS at 22:42

## 2019-04-06 ASSESSMENT — PAIN SCALES - GENERAL
PAINLEVEL_OUTOF10: 0
PAINLEVEL_OUTOF10: 0

## 2019-04-06 NOTE — CONSULTS
CARDIOLOGY CONSULT DICTATED. IMPRESSION:    1. ACUTE ON CHRONIC SYSTOLIC CHF. 2. MODERATE TO SEVERE PULMONARY HTN. 3. ATRIAL FIBRILLATION. PLAN:    CONTINUE APIXABAN FOR THROMBOPROPHYLAXIS OF ATRIAL FIBRILLATION. CONTINUE CURRENT DIURETIC REGIMEN.

## 2019-04-06 NOTE — PROGRESS NOTES
venous thrombosis in both lower    extremities.          Impression:  · Acute on chronic respiratory failure  ·  pulmonary edema/effusions  · Moderate to severe secondary pulmonary hypertension, RVSP 64  · Moderate TR/MR/severe dilated left and right atrium  · Lower extremity edema    Recommendations:  ·   Oxygen nasal cannula  ·  BiPAP support as needed  ·  incentive spirometer every hour awake  ·  DuoNeb by nebulizer  ·  Dulera 200 b.i.d.  · Diuresis  ·  check chest x-ray  ·  off antibiotics repeat procalcitonin  · Singulair  · on Eliquis  ·  physical therapy  · Doppler of lower extremity  ·  discussed with RT  ·  discharge planning    Shavonne Cancino MD, [facility]  Pulmonary Critical Care and Sleep Medicine,  [facility] AT [place]: [phone]

## 2019-04-06 NOTE — PLAN OF CARE
Problem: Falls - Risk of:  Goal: Will remain free from falls  Description  Will remain free from falls  Outcome: Ongoing  Note:   Patient remains free from falls at this time. Bed in lowest position with wheels locked. Tray table and call light within reach. Hourly rounding. Problem: Risk for Impaired Skin Integrity  Goal: Tissue integrity - skin and mucous membranes  Description  Structural intactness and normal physiological function of skin and  mucous membranes. Outcome: Ongoing  Note:   Skin assessment performed. No new signs of skin breakdown. Patient repositions self. Assisted with repositioning as needed. Pillow support provided.

## 2019-04-06 NOTE — PLAN OF CARE
Problem: Falls - Risk of:  Goal: Will remain free from falls  Description  Will remain free from falls  4/6/2019 1222 by Landen Denise RN  Outcome: Ongoing     Problem: Risk for Impaired Skin Integrity  Goal: Tissue integrity - skin and mucous membranes  Description  Structural intactness and normal physiological function of skin and  mucous membranes.   4/6/2019 1222 by Landen Denise RN  Outcome: Ongoing

## 2019-04-06 NOTE — PROGRESS NOTES
Patient transferred to PCU room 5051 without complications via bed. All pt belongings sent with patient. Report given to [facility].

## 2019-04-06 NOTE — PROGRESS NOTES
Subjective:     Follow-up the type 2 diabetes  No polyuria no polydipsia no hypoglycemia blood sugars reviewed  ROS  Fever no chills no GI/ complaints no cardiopulmonary complaints at present no TIA no bleeding no headache no sore throat no skin lesions no fatigue  physical exam  General Appearance: in no acute distress and alert  Skin: warm and dry, no rash or erythema  Head: normocephalic and atraumatic  Eyes: pupils equal, round, and reactive to light, conjunctivae normal and sclera anicteric  Neck: neck supple and non tender without mass   Pulmonary/Chest: clear to auscultation bilaterally- no wheezes, rales or rhonchi, normal air movement, no respiratory distress  Cardiovascular: normal rate, irregular rhythm, normal S1 and S2, no gallops, intact distal pulses and no carotid bruits  Abdomen: soft, non-tender, non-distended, normal bowel sounds, no masses or organomegaly  Extremities: no edema and pulses no Valente sign  Neurologic: Alert oriented ×3 with no focal deficit    /78   Pulse 99   Temp 97.5 °F (36.4 °C) (Oral)   Resp 18   Ht 4' 7\" (1.397 m)   Wt 106 lb 6.4 oz (48.3 kg)   SpO2 96%   BMI 24.73 kg/m²     CBC:   Lab Results   Component Value Date    WBC 17.8 04/06/2019    RBC 4.09 04/06/2019    HGB 12.6 04/06/2019    HCT 38.3 04/06/2019    MCV 93.8 04/06/2019    MCH 31.0 04/06/2019    MCHC 33.0 04/06/2019    RDW 16.1 04/06/2019     04/06/2019    MPV 8.1 04/06/2019     CMP:    Lab Results   Component Value Date     04/06/2019    K 4.1 04/06/2019    CL 93 04/06/2019    CO2 25 04/06/2019    BUN 31 04/06/2019    CREATININE 0.76 04/06/2019    GFRAA >60 04/06/2019    LABGLOM >60 04/06/2019    GLUCOSE 149 04/06/2019    GLUCOSE 117 09/02/2011    PROT 7.9 11/13/2018    LABALBU 4.2 11/13/2018    CALCIUM 9.3 04/06/2019    BILITOT 0.84 11/13/2018    ALKPHOS 108 11/13/2018    AST 32 11/13/2018    ALT 21 11/13/2018        Assessment:  Patient Active Problem List   Diagnosis    Pneumonia    Sepsis ([facility] [address]    Moderate malnutrition ([facility] [address] [facility]    Pulmonary HTN ([facility] [address]    Non-rheumatic mitral regurgitation    Acute on chronic combined systolic and diastolic congestive heart failure (HCC)    Chronic atrial fibrillation (HCC)    Type 2 diabetes mellitus with hyperglycemia (HCC)    Acute bronchitis    Acute congestive heart failure (HCC)    Hypotensive episode    Acute respiratory failure (HCC)     Type 2 diabetes with hyperglycemia  LV dysfunction  Atrial fibrillation chronic  Acute respiratory failure on chronic respiratory failure uses home O2  Acute on chronic combined CHF  Pulmonary hypertension  Plan:    Meds labs reviewed ambulate physical therapy occupational therapy DVT prophylaxis start to oral diuresis see orders      Erica Jasso MD  1:47 PM

## 2019-04-07 ENCOUNTER — APPOINTMENT (OUTPATIENT)
Dept: GENERAL RADIOLOGY | Age: 84
DRG: 291 | End: 2019-04-07
Payer: MEDICARE

## 2019-04-07 LAB
ABSOLUTE EOS #: 0.3 K/UL (ref 0–0.4)
ABSOLUTE IMMATURE GRANULOCYTE: ABNORMAL K/UL (ref 0–0.3)
ABSOLUTE LYMPH #: 2.1 K/UL (ref 1–4.8)
ABSOLUTE MONO #: 1.6 K/UL (ref 0.2–0.8)
ANION GAP SERPL CALCULATED.3IONS-SCNC: 14 MMOL/L (ref 9–17)
ANION GAP SERPL CALCULATED.3IONS-SCNC: 14 MMOL/L (ref 9–17)
BASOPHILS # BLD: 0 % (ref 0–2)
BASOPHILS ABSOLUTE: 0 K/UL (ref 0–0.2)
BUN BLDV-MCNC: 28 MG/DL (ref 8–23)
BUN/CREAT BLD: 38 (ref 9–20)
CALCIUM SERPL-MCNC: 9.5 MG/DL (ref 8.6–10.4)
CHLORIDE BLD-SCNC: 93 MMOL/L (ref 98–107)
CHLORIDE BLD-SCNC: 95 MMOL/L (ref 98–107)
CO2: 28 MMOL/L (ref 20–31)
CO2: 31 MMOL/L (ref 20–31)
CREAT SERPL-MCNC: 0.73 MG/DL (ref 0.5–0.9)
DIFFERENTIAL TYPE: ABNORMAL
EOSINOPHILS RELATIVE PERCENT: 2 % (ref 1–4)
GFR AFRICAN AMERICAN: >60 ML/MIN
GFR NON-AFRICAN AMERICAN: >60 ML/MIN
GFR SERPL CREATININE-BSD FRML MDRD: ABNORMAL ML/MIN/{1.73_M2}
GFR SERPL CREATININE-BSD FRML MDRD: ABNORMAL ML/MIN/{1.73_M2}
GLUCOSE BLD-MCNC: 166 MG/DL (ref 70–99)
GLUCOSE BLD-MCNC: 171 MG/DL (ref 65–105)
GLUCOSE BLD-MCNC: 185 MG/DL (ref 65–105)
GLUCOSE BLD-MCNC: 188 MG/DL (ref 65–105)
GLUCOSE BLD-MCNC: 199 MG/DL (ref 65–105)
HCT VFR BLD CALC: 39.4 % (ref 36–46)
HEMOGLOBIN: 13.1 G/DL (ref 12–16)
IMMATURE GRANULOCYTES: ABNORMAL %
LYMPHOCYTES # BLD: 13 % (ref 24–44)
MAGNESIUM: 1.6 MG/DL (ref 1.6–2.6)
MCH RBC QN AUTO: 31 PG (ref 26–34)
MCHC RBC AUTO-ENTMCNC: 33.3 G/DL (ref 31–37)
MCV RBC AUTO: 93.1 FL (ref 80–100)
MONOCYTES # BLD: 10 % (ref 1–7)
NRBC AUTOMATED: ABNORMAL PER 100 WBC
PDW BLD-RTO: 15.1 % (ref 11.5–14.5)
PLATELET # BLD: 282 K/UL (ref 130–400)
PLATELET ESTIMATE: ABNORMAL
PMV BLD AUTO: 7.8 FL (ref 6–12)
POTASSIUM SERPL-SCNC: 3 MMOL/L (ref 3.7–5.3)
POTASSIUM SERPL-SCNC: 3.3 MMOL/L (ref 3.7–5.3)
PROCALCITONIN: 0.27 NG/ML
RBC # BLD: 4.23 M/UL (ref 4–5.2)
RBC # BLD: ABNORMAL 10*6/UL
SEG NEUTROPHILS: 75 % (ref 36–66)
SEGMENTED NEUTROPHILS ABSOLUTE COUNT: 12.4 K/UL (ref 1.8–7.7)
SODIUM BLD-SCNC: 137 MMOL/L (ref 135–144)
SODIUM BLD-SCNC: 138 MMOL/L (ref 135–144)
WBC # BLD: 16.4 K/UL (ref 3.5–11)
WBC # BLD: ABNORMAL 10*3/UL

## 2019-04-07 PROCEDURE — 94760 N-INVAS EAR/PLS OXIMETRY 1: CPT

## 2019-04-07 PROCEDURE — 83735 ASSAY OF MAGNESIUM: CPT

## 2019-04-07 PROCEDURE — 80051 ELECTROLYTE PANEL: CPT

## 2019-04-07 PROCEDURE — 6370000000 HC RX 637 (ALT 250 FOR IP): Performed by: INTERNAL MEDICINE

## 2019-04-07 PROCEDURE — 82947 ASSAY GLUCOSE BLOOD QUANT: CPT

## 2019-04-07 PROCEDURE — 80048 BASIC METABOLIC PNL TOTAL CA: CPT

## 2019-04-07 PROCEDURE — 84145 PROCALCITONIN (PCT): CPT

## 2019-04-07 PROCEDURE — 2580000003 HC RX 258: Performed by: INTERNAL MEDICINE

## 2019-04-07 PROCEDURE — 85025 COMPLETE CBC W/AUTO DIFF WBC: CPT

## 2019-04-07 PROCEDURE — 51798 US URINE CAPACITY MEASURE: CPT

## 2019-04-07 PROCEDURE — 36415 COLL VENOUS BLD VENIPUNCTURE: CPT

## 2019-04-07 PROCEDURE — 94640 AIRWAY INHALATION TREATMENT: CPT

## 2019-04-07 PROCEDURE — 2060000000 HC ICU INTERMEDIATE R&B

## 2019-04-07 PROCEDURE — 2700000000 HC OXYGEN THERAPY PER DAY

## 2019-04-07 PROCEDURE — 71045 X-RAY EXAM CHEST 1 VIEW: CPT

## 2019-04-07 RX ORDER — POTASSIUM CHLORIDE 750 MG/1
20 CAPSULE, EXTENDED RELEASE ORAL DAILY
Status: DISCONTINUED | OUTPATIENT
Start: 2019-04-07 | End: 2019-04-09 | Stop reason: HOSPADM

## 2019-04-07 RX ORDER — PANTOPRAZOLE SODIUM 40 MG/1
40 TABLET, DELAYED RELEASE ORAL
Status: DISCONTINUED | OUTPATIENT
Start: 2019-04-08 | End: 2019-04-09 | Stop reason: HOSPADM

## 2019-04-07 RX ORDER — LEVOFLOXACIN 250 MG/1
250 TABLET ORAL DAILY
Status: DISCONTINUED | OUTPATIENT
Start: 2019-04-08 | End: 2019-04-09 | Stop reason: HOSPADM

## 2019-04-07 RX ORDER — FAMOTIDINE 20 MG/1
20 TABLET, FILM COATED ORAL DAILY
Status: DISCONTINUED | OUTPATIENT
Start: 2019-04-07 | End: 2019-04-07

## 2019-04-07 RX ORDER — POTASSIUM BICARBONATE 25 MEQ/1
25 TABLET, EFFERVESCENT ORAL ONCE
Status: COMPLETED | OUTPATIENT
Start: 2019-04-07 | End: 2019-04-07

## 2019-04-07 RX ORDER — LEVOFLOXACIN 500 MG/1
500 TABLET, FILM COATED ORAL ONCE
Status: COMPLETED | OUTPATIENT
Start: 2019-04-07 | End: 2019-04-07

## 2019-04-07 RX ADMIN — DORZOLAMIDE HYDROCHLORIDE 1 DROP: 20 SOLUTION/ DROPS OPHTHALMIC at 20:17

## 2019-04-07 RX ADMIN — BRIMONIDINE TARTRATE 1 DROP: 2 SOLUTION OPHTHALMIC at 08:51

## 2019-04-07 RX ADMIN — Medication 2 PUFF: at 20:16

## 2019-04-07 RX ADMIN — BRIMONIDINE TARTRATE 1 DROP: 2 SOLUTION OPHTHALMIC at 20:16

## 2019-04-07 RX ADMIN — FAMOTIDINE 20 MG: 20 TABLET ORAL at 08:53

## 2019-04-07 RX ADMIN — ATORVASTATIN CALCIUM 20 MG: 20 TABLET, FILM COATED ORAL at 08:53

## 2019-04-07 RX ADMIN — POTASSIUM BICARBONATE 25 MEQ: 25 TABLET, EFFERVESCENT ORAL at 09:44

## 2019-04-07 RX ADMIN — Medication 2 PUFF: at 07:50

## 2019-04-07 RX ADMIN — Medication 10 ML: at 09:02

## 2019-04-07 RX ADMIN — POTASSIUM CHLORIDE 20 MEQ: 750 CAPSULE, EXTENDED RELEASE ORAL at 11:56

## 2019-04-07 RX ADMIN — MONTELUKAST 10 MG: 10 TABLET, FILM COATED ORAL at 20:16

## 2019-04-07 RX ADMIN — INSULIN LISPRO 1 UNITS: 100 INJECTION, SOLUTION INTRAVENOUS; SUBCUTANEOUS at 21:38

## 2019-04-07 RX ADMIN — INSULIN LISPRO 1 UNITS: 100 INJECTION, SOLUTION INTRAVENOUS; SUBCUTANEOUS at 08:53

## 2019-04-07 RX ADMIN — IPRATROPIUM BROMIDE AND ALBUTEROL SULFATE 1 AMPULE: .5; 3 SOLUTION RESPIRATORY (INHALATION) at 07:49

## 2019-04-07 RX ADMIN — DILTIAZEM HYDROCHLORIDE 60 MG: 60 CAPSULE, EXTENDED RELEASE ORAL at 08:53

## 2019-04-07 RX ADMIN — VITAMIN D 2000 UNITS: 25 TAB ORAL at 08:53

## 2019-04-07 RX ADMIN — BUMETANIDE 1 MG: 1 TABLET ORAL at 08:53

## 2019-04-07 RX ADMIN — LEVOFLOXACIN 500 MG: 500 TABLET, FILM COATED ORAL at 15:28

## 2019-04-07 RX ADMIN — GLIMEPIRIDE 1 MG: 1 TABLET ORAL at 18:13

## 2019-04-07 RX ADMIN — TIMOLOL MALEATE 1 DROP: 5 SOLUTION OPHTHALMIC at 08:51

## 2019-04-07 RX ADMIN — INSULIN LISPRO 1 UNITS: 100 INJECTION, SOLUTION INTRAVENOUS; SUBCUTANEOUS at 18:13

## 2019-04-07 RX ADMIN — GABAPENTIN 100 MG: 100 CAPSULE ORAL at 20:16

## 2019-04-07 RX ADMIN — VITAMIN D 2000 UNITS: 25 TAB ORAL at 20:16

## 2019-04-07 RX ADMIN — IPRATROPIUM BROMIDE AND ALBUTEROL SULFATE 1 AMPULE: .5; 3 SOLUTION RESPIRATORY (INHALATION) at 20:16

## 2019-04-07 RX ADMIN — TIMOLOL MALEATE 1 DROP: 5 SOLUTION OPHTHALMIC at 20:17

## 2019-04-07 RX ADMIN — APIXABAN 2.5 MG: 2.5 TABLET, FILM COATED ORAL at 08:53

## 2019-04-07 RX ADMIN — DORZOLAMIDE HYDROCHLORIDE 1 DROP: 20 SOLUTION/ DROPS OPHTHALMIC at 08:51

## 2019-04-07 RX ADMIN — INSULIN LISPRO 1 UNITS: 100 INJECTION, SOLUTION INTRAVENOUS; SUBCUTANEOUS at 11:56

## 2019-04-07 RX ADMIN — IPRATROPIUM BROMIDE AND ALBUTEROL SULFATE 1 AMPULE: .5; 3 SOLUTION RESPIRATORY (INHALATION) at 11:34

## 2019-04-07 RX ADMIN — Medication 10 ML: at 20:16

## 2019-04-07 NOTE — PLAN OF CARE
Problem: Infection:  Goal: Will remain free from infection  Description  Will remain free from infection  4/7/2019 1531 by [id number] ANYA Walters.  Outcome: Ongoing  Note:   Pt started on ATB per physician order

## 2019-04-07 NOTE — PROGRESS NOTES
Pulmonary Critical Care Progress Note       Patient seen for the follow up of acute on chronic respiratory failure, pulmonary edema, pleural effusion, decompensated CHF    Subjective:   she has been on oxygen at 3 L nasal cannula. She denies chest pain. She has been off BiPAP. Mild occasional cough, mostly dry. She has improved shortness of breath. She is not ambulating much. Examination:  Vitals: /70   Pulse 101   Temp 99.1 °F (37.3 °C) (Oral)   Resp 16   Ht 4' 7\" (1.397 m)   Wt 95 lb 4.8 oz (43.2 kg)   SpO2 97%   BMI 22.15 kg/m²   General appearance: alert and cooperative with exam  Neck: No JVD  Lungs: Moderate exchange, no wheezing, bibasilar crackles  Heart: regular rate and rhythm, S1, S2 normal, no gallop  Abdomen: Soft, non tender, + BS  Extremities: no cyanosis or clubbing. Positive edema    LABs:  CBC:   Recent Labs     04/06/19  0423 04/07/19  0525   WBC 17.8* 16.4*   HGB 12.6 13.1   HCT 38.3 39.4    282     BMP:   Recent Labs     04/06/19  0423 04/07/19  0525 04/07/19  1110    137 138   K 4.1 3.0* 3.3*   CO2 25 28 31   BUN 31* 28*  --    CREATININE 0.76 0.73  --    LABGLOM >60 >60  --    GLUCOSE 149* 166*  --      APTT:  No results for input(s): APTT in the last 72 hours. ABG:  Lab Results   Component Value Date    CZY1RYX 31 04/04/2019    FIO2 50.0 04/04/2019       Lab Results   Component Value Date    POCPH 7.25 04/04/2019    POCPCO2 65 04/04/2019    POCPO2 154 04/04/2019    POCHCO3 28.6 04/04/2019    NBEA NOT REPORTED 04/04/2019    PBEA 1 04/04/2019    XZT1YJH 31 04/04/2019    ATLM5ZSZ 99 04/04/2019    FIO2 50.0 04/04/2019   Results for Padmini Benitez (MRN [id number]) as of 4/6/2019 13:39   Ref. Range 4/4/2019 08:03   Procalcitonin Latest Ref Range: <0.09 ng/mL 0.20 (H)     Radiology:   chest x-ray 4/7  Stable to slightly improved interstitial edema.  Unchanged small pleural   effusions.         lower extremity venous Doppler  No evidence of superficial or deep venous thrombosis in both lower    extremities. Impression:  · Acute on chronic respiratory failure  ·  pulmonary edema/effusions  ·  possible pneumonia  /lower respiratory tract infection /increased procalcitonin  · Moderate to severe secondary pulmonary hypertension, RVSP 64  · Moderate TR/MR/severe dilated left and right atrium  · Lower extremity edema    Recommendations:  ·   Oxygen nasal cannula  ·  BiPAP support as needed  ·  incentive spirometer every hour awake  ·  DuoNeb by nebulizer  ·  Dulera 200 b.i.d.  · Diuresis  ·  start Levaquin 500 p.o.  Daily for 5 days  · Singulair  · on Eliquis  ·   Discussed with son  ·  physical therapy  ·  discussed with RN  ·  discussed with Dr. Kenzie Calderon  ·  okay to discharge home  ·   follow-up within 2-3 week    Willian Juares MD, [facility]  Pulmonary Critical Care and Sleep Medicine,  [facility] AT [place]: [phone]

## 2019-04-07 NOTE — PLAN OF CARE
Problem: Infection:  Goal: Will remain free from infection  Description  Will remain free from infection  Outcome: Ongoing     Problem: Safety:  Goal: Free from accidental physical injury  Description  Free from accidental physical injury  Outcome: Ongoing     Problem: Daily Care:  Goal: Daily care needs are met  Description  Daily care needs are met  Outcome: Ongoing     Problem: Pain:  Goal: Patient's pain/discomfort is manageable  Description  Patient's pain/discomfort is manageable  Outcome: Ongoing     Problem: Skin Integrity:  Goal: Skin integrity will stabilize  Description  Skin integrity will stabilize  Outcome: Ongoing     Problem: Discharge Planning:  Goal: Patients continuum of care needs are met  Description  Patients continuum of care needs are met  Outcome: Ongoing

## 2019-04-07 NOTE — PLAN OF CARE
Problem: Falls - Risk of:  Goal: Will remain free from falls  Description  Will remain free from falls  4/6/2019 2331 by Stacy Mercedes RN  Outcome: Ongoing  4/6/2019 1222 by Sudheer Vaughn RN  Outcome: Ongoing   Pt determined by Tahira Lemus fall risk assessment to be a fall risk; falling star, ID band and safety alarm activated and in use as needed. Hourly rounding performed, call light use encouraged; personal items within reach. Bed locked in low position.

## 2019-04-07 NOTE — PROGRESS NOTES
Section of Cardiology  Progress Note      Date:  4/7/2019  Patient: Barrett Palma  Admission:  4/4/2019  5:24 AM  Admit DX: Acute respiratory failure (HCC) [J96.00]  Acute respiratory failure ([address] [J96.00]  Age:  80 y. o., 12/31/1923     LOS: 3 days     Reason for evaluation:   Acute on chronic systolic heart failure    Pulmonary hypertension  Atrial fibrillation  SUBJECTIVE:     The patient was seen and examined. Notes and labs reviewed. There were not complications over night. Patient's cardiac review of systems: positive for fatigue. The patient is generally feeling unchanged. OBJECTIVE:      EXAM:   Vitals:    VITALS:  BP (!) 157/80   Pulse 156   Temp 97.9 °F (36.6 °C) (Oral)   Resp 16   Ht 4' 7\" (1.397 m)   Wt 95 lb 4.8 oz (43.2 kg)   SpO2 93%   BMI 22.15 kg/m²   24HR INTAKE/OUTPUT:      Intake/Output Summary (Last 24 hours) at 4/7/2019 1114  Last data filed at 4/7/2019 2053  Gross per 24 hour   Intake 480 ml   Output 2125 ml   Net -1645 ml       CONSTITUTIONAL:  awake, alert, cooperative, no apparent distress, and appears stated age. HEENT: Normal jugular venous pulsations, no carotid bruits. Head is atraumatic, normocephalic. Eyes and oral mucosa are normal.  LUNGS: Good respiratory effort On auscultation: clear to auscultation bilaterally  CARDIOVASCULAR:  Normal apical impulse, irregular irregular rhythm, normal S1 and S2, 2/6 systolic murmur at right sternal border dorsalis pedis and bilateralpresent 2+  ABDOMEN: Soft, nontender, nondistended. Bowel sounds present. No masses or tenderness. No bruit. SKIN: Warm and dry. EXTREMITIES:No lower extremity edema. Motor movement grossly intact. No cyanosis or clubbing.     Current Inpatient Medications:   famotidine  20 mg Oral Daily    bumetanide  1 mg Oral Daily    glimepiride  1 mg Oral Dinner    mometasone-formoterol  2 puff Inhalation BID    sodium chloride flush  10 mL Intravenous 2 times per day    apixaban  2.5 mg Oral BID  atorvastatin  20 mg Oral Daily    brimonidine  1 drop Both Eyes BID    diltiazem  60 mg Oral Daily    vitamin D  2,000 Units Oral BID    montelukast  10 mg Oral Nightly    insulin lispro  0-6 Units Subcutaneous TID WC    insulin lispro  0-3 Units Subcutaneous Nightly    dorzolamide  1 drop Both Eyes BID    And    timolol  1 drop Both Eyes BID    ipratropium-albuterol  1 ampule Inhalation Q4H WA       IV Infusions (if any):   dextrose         Diagnostics:   Telemetry: Atrial fibrillation  Labs:   CBC:  Recent Labs     04/06/19  0423 04/07/19  0525   WBC 17.8* 16.4*   HGB 12.6 13.1   HCT 38.3 39.4    282     Magnesium:  Recent Labs     04/07/19  0525   MG 1.6     BMP:  Recent Labs     04/06/19  0423 04/07/19  0525    137   K 4.1 3.0*   CALCIUM 9.3 9.5   CO2 25 28   BUN 31* 28*   CREATININE 0.76 0.73   LABGLOM >60 >60   GLUCOSE 149* 166*     BNP:No results for input(s): BNP in the last 72 hours. PT/INR:No results for input(s): PROTIME, INR in the last 72 hours. APTT:No results for input(s): APTT in the last 72 hours. CARDIAC ENZYMES:  Recent Labs     04/04/19  1143   TROPONINT NOT REPORTED     FASTING LIPID PANEL:  Lab Results   Component Value Date    HDL 80 05/09/2018    TRIG 85 05/09/2018     LIVER PROFILE:No results for input(s): AST, ALT, LABALBU, ALKPHOS, BILITOT, BILIDIR, IBILI, PROT, GLOB, ALBUMIN in the last 72 hours. ASSESSMENT:  1. Acute on chronic systolic and short failure currently compensated  2. Permanent atrial fibrillation  3. Moderate to severe pulmonary hypertension  4.  Moderate to severe tricuspid regurgitation and mitral regurgitation    Patient Active Problem List   Diagnosis    Pneumonia    S[facility]    Moderate malnutrition ([facility] [facility]    Pulmonary HTN ([facility] [address]    Non-rheumatic mitral regurgitation    Acute on chronic combined systolic and diastolic congestive heart failure (HCC)    Chronic atrial fibrillation (HCC)    Type 2 diabetes mellitus with hyperglycemia (HCC)    Acute bronchitis    Acute congestive heart failure (HCC)    Hypotensive episode    Acute respiratory failure (HCC)       PLAN:  1. Continue current medications. 2. Patient's symptoms appear fairly compensated on current regimen she may be discharged home when okay with all services. Her overall prognosis is very poor given her severe other valvulopathy moderate to severe pulmonary hypertension and advanced age of 89 years. Continue upper accident thrombus prophylaxis            Discussed with family and nursing.     Irma Sandoval MD

## 2019-04-08 ENCOUNTER — APPOINTMENT (OUTPATIENT)
Dept: GENERAL RADIOLOGY | Age: 84
DRG: 291 | End: 2019-04-08
Payer: MEDICARE

## 2019-04-08 ENCOUNTER — ANESTHESIA EVENT (OUTPATIENT)
Dept: OPERATING ROOM | Age: 84
DRG: 291 | End: 2019-04-08
Payer: MEDICARE

## 2019-04-08 PROBLEM — R13.19 OTHER DYSPHAGIA: Status: ACTIVE | Noted: 2019-04-08

## 2019-04-08 LAB
ABSOLUTE EOS #: 0.1 K/UL (ref 0–0.4)
ABSOLUTE IMMATURE GRANULOCYTE: ABNORMAL K/UL (ref 0–0.3)
ABSOLUTE LYMPH #: 1.7 K/UL (ref 1–4.8)
ABSOLUTE MONO #: 1.4 K/UL (ref 0.2–0.8)
ANION GAP SERPL CALCULATED.3IONS-SCNC: 13 MMOL/L (ref 9–17)
BASOPHILS # BLD: 0 % (ref 0–2)
BASOPHILS ABSOLUTE: 0 K/UL (ref 0–0.2)
BUN BLDV-MCNC: 29 MG/DL (ref 8–23)
BUN/CREAT BLD: 32 (ref 9–20)
CALCIUM SERPL-MCNC: 9.1 MG/DL (ref 8.6–10.4)
CHLORIDE BLD-SCNC: 95 MMOL/L (ref 98–107)
CO2: 30 MMOL/L (ref 20–31)
CREAT SERPL-MCNC: 0.9 MG/DL (ref 0.5–0.9)
DIFFERENTIAL TYPE: ABNORMAL
EOSINOPHILS RELATIVE PERCENT: 1 % (ref 1–4)
GFR AFRICAN AMERICAN: >60 ML/MIN
GFR NON-AFRICAN AMERICAN: 58 ML/MIN
GFR SERPL CREATININE-BSD FRML MDRD: ABNORMAL ML/MIN/{1.73_M2}
GFR SERPL CREATININE-BSD FRML MDRD: ABNORMAL ML/MIN/{1.73_M2}
GLUCOSE BLD-MCNC: 158 MG/DL (ref 70–99)
GLUCOSE BLD-MCNC: 160 MG/DL (ref 65–105)
GLUCOSE BLD-MCNC: 176 MG/DL (ref 65–105)
GLUCOSE BLD-MCNC: 186 MG/DL (ref 65–105)
GLUCOSE BLD-MCNC: 218 MG/DL (ref 65–105)
HCT VFR BLD CALC: 38.3 % (ref 36–46)
HEMOGLOBIN: 12.4 G/DL (ref 12–16)
IMMATURE GRANULOCYTES: ABNORMAL %
LYMPHOCYTES # BLD: 13 % (ref 24–44)
MAGNESIUM: 1.6 MG/DL (ref 1.6–2.6)
MCH RBC QN AUTO: 30.6 PG (ref 26–34)
MCHC RBC AUTO-ENTMCNC: 32.4 G/DL (ref 31–37)
MCV RBC AUTO: 94.4 FL (ref 80–100)
MONOCYTES # BLD: 10 % (ref 1–7)
NRBC AUTOMATED: ABNORMAL PER 100 WBC
PDW BLD-RTO: 15.4 % (ref 11.5–14.5)
PLATELET # BLD: 312 K/UL (ref 130–400)
PLATELET ESTIMATE: ABNORMAL
PMV BLD AUTO: 7.6 FL (ref 6–12)
POTASSIUM SERPL-SCNC: 3.5 MMOL/L (ref 3.7–5.3)
RBC # BLD: 4.06 M/UL (ref 4–5.2)
RBC # BLD: ABNORMAL 10*6/UL
SEG NEUTROPHILS: 76 % (ref 36–66)
SEGMENTED NEUTROPHILS ABSOLUTE COUNT: 10 K/UL (ref 1.8–7.7)
SODIUM BLD-SCNC: 138 MMOL/L (ref 135–144)
WBC # BLD: 13.3 K/UL (ref 3.5–11)
WBC # BLD: ABNORMAL 10*3/UL

## 2019-04-08 PROCEDURE — 2500000003 HC RX 250 WO HCPCS: Performed by: NURSE PRACTITIONER

## 2019-04-08 PROCEDURE — 82947 ASSAY GLUCOSE BLOOD QUANT: CPT

## 2019-04-08 PROCEDURE — 6370000000 HC RX 637 (ALT 250 FOR IP): Performed by: INTERNAL MEDICINE

## 2019-04-08 PROCEDURE — 2060000000 HC ICU INTERMEDIATE R&B

## 2019-04-08 PROCEDURE — 85025 COMPLETE CBC W/AUTO DIFF WBC: CPT

## 2019-04-08 PROCEDURE — 97116 GAIT TRAINING THERAPY: CPT

## 2019-04-08 PROCEDURE — 80048 BASIC METABOLIC PNL TOTAL CA: CPT

## 2019-04-08 PROCEDURE — 2700000000 HC OXYGEN THERAPY PER DAY

## 2019-04-08 PROCEDURE — 94760 N-INVAS EAR/PLS OXIMETRY 1: CPT

## 2019-04-08 PROCEDURE — 99222 1ST HOSP IP/OBS MODERATE 55: CPT | Performed by: INTERNAL MEDICINE

## 2019-04-08 PROCEDURE — 97530 THERAPEUTIC ACTIVITIES: CPT

## 2019-04-08 PROCEDURE — 74220 X-RAY XM ESOPHAGUS 1CNTRST: CPT

## 2019-04-08 PROCEDURE — 2580000003 HC RX 258: Performed by: INTERNAL MEDICINE

## 2019-04-08 PROCEDURE — 83735 ASSAY OF MAGNESIUM: CPT

## 2019-04-08 PROCEDURE — 97163 PT EVAL HIGH COMPLEX 45 MIN: CPT

## 2019-04-08 PROCEDURE — APPNB30 APP NON BILLABLE TIME 0-30 MINS: Performed by: NURSE PRACTITIONER

## 2019-04-08 PROCEDURE — 97535 SELF CARE MNGMENT TRAINING: CPT

## 2019-04-08 PROCEDURE — 94640 AIRWAY INHALATION TREATMENT: CPT

## 2019-04-08 PROCEDURE — 97166 OT EVAL MOD COMPLEX 45 MIN: CPT

## 2019-04-08 PROCEDURE — 74230 X-RAY XM SWLNG FUNCJ C+: CPT

## 2019-04-08 PROCEDURE — 36415 COLL VENOUS BLD VENIPUNCTURE: CPT

## 2019-04-08 RX ORDER — SODIUM CHLORIDE 0.9 % (FLUSH) 0.9 %
10 SYRINGE (ML) INJECTION PRN
Status: CANCELLED | OUTPATIENT
Start: 2019-04-08

## 2019-04-08 RX ORDER — ACETAMINOPHEN 325 MG/1
650 TABLET ORAL EVERY 6 HOURS PRN
Status: DISCONTINUED | OUTPATIENT
Start: 2019-04-08 | End: 2019-04-09 | Stop reason: HOSPADM

## 2019-04-08 RX ORDER — LIDOCAINE HYDROCHLORIDE 10 MG/ML
1 INJECTION, SOLUTION EPIDURAL; INFILTRATION; INTRACAUDAL; PERINEURAL
Status: CANCELLED | OUTPATIENT
Start: 2019-04-08 | End: 2019-04-08

## 2019-04-08 RX ORDER — SODIUM CHLORIDE 0.9 % (FLUSH) 0.9 %
10 SYRINGE (ML) INJECTION EVERY 12 HOURS SCHEDULED
Status: CANCELLED | OUTPATIENT
Start: 2019-04-08

## 2019-04-08 RX ORDER — SODIUM CHLORIDE 9 MG/ML
INJECTION, SOLUTION INTRAVENOUS CONTINUOUS
Status: CANCELLED | OUTPATIENT
Start: 2019-04-08

## 2019-04-08 RX ORDER — SODIUM CHLORIDE, SODIUM LACTATE, POTASSIUM CHLORIDE, CALCIUM CHLORIDE 600; 310; 30; 20 MG/100ML; MG/100ML; MG/100ML; MG/100ML
INJECTION, SOLUTION INTRAVENOUS CONTINUOUS
Status: CANCELLED | OUTPATIENT
Start: 2019-04-08

## 2019-04-08 RX ADMIN — BRIMONIDINE TARTRATE 1 DROP: 2 SOLUTION OPHTHALMIC at 22:26

## 2019-04-08 RX ADMIN — DORZOLAMIDE HYDROCHLORIDE 1 DROP: 20 SOLUTION/ DROPS OPHTHALMIC at 12:05

## 2019-04-08 RX ADMIN — TIMOLOL MALEATE 1 DROP: 5 SOLUTION OPHTHALMIC at 22:26

## 2019-04-08 RX ADMIN — TIMOLOL MALEATE 1 DROP: 5 SOLUTION OPHTHALMIC at 12:06

## 2019-04-08 RX ADMIN — Medication 10 ML: at 12:06

## 2019-04-08 RX ADMIN — INSULIN LISPRO 1 UNITS: 100 INJECTION, SOLUTION INTRAVENOUS; SUBCUTANEOUS at 17:14

## 2019-04-08 RX ADMIN — MONTELUKAST 10 MG: 10 TABLET, FILM COATED ORAL at 22:26

## 2019-04-08 RX ADMIN — LEVOFLOXACIN 250 MG: 250 TABLET, FILM COATED ORAL at 12:05

## 2019-04-08 RX ADMIN — VITAMIN D 2000 UNITS: 25 TAB ORAL at 12:05

## 2019-04-08 RX ADMIN — ATORVASTATIN CALCIUM 20 MG: 20 TABLET, FILM COATED ORAL at 12:05

## 2019-04-08 RX ADMIN — DORZOLAMIDE HYDROCHLORIDE 1 DROP: 20 SOLUTION/ DROPS OPHTHALMIC at 22:26

## 2019-04-08 RX ADMIN — Medication 2 PUFF: at 20:20

## 2019-04-08 RX ADMIN — Medication 2 PUFF: at 07:27

## 2019-04-08 RX ADMIN — IPRATROPIUM BROMIDE AND ALBUTEROL SULFATE 1 AMPULE: .5; 3 SOLUTION RESPIRATORY (INHALATION) at 07:25

## 2019-04-08 RX ADMIN — DILTIAZEM HYDROCHLORIDE 60 MG: 60 CAPSULE, EXTENDED RELEASE ORAL at 12:05

## 2019-04-08 RX ADMIN — BARIUM SULFATE 40 ML: 980 POWDER, FOR SUSPENSION ORAL at 14:50

## 2019-04-08 RX ADMIN — POTASSIUM CHLORIDE 20 MEQ: 750 CAPSULE, EXTENDED RELEASE ORAL at 12:05

## 2019-04-08 RX ADMIN — IPRATROPIUM BROMIDE AND ALBUTEROL SULFATE 1 AMPULE: .5; 3 SOLUTION RESPIRATORY (INHALATION) at 20:20

## 2019-04-08 RX ADMIN — BRIMONIDINE TARTRATE 1 DROP: 2 SOLUTION OPHTHALMIC at 12:06

## 2019-04-08 RX ADMIN — GLIMEPIRIDE 1 MG: 1 TABLET ORAL at 17:14

## 2019-04-08 RX ADMIN — BUMETANIDE 1 MG: 1 TABLET ORAL at 12:05

## 2019-04-08 RX ADMIN — IPRATROPIUM BROMIDE AND ALBUTEROL SULFATE 1 AMPULE: .5; 3 SOLUTION RESPIRATORY (INHALATION) at 12:03

## 2019-04-08 RX ADMIN — PANTOPRAZOLE SODIUM 40 MG: 40 TABLET, DELAYED RELEASE ORAL at 12:05

## 2019-04-08 RX ADMIN — Medication 10 ML: at 22:27

## 2019-04-08 RX ADMIN — INSULIN LISPRO 1 UNITS: 100 INJECTION, SOLUTION INTRAVENOUS; SUBCUTANEOUS at 22:26

## 2019-04-08 RX ADMIN — INSULIN LISPRO 2 UNITS: 100 INJECTION, SOLUTION INTRAVENOUS; SUBCUTANEOUS at 12:38

## 2019-04-08 NOTE — PROGRESS NOTES
Perfect serve message sent to Dr. Diane Mathews regarding possible plans for EGD today? and if not is NPO order needed.

## 2019-04-08 NOTE — PROCEDURES
INSTRUMENTAL SWALLOW REPORT  MODIFIED BARIUM SWALLOW    NAME: Kiko Bravo   : 1923  MRN: [id number]       Date of Eval: 2019              Referring Diagnosis(es):      Past Medical History:  has a past medical history of Arthritis, Atrial fibrillation ([facility] [address] CAD (coronary artery disease), CHF (congestive heart failure) ([facility] [address] COPD (chronic obstructive pulmonary disease) ([facility] [address] Diabetes mellitus ([facility] [address] Femur fracture ([facility] [address] Hyperlipidemia, and Hypertension. Past Surgical History:  has a past surgical history that includes Total hip arthroplasty (Left). Current Diet Solid Consistency: Regular  Current Diet Liquid Consistency: Nectar thick       Type of Study: Initial MBS         Patient Complaints/Reason for Referral:  Kiko Bravo was referred for a MBS to assess the efficiency of his/her swallow function, assess for aspiration, and to make recommendations regarding safe dietary consistencies, effective compensatory strategies, and safe eating environment. Onset of problem:     Patient presents to the ED per EMS with complaints of respiratory distress. Patient woke up this morning short of breath asking her daughter to turn her home O2 up so that she could catch her breath. Daughter states that she tried giving the patient a breathing treatment but it was no help and her lips were starting to turn blue. Upon arrival to the ED patient was in tripod position on EMS cot with CPap in place, she was then transferred over to the ED cot and put of bipap. Patients O2 sats were in the mid 80's without bipap on, she has crackles in the bases, is tachypneic, patient is in a-fib with a rate in the 80s-120s, she is frail with dry intact skin. Behavior/Cognition/Vision/Hearing:  Behavior/Cognition: Alert; Cooperative  Vision: Impaired  Hearing: Exceptions to [facility]    Impressions:  Patient presents with safe swallow for Regular diet with Nectar thick liquids as evidenced by no  aspiration noted with consistencies tested. Recommend small sips and bites, only feed when alert and awake and upright at 90 degrees for all PO intake. Recommend close monitoring for overt/clinical s/s of aspiration and D/C PO intake and complete Modified Barium Swallow Study should they occur. Results and recommendations reported to RN. Patient Position: Lateral and Patient Degrees: 90      Consistencies Administered: Soft solid;Reg solid;Puree;Nectar  teaspoon; Thin cup       Postural Changes and/or Swallow Maneuvers Trialed: Upright 90 degrees        Recommended Diet:  Solid consistency: Regular  Liquid consistency: Nectar  Liquid administration via: Cup    Medication administration: Meds in puree    Safe Swallow Protocol:  Supervision: Close  Compensatory Swallowing Strategies: Alternate solids and liquids;Small bites/sips;Eat/Feed slowly;Upright as possible for all oral intake    Recommendations/Treatment  Requires SLP Intervention: Yes        D/C Recommendations: 24 hour supervision/assistance  Postural Changes and/or Swallow Maneuvers: Upright 90 degrees      Recommended Exercises:    Therapeutic Interventions: Diet tolerance monitoring; Tongue base strengthening;Pharyngeal exercises; Laryngeal exercises; Sherrill    Education: Images and recommendations were reviewed with pt following this exam.   Patient Education: yes  Patient Education Response: Needs reinforcement    Prognosis  Prognosis for safe diet advancement: good      Goals:    Long Term:     To Maximize safety with intake, optimize nutrition/hydration and minimize risk for aspiration.        Short Term:     Goal 1: O/M resistance exercises for dysphagia  Goal 2: The patient will tolerate recommended diet without observed clinical signs of aspiration      Oral Preparation / Oral Phase  Oral Phase: WFL  Oral Phase: A-P transit and oral manipulation funcational for consistencies tested    Pharyngeal Phase  Pharyngeal Phase: WFL    Pharyngeal: Thin cup: + penetration with no aspiration and no cough (+stasis in the laryngeal vestibule). Thick liquids, Puree, Fruit and Cookie: No penetration and no aspiraiton with min stasis. Dysphagia Outcome Severity Scale: Level 5: Mild dysphagia- Distant supervision.  May need one diet consistency restricted  Penetration-Aspiration Scale (PAS): 3 - Material enters the airway, remains above the vocal folds, and is not ejected from airway    Esophageal Phase    Upper Esophageal Screen: see radiologist report        Pain   Patient Currently in Pain: No  Pain Level: 0      Therapy Time:   Individual Concurrent Group Co-treatment   Time In 1440         Time Out 1451         Minutes 11                   Marcelina Viera, 4/8/2019, 2:51 PM

## 2019-04-08 NOTE — PROGRESS NOTES
Pulmonary Critical Care Progress Note  Shani Dockery CNP / Dr. Lilly Powers M.D. Patient seen for the follow up of respiratory failure, pulmonary infiltrates/pleural effusion, heart failure     Subjective:  She continues to feel short of breath and weak. She was noted to have some difficulty swallowing yesterday, she has been made nothing by mouth and GI has been consulted-awaiting further input. No acute events noted overnight. She denies chest pain or cough. Examination:  Vitals: /78   Pulse 105   Temp 98.2 °F (36.8 °C) (Oral)   Resp 16   Ht 4' 7\" (1.397 m)   Wt 93 lb 4.8 oz (42.3 kg)   SpO2 100%   BMI 21.68 kg/m²     General appearance: alert and cooperative with exam, sitting up in chair  Neck: No JVD  Lungs: Moderate air exchange, faint crackles  Heart: regular rate and rhythm, S1, S2 normal, no gallop  Abdomen: Soft, non tender, + BS  Extremities: no cyanosis or clubbing.  No significant edema    LABs:  CBC:   Recent Labs     04/07/19  0525 04/08/19  0552   WBC 16.4* 13.3*   HGB 13.1 12.4   HCT 39.4 38.3    312     BMP:   Recent Labs     04/07/19  0525 04/07/19  1110 04/08/19  0552    138 138   K 3.0* 3.3* 3.5*   CO2 28 31 30   BUN 28*  --  29*   CREATININE 0.73  --  0.90   LABGLOM >60  --  58*   GLUCOSE 166*  --  158*     Impression:  · Acute on chronic respiratory failure  · Pulmonary edema/pleural effusions   · LRI  · Moderate to severe secondary pulmonary hypertension  · Moderate TR/MR/severe dilated left and right atrium  · Lower extremity edema    Recommendations:  · 2 liters/min via nasal cannula  · BiPAP  · PO Levaquin  · Albuterol and Ipratropium Q 4 hours and prn  · Dulera 200  · Singulair  · Await GI input  · DVT prophylaxis, on Eliquis  · PT/OT  · Incentive spirometry every hour while awake  · Discharge planning okay from pulmonary standpoint  · Will follow with you    Electronically signed by     AIDEE Austin CNP on 4/8/2019 at 9:08 AM  Patient was

## 2019-04-08 NOTE — PLAN OF CARE
PRE CONSULT ROUNDING NOTE  HPI  80year old female admitted for shortness of breath and CHF. Our service was consulted for dysphagia with food. The pt does not speak [place]; a  was used for HPI. The pt reports throat pain with dysphagia with food for two months. She has lost weight but cannot quantify the amount. She has not had abdominal pain, nausea or diarrhea. She denies fevers and chills. She has not had any imaging for her swallowing. Labs show a WBC of 13.3, and mildly elevated alk phos at 108. Chest x rays show bilateral pleural effusions with interstitial edema. She is on Eliquis for afib and has a significant cardiac history and on levaquin for her pleural effusions. Endoscopy none. Social no etoh or smoking  Family no hx of colon cancer  /78   Pulse 108   Temp 98.2 °F (36.8 °C) (Oral)   Resp 16   Ht 4' 7\" (1.397 m)   Wt 93 lb 4.8 oz (42.3 kg)   SpO2 100%   BMI 21.68 kg/m²     ROS meds labs imaging and past medical records were reviewed    Exam  Alert and oriented x3 appropriate  S1S2 irregular murmur present  Lungs diminished with rales in bases  BSX4 active non tender non distended  No edema    Assessment  Dysphagia with food  Unintentional weight loss    Plan  Will order esophagram and [facility]  Will need cardiac clearance for endoscopy  Discussed case with pulmonary NP, she is likely a moderate risk for procedures     Formal GI consult to follow by Dr Lillian Olson  . Kristen Gore, APRN - CNP

## 2019-04-08 NOTE — PROGRESS NOTES
[facility]), and Advanced age were also pertinent to this visit. has a past medical history of Arthritis, Atrial fibrillation ([facility] [address] CAD (coronary artery disease), CHF (congestive heart failure) ([facility] [address] COPD (chronic obstructive pulmonary disease) ([facility] [address] Diabetes mellitus ([facility] [address] Femur fracture ([facility] [address] Hyperlipidemia, and Hypertension. has a past surgical history that includes Total hip arthroplasty (Left).       PER H&P:  80 yr old [address] Extension to er with acxute dyspnea tachypnea orthopnea x 1 day dyspnea rated 7/10 continuous worse with exertion better with rest , no wheezing no cough no fever no chills , no chest pain no palpitation  , positive pedal edema orthopnea no pnd          Restrictions  Restrictions/Precautions  Restrictions/Precautions: General Precautions, Fall Risk  Required Braces or Orthoses?: No  Position Activity Restriction  Other position/activity restrictions: NPO, bed alarm, up with assist, pt speaks some English/is [place] and is a language barrier     Subjective   General  Chart Reviewed: Yes  Patient assessed for rehabilitation services?: Yes  Family / Caregiver Present: No  *pt denies c/o pain     Social/Functional History  Social/Functional History  Lives With: Daughter(per chart, dtr provides 24 hr care and declines SNF for pt )  Type of Home: House  Home Layout: One level  Home Access: Level entry  Bathroom Shower/Tub: Tub/Shower unit  Bathroom Equipment: Shower chair  Home Equipment: Rolling walker  Receives Help From: Family  ADL Assistance: Independent  Homemaking Assistance: Needs assistance(pt states dtr assists )  Homemaking Responsibilities: No  Ambulation Assistance: Independent(with RW )  Transfer Assistance: Independent  Active : No(dtr assists with driving )  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 Objective   Vision: Impaired(difficulties assessing due to language barrier )  Vision Exceptions: Wears glasses for reading per pt   Hearing: Exceptions to [facility]  Hearing Exceptions: Hard of hearing/hearing concerns;Bilateral hearing aid    Orientation  Overall Orientation Status: Impaired  Orientation Level: Oriented to person only (difficulties due to language barrier; pt states she voss not know month/year)  Observation/Palpation  Posture: Fair(with RW )  Observation: BUE bruises noted, LUE IV.   Pt able to speak some simple English/is [place] [place] and able to follow commands with increased time/use of gestures and verbal instruction/tactile assist   Edema: none   Balance  Sitting Balance: Stand by assistance  Standing Balance: Minimal assistance(min to CG with RW)  Standing Balance  Time: stand cory > 4 mins with RW for self care tasks   Sit to stand: Minimal assistance  Stand to sit: Minimal assistance  Functional Mobility  Functional - Mobility Device: Rolling Walker  Activity: To/from bathroom(in room )  Assist Level: Minimal assistance  Functional Mobility Comments: verbal instruction/tactile assist for upright posture, RW safety/mgt, weight shifting, and looking up and scanning environment as well as awareness/assist with IV pole  Toilet Transfers  Toilet - Technique: Ambulating  Equipment Used: Grab bars  Toilet Transfer: Minimal assistance  Toilet Transfers Comments: verbal instruction/tactile assist for hand placement on grab bar and nose oves toes as able   ADL  Feeding: NPO(set up assist when pt no longer NPO )  Grooming: Setup;Stand by assistance(for oral care/dentures and washing face standing at sink with RW )  UE Bathing: Setup;Stand by assistance  LE Bathing: Setup;Minimal assistance  UE Dressing: Setup;Minimal assistance(with hosp gown )  LE Dressing: Setup;Minimal assistance(pt able to rosy B socks seated EOB with SBA)  Toileting: Minimal assistance(with gown/mesh underwear mgt; pt seated to wipe self with I after urination )  Tone RUE  RUE Tone: Normotonic  Tone LUE  LUE Tone: Normotonic  Coordination  Movements Are Fluid And Coordinated: Yes  Coordination and Movement description: Fine motor impairments     Bed mobility  Supine to Sit: Stand by assistance(with increased time )  Sit to Supine: Unable to assess(pt agreed to sit up in chair )  Comment: verbal instruction/tactile assist for hand placement on rail   Transfers  Stand Pivot Transfers: Minimal assistance(with RW )  Sit to stand: Minimal assistance  Stand to sit: Minimal assistance  Transfer Comments: verbal instruction/tactile assist for hand placement, nose over toes as able, upright posture, RW safety/mgt, looking up and scanning room, controlled stand to sit and awareness/assist with IV pole   Vision - Basic Assessment  Prior Vision: Wears glasses only for reading  Vision Comments: difficulties assessing due to language barrier   Cognition  Overall Cognitive Status: Exceptions  Arousal/Alertness: Delayed responses to stimuli  Following Commands: Follows one step commands with increased time; Follows one step commands with repetition  Attention Span: Appears intact  Memory: Decreased short term memory  Safety Judgement: Decreased awareness of need for assistance;Decreased awareness of need for safety  Problem Solving: Decreased awareness of errors;Assistance required to correct errors made;Assistance required to identify errors made  Insights: Decreased awareness of deficits  Initiation: Requires cues for some  Sequencing: Requires cues for some  Cognition Comment: Some difficulties assessing due to language barrier   Perception  Overall Perceptual Status: WFL     Sensation  Overall Sensation Status: WFL        LUE AROM (degrees)  LUE AROM : WFL  RUE AROM (degrees)  RUE AROM : WFL  LUE Strength  Gross LUE Strength: WFL  LUE Strength Comment: pt unable to follow MMT commands however BUE strength WFLS in function   RUE Strength  Gross RUE Strength: WFL                   Plan   Plan  Times per week: 3-5x/week 1x/day as cory   Current Treatment Recommendations: Strengthening, Functional Mobility Training, Patient/Caregiver Education & Training, Cognitive Reorientation, Home Management Training, Endurance Training, Equipment Evaluation, Education, & procurement, Balance Training, Neuromuscular Re-education, Safety Education & Training, Self-Care / ADL    G-Code  OT G-codes  Functional Assessment Tool Used: AM-PAC   Score: 19  OutComes Score                                                  AM-PAC Score        AM-PAC Inpatient Daily Activity Raw Score: 19  AM-PAC Inpatient ADL T-Scale Score : 40.22  ADL Inpatient CMS 0-100% Score: 42.8  ADL Inpatient CMS G-Code Modifier : CK    Goals  Short term goals  Time Frame for Short term goals: by discharge, pt to demo   Short term goal 1: bed mob tasks with use of rail as needed to SUP. Short term goal 2: UB ADL to set up and LB ADL to SBA/SUP with use of AD/AE as needed. Short term goal 3: toileting tasks with use of grab bar/AD as needed to SUP. Short term goal 4: ADL transfers and functional mob with AD as needed to SBA/SUP. Short term goal 5: balance to fair/fair plus with AD and cory > 5 mins to reduce fall risk for self care. Long term goals  Long term goal 1: Pt's family/caregivers to be I with BUE HEP, EC/WS and fall prevention tech with hand outs as needed.    Patient Goals   Patient goals : pt unable to state due to language barrier and pt nodded yes when asked if she wanted to return home       Therapy Time   Individual Concurrent Group Co-treatment   Time In 0826(plus 10 min chart review )         Time Out 0908         Minutes [address] [place] [address]

## 2019-04-08 NOTE — PLAN OF CARE
Case discussed with Dr Walt Rivers, cardiac clearance obtained, will plan for EGD tomorrow npo after midnight. Amy Reddy, AIDEE - CNP

## 2019-04-08 NOTE — PROGRESS NOTES
Physical Therapy    Facility/Department: [facility] PROGRESSIVE CARE  Initial Assessment    NAME: Theresa Mccullough  : 1923  MRN: [id number]    Date of Service: 2019    Discharge Recommendations:  24 hour supervision or assist, Home with Home health PT(Home w/  assist)        Assessment   Body structures, Functions, Activity limitations: Decreased endurance;Decreased balance  Prognosis: Good  Decision Making: High Complexity  REQUIRES PT FOLLOW UP: Yes  Activity Tolerance  Activity Tolerance: Patient limited by endurance       Patient Diagnosis(es): The primary encounter diagnosis was Acute respiratory failure with hypoxia and hypercapnia ([facility] [address] Diagnoses of Acute on chronic congestive heart failure, unspecified heart failure type ([address] COPD with acute exacerbation ([address] and Advanced age were also pertinent to this visit. has a past medical history of Arthritis, Atrial fibrillation ([address] CAD (coronary artery disease), CHF (congestive heart failure) ([address] COPD (chronic obstructive pulmonary disease) ([address] Diabetes mellitus ([facility] [address] Femur fracture ([facility] [address] Hyperlipidemia, and Hypertension. has a past surgical history that includes Total hip arthroplasty (Left).     Restrictions  Restrictions/Precautions  Restrictions/Precautions: General Precautions, Fall Risk  Required Braces or Orthoses?: No  Position Activity Restriction  Other position/activity restrictions: NPO, bed alarm, up with assist, pt speaks some English/is [place] and language barrier   Vision/Hearing        Subjective  General  Chart Reviewed: Yes  Patient assessed for rehabilitation services?: Yes  Response To Previous Treatment: Not applicable  Family / Caregiver Present: No  Follows Commands: Within Functional Limits  Other (Comment): Limited, at times, by language  General Comment  Comments: OK for PT per Brisa Phillip RN  Pain Screening  Patient Currently in Pain: Denies  Vital Signs  Patient Currently in Pain: Denies Training, Home Exercise Program  Safety Devices  Type of devices: Gait belt, Call light within reach, Left in chair, Chair alarm in place  Restraints  Initially in place: No    G-Code       OutComes Score                                                  AM-PAC Score  AM-PAC Inpatient Mobility Raw Score : 19  AM-PAC Inpatient T-Scale Score : 45.44  Mobility Inpatient CMS 0-100% Score: 41.77  Mobility Inpatient CMS G-Code Modifier : CK          Goals  Short term goals  Time Frame for Short term goals: 12 treatments  Short term goal 1: Independent transfers  Short term goal 2: SBA ambulation w/ ' x 1  Short term goal 3: Tolerate 30 min ther act  Short term goal 4: Good standing balance w/ RW  Patient Goals   Patient goals : None stated       Therapy Time   Individual Concurrent Group Co-treatment   Time In [address]         Time Out 0909         Minutes [address], [address] [facility]

## 2019-04-08 NOTE — CARE COORDINATION
Discharge planning     Patient transferred from ICU om 4/6 and chart reviewed. IM acute chf, a fib - chronic, a/c respiratory failure. PULM: a/c chronic respiratory failure, pulmonary edema. On bipap and 02 currently    CARD a/c chf, permanent a fib. May be dc home but she does have poor prognosis with severe valvulopathy, pulmonary htn and age    Will need to assess what her 18 status is with HCS. Melody Palmer Here she is on 4 liter nc may need to have new RX to support. Per white board patient uses 3. 5 liters. PT and OT ordered on the 5th, no evaluation as of yet. Patient is current with [facility] for RN.  On initial assessment patient and daughter are declining snf as daughter provides 24 hour care

## 2019-04-09 ENCOUNTER — ANESTHESIA (OUTPATIENT)
Dept: OPERATING ROOM | Age: 84
DRG: 291 | End: 2019-04-09
Payer: MEDICARE

## 2019-04-09 VITALS
BODY MASS INDEX: 21.59 KG/M2 | HEIGHT: 55 IN | WEIGHT: 93.3 LBS | DIASTOLIC BLOOD PRESSURE: 58 MMHG | OXYGEN SATURATION: 100 % | SYSTOLIC BLOOD PRESSURE: 137 MMHG | TEMPERATURE: 97.3 F | HEART RATE: 95 BPM | RESPIRATION RATE: 16 BRPM

## 2019-04-09 VITALS
SYSTOLIC BLOOD PRESSURE: 148 MMHG | OXYGEN SATURATION: 100 % | DIASTOLIC BLOOD PRESSURE: 84 MMHG | RESPIRATION RATE: 21 BRPM

## 2019-04-09 PROBLEM — E44.0 MODERATE MALNUTRITION (HCC): Chronic | Status: ACTIVE | Noted: 2019-04-09

## 2019-04-09 LAB
ABSOLUTE EOS #: 0.41 K/UL (ref 0–0.4)
ABSOLUTE IMMATURE GRANULOCYTE: ABNORMAL K/UL (ref 0–0.3)
ABSOLUTE LYMPH #: 1.92 K/UL (ref 1–4.8)
ABSOLUTE MONO #: 1.64 K/UL (ref 0.2–0.8)
ANION GAP SERPL CALCULATED.3IONS-SCNC: 17 MMOL/L (ref 9–17)
BASOPHILS # BLD: 0 %
BASOPHILS ABSOLUTE: 0 K/UL (ref 0–0.2)
BUN BLDV-MCNC: 35 MG/DL (ref 8–23)
BUN/CREAT BLD: 38 (ref 9–20)
CALCIUM SERPL-MCNC: 9.3 MG/DL (ref 8.6–10.4)
CHLORIDE BLD-SCNC: 97 MMOL/L (ref 98–107)
CO2: 22 MMOL/L (ref 20–31)
CREAT SERPL-MCNC: 0.91 MG/DL (ref 0.5–0.9)
DIFFERENTIAL TYPE: ABNORMAL
EOSINOPHILS RELATIVE PERCENT: 3 % (ref 1–4)
GFR AFRICAN AMERICAN: >60 ML/MIN
GFR NON-AFRICAN AMERICAN: 57 ML/MIN
GFR SERPL CREATININE-BSD FRML MDRD: ABNORMAL ML/MIN/{1.73_M2}
GFR SERPL CREATININE-BSD FRML MDRD: ABNORMAL ML/MIN/{1.73_M2}
GLUCOSE BLD-MCNC: 111 MG/DL (ref 65–105)
GLUCOSE BLD-MCNC: 116 MG/DL (ref 65–105)
GLUCOSE BLD-MCNC: 163 MG/DL (ref 70–99)
GLUCOSE BLD-MCNC: 184 MG/DL (ref 65–105)
GLUCOSE BLD-MCNC: 198 MG/DL (ref 65–105)
HCT VFR BLD CALC: 41.6 % (ref 36–46)
HEMOGLOBIN: 13.9 G/DL (ref 12–16)
IMMATURE GRANULOCYTES: ABNORMAL %
LYMPHOCYTES # BLD: 14 % (ref 24–44)
MCH RBC QN AUTO: 31.6 PG (ref 26–34)
MCHC RBC AUTO-ENTMCNC: 33.4 G/DL (ref 31–37)
MCV RBC AUTO: 94.4 FL (ref 80–100)
MONOCYTES # BLD: 12 % (ref 1–7)
NRBC AUTOMATED: ABNORMAL PER 100 WBC
PDW BLD-RTO: 14.8 % (ref 11.5–14.5)
PLATELET # BLD: 210 K/UL (ref 130–400)
PLATELET ESTIMATE: ABNORMAL
PMV BLD AUTO: ABNORMAL FL (ref 6–12)
POTASSIUM SERPL-SCNC: 4.7 MMOL/L (ref 3.7–5.3)
RBC # BLD: 4.41 M/UL (ref 4–5.2)
RBC # BLD: ABNORMAL 10*6/UL
SEG NEUTROPHILS: 71 % (ref 36–66)
SEGMENTED NEUTROPHILS ABSOLUTE COUNT: 9.73 K/UL (ref 1.8–7.7)
SODIUM BLD-SCNC: 136 MMOL/L (ref 135–144)
WBC # BLD: 13.7 K/UL (ref 3.5–11)
WBC # BLD: ABNORMAL 10*3/UL

## 2019-04-09 PROCEDURE — 3609012400 HC EGD TRANSORAL BIOPSY SINGLE/MULTIPLE: Performed by: INTERNAL MEDICINE

## 2019-04-09 PROCEDURE — 82947 ASSAY GLUCOSE BLOOD QUANT: CPT

## 2019-04-09 PROCEDURE — 6370000000 HC RX 637 (ALT 250 FOR IP): Performed by: INTERNAL MEDICINE

## 2019-04-09 PROCEDURE — 36415 COLL VENOUS BLD VENIPUNCTURE: CPT

## 2019-04-09 PROCEDURE — 85025 COMPLETE CBC W/AUTO DIFF WBC: CPT

## 2019-04-09 PROCEDURE — 7100000001 HC PACU RECOVERY - ADDTL 15 MIN: Performed by: INTERNAL MEDICINE

## 2019-04-09 PROCEDURE — 97530 THERAPEUTIC ACTIVITIES: CPT | Performed by: NURSE PRACTITIONER

## 2019-04-09 PROCEDURE — 88305 TISSUE EXAM BY PATHOLOGIST: CPT

## 2019-04-09 PROCEDURE — 0DB78ZX EXCISION OF STOMACH, PYLORUS, VIA NATURAL OR ARTIFICIAL OPENING ENDOSCOPIC, DIAGNOSTIC: ICD-10-PCS | Performed by: INTERNAL MEDICINE

## 2019-04-09 PROCEDURE — 2580000003 HC RX 258: Performed by: INTERNAL MEDICINE

## 2019-04-09 PROCEDURE — 80048 BASIC METABOLIC PNL TOTAL CA: CPT

## 2019-04-09 PROCEDURE — 7100000000 HC PACU RECOVERY - FIRST 15 MIN: Performed by: INTERNAL MEDICINE

## 2019-04-09 PROCEDURE — 94640 AIRWAY INHALATION TREATMENT: CPT

## 2019-04-09 PROCEDURE — 2500000003 HC RX 250 WO HCPCS: Performed by: ANESTHESIOLOGY

## 2019-04-09 PROCEDURE — 43239 EGD BIOPSY SINGLE/MULTIPLE: CPT | Performed by: INTERNAL MEDICINE

## 2019-04-09 PROCEDURE — 2580000003 HC RX 258: Performed by: ANESTHESIOLOGY

## 2019-04-09 PROCEDURE — 2709999900 HC NON-CHARGEABLE SUPPLY: Performed by: INTERNAL MEDICINE

## 2019-04-09 PROCEDURE — 6360000002 HC RX W HCPCS: Performed by: ANESTHESIOLOGY

## 2019-04-09 PROCEDURE — 3700000000 HC ANESTHESIA ATTENDED CARE: Performed by: INTERNAL MEDICINE

## 2019-04-09 RX ORDER — POTASSIUM CHLORIDE 750 MG/1
20 CAPSULE, EXTENDED RELEASE ORAL DAILY
Qty: 60 CAPSULE | Refills: 0 | Status: ON HOLD | OUTPATIENT
Start: 2019-04-09 | End: 2019-08-05 | Stop reason: HOSPADM

## 2019-04-09 RX ORDER — FENTANYL CITRATE 50 UG/ML
25 INJECTION, SOLUTION INTRAMUSCULAR; INTRAVENOUS EVERY 5 MIN PRN
Status: DISCONTINUED | OUTPATIENT
Start: 2019-04-09 | End: 2019-04-09 | Stop reason: HOSPADM

## 2019-04-09 RX ORDER — ONDANSETRON 2 MG/ML
4 INJECTION INTRAMUSCULAR; INTRAVENOUS
Status: DISCONTINUED | OUTPATIENT
Start: 2019-04-09 | End: 2019-04-09 | Stop reason: HOSPADM

## 2019-04-09 RX ORDER — LEVOFLOXACIN 250 MG/1
250 TABLET ORAL DAILY
Qty: 5 TABLET | Refills: 0 | Status: SHIPPED | OUTPATIENT
Start: 2019-04-09 | End: 2019-04-19

## 2019-04-09 RX ORDER — PANTOPRAZOLE SODIUM 40 MG/1
40 TABLET, DELAYED RELEASE ORAL
Qty: 30 TABLET | Refills: 0 | Status: SHIPPED | OUTPATIENT
Start: 2019-04-10

## 2019-04-09 RX ORDER — FENTANYL CITRATE 50 UG/ML
50 INJECTION, SOLUTION INTRAMUSCULAR; INTRAVENOUS EVERY 5 MIN PRN
Status: DISCONTINUED | OUTPATIENT
Start: 2019-04-09 | End: 2019-04-09 | Stop reason: HOSPADM

## 2019-04-09 RX ORDER — SODIUM CHLORIDE, SODIUM LACTATE, POTASSIUM CHLORIDE, CALCIUM CHLORIDE 600; 310; 30; 20 MG/100ML; MG/100ML; MG/100ML; MG/100ML
INJECTION, SOLUTION INTRAVENOUS CONTINUOUS PRN
Status: DISCONTINUED | OUTPATIENT
Start: 2019-04-09 | End: 2019-04-09 | Stop reason: SDUPTHER

## 2019-04-09 RX ORDER — PROPOFOL 10 MG/ML
INJECTION, EMULSION INTRAVENOUS PRN
Status: DISCONTINUED | OUTPATIENT
Start: 2019-04-09 | End: 2019-04-09 | Stop reason: SDUPTHER

## 2019-04-09 RX ORDER — DILTIAZEM HYDROCHLORIDE 60 MG/1
60 CAPSULE, EXTENDED RELEASE ORAL DAILY
Qty: 60 CAPSULE | Refills: 0 | Status: SHIPPED | OUTPATIENT
Start: 2019-04-10 | End: 2019-06-15

## 2019-04-09 RX ORDER — LIDOCAINE HYDROCHLORIDE 10 MG/ML
INJECTION, SOLUTION EPIDURAL; INFILTRATION; INTRACAUDAL; PERINEURAL PRN
Status: DISCONTINUED | OUTPATIENT
Start: 2019-04-09 | End: 2019-04-09 | Stop reason: SDUPTHER

## 2019-04-09 RX ADMIN — MONTELUKAST 10 MG: 10 TABLET, FILM COATED ORAL at 19:42

## 2019-04-09 RX ADMIN — APIXABAN 2.5 MG: 2.5 TABLET, FILM COATED ORAL at 19:42

## 2019-04-09 RX ADMIN — INSULIN LISPRO 1 UNITS: 100 INJECTION, SOLUTION INTRAVENOUS; SUBCUTANEOUS at 12:38

## 2019-04-09 RX ADMIN — BRIMONIDINE TARTRATE 1 DROP: 2 SOLUTION OPHTHALMIC at 09:32

## 2019-04-09 RX ADMIN — Medication 10 ML: at 09:33

## 2019-04-09 RX ADMIN — IPRATROPIUM BROMIDE AND ALBUTEROL SULFATE 1 AMPULE: .5; 3 SOLUTION RESPIRATORY (INHALATION) at 11:30

## 2019-04-09 RX ADMIN — DORZOLAMIDE HYDROCHLORIDE 1 DROP: 20 SOLUTION/ DROPS OPHTHALMIC at 09:33

## 2019-04-09 RX ADMIN — Medication 2 PUFF: at 08:00

## 2019-04-09 RX ADMIN — IPRATROPIUM BROMIDE AND ALBUTEROL SULFATE 1 AMPULE: .5; 3 SOLUTION RESPIRATORY (INHALATION) at 15:49

## 2019-04-09 RX ADMIN — PROPOFOL 30 MG: 10 INJECTION, EMULSION INTRAVENOUS at 17:56

## 2019-04-09 RX ADMIN — IPRATROPIUM BROMIDE AND ALBUTEROL SULFATE 1 AMPULE: .5; 3 SOLUTION RESPIRATORY (INHALATION) at 07:58

## 2019-04-09 RX ADMIN — VITAMIN D 2000 UNITS: 25 TAB ORAL at 19:42

## 2019-04-09 RX ADMIN — DILTIAZEM HYDROCHLORIDE 60 MG: 60 CAPSULE, EXTENDED RELEASE ORAL at 08:24

## 2019-04-09 RX ADMIN — SODIUM CHLORIDE, POTASSIUM CHLORIDE, SODIUM LACTATE AND CALCIUM CHLORIDE: 600; 310; 30; 20 INJECTION, SOLUTION INTRAVENOUS at 17:49

## 2019-04-09 RX ADMIN — PROPOFOL 30 MG: 10 INJECTION, EMULSION INTRAVENOUS at 17:52

## 2019-04-09 RX ADMIN — TIMOLOL MALEATE 1 DROP: 5 SOLUTION OPHTHALMIC at 09:33

## 2019-04-09 RX ADMIN — LIDOCAINE HYDROCHLORIDE 50 MG: 10 INJECTION, SOLUTION EPIDURAL; INFILTRATION; INTRACAUDAL; PERINEURAL at 17:52

## 2019-04-09 ASSESSMENT — PAIN SCALES - GENERAL
PAINLEVEL_OUTOF10: 0

## 2019-04-09 ASSESSMENT — PULMONARY FUNCTION TESTS
PIF_VALUE: 1

## 2019-04-09 NOTE — ANESTHESIA POSTPROCEDURE EVALUATION
Department of Anesthesiology  Postprocedure Note    Patient: Paola Rhodes  MRN: [id number]  YOB: 1923  Date of evaluation: 4/9/2019  Time:  6:19 PM     Procedure Summary     Date:  04/09/19 Room / Location:  [facility] M2 / [facility] OR    Anesthesia Start:  2987 Anesthesia Stop:  8354    Procedure:  EGD BIOPSY (N/A Abdomen) Diagnosis:  (DIFFICULTY SWALLOWING)    Surgeon:  Brian Uribe MD Responsible Provider:  Tim Roberts MD    Anesthesia Type:  MAC ASA Status:  4          Anesthesia Type: Mac    Arina Phase I: Raina Score: 8    Raina Phase II:      Last vitals: Reviewed and per EMR flowsheets.        Anesthesia Post Evaluation    Patient location during evaluation: PACU  Level of consciousness: awake and alert  Airway patency: patent  Complications: no  Cardiovascular status: blood pressure returned to baseline  Respiratory status: acceptable

## 2019-04-09 NOTE — DISCHARGE SUMMARY
inhaler Inhale 2 puffs into the lungs 2 times daily  Qty: 1 Inhaler, Refills: 0      diltiazem (CARDIZEM 12 HR) 60 MG extended release capsule Take 1 capsule by mouth daily  Qty: 60 capsule, Refills: 0      levofloxacin (LEVAQUIN) 250 MG tablet Take 1 tablet by mouth daily for 10 days  Qty: 5 tablet, Refills: 0      potassium chloride (MICRO-K) 10 MEQ extended release capsule Take 2 capsules by mouth daily  Qty: 60 capsule, Refills: 0      pantoprazole (PROTONIX) 40 MG tablet Take 1 tablet by mouth every morning (before breakfast)  Qty: 30 tablet, Refills: 0         CONTINUE these medications which have NOT CHANGED    Details   glimepiride (AMARYL) 1 MG tablet Take 1 tablet by mouth Daily with supper  Qty: 30 tablet, Refills: 3      acetaminophen-codeine (TYLENOL/CODEINE #4) 300-60 MG per tablet Take 1 tablet by mouth 2 times daily as needed for Pain. bumetanide (BUMEX) 1 MG tablet Take 1 mg by mouth daily      brimonidine (ALPHAGAN) 0.2 % ophthalmic solution Place 1 drop into both eyes 2 times daily      albuterol sulfate HFA (PROAIR HFA) 108 (90 Base) MCG/ACT inhaler Inhale 2 puffs into the lungs every 6 hours as needed for Wheezing  Qty: 1 Inhaler, Refills: 3      dorzolamide-timolol (COSOPT) 22.3-6.8 MG/ML ophthalmic solution Place 1 drop into both eyes 2 times daily      gabapentin (NEURONTIN) 100 MG capsule Take 100 mg by mouth 2 times daily as needed (nerve pain).  .      montelukast (SINGULAIR) 10 MG tablet Take 10 mg by mouth nightly      atorvastatin (LIPITOR) 20 MG tablet Take 20 mg by mouth daily      apixaban (ELIQUIS) 2.5 MG TABS tablet Take 2.5 mg by mouth 2 times daily       Cholecalciferol (VITAMIN D) 2000 units CAPS capsule Take 2,000 Units by mouth 2 times daily       magnesium oxide (MAG-OX) 400 MG tablet Take 400 mg by mouth 2 times daily         STOP taking these medications       metFORMIN (GLUCOPHAGE-XR) 500 MG extended release tablet Comments:   Reason for Stopping:         diltiazem

## 2019-04-09 NOTE — PROGRESS NOTES
Nutrition Assessment    Type and Reason for Visit: Reassess(>5 days )    Nutrition Recommendations: 1. Continue NPO effective now. 2. Suggest Dysphagia Diet 3 with Nectar thick liquids. Nutrition Assessment: Patient is moderatel malnourished as evidenced by continuous weight loss (per EHR 7% in 8 months), poor PO intake (1-25% at meals), mild subcutaneous fat loss around orbitals, and mild muscle mass loss around temples and clavicles. Patient is at risk for further compromise due to respiratory failure, pulmonary infiltrates, and heart failure. Patient is currently NPO, but suggest Dysphagia diet 3 with nectar thick liquids per SLP, and consider adding high calorie oral nutrition supplement when clinically appropriate. Malnutrition Assessment:  · Malnutrition Status: Meets the criteria for moderate malnutrition  · Context: Chronic illness  · Findings of the 6 clinical characteristics of malnutrition (Minimum of 2 out of 6 clinical characteristics is required to make the diagnosis of moderate or severe Protein Calorie Malnutrition based on AND/ASPEN Guidelines):  1. Energy Intake-Less than or equal to 75% of estimated energy requirement, Greater than or equal to 1 month    2. Weight Loss-1-2% loss, (1 day)  3. Fat Loss-Mild subcutaneous fat loss, Orbital  4. Muscle Loss-Mild muscle mass loss, Temples (temporalis muscle), Clavicles (pectoralis and deltoids)  5. Fluid Accumulation-Mild fluid accumulation, Extremities  6.  Strength-Not measured    Nutrition Risk Level: High    Nutrient Needs:  · Estimated Daily Total Kcal: 1648-8663 (31-33 kcal/kg)  · Estimated Daily Protein (g): 65-70 (1.5-1.6 g/kg)  · Estimated Daily Total Fluid (ml/day): 6413-8296 (1mL/kcal)    Nutrition Diagnosis:   · Problem:  Moderate malnutrition  · Etiology: related to Impaired respiratory function-inability to consume food, Increased demand for energy/nutrients     Signs and symptoms:  as evidenced by Diet history of poor intake, Intake 0-25%, Patient report of, Weight loss 1-2% in 1 week, Mild loss of subcutaneous fat, Mild muscle loss, Localized or generalized fluid accumulation    Objective Information:  · Nutrition-Focused Physical Findings: GI: Rounded; soft; non tender; active bowel sounds  PV: RLE +1 non- pitting, LLE trace, non pitting  Skin: warm, dry   · Wound Type: None  · Current Nutrition Therapies:  · Oral Diet Orders: Cardiac   · Oral Diet intake: NPO  · Oral Nutrition Supplement (ONS) Orders: None  · ONS intake: NPO  · Anthropometric Measures:  · Ht: 4' 7\" (139.7 cm)   · Current Body Wt: 93 lb 4.8 oz (42.3 kg)  · Admission Body Wt: 106 lb (48.1 kg)  · Usual Body Wt: (patient unable to answer )  · % Weight Change:  ,  2% in 1 day  · Ideal Body Wt: 95 lb (43.1 kg), % Ideal Body 98%  · BMI Classification: BMI 18.5 - 24.9 Normal Weight    Nutrition Interventions:   Continue NPO  Continued Inpatient Monitoring, Coordination of Care    Nutrition Evaluation:   · Evaluation: Goals set   · Goals: PO intake to meet >75% of estimated kcal/protein needs with good glycemic control     · Monitoring: Skin Integrity, I&O, Weight, Pertinent Labs, Monitor Bowel Function      Lyly Mott, Dietetic Intern   Electronically signed by Jose L Man,  DAYLIN, OSCAR on 4/9/19 at 2:34 PM    Contact Number: [phone]

## 2019-04-09 NOTE — PLAN OF CARE
Problem: Falls - Risk of:  Goal: Will remain free from falls  Description  Will remain free from falls  Note:   Fall risk assessment completed. Patient instructed to use call light. Bed locked and in lowest position, side rails up 2/4, call light and bedside table within reach, clutter removed, and non-skid footwear on when pt out of bed. Hourly rounds will continue. Pt fall risk, fall band present, falling star, safety alarm activated and in use as needed. Hourly rounding performed. Pt encouraged to use call light. See Cornelia Fenton fall risk assessment. Problem: Risk for Impaired Skin Integrity  Goal: Tissue integrity - skin and mucous membranes  Description  Structural intactness and normal physiological function of skin and  mucous membranes. Note:   Skin: scab to LLE. Mucus membranes moist. Patient turned and repositioned as needed. Heels elevated as needed. Dressings changed as needed and per orders.  Continue to monitor skin for breakdown

## 2019-04-09 NOTE — OP NOTE
PROCEDURE NOTE    DATE OF PROCEDURE: 4/9/2019     SURGEON: Jodie Leonard MD  Facility: [facility] DR MYLES JOHNSON  ASSISTANT: None  Anesthesia: MAC  PREOPERATIVE DIAGNOSIS:   Oropharyngeal dysphagia    Diagnosis:  Vocal cords looked normal  Could not recognize any lesion in the throat although the patient is not tolerable to the scope  being there for a long time to examine carefully    Gastritis biopsies were taken  Hiatal hernia    POSTOPERATIVE DIAGNOSIS: As described below    OPERATION: Upper GI endoscopy with Biopsy    ANESTHESIA: Moderate Sedation     ESTIMATED BLOOD LOSS: Less than 50 ml    COMPLICATIONS: None. SPECIMENS:  Was Obtained:     Gastritis, bxs taken     HISTORY: The patient is a 80y.o. year old female with history of above preop diagnosis. I recommended esophagogastroduodenoscopy with possible biopsy and I explained the risk, benefits, expected outcome, and alternatives to the procedure. Risks included but are not limited to bleeding, infection, respiratory distress, hypotension, and perforation of the esophagus, stomach, or duodenum. Patient understands and is in agreement. PROCEDURE: The patient was given IV conscious sedation. The patient's SPO2 remained above 90% throughout the procedure. The gastroscope was inserted orally and advanced under direct vision through the esophagus, through the stomach, through the pylorus, and into the descending duodenum. Post sedation note : The patient's SPO2 remained above 90% throughout the procedure. the vital signs remained stable , and no immediate complication form the procedure noted, patient will be ready for d/c when criteria is met .       Findings:    Retropharyngeal area :    Vocal cords looked normal  Could not recognize any lesion in the throat although the patient is not tolerable to the scope  being there for a long time to examine carefully      Esophagus: normal    Stomach:    Fundus: abnormal:   Hiatal hernia    Body: abnormal: *Gastritis biopsies were taken    Antrum: abnormal: Gastritis biopsies were taken    Duodenum:     Descending: normal    Bulb: normal    The scope was removed and the patient tolerated the procedure well. Recommendations/Plan:   1. Speech pathology consult   2. dietitian consult   3.  Out pt follow up     Electronically signed by Chana Bustamante MD  on 4/9/2019 at 6:02 PM

## 2019-04-09 NOTE — PROGRESS NOTES
Occupational Therapy  Facility/Department: [facility] PROGRESSIVE CARE  Daily Treatment Note  NAME: Genevieve Hernandez  : 1923  MRN: [id number]    Date of Service: 2019    Discharge Recommendations:  24 hour supervision or assist, Home with assist PRN, Home with nursing aide, Home with Home health OT    Pt would benefit from skilled home OT services in order to maximize occupational performance, safety, and independence in the home environment. Assessment   Performance deficits / Impairments: Decreased functional mobility ; Decreased endurance;Decreased ADL status; Decreased safe awareness;Decreased high-level IADLs;Decreased balance;Decreased coordination  Prognosis: Fair  Patient Education: Pt educated on fall prevention in hospital setting. Pt encouraged to ALWAYS call for assistance from staff for any OOB needs. Room is organized and all fall hazards are eliminated at this time. Alarm is in place prior to end of therapy session and all patient care needs have been addressed. Pt reminded that patient SAFETY is our goal.      REQUIRES OT FOLLOW UP: Yes  Activity Tolerance  Activity Tolerance: Patient Tolerated treatment well;Patient limited by fatigue  Safety Devices  Safety Devices in place: Yes  Type of devices: Call light within reach; Left in chair;Chair alarm in place;Gait belt;Patient at risk for falls;Nurse notified; All fall risk precautions in place         Patient Diagnosis(es): The primary encounter diagnosis was Acute respiratory failure with hypoxia and hypercapnia ([address] Diagnoses of Acute on chronic congestive heart failure, unspecified heart failure type ([address] COPD with acute exacerbation ([address] and Advanced age were also pertinent to this visit.       has a past medical history of Arthritis, Atrial fibrillation ([address] CAD (coronary artery disease), CHF (congestive heart failure) ([address] COPD (chronic obstructive pulmonary disease) ([address] Diabetes mellitus ([address] Femur fracture ([address] Hyperlipidemia, and Hypertension. has a past surgical history that includes Total hip arthroplasty (Left). Restrictions  Restrictions/Precautions  Restrictions/Precautions: General Precautions, Fall Risk  Required Braces or Orthoses?: No  Position Activity Restriction  Other position/activity restrictions: Up with assist, bed/chair alarm  Subjective   General  Chart Reviewed: Yes  Patient assessed for rehabilitation services?: Yes  Response to previous treatment: Patient with no complaints from previous session  Family / Caregiver Present: No      Orientation     Objective             Balance  Sitting Balance: Stand by assistance  Standing Balance: Minimal assistance  Standing Balance  Sit to stand: Minimal assistance  Stand to sit: Minimal assistance  Bed mobility  Supine to Sit: Stand by assistance  Scooting: Stand by assistance  Comment: With use of bed rails  Transfers  Stand Step Transfers: Minimal assistance  Sit to stand: Minimal assistance  Stand to sit: Minimal assistance  Transfer Comments: From EOB stand step to chair without use of device                       Cognition  Overall Cognitive Status: Exceptions  Following Commands: Follows one step commands with increased time; Follows one step commands with repetition  Memory: Decreased short term memory  Safety Judgement: Decreased awareness of need for assistance;Decreased awareness of need for safety  Problem Solving: Decreased awareness of errors;Assistance required to correct errors made;Assistance required to identify errors made  Insights: Decreased awareness of deficits  Initiation: Requires cues for some  Sequencing: Requires cues for some     Perception  Overall Perceptual Status: [facility]        Additional Activities Comment  Additional Activities:      Upon writer exit, call light within reach, pt retired to chair. All lines intact and patient positioned comfortably. All patient needs addressed prior to ending therapy session.  Chart reviewed prior to treatment and patient is agreeable for therapy. RN reports patient is medically stable for therapy treatment this date. Plan   Plan  Times per week: 3-5x/week 1x/day as cory   Current Treatment Recommendations: Strengthening, Functional Mobility Training, Patient/Caregiver Education & Training, Cognitive Reorientation, Home Management Training, Endurance Training, Equipment Evaluation, Education, & procurement, Balance Training, Neuromuscular Re-education, Safety Education & Training, Self-Care / ADL    AM-PAC Score        AM-PAC Inpatient Daily Activity Raw Score: 19  AM-PAC Inpatient ADL T-Scale Score : 40.22  ADL Inpatient CMS 0-100% Score: 42.8  ADL Inpatient CMS G-Code Modifier : CK    Goals  Short term goals  Time Frame for Short term goals: by discharge, pt to demo   Short term goal 1: bed mob tasks with use of rail as needed to SUP. Short term goal 2: UB ADL to set up and LB ADL to SBA/SUP with use of AD/AE as needed. Short term goal 3: toileting tasks with use of grab bar/AD as needed to SUP. Short term goal 4: ADL transfers and functional mob with AD as needed to SBA/SUP. Short term goal 5: balance to fair/fair plus with AD and cory > 5 mins to reduce fall risk for self care. Long term goals  Long term goal 1: Pt's family/caregivers to be I with BUE HEP, EC/WS and fall prevention tech with hand outs as needed.    Patient Goals   Patient goals : pt unable to state due to language barrier and pt nodded yes when asked if she wanted to return home       Therapy Time   Individual Concurrent Group Co-treatment   Time In 1326         Time Out 1404         Minutes TAVARES Mohan

## 2019-04-09 NOTE — CONSULTS
Gastroenterology Consult Note      Patient: Cornelia Tuttle  : 1923  Acct#:  [de-identified]     Date:  2019    Subjective:       History of Present Illness  Patient is a 80 y.o.  female admitted with Acute respiratory failure ([facility] [J96.00]  Acute respiratory failure ([facility] [J96.00] who is seen in consult for *Dysphagia    This elderly lady is admitted with shortness breath and congestive heart failure. We are consulted because of dysphagia. Patient been having issues with throat pain and dysphagia with the food been going on for at least a month. Lost weight but could not quantify that. Dysphagia mainly to solid. Progressive. Not associated with any other symptoms there is no abdominal pain there's no chest pain there is no nausea or vomiting. No diarrhea no fever or chills. No imaging studies were done reviewed the imaging done on her for the other reason. Her labs also were all reviewed. Her chest x-ray showing bilateral pleural effusion with interstitial edema. She is on Eliquis for A. fib. Significant cardiac history. Normal recent endoscopies      Past Medical History:   Diagnosis Date    Arthritis     Atrial fibrillation ([facility]     CAD (coronary artery disease)     CHF (congestive heart failure) (HCC)     COPD (chronic obstructive pulmonary disease) ([facility]     Diabetes mellitus ([facility]     Femur fracture ([facility]     Hyperlipidemia     Hypertension       Past Surgical History:   Procedure Laterality Date    TOTAL HIP ARTHROPLASTY Left       Past Endoscopic History none    Admission Meds  No current facility-administered medications on file prior to encounter.       Current Outpatient Medications on File Prior to Encounter   Medication Sig Dispense Refill    glimepiride (AMARYL) 1 MG tablet Take 1 tablet by mouth Daily with supper 30 tablet 3    metFORMIN (GLUCOPHAGE-XR) 500 MG extended release tablet Take 500 mg by mouth 2 times daily (with meals)      acetaminophen-codeine (TYLENOL/CODEINE #4) 300-60 MG per tablet Take 1 tablet by mouth 2 times daily as needed for Pain.  bumetanide (BUMEX) 1 MG tablet Take 1 mg by mouth daily      diltiazem (DILACOR XR) 180 MG extended release capsule Take 180 mg by mouth daily       brimonidine (ALPHAGAN) 0.2 % ophthalmic solution Place 1 drop into both eyes 2 times daily      albuterol sulfate HFA (PROAIR HFA) 108 (90 Base) MCG/ACT inhaler Inhale 2 puffs into the lungs every 6 hours as needed for Wheezing 1 Inhaler 3    dorzolamide-timolol (COSOPT) 22.3-6.8 MG/ML ophthalmic solution Place 1 drop into both eyes 2 times daily      glimepiride (AMARYL) 2 MG tablet Take 2 mg by mouth every morning (before breakfast)      gabapentin (NEURONTIN) 100 MG capsule Take 100 mg by mouth 2 times daily as needed (nerve pain). Gisele Lopez montelukast (SINGULAIR) 10 MG tablet Take 10 mg by mouth nightly      atorvastatin (LIPITOR) 20 MG tablet Take 20 mg by mouth daily      apixaban (ELIQUIS) 2.5 MG TABS tablet Take 2.5 mg by mouth 2 times daily       Cholecalciferol (VITAMIN D) 2000 units CAPS capsule Take 2,000 Units by mouth 2 times daily       magnesium oxide (MAG-OX) 400 MG tablet Take 400 mg by mouth 2 times daily      risedronate (ACTONEL) 35 MG tablet Take 35 mg by mouth every 7 days Wednesdays         Patient   Does Use ASA, NSAID No  Allergies  Allergies   Allergen Reactions    Penicillins Hives     Received ceftriaxone during 11/13/18 admission        Social   Social History     Tobacco Use    Smoking status: Never Smoker    Smokeless tobacco: Never Used   Substance Use Topics    Alcohol use: No        PSYCH HISTORY:  Depression No  Anxiety No  Suicide No       Family History   Family history unknown: Yes      No family history of colon cancer, Crohn's disease, or ulcerative colitis.      Review of Systems  Constitutional: negative  Eyes: negative  Ears, nose, mouth, throat, and face: negative  Respiratory: AMYLASE in the last 72 hours. No results for input(s): PROTIME, INR in the last 72 hours. No results for input(s): PTT in the last 72 hours. No results for input(s): OCCULTBLD in the last 72 hours. CEA:  No results found for: CEA  Ca 125:  No results found for:   Ca 19-9:  No results found for:   Ca 15-3:  No results found for:   AFP:  No components found for: AFAFP  Beta HCG:  No components found for: BHCG  Neuron Specific Enolase:  No results found for: NSE  Imaging Studies:                           All appropriate imaging studies and reports reviewed: Yes                 Assessment:     Active Problems:    Acute on chronic combined systolic and diastolic congestive heart failure (HCC)    Chronic atrial fibrillation (HCC)    Type 2 diabetes mellitus with hyperglycemia (HCC)    Acute respiratory failure (HCC)    Other dysphagia    Unintentional weight loss  Resolved Problems:    * No resolved hospital problems. *    Dysphagia with throat pain  Weight loss    Recommendations:   Plan for modified barium swallow to evaluate any oropharyngeal abnormality. And we'll proceed also with EGD if cardiology clears her for that. Will hold anticoagulation for the endoscopy for now. Case discussed with the other services the patient being cleared for EGD tomorrow for which will proceed with that. Thank you for allowing me to participate in the care of your patient. Please feel free to contact me with any questions or concerns.      Vimal Ruby MD

## 2019-04-09 NOTE — PROGRESS NOTES
Subjective:     Follow-up diabetes  No polyuria and no polydipsia no hypoglycemia blood sugars reviewed  ROS  No fever no chills no GI/ complaints except dysphagia cardiopulmonary complaints except some shortness of breath with exertion no TIA no bleeding no headache no sore throat no skin lesions  physical exam  General Appearance: alert and oriented to person, place and time and in no acute distress  Skin: warm and dry, no rash or erythema  Head: normocephalic and atraumatic  Eyes: pupils equal, round, and reactive to light, conjunctivae normal and sclera anicteric  Neck: neck supple and non tender without mass   Pulmonary/Chest: Entry equal decreased at the bases no rhonchi no crackles no use of intercostal muscles  Cardiovascular: normal rate, regular rhythm, normal S1 and S2, no gallops, intact distal pulses and no carotid bruits  Abdomen: soft, non-tender, non-distended, normal bowel sounds, no masses or organomegaly  Extremities: no edema and good pulses no Homans sign  Neurologic: Alert and oriented ×2 no focal deficit    BP (!) 98/58   Pulse 93   Temp 97.9 °F (36.6 °C) (Oral)   Resp 18   Ht 4' 7\" (1.397 m)   Wt 93 lb 4.8 oz (42.3 kg)   SpO2 100%   BMI 21.68 kg/m²     CBC:   Lab Results   Component Value Date    WBC 13.3 04/08/2019    RBC 4.06 04/08/2019    HGB 12.4 04/08/2019    HCT 38.3 04/08/2019    MCV 94.4 04/08/2019    MCH 30.6 04/08/2019    MCHC 32.4 04/08/2019    RDW 15.4 04/08/2019     04/08/2019    MPV 7.6 04/08/2019     CMP:    Lab Results   Component Value Date     04/08/2019    K 3.5 04/08/2019    CL 95 04/08/2019    CO2 30 04/08/2019    BUN 29 04/08/2019    CREATININE 0.90 04/08/2019    GFRAA >60 04/08/2019    LABGLOM 58 04/08/2019    GLUCOSE 158 04/08/2019    GLUCOSE 117 09/02/2011    PROT 7.9 11/13/2018    LABALBU 4.2 11/13/2018    CALCIUM 9.1 04/08/2019    BILITOT 0.84 11/13/2018    ALKPHOS 108 11/13/2018    AST 32 11/13/2018    ALT 21 11/13/2018

## 2019-04-09 NOTE — PLAN OF CARE
Problem: Safety:  Goal: Ability to chew and swallow food without choking will improve  Description  Ability to chew and swallow food without choking will improve  Outcome: Ongoing  Note:   Pt to go for EGD today near 1530. Currently NPO with sips. Thickened liquids per speech. GI on board. Will monitor.

## 2019-04-09 NOTE — PROGRESS NOTES
Subjective:     Follow-up diabetes  No polyuria no polydipsia no hypoglycemia blood sugars reviewed  ROS  No fever no chills no GI/ complaints no cardio pulmonary complaints no more dysphagia no TIA no bleeding no headache no sore throat no skin lesions  physical exam  General Appearance: alert and oriented to person, place and time and in no acute distress  Skin: warm and dry, no rash or erythema  Head: normocephalic and atraumatic  Eyes: pupils equal, round, and reactive to light, conjunctivae normal and sclera anicteric  Neck: neck supple and non tender without mass   Pulmonary/Chest: clear to auscultation bilaterally- no wheezes, rales or rhonchi, normal air movement, no respiratory distress  Cardiovascular: normal rate, regular rhythm, normal S1 and S2, no gallops, intact distal pulses and no carotid bruits  Abdomen: soft, non-tender, non-distended, normal bowel sounds, no masses or organomegaly  Extremities: no edema and good pulses no Valente sign   NEUROLOGIC EXAM: Alert oriented ×2 no focal deficit}    BP (!) 137/58   Pulse 95   Temp 97.3 °F (36.3 °C) (Oral)   Resp 16   Ht 4' 7\" (1.397 m)   Wt 93 lb 4.8 oz (42.3 kg)   SpO2 100%   BMI 21.68 kg/m²     CBC:   Lab Results   Component Value Date    WBC 13.7 04/09/2019    RBC 4.41 04/09/2019    HGB 13.9 04/09/2019    HCT 41.6 04/09/2019    MCV 94.4 04/09/2019    MCH 31.6 04/09/2019    MCHC 33.4 04/09/2019    RDW 14.8 04/09/2019     04/09/2019    MPV NOT REPORTED 04/09/2019     CMP:    Lab Results   Component Value Date     04/09/2019    K 4.7 04/09/2019    CL 97 04/09/2019    CO2 22 04/09/2019    BUN 35 04/09/2019    CREATININE 0.91 04/09/2019    GFRAA >60 04/09/2019    LABGLOM 57 04/09/2019    GLUCOSE 163 04/09/2019    GLUCOSE 117 09/02/2011    PROT 7.9 11/13/2018    LABALBU 4.2 11/13/2018    CALCIUM 9.3 04/09/2019    BILITOT 0.84 11/13/2018    ALKPHOS 108 11/13/2018    AST 32 11/13/2018    ALT 21 11/13/2018        Assessment:  Patient Active Problem List   Diagnosis    Pneumonia    Sepsis ([address]    Moderate malnutrition ([address]    Pulmonary HTN ([address]    Non-rheumatic mitral regurgitation    Acute on chronic combined systolic and diastolic congestive heart failure (HCC)    Chronic atrial fibrillation (HCC)    Type 2 diabetes mellitus with hyperglycemia (HCC)    Acute bronchitis    Acute congestive heart failure (HCC)    Hypotensive episode    Acute respiratory failure ([address]    Other dysphagia    Unintentional weight loss    Moderate malnutrition ([address]     Type 2 diabetes with hyperglycemia continue with amaryl  Gastroesophageal reflux disease with dysphagia continue.   GI she noted mild acute gastritis  Pulmonary hypertension  Mitral regurgitation chronic atrial fibrillation controlled continue with anticoagulation  Acute on chronic combined CHF  Acute on chronic respiratory failure with hypoxia  Pneumonitis continue with Levaquin  Hypokalemia   Plan:    **The family patient adamant about going home will send her with visiting home nurse home physical therapy occupational therapy will have speech therapy see her at home if continued problem with dysphagia will do a swallow eval as an outpatient possibility with G-tube is entertained will inform her PCP    Mnidy Espinoza MD  7:22 PM

## 2019-04-09 NOTE — DISCHARGE INSTR - DIET
 Good nutrition is important when healing from an illness, injury, or surgery. Follow any nutrition recommendations given to you during your hospital stay.  If you were given an oral nutrition supplement while in the hospital, continue to take this supplement at home. You can take it with meals, in-between meals, and/or before bedtime. These supplements can be purchased at most local grocery stores, pharmacies, and chain super-stores.  If you have any questions about your diet or nutrition, call the hospital and ask for the dietitian. Dental soft Diabetic diet with nectar thick liquids.   No added salt   Fluid restriction 1800 ml

## 2019-04-09 NOTE — ANESTHESIA PRE PROCEDURE
Provider, MD   Cholecalciferol (VITAMIN D) 2000 units CAPS capsule Take 2,000 Units by mouth 2 times daily     Historical Provider, MD   magnesium oxide (MAG-OX) 400 MG tablet Take 400 mg by mouth 2 times daily    Historical Provider, MD   risedronate (ACTONEL) 35 MG tablet Take 35 mg by mouth every 7 days Wednesdays    Historical Provider, MD       Current medications:    Current Facility-Administered Medications   Medication Dose Route Frequency Provider Last Rate Last Dose    [MAR Hold] acetaminophen (TYLENOL) tablet 650 mg  650 mg Oral Q6H PRN Feli Gomez MD        [facility] Hold] potassium chloride (MICRO-K) extended release capsule 20 mEq  20 mEq Oral Daily Feli Gomez MD   20 mEq at 04/08/19 1205    [MAR Hold] pantoprazole (PROTONIX) tablet 40 mg  40 mg Oral QAM AC Feli Gomez MD   40 mg at 04/08/19 1205    [MAR Hold] levofloxacin (LEVAQUIN) tablet 250 mg  250 mg Oral Daily Ruth Brown MD   250 mg at 04/08/19 1205    [MAR Hold] bumetanide (BUMEX) tablet 1 mg  1 mg Oral Daily Feli Gomez MD   1 mg at 04/08/19 1205    [MAR Hold] glimepiride (AMARYL) tablet 1 mg  1 mg Oral Dinner Feli Gomez MD   1 mg at 04/08/19 1714    [MAR Hold] metoprolol (LOPRESSOR) injection 2.5 mg  2.5 mg Intravenous Q6H PRN Feli Gomez MD        [MAR Hold] mometasone-formoterol (DULERA) 200-5 MCG/ACT inhaler 2 puff  2 puff Inhalation BID Feli Gomez MD   2 puff at 04/09/19 0800    [MAR Hold] sodium chloride flush 0.9 % injection 10 mL  10 mL Intravenous 2 times per day Feli Gomez MD   10 mL at 04/09/19 0933    [MAR Hold] sodium chloride flush 0.9 % injection 10 mL  10 mL Intravenous PRN Feli Gomez MD        [facility] Hold] magnesium hydroxide (MILK OF MAGNESIA) 400 MG/5ML suspension 30 mL  30 mL Oral Daily PRN Feli Gomez MD   30 mL at 04/05/19 0945    [MAR Hold] apixaban (ELIQUIS) tablet 2.5 mg  2.5 mg Oral BID Feli Gomez MD   Stopped at 04/08/19 9751    [MAR Hold] solution 1 ampule  1 ampule Inhalation Q4H JEMIMA Johnston MD   1 ampule at 04/09/19 1549       Allergies: Allergies   Allergen Reactions    Penicillins Hives     Received ceftriaxone during 11/13/18 admission       Problem List:    Patient Active Problem List   Diagnosis Code    Pneumonia J18.9    Sepsis ([address] A41.9    Moderate malnutrition ([address] E44.0    Pulmonary HTN ([address] I27.20    Non-rheumatic mitral regurgitation I34.0    Acute on chronic combined systolic and diastolic congestive heart failure (HCC) I50.43    Chronic atrial fibrillation (HCC) I48.2    Type 2 diabetes mellitus with hyperglycemia (HCC) E11.65    Acute bronchitis J20.9    Acute congestive heart failure (HCC) I50.9    Hypotensive episode I95.9    Acute respiratory failure (HCC) J96.00    Other dysphagia R13.19    Unintentional weight loss R63.4    Moderate malnutrition ([facility]) E44.0       Past Medical History:        Diagnosis Date    Arthritis     Atrial fibrillation ([address]     CAD (coronary artery disease)     CHF (congestive heart failure) (HCC)     COPD (chronic obstructive pulmonary disease) (HCC)     Diabetes mellitus ([address]     Femur fracture ([facility])     Hyperlipidemia     Hypertension     Moderate malnutrition ([address] 4/9/2019       Past Surgical History:        Procedure Laterality Date    TOTAL HIP ARTHROPLASTY Left        Social History:    Social History     Tobacco Use    Smoking status: Never Smoker    Smokeless tobacco: Never Used   Substance Use Topics    Alcohol use:  No                                Counseling given: Not Answered      Vital Signs (Current):   Vitals:    04/09/19 0041 04/09/19 0900 04/09/19 1206 04/09/19 1344   BP: 122/60 121/61  (!) 98/44   Pulse: 61 105  88   Resp: 20 16  16   Temp: 98.1 °F (36.7 °C) 98.2 °F (36.8 °C)  98.1 °F (36.7 °C)   TempSrc: Oral Oral  Oral   SpO2: 100% 100%  100%   Weight:       Height:   4' 7\" (1.397 m)                                               BP Readings from Last 3 Encounters:   04/09/19 (!) 98/44   11/19/18 121/62   09/05/18 (!) 98/55       NPO Status:                                                                                 BMI:   Wt Readings from Last 3 Encounters:   04/08/19 93 lb 4.8 oz (42.3 kg)   11/19/18 103 lb 5 oz (46.9 kg)   09/05/18 101 lb 13.6 oz (46.2 kg)     Body mass index is 21.68 kg/m².     CBC:   Lab Results   Component Value Date    WBC 13.7 04/09/2019    RBC 4.41 04/09/2019    HGB 13.9 04/09/2019    HCT 41.6 04/09/2019    MCV 94.4 04/09/2019    RDW 14.8 04/09/2019     04/09/2019       CMP:   Lab Results   Component Value Date     04/09/2019    K 4.7 04/09/2019    CL 97 04/09/2019    CO2 22 04/09/2019    BUN 35 04/09/2019    CREATININE 0.91 04/09/2019    GFRAA >60 04/09/2019    LABGLOM 57 04/09/2019    GLUCOSE 163 04/09/2019    GLUCOSE 117 09/02/2011    PROT 7.9 11/13/2018    CALCIUM 9.3 04/09/2019    BILITOT 0.84 11/13/2018    ALKPHOS 108 11/13/2018    AST 32 11/13/2018    ALT 21 11/13/2018       POC Tests:   Recent Labs     04/09/19  1204   POCGLU 184*       Coags:   Lab Results   Component Value Date    PROTIME 12.0 04/04/2019    INR 1.2 04/04/2019    APTT 29.9 04/04/2019       HCG (If Applicable): No results found for: PREGTESTUR, PREGSERUM, HCG, HCGQUANT     ABGs: No results found for: PHART, PO2ART, YUQ3HFB, TGI2WOT, BEART, H3FCDIDT     Type & Screen (If Applicable):  No results found for: LABABO, LABRH    Anesthesia Evaluation   no history of anesthetic complications:   Airway: Mallampati: II  TM distance: >3 FB   Neck ROM: full  Mouth opening: > = 3 FB Dental:    (+) upper dentures and lower dentures      Pulmonary:   (+) COPD (on continuous 3L O2 via nc):                             Cardiovascular:    (+) hypertension:, CAD:, dysrhythmias: atrial fibrillation,         Rhythm: irregular  Rate: abnormal                    Neuro/Psych:               GI/Hepatic/Renal:             Endo/Other:    (+) DiabetesType II

## 2019-04-10 LAB
CULTURE: NORMAL
Lab: NORMAL
SPECIMEN DESCRIPTION: NORMAL

## 2019-04-10 NOTE — PROGRESS NOTES
Pt discharged at 8 pm on 4/9/2019 on a . Pt family present and discharged doc & plan discussed with the daughter. Pt dc 2 l of nasal canula.  Vitals taken & had her last set of night medications

## 2019-04-11 LAB — SURGICAL PATHOLOGY REPORT: NORMAL

## 2019-04-12 LAB
CULTURE: NORMAL
CULTURE: NORMAL
Lab: NORMAL
Lab: NORMAL
SPECIMEN DESCRIPTION: NORMAL
SPECIMEN DESCRIPTION: NORMAL

## 2019-05-15 NOTE — ED NOTES
Patient presents to the ED per EMS with complaints of respiratory distress. Patient woke up this morning short of breath asking her daughter to turn her home O2 up so that she could catch her breath. Daughter states that she tried giving the patient a breathing treatment but it was no help and her lips were starting to turn blue. Upon arrival to the ED patient was in tripod position on EMS cot with CPap in place, she was then transferred over to the ED cot and put of bipap. Patients O2 sats were in the mid 80's without bipap on, she has crackles in the bases, is tachypneic, patient is in a-fib with a rate in the 80s-120s, she is frail with dry intact skin.       Kerri Enciso RN  04/04/19 0600
Patient resting more comfortably now, daughter at bedside.       Ellen Fierro RN  04/04/19 2785
36.8

## 2019-06-15 ENCOUNTER — HOSPITAL ENCOUNTER (INPATIENT)
Age: 84
LOS: 10 days | Discharge: HOME | DRG: 871 | End: 2019-06-25
Attending: EMERGENCY MEDICINE | Admitting: INTERNAL MEDICINE
Payer: MEDICARE

## 2019-06-15 ENCOUNTER — APPOINTMENT (OUTPATIENT)
Dept: GENERAL RADIOLOGY | Age: 84
DRG: 871 | End: 2019-06-15
Payer: MEDICARE

## 2019-06-15 DIAGNOSIS — I50.9 ACUTE ON CHRONIC CONGESTIVE HEART FAILURE, UNSPECIFIED HEART FAILURE TYPE (HCC): Primary | ICD-10-CM

## 2019-06-15 PROBLEM — I50.43 CHF (CONGESTIVE HEART FAILURE), NYHA CLASS I, ACUTE ON CHRONIC, COMBINED (HCC): Status: ACTIVE | Noted: 2019-06-15

## 2019-06-15 LAB
ABSOLUTE EOS #: 0.26 K/UL (ref 0–0.44)
ABSOLUTE IMMATURE GRANULOCYTE: 0.05 K/UL (ref 0–0.3)
ABSOLUTE LYMPH #: 0.97 K/UL (ref 1.1–3.7)
ABSOLUTE MONO #: 1.18 K/UL (ref 0.1–1.2)
ANION GAP SERPL CALCULATED.3IONS-SCNC: 10 MMOL/L (ref 9–17)
BASOPHILS # BLD: 1 % (ref 0–2)
BASOPHILS ABSOLUTE: 0.08 K/UL (ref 0–0.2)
BNP INTERPRETATION: ABNORMAL
BUN BLDV-MCNC: 36 MG/DL (ref 8–23)
BUN/CREAT BLD: 40 (ref 9–20)
CALCIUM SERPL-MCNC: 9.2 MG/DL (ref 8.6–10.4)
CHLORIDE BLD-SCNC: 101 MMOL/L (ref 98–107)
CO2: 27 MMOL/L (ref 20–31)
CREAT SERPL-MCNC: 0.89 MG/DL (ref 0.5–0.9)
DIFFERENTIAL TYPE: ABNORMAL
EOSINOPHILS RELATIVE PERCENT: 2 % (ref 1–4)
GFR AFRICAN AMERICAN: >60 ML/MIN
GFR NON-AFRICAN AMERICAN: 59 ML/MIN
GFR SERPL CREATININE-BSD FRML MDRD: ABNORMAL ML/MIN/{1.73_M2}
GFR SERPL CREATININE-BSD FRML MDRD: ABNORMAL ML/MIN/{1.73_M2}
GLUCOSE BLD-MCNC: 141 MG/DL (ref 65–105)
GLUCOSE BLD-MCNC: 190 MG/DL (ref 70–99)
HCT VFR BLD CALC: 33.6 % (ref 36.3–47.1)
HEMOGLOBIN: 10.3 G/DL (ref 11.9–15.1)
IMMATURE GRANULOCYTES: 0 %
LYMPHOCYTES # BLD: 9 % (ref 24–43)
MCH RBC QN AUTO: 30.5 PG (ref 25.2–33.5)
MCHC RBC AUTO-ENTMCNC: 30.7 G/DL (ref 28.4–34.8)
MCV RBC AUTO: 99.4 FL (ref 82.6–102.9)
MONOCYTES # BLD: 11 % (ref 3–12)
MYOGLOBIN: 39 NG/ML (ref 25–58)
NRBC AUTOMATED: 0 PER 100 WBC
PDW BLD-RTO: 14.9 % (ref 11.8–14.4)
PLATELET # BLD: 188 K/UL (ref 138–453)
PLATELET ESTIMATE: ABNORMAL
PMV BLD AUTO: 10.1 FL (ref 8.1–13.5)
POTASSIUM SERPL-SCNC: 4.6 MMOL/L (ref 3.7–5.3)
PRO-BNP: 2191 PG/ML
RBC # BLD: 3.38 M/UL (ref 3.95–5.11)
RBC # BLD: ABNORMAL 10*6/UL
SEG NEUTROPHILS: 77 % (ref 36–65)
SEGMENTED NEUTROPHILS ABSOLUTE COUNT: 8.72 K/UL (ref 1.5–8.1)
SODIUM BLD-SCNC: 138 MMOL/L (ref 135–144)
THYROXINE, FREE: 1.47 NG/DL (ref 0.93–1.7)
TROPONIN INTERP: ABNORMAL
TROPONIN T: ABNORMAL NG/ML
TROPONIN, HIGH SENSITIVITY: 27 NG/L (ref 0–14)
TROPONIN, HIGH SENSITIVITY: 29 NG/L (ref 0–14)
TROPONIN, HIGH SENSITIVITY: 32 NG/L (ref 0–14)
TSH SERPL DL<=0.05 MIU/L-ACNC: 1.38 MIU/L (ref 0.3–5)
WBC # BLD: 11.3 K/UL (ref 3.5–11.3)
WBC # BLD: ABNORMAL 10*3/UL

## 2019-06-15 PROCEDURE — 80048 BASIC METABOLIC PNL TOTAL CA: CPT

## 2019-06-15 PROCEDURE — 99285 EMERGENCY DEPT VISIT HI MDM: CPT

## 2019-06-15 PROCEDURE — 6360000002 HC RX W HCPCS: Performed by: INTERNAL MEDICINE

## 2019-06-15 PROCEDURE — 2060000000 HC ICU INTERMEDIATE R&B

## 2019-06-15 PROCEDURE — 84443 ASSAY THYROID STIM HORMONE: CPT

## 2019-06-15 PROCEDURE — 85025 COMPLETE CBC W/AUTO DIFF WBC: CPT

## 2019-06-15 PROCEDURE — 2580000003 HC RX 258: Performed by: INTERNAL MEDICINE

## 2019-06-15 PROCEDURE — 84439 ASSAY OF FREE THYROXINE: CPT

## 2019-06-15 PROCEDURE — 2700000000 HC OXYGEN THERAPY PER DAY

## 2019-06-15 PROCEDURE — 93005 ELECTROCARDIOGRAM TRACING: CPT | Performed by: NURSE PRACTITIONER

## 2019-06-15 PROCEDURE — 83874 ASSAY OF MYOGLOBIN: CPT

## 2019-06-15 PROCEDURE — 36415 COLL VENOUS BLD VENIPUNCTURE: CPT

## 2019-06-15 PROCEDURE — 87205 SMEAR GRAM STAIN: CPT

## 2019-06-15 PROCEDURE — 94640 AIRWAY INHALATION TREATMENT: CPT

## 2019-06-15 PROCEDURE — 6370000000 HC RX 637 (ALT 250 FOR IP): Performed by: INTERNAL MEDICINE

## 2019-06-15 PROCEDURE — 82947 ASSAY GLUCOSE BLOOD QUANT: CPT

## 2019-06-15 PROCEDURE — 83880 ASSAY OF NATRIURETIC PEPTIDE: CPT

## 2019-06-15 PROCEDURE — 84484 ASSAY OF TROPONIN QUANT: CPT

## 2019-06-15 PROCEDURE — 87070 CULTURE OTHR SPECIMN AEROBIC: CPT

## 2019-06-15 PROCEDURE — 94761 N-INVAS EAR/PLS OXIMETRY MLT: CPT

## 2019-06-15 PROCEDURE — 71045 X-RAY EXAM CHEST 1 VIEW: CPT

## 2019-06-15 PROCEDURE — 93005 ELECTROCARDIOGRAM TRACING: CPT | Performed by: INTERNAL MEDICINE

## 2019-06-15 PROCEDURE — 6360000002 HC RX W HCPCS: Performed by: EMERGENCY MEDICINE

## 2019-06-15 PROCEDURE — 6360000002 HC RX W HCPCS: Performed by: NURSE PRACTITIONER

## 2019-06-15 RX ORDER — BUDESONIDE AND FORMOTEROL FUMARATE DIHYDRATE 160; 4.5 UG/1; UG/1
1 AEROSOL RESPIRATORY (INHALATION) 2 TIMES DAILY
COMMUNITY

## 2019-06-15 RX ORDER — SODIUM CHLORIDE 0.9 % (FLUSH) 0.9 %
10 SYRINGE (ML) INJECTION PRN
Status: DISCONTINUED | OUTPATIENT
Start: 2019-06-15 | End: 2019-06-26 | Stop reason: HOSPADM

## 2019-06-15 RX ORDER — METFORMIN HYDROCHLORIDE 500 MG/1
500 TABLET, EXTENDED RELEASE ORAL
Status: ON HOLD | COMMUNITY
End: 2019-06-25 | Stop reason: HOSPADM

## 2019-06-15 RX ORDER — DILTIAZEM HYDROCHLORIDE 120 MG/1
120 CAPSULE, COATED, EXTENDED RELEASE ORAL DAILY
Status: ON HOLD | COMMUNITY
End: 2019-08-05 | Stop reason: HOSPADM

## 2019-06-15 RX ORDER — GUAIFENESIN 600 MG/1
600 TABLET, EXTENDED RELEASE ORAL 2 TIMES DAILY
Status: DISCONTINUED | OUTPATIENT
Start: 2019-06-15 | End: 2019-06-26 | Stop reason: HOSPADM

## 2019-06-15 RX ORDER — MONTELUKAST SODIUM 10 MG/1
10 TABLET ORAL NIGHTLY
Status: DISCONTINUED | OUTPATIENT
Start: 2019-06-15 | End: 2019-06-26 | Stop reason: HOSPADM

## 2019-06-15 RX ORDER — ALBUTEROL SULFATE 90 UG/1
2 AEROSOL, METERED RESPIRATORY (INHALATION)
Status: DISCONTINUED | OUTPATIENT
Start: 2019-06-15 | End: 2019-06-15

## 2019-06-15 RX ORDER — ATORVASTATIN CALCIUM 20 MG/1
20 TABLET, FILM COATED ORAL DAILY
Status: DISCONTINUED | OUTPATIENT
Start: 2019-06-15 | End: 2019-06-26 | Stop reason: HOSPADM

## 2019-06-15 RX ORDER — POTASSIUM CHLORIDE 7.45 MG/ML
10 INJECTION INTRAVENOUS PRN
Status: DISCONTINUED | OUTPATIENT
Start: 2019-06-15 | End: 2019-06-17

## 2019-06-15 RX ORDER — LISINOPRIL 5 MG/1
5 TABLET ORAL DAILY
Status: DISCONTINUED | OUTPATIENT
Start: 2019-06-15 | End: 2019-06-26 | Stop reason: HOSPADM

## 2019-06-15 RX ORDER — DILTIAZEM HYDROCHLORIDE 120 MG/1
120 CAPSULE, COATED, EXTENDED RELEASE ORAL DAILY
Status: DISCONTINUED | OUTPATIENT
Start: 2019-06-15 | End: 2019-06-26 | Stop reason: HOSPADM

## 2019-06-15 RX ORDER — ALBUTEROL SULFATE 2.5 MG/3ML
5 SOLUTION RESPIRATORY (INHALATION)
Status: DISCONTINUED | OUTPATIENT
Start: 2019-06-15 | End: 2019-06-15

## 2019-06-15 RX ORDER — POTASSIUM CHLORIDE 750 MG/1
20 CAPSULE, EXTENDED RELEASE ORAL DAILY
Status: DISCONTINUED | OUTPATIENT
Start: 2019-06-15 | End: 2019-06-26 | Stop reason: HOSPADM

## 2019-06-15 RX ORDER — POTASSIUM CHLORIDE 20 MEQ/1
40 TABLET, EXTENDED RELEASE ORAL PRN
Status: DISCONTINUED | OUTPATIENT
Start: 2019-06-15 | End: 2019-06-17

## 2019-06-15 RX ORDER — FUROSEMIDE 10 MG/ML
20 INJECTION INTRAMUSCULAR; INTRAVENOUS 2 TIMES DAILY
Status: DISCONTINUED | OUTPATIENT
Start: 2019-06-15 | End: 2019-06-16

## 2019-06-15 RX ORDER — GLIMEPIRIDE 2 MG/1
2 TABLET ORAL DAILY
Status: DISCONTINUED | OUTPATIENT
Start: 2019-06-15 | End: 2019-06-26 | Stop reason: HOSPADM

## 2019-06-15 RX ORDER — PANTOPRAZOLE SODIUM 40 MG/1
40 TABLET, DELAYED RELEASE ORAL
Status: DISCONTINUED | OUTPATIENT
Start: 2019-06-16 | End: 2019-06-26 | Stop reason: HOSPADM

## 2019-06-15 RX ORDER — BRIMONIDINE TARTRATE 2 MG/ML
1 SOLUTION/ DROPS OPHTHALMIC 2 TIMES DAILY
Status: DISCONTINUED | OUTPATIENT
Start: 2019-06-15 | End: 2019-06-26 | Stop reason: HOSPADM

## 2019-06-15 RX ORDER — ACETAMINOPHEN 325 MG/1
650 TABLET ORAL EVERY 4 HOURS PRN
Status: DISCONTINUED | OUTPATIENT
Start: 2019-06-15 | End: 2019-06-26 | Stop reason: HOSPADM

## 2019-06-15 RX ORDER — DORZOLAMIDE HCL 20 MG/ML
1 SOLUTION/ DROPS OPHTHALMIC 2 TIMES DAILY
Status: DISCONTINUED | OUTPATIENT
Start: 2019-06-15 | End: 2019-06-26 | Stop reason: HOSPADM

## 2019-06-15 RX ORDER — ONDANSETRON 2 MG/ML
4 INJECTION INTRAMUSCULAR; INTRAVENOUS EVERY 6 HOURS PRN
Status: DISCONTINUED | OUTPATIENT
Start: 2019-06-15 | End: 2019-06-26 | Stop reason: HOSPADM

## 2019-06-15 RX ORDER — FUROSEMIDE 10 MG/ML
20 INJECTION INTRAMUSCULAR; INTRAVENOUS ONCE
Status: COMPLETED | OUTPATIENT
Start: 2019-06-15 | End: 2019-06-15

## 2019-06-15 RX ORDER — SODIUM CHLORIDE 0.9 % (FLUSH) 0.9 %
10 SYRINGE (ML) INJECTION EVERY 12 HOURS SCHEDULED
Status: DISCONTINUED | OUTPATIENT
Start: 2019-06-15 | End: 2019-06-15

## 2019-06-15 RX ORDER — FUROSEMIDE 10 MG/ML
40 INJECTION INTRAMUSCULAR; INTRAVENOUS 2 TIMES DAILY
Status: DISCONTINUED | OUTPATIENT
Start: 2019-06-15 | End: 2019-06-15 | Stop reason: DRUGHIGH

## 2019-06-15 RX ORDER — AZITHROMYCIN 250 MG/1
500 TABLET, FILM COATED ORAL DAILY
Status: DISCONTINUED | OUTPATIENT
Start: 2019-06-15 | End: 2019-06-26 | Stop reason: HOSPADM

## 2019-06-15 RX ORDER — TIMOLOL MALEATE 5 MG/ML
1 SOLUTION/ DROPS OPHTHALMIC 2 TIMES DAILY
Status: DISCONTINUED | OUTPATIENT
Start: 2019-06-15 | End: 2019-06-26 | Stop reason: HOSPADM

## 2019-06-15 RX ORDER — GABAPENTIN 100 MG/1
100 CAPSULE ORAL DAILY
Status: DISCONTINUED | OUTPATIENT
Start: 2019-06-15 | End: 2019-06-26 | Stop reason: HOSPADM

## 2019-06-15 RX ORDER — SODIUM CHLORIDE 0.9 % (FLUSH) 0.9 %
10 SYRINGE (ML) INJECTION EVERY 12 HOURS SCHEDULED
Status: DISCONTINUED | OUTPATIENT
Start: 2019-06-15 | End: 2019-06-26 | Stop reason: HOSPADM

## 2019-06-15 RX ORDER — ALBUTEROL SULFATE 90 UG/1
2 AEROSOL, METERED RESPIRATORY (INHALATION) EVERY 6 HOURS PRN
Status: DISCONTINUED | OUTPATIENT
Start: 2019-06-15 | End: 2019-06-26 | Stop reason: HOSPADM

## 2019-06-15 RX ORDER — GLIMEPIRIDE 2 MG/1
2 TABLET ORAL DAILY
COMMUNITY
End: 2019-08-10 | Stop reason: ALTCHOICE

## 2019-06-15 RX ORDER — IPRATROPIUM BROMIDE AND ALBUTEROL SULFATE 2.5; .5 MG/3ML; MG/3ML
1 SOLUTION RESPIRATORY (INHALATION)
Status: DISCONTINUED | OUTPATIENT
Start: 2019-06-15 | End: 2019-06-15

## 2019-06-15 RX ORDER — DORZOLAMIDE HYDROCHLORIDE AND TIMOLOL MALEATE 20; 5 MG/ML; MG/ML
1 SOLUTION/ DROPS OPHTHALMIC 2 TIMES DAILY
Status: DISCONTINUED | OUTPATIENT
Start: 2019-06-15 | End: 2019-06-15 | Stop reason: CLARIF

## 2019-06-15 RX ORDER — SODIUM CHLORIDE 0.9 % (FLUSH) 0.9 %
10 SYRINGE (ML) INJECTION PRN
Status: DISCONTINUED | OUTPATIENT
Start: 2019-06-15 | End: 2019-06-15

## 2019-06-15 RX ADMIN — Medication 10 ML: at 21:01

## 2019-06-15 RX ADMIN — BRIMONIDINE TARTRATE 1 DROP: 2 SOLUTION OPHTHALMIC at 21:09

## 2019-06-15 RX ADMIN — VITAMIN D, TAB 1000IU (100/BT) 2000 UNITS: 25 TAB at 20:56

## 2019-06-15 RX ADMIN — MAGNESIUM OXIDE TAB 400 MG (241.3 MG ELEMENTAL MG) 400 MG: 400 (241.3 MG) TAB at 20:57

## 2019-06-15 RX ADMIN — LISINOPRIL 5 MG: 5 TABLET ORAL at 15:32

## 2019-06-15 RX ADMIN — GUAIFENESIN 600 MG: 600 TABLET, EXTENDED RELEASE ORAL at 20:58

## 2019-06-15 RX ADMIN — DORZOLAMIDE HYDROCHLORIDE 1 DROP: 20 SOLUTION/ DROPS OPHTHALMIC at 20:58

## 2019-06-15 RX ADMIN — MOMETASONE FUROATE AND FORMOTEROL FUMARATE DIHYDRATE 2 PUFF: 200; 5 AEROSOL RESPIRATORY (INHALATION) at 20:11

## 2019-06-15 RX ADMIN — MONTELUKAST 10 MG: 10 TABLET, FILM COATED ORAL at 20:57

## 2019-06-15 RX ADMIN — DILTIAZEM HYDROCHLORIDE 120 MG: 120 CAPSULE, COATED, EXTENDED RELEASE ORAL at 15:32

## 2019-06-15 RX ADMIN — GUAIFENESIN 600 MG: 600 TABLET, EXTENDED RELEASE ORAL at 14:31

## 2019-06-15 RX ADMIN — ENOXAPARIN SODIUM 40 MG: 40 INJECTION SUBCUTANEOUS at 11:39

## 2019-06-15 RX ADMIN — AZITHROMYCIN 500 MG: 250 TABLET, FILM COATED ORAL at 15:00

## 2019-06-15 RX ADMIN — ALBUTEROL SULFATE 2.5 MG: 5 SOLUTION RESPIRATORY (INHALATION) at 10:57

## 2019-06-15 RX ADMIN — SODIUM CHLORIDE, PRESERVATIVE FREE 10 ML: 5 INJECTION INTRAVENOUS at 11:33

## 2019-06-15 RX ADMIN — APIXABAN 2.5 MG: 2.5 TABLET, FILM COATED ORAL at 20:57

## 2019-06-15 RX ADMIN — FUROSEMIDE 20 MG: 10 INJECTION, SOLUTION INTRAMUSCULAR; INTRAVENOUS at 17:43

## 2019-06-15 RX ADMIN — POTASSIUM CHLORIDE 20 MEQ: 750 CAPSULE, EXTENDED RELEASE ORAL at 15:58

## 2019-06-15 RX ADMIN — GLIMEPIRIDE 2 MG: 2 TABLET ORAL at 15:31

## 2019-06-15 RX ADMIN — TIMOLOL MALEATE 1 DROP: 5 SOLUTION OPHTHALMIC at 21:02

## 2019-06-15 RX ADMIN — ATORVASTATIN CALCIUM 20 MG: 20 TABLET, FILM COATED ORAL at 20:57

## 2019-06-15 RX ADMIN — GABAPENTIN 100 MG: 100 CAPSULE ORAL at 15:31

## 2019-06-15 RX ADMIN — FUROSEMIDE 20 MG: 10 INJECTION, SOLUTION INTRAVENOUS at 11:40

## 2019-06-15 ASSESSMENT — ENCOUNTER SYMPTOMS
DIARRHEA: 0
VOMITING: 0
COUGH: 0
COLOR CHANGE: 0
ABDOMINAL PAIN: 0
NAUSEA: 0
RHINORRHEA: 0
WHEEZING: 0
SINUS PRESSURE: 0
SORE THROAT: 0
SHORTNESS OF BREATH: 1
CONSTIPATION: 0

## 2019-06-15 ASSESSMENT — PAIN SCALES - GENERAL: PAINLEVEL_OUTOF10: 0

## 2019-06-15 NOTE — PROGRESS NOTES
mouth daily.     Yes Historical Provider, MD   montelukast (SINGULAIR) 10 MG tablet Take 10 mg by mouth nightly   Yes Historical Provider, MD   atorvastatin (LIPITOR) 20 MG tablet Take 20 mg by mouth daily   Yes Historical Provider, MD   apixaban (ELIQUIS) 2.5 MG TABS tablet Take 2.5 mg by mouth 2 times daily    Yes Historical Provider, MD   Cholecalciferol (VITAMIN D) 2000 units CAPS capsule Take 2,000 Units by mouth 2 times daily    Yes Historical Provider, MD   magnesium oxide (MAG-OX) 400 MG tablet Take 400 mg by mouth 2 times daily   Yes Historical Provider, MD

## 2019-06-15 NOTE — ED PROVIDER NOTES
POC Glucose 460 (*)     All other components within normal limits   POC GLUCOSE FINGERSTICK - Abnormal; Notable for the following components:    POC Glucose 413 (*)     All other components within normal limits   POC GLUCOSE FINGERSTICK - Abnormal; Notable for the following components:    POC Glucose 254 (*)     All other components within normal limits   POC GLUCOSE FINGERSTICK - Abnormal; Notable for the following components:    POC Glucose 228 (*)     All other components within normal limits   POC GLUCOSE FINGERSTICK - Abnormal; Notable for the following components:    POC Glucose 367 (*)     All other components within normal limits   POC GLUCOSE FINGERSTICK - Abnormal; Notable for the following components:    POC Glucose 302 (*)     All other components within normal limits   POC GLUCOSE FINGERSTICK - Abnormal; Notable for the following components:    POC Glucose 262 (*)     All other components within normal limits   POC GLUCOSE FINGERSTICK - Abnormal; Notable for the following components:    POC Glucose 213 (*)     All other components within normal limits   POC GLUCOSE FINGERSTICK - Abnormal; Notable for the following components:    POC Glucose 281 (*)     All other components within normal limits   POC GLUCOSE FINGERSTICK - Abnormal; Notable for the following components:    POC Glucose 150 (*)     All other components within normal limits   POC GLUCOSE FINGERSTICK - Abnormal; Notable for the following components:    POC Glucose 177 (*)     All other components within normal limits   POC GLUCOSE FINGERSTICK - Abnormal; Notable for the following components:    POC Glucose 158 (*)     All other components within normal limits   POC GLUCOSE FINGERSTICK - Abnormal; Notable for the following components:    POC Glucose 193 (*)     All other components within normal limits   POC GLUCOSE FINGERSTICK - Abnormal; Notable for the following components:    POC Glucose 236 (*)     All other components within normal

## 2019-06-15 NOTE — PROGRESS NOTES
· Bronchodilator assessment   []    Bronchodilator Assessment    FEV1 % PREDICTED unable due to pt age  FEV1 actual:   PEFR    PEFR % Predicted   RR 12  Bronchodilator assessment at level  2  BRONCHODILATOR ASSESSMENT SCORE  Score 1 2 3 4   Breath Sounds   []  Clear [x]  Mild Wheezing with good aeration []  Moderate I/E wheezing with adequate aeration []  Poor Aeration or diffuse wheezing   Respiratory Rate [x]  Less than 20 []  20-25 []  Greater than 25  []  Greater than 35    Dyspnea [x]  No SOB  []  SOB with minimal activity []  Speaking in partial sentences []  Acute/ At rest   Peakflow (asthma) []  80 % or greater predicted/PB  []  Unable []  70% or greater predicted/PB  [x]  Unable []  51%-70% predicted/PB  []  Unable []  Less than 50% predicted/PB  []  Unable due to distress   FEV1 % Predicted []  Greater than 69%  []  Unable  []  Less than 50%-69%  [x]  Unable  []  Less than 35%-49%  []  Unable  []  Less than 35%  []  Unable due to distress     · Bronchodilator assessment   []    Bronchodilator Assessment    FEV1 % PREDICTED   FEV1 actual:   PEFR    PEFR % Predicted   RR 13  Bronchodilator assessment at level  1  BRONCHODILATOR ASSESSMENT SCORE  Score 1 2 3 4   Breath Sounds   []  Clear [x]  Mild Wheezing with good aeration []  Moderate I/E wheezing with adequate aeration []  Poor Aeration or diffuse wheezing   Respiratory Rate [x]  Less than 20 []  20-25 []  Greater than 25  []  Greater than 35    Dyspnea [x]  No SOB  []  SOB with minimal activity []  Speaking in partial sentences []  Acute/ At rest   Peakflow (asthma) []  80 % or greater predicted/PB  [x]  Unable []  70% or greater predicted/PB  []  Unable []  51%-70% predicted/PB  []  Unable []  Less than 50% predicted/PB  []  Unable due to distress   FEV1 % Predicted []  Greater than 69%  [x]  Unable  []  Less than 50%-69%  []  Unable  []  Less than 35%-49%  []  Unable  []  Less than 35%  []  Unable due to distress   · Bronchodilator assessment   [] Bronchodilator Assessment    FEV1 % PREDICTED   FEV1 actual:   PEFR    PEFR % Predicted   RR   Bronchodilator assessment at level    BRONCHODILATOR ASSESSMENT SCORE  Score 1 2 3 4   Breath Sounds   []  Clear []  Mild Wheezing with good aeration []  Moderate I/E wheezing with adequate aeration []  Poor Aeration or diffuse wheezing   Respiratory Rate []  Less than 20 []  20-25 []  Greater than 25  []  Greater than 35    Dyspnea []  No SOB  []  SOB with minimal activity []  Speaking in partial sentences []  Acute/ At rest   Peakflow (asthma) []  80 % or greater predicted/PB  []  Unable []  70% or greater predicted/PB  []  Unable []  51%-70% predicted/PB  []  Unable []  Less than 50% predicted/PB  []  Unable due to distress   FEV1 % Predicted []  Greater than 69%  []  Unable  []  Less than 50%-69%  []  Unable  []  Less than 35%-49%  []  Unable  []  Less than 35%  []  Unable due to distress     MDI Instruction

## 2019-06-15 NOTE — LETTER
Beneficiary Notification Letter     This [place] Provider is Participating in an Innovative Payment and [address] [place] [address] [place] from Medicare     Greetings:   King Tse is participating in a Medicare initiative called the [facility] for [facility]. You are receiving this letter because your health care provider has identified you as a patient who is receiving care through this initiative. Health care providers participating in the [facility], including King Tse, will work with Medicare to improve care for patients. Your Medicare rights have not been changed. You still have all the same Medicare rights and protections, including the right to choose which hospital, doctor, or other health care provider you see. However, because King Tse chose to participate in the [address] [place] [address], all Medicare beneficiaries who meet the eligibility criteria of this initiative will receive care under the initiative. If you do not wish to receive care under the Bundled Payments [facility], you must choose a health care provider that does not participate in this initiative for your care. Regardless of which health care provider you see, Medicare will continue to cover all of your medically necessary services. Bundled Payments for Care Improvement Advanced aims to help improve your care     The Bundled Payments [facility] is an innovative Medicare initiative that encourages your doctors, hospitals, and other health care providers to work more closely together so you get better care during and following certain hospital stays.  In this initiative, doctors and hospitals may work closely with certain health care providers and suppliers that help patients recover after discharge from the hospital, including skilled nursing facilities, home health agencies, inpatient rehabilitation facilities, and long term care hospitals. [place] [address] is working closely with the doctors and other health care providers that care for you during and following your hospital stay and for a period of time after you leave the hospital. By working together, the health care providers are trying to more efficiently provide well-managed, high quality, patient-centered care as you undergo treatment. Hospitals, doctors, and other health care providers that care for you following a hospital stay may receive an additional payment for providing better, more coordinated health care. Medicare will monitor your care to make sure you and others get high quality care. Your feedback is important     Medicare may also ask you to answer a survey about the services and care you received from [facility]. [place] will be mailed to you. Your feedback will improve care for all people with Medicare who receive care from [place] [address]. Completion of this survey is optional.     Get more information     For more information about the Bundled Payments for [address] [place] [address], you can:    · Visit the CMS BPCI Advanced Website at http://fernandez-barajas.net/ initiatives/bpci-advanced   · Call the [facility] team at (356) [phone]. · Call 1-800-MEDICARE (3-910.261.3481). TTY users can call 5-228.808.8870     If you have concerns or complaints about your care, talk to your health care provider, or contact your Beneficiary and Family Centered Quality Improvement Organization [facility]). To get your [facility]'s phone number, visit Medicare.gov/contacts or call 1-800-MEDICARE. · To find a different hospital, visit www. hospitalcompare.[facility].gov or call 1-800[employer] MEDICARE (1-384.528.6234). TTY users should call 9-251.536.6558. · To find a different doctor, visit Medicare's Physician Compare website, HDTapes.co.nz, or call 1-800-MEDICARE (889 5776). TTY users should call 6-224.603.3399. · To find a different skilled nursing facility, visit [facility] Medico website, https://www.[employer]/, or call 1-800-MEDICARE (1- 501.622.5176). TTY users should call 2-253.732.4317. · To find a different long term care hospital, visit [facility] [address] Compare website, Kamibulogy.be, or call 1-800- MEDICARE (810 6975). TTY users should call 7-389.753.5171. · To find a different inpatient rehabilitation facility, visit [facility] Compare website, www.medicare.gov/ inpatientrehabilitation facilitycompare, or call 1-800-MEDICARE (3-756.922.4932). TTY users should call 0- 240.610.8496. · To find a different home health agency, visit [address] website, www.medicare.gov/homehealthcompare, or call 1-800-MEDICARE (1-274- 505-7929). TTY users should call 7-741.330.7743.

## 2019-06-15 NOTE — ED PROVIDER NOTES
[facility]  eMERGENCY dEPARTMENT eNCOUnter      Pt Name: Sudheer Whittaker  MRN: [id number]  Jasongfjeremiah 12/31/1923  Date of evaluation: 6/15/2019  Provider: [address] [place] [address] МАРИЯ, AIDEE Damon 2419       Chief Complaint   Patient presents with    Shortness of Breath       last 2 days    Cough         HISTORY OF PRESENT ILLNESS  (Location/Symptom, Timing/Onset, Context/Setting, Quality, Duration, Modifying Factors, Severity.)   Sudheer Whittaker is a 80 y.o. female who presents to the emergency department private vehicle for evaluation of a cough and shortness of breath. The daughter states for the last 2 days she has had short of breath. She states that she is short of breath with rest and exertion. She also has had a cough with some thick yellow productive sputum. She has a history of heart failure and takes Bumex 1 mg daily. She also has a history of asthma and wears oxygen at home all the time. Nursing Notes were reviewed. ALLERGIES     Penicillins    CURRENT MEDICATIONS       Current Discharge Medication List      CONTINUE these medications which have NOT CHANGED    Details   glimepiride (AMARYL) 2 MG tablet Take 2 mg by mouth daily      budesonide-formoterol (SYMBICORT) 160-4.5 MCG/ACT AERO Inhale 1 puff into the lungs 2 times daily      metFORMIN (GLUCOPHAGE-XR) 500 MG extended release tablet Take 500 mg by mouth 2 times daily (before meals)      diltiazem (CARDIZEM CD) 120 MG extended release capsule Take 120 mg by mouth daily      potassium chloride (MICRO-K) 10 MEQ extended release capsule Take 2 capsules by mouth daily  Qty: 60 capsule, Refills: 0      pantoprazole (PROTONIX) 40 MG tablet Take 1 tablet by mouth every morning (before breakfast)  Qty: 30 tablet, Refills: 0      acetaminophen-codeine (TYLENOL/CODEINE #4) 300-60 MG per tablet Take 1 tablet by mouth daily as needed for Pain.        bumetanide (BUMEX) 1 MG tablet Take 1 mg by mouth daily      brimonidine (ALPHAGAN) range or not returned as of this dictation. EMERGENCY DEPARTMENT COURSE and DIFFERENTIAL DIAGNOSIS/MDM:   Vitals:    Vitals:    06/15/19 1013 06/15/19 1058 06/15/19 1303 06/15/19 1525   BP: (!) 116/54 105/65 (!) 123/46 (!) 106/47   Pulse: 105 93 89 86   Resp: 20 14 16 22   Temp: 98.5 °F (36.9 °C)  97.3 °F (36.3 °C) 97.5 °F (36.4 °C)   TempSrc: Oral  Oral Oral   SpO2: (!) 89% 98% 100% 100%   Weight: 98 lb (44.5 kg)      Height: 4' 6\" (1.372 m)          Medical Decision Making: She is found to have CHF exacerbation.   She will be given IV lasix and admitted to the hospital.    CONSULTS:  IP CONSULT TO INTERNAL MEDICINE  IP CONSULT TO CARDIOLOGY  IP CONSULT TO DIETITIAN        FINAL IMPRESSION    CHF exac    DISPOSITION/PLAN   DISPOSITION Admitted 06/15/2019 11:27:38 AM      PATIENT REFERRED TO:   Elsie Hopper, [address]             DISCHARGE MEDICATIONS:     Current Discharge Medication List              (Please note that portions of this note were completed with a voice recognition program.  Efforts were made to edit the dictations but occasionally words are mis-transcribed.)    [facility] NP, APRN - [facility]  Certified Nurse Practitioner          AIDEE Chan - CNP  06/15/19 100 AIDEE Mcdonald - JOHNATHAN  06/15/19 2661

## 2019-06-16 ENCOUNTER — APPOINTMENT (OUTPATIENT)
Dept: GENERAL RADIOLOGY | Age: 84
DRG: 871 | End: 2019-06-16
Payer: MEDICARE

## 2019-06-16 LAB
ABSOLUTE EOS #: 0.37 K/UL (ref 0–0.44)
ABSOLUTE IMMATURE GRANULOCYTE: 0.03 K/UL (ref 0–0.3)
ABSOLUTE LYMPH #: 1.62 K/UL (ref 1.1–3.7)
ABSOLUTE MONO #: 1.24 K/UL (ref 0.1–1.2)
ANION GAP SERPL CALCULATED.3IONS-SCNC: 11 MMOL/L (ref 9–17)
BASOPHILS # BLD: 1 % (ref 0–2)
BASOPHILS ABSOLUTE: 0.09 K/UL (ref 0–0.2)
BUN BLDV-MCNC: 36 MG/DL (ref 8–23)
BUN/CREAT BLD: 35 (ref 9–20)
CALCIUM SERPL-MCNC: 9.6 MG/DL (ref 8.6–10.4)
CHLORIDE BLD-SCNC: 99 MMOL/L (ref 98–107)
CHOLESTEROL/HDL RATIO: 1.9
CHOLESTEROL: 104 MG/DL
CO2: 29 MMOL/L (ref 20–31)
CREAT SERPL-MCNC: 1.03 MG/DL (ref 0.5–0.9)
CULTURE: NORMAL
DIFFERENTIAL TYPE: ABNORMAL
DIRECT EXAM: NORMAL
DIRECT EXAM: NORMAL
EKG ATRIAL RATE: 102 BPM
EKG ATRIAL RATE: 111 BPM
EKG Q-T INTERVAL: 334 MS
EKG Q-T INTERVAL: 344 MS
EKG QRS DURATION: 76 MS
EKG QRS DURATION: 76 MS
EKG QTC CALCULATION (BAZETT): 417 MS
EKG QTC CALCULATION (BAZETT): 418 MS
EKG R AXIS: -23 DEGREES
EKG R AXIS: 3 DEGREES
EKG T AXIS: 38 DEGREES
EKG T AXIS: 69 DEGREES
EKG VENTRICULAR RATE: 89 BPM
EKG VENTRICULAR RATE: 94 BPM
EOSINOPHILS RELATIVE PERCENT: 4 % (ref 1–4)
GFR AFRICAN AMERICAN: >60 ML/MIN
GFR NON-AFRICAN AMERICAN: 50 ML/MIN
GFR SERPL CREATININE-BSD FRML MDRD: ABNORMAL ML/MIN/{1.73_M2}
GFR SERPL CREATININE-BSD FRML MDRD: ABNORMAL ML/MIN/{1.73_M2}
GLUCOSE BLD-MCNC: 102 MG/DL (ref 70–99)
GLUCOSE BLD-MCNC: 144 MG/DL (ref 65–105)
GLUCOSE BLD-MCNC: 323 MG/DL (ref 65–105)
GLUCOSE BLD-MCNC: 41 MG/DL (ref 65–105)
GLUCOSE BLD-MCNC: 460 MG/DL (ref 65–105)
GLUCOSE BLD-MCNC: 63 MG/DL (ref 65–105)
GLUCOSE BLD-MCNC: 76 MG/DL (ref 65–105)
HCT VFR BLD CALC: 37.3 % (ref 36.3–47.1)
HDLC SERPL-MCNC: 55 MG/DL
HEMOGLOBIN: 11.3 G/DL (ref 11.9–15.1)
IMMATURE GRANULOCYTES: 0 %
LDL CHOLESTEROL: 34 MG/DL (ref 0–130)
LYMPHOCYTES # BLD: 19 % (ref 24–43)
Lab: NORMAL
MAGNESIUM: 2.1 MG/DL (ref 1.6–2.6)
MCH RBC QN AUTO: 30.1 PG (ref 25.2–33.5)
MCHC RBC AUTO-ENTMCNC: 30.3 G/DL (ref 28.4–34.8)
MCV RBC AUTO: 99.5 FL (ref 82.6–102.9)
MONOCYTES # BLD: 15 % (ref 3–12)
NRBC AUTOMATED: 0 PER 100 WBC
PDW BLD-RTO: 15.1 % (ref 11.8–14.4)
PLATELET # BLD: 209 K/UL (ref 138–453)
PLATELET ESTIMATE: ABNORMAL
PMV BLD AUTO: 10.2 FL (ref 8.1–13.5)
POTASSIUM SERPL-SCNC: 4.4 MMOL/L (ref 3.7–5.3)
RBC # BLD: 3.75 M/UL (ref 3.95–5.11)
RBC # BLD: ABNORMAL 10*6/UL
SEG NEUTROPHILS: 61 % (ref 36–65)
SEGMENTED NEUTROPHILS ABSOLUTE COUNT: 5.07 K/UL (ref 1.5–8.1)
SODIUM BLD-SCNC: 139 MMOL/L (ref 135–144)
SPECIMEN DESCRIPTION: NORMAL
TRIGL SERPL-MCNC: 75 MG/DL
VLDLC SERPL CALC-MCNC: NORMAL MG/DL (ref 1–30)
WBC # BLD: 8.4 K/UL (ref 3.5–11.3)
WBC # BLD: ABNORMAL 10*3/UL

## 2019-06-16 PROCEDURE — 85025 COMPLETE CBC W/AUTO DIFF WBC: CPT

## 2019-06-16 PROCEDURE — 94640 AIRWAY INHALATION TREATMENT: CPT

## 2019-06-16 PROCEDURE — 83036 HEMOGLOBIN GLYCOSYLATED A1C: CPT

## 2019-06-16 PROCEDURE — 6360000002 HC RX W HCPCS: Performed by: INTERNAL MEDICINE

## 2019-06-16 PROCEDURE — 71045 X-RAY EXAM CHEST 1 VIEW: CPT

## 2019-06-16 PROCEDURE — 80048 BASIC METABOLIC PNL TOTAL CA: CPT

## 2019-06-16 PROCEDURE — 83735 ASSAY OF MAGNESIUM: CPT

## 2019-06-16 PROCEDURE — 80061 LIPID PANEL: CPT

## 2019-06-16 PROCEDURE — 2700000000 HC OXYGEN THERAPY PER DAY

## 2019-06-16 PROCEDURE — 2580000003 HC RX 258: Performed by: INTERNAL MEDICINE

## 2019-06-16 PROCEDURE — 6370000000 HC RX 637 (ALT 250 FOR IP): Performed by: INTERNAL MEDICINE

## 2019-06-16 PROCEDURE — 82947 ASSAY GLUCOSE BLOOD QUANT: CPT

## 2019-06-16 PROCEDURE — 36415 COLL VENOUS BLD VENIPUNCTURE: CPT

## 2019-06-16 PROCEDURE — 2060000000 HC ICU INTERMEDIATE R&B

## 2019-06-16 RX ORDER — NICOTINE POLACRILEX 4 MG
15 LOZENGE BUCCAL PRN
Status: DISCONTINUED | OUTPATIENT
Start: 2019-06-16 | End: 2019-06-26 | Stop reason: HOSPADM

## 2019-06-16 RX ORDER — METHYLPREDNISOLONE SODIUM SUCCINATE 40 MG/ML
40 INJECTION, POWDER, LYOPHILIZED, FOR SOLUTION INTRAMUSCULAR; INTRAVENOUS EVERY 6 HOURS
Status: COMPLETED | OUTPATIENT
Start: 2019-06-16 | End: 2019-06-17

## 2019-06-16 RX ORDER — FUROSEMIDE 10 MG/ML
40 INJECTION INTRAMUSCULAR; INTRAVENOUS 2 TIMES DAILY
Status: COMPLETED | OUTPATIENT
Start: 2019-06-16 | End: 2019-06-17

## 2019-06-16 RX ORDER — DEXTROSE MONOHYDRATE 50 MG/ML
100 INJECTION, SOLUTION INTRAVENOUS PRN
Status: DISCONTINUED | OUTPATIENT
Start: 2019-06-16 | End: 2019-06-26 | Stop reason: HOSPADM

## 2019-06-16 RX ORDER — DEXTROSE MONOHYDRATE 25 G/50ML
12.5 INJECTION, SOLUTION INTRAVENOUS PRN
Status: DISCONTINUED | OUTPATIENT
Start: 2019-06-16 | End: 2019-06-26 | Stop reason: HOSPADM

## 2019-06-16 RX ADMIN — Medication 10 ML: at 22:26

## 2019-06-16 RX ADMIN — GABAPENTIN 100 MG: 100 CAPSULE ORAL at 09:24

## 2019-06-16 RX ADMIN — DORZOLAMIDE HYDROCHLORIDE 1 DROP: 20 SOLUTION/ DROPS OPHTHALMIC at 22:36

## 2019-06-16 RX ADMIN — MAGNESIUM OXIDE TAB 400 MG (241.3 MG ELEMENTAL MG) 400 MG: 400 (241.3 MG) TAB at 09:25

## 2019-06-16 RX ADMIN — METHYLPREDNISOLONE SODIUM SUCCINATE 40 MG: 40 INJECTION, POWDER, FOR SOLUTION INTRAMUSCULAR; INTRAVENOUS at 22:39

## 2019-06-16 RX ADMIN — GUAIFENESIN 600 MG: 600 TABLET, EXTENDED RELEASE ORAL at 09:24

## 2019-06-16 RX ADMIN — FUROSEMIDE 20 MG: 10 INJECTION, SOLUTION INTRAMUSCULAR; INTRAVENOUS at 09:25

## 2019-06-16 RX ADMIN — APIXABAN 2.5 MG: 2.5 TABLET, FILM COATED ORAL at 09:25

## 2019-06-16 RX ADMIN — BRIMONIDINE TARTRATE 1 DROP: 2 SOLUTION OPHTHALMIC at 22:26

## 2019-06-16 RX ADMIN — VITAMIN D, TAB 1000IU (100/BT) 2000 UNITS: 25 TAB at 09:24

## 2019-06-16 RX ADMIN — INSULIN LISPRO 6 UNITS: 100 INJECTION, SOLUTION INTRAVENOUS; SUBCUTANEOUS at 22:27

## 2019-06-16 RX ADMIN — APIXABAN 2.5 MG: 2.5 TABLET, FILM COATED ORAL at 22:26

## 2019-06-16 RX ADMIN — POTASSIUM CHLORIDE 20 MEQ: 750 CAPSULE, EXTENDED RELEASE ORAL at 10:10

## 2019-06-16 RX ADMIN — DORZOLAMIDE HYDROCHLORIDE 1 DROP: 20 SOLUTION/ DROPS OPHTHALMIC at 09:35

## 2019-06-16 RX ADMIN — MONTELUKAST 10 MG: 10 TABLET, FILM COATED ORAL at 22:26

## 2019-06-16 RX ADMIN — METHYLPREDNISOLONE SODIUM SUCCINATE 40 MG: 40 INJECTION, POWDER, FOR SOLUTION INTRAMUSCULAR; INTRAVENOUS at 14:14

## 2019-06-16 RX ADMIN — AZITHROMYCIN 500 MG: 250 TABLET, FILM COATED ORAL at 09:24

## 2019-06-16 RX ADMIN — MOMETASONE FUROATE AND FORMOTEROL FUMARATE DIHYDRATE 2 PUFF: 200; 5 AEROSOL RESPIRATORY (INHALATION) at 09:51

## 2019-06-16 RX ADMIN — INSULIN LISPRO 4 UNITS: 100 INJECTION, SOLUTION INTRAVENOUS; SUBCUTANEOUS at 14:14

## 2019-06-16 RX ADMIN — LISINOPRIL 5 MG: 5 TABLET ORAL at 09:24

## 2019-06-16 RX ADMIN — TIMOLOL MALEATE 1 DROP: 5 SOLUTION OPHTHALMIC at 09:35

## 2019-06-16 RX ADMIN — GLIMEPIRIDE 2 MG: 2 TABLET ORAL at 09:24

## 2019-06-16 RX ADMIN — MOMETASONE FUROATE AND FORMOTEROL FUMARATE DIHYDRATE 2 PUFF: 200; 5 AEROSOL RESPIRATORY (INHALATION) at 18:11

## 2019-06-16 RX ADMIN — BRIMONIDINE TARTRATE 1 DROP: 2 SOLUTION OPHTHALMIC at 09:35

## 2019-06-16 RX ADMIN — TIMOLOL MALEATE 1 DROP: 5 SOLUTION OPHTHALMIC at 22:36

## 2019-06-16 RX ADMIN — ATORVASTATIN CALCIUM 20 MG: 20 TABLET, FILM COATED ORAL at 22:26

## 2019-06-16 RX ADMIN — DILTIAZEM HYDROCHLORIDE 120 MG: 120 CAPSULE, COATED, EXTENDED RELEASE ORAL at 09:24

## 2019-06-16 RX ADMIN — MAGNESIUM OXIDE TAB 400 MG (241.3 MG ELEMENTAL MG) 400 MG: 400 (241.3 MG) TAB at 22:26

## 2019-06-16 RX ADMIN — GUAIFENESIN 600 MG: 600 TABLET, EXTENDED RELEASE ORAL at 22:26

## 2019-06-16 RX ADMIN — VITAMIN D, TAB 1000IU (100/BT) 2000 UNITS: 25 TAB at 22:26

## 2019-06-16 RX ADMIN — PANTOPRAZOLE SODIUM 40 MG: 40 TABLET, DELAYED RELEASE ORAL at 06:27

## 2019-06-16 RX ADMIN — FUROSEMIDE 40 MG: 10 INJECTION, SOLUTION INTRAMUSCULAR; INTRAVENOUS at 18:33

## 2019-06-16 RX ADMIN — INSULIN LISPRO 3 UNITS: 100 INJECTION, SOLUTION INTRAVENOUS; SUBCUTANEOUS at 22:28

## 2019-06-16 RX ADMIN — Medication 10 ML: at 09:25

## 2019-06-16 NOTE — FLOWSHEET NOTE
Patient was sleeping, no family present.  shared in silent prayer.    leaves prayer card for possible follow up.     06/16/19 2943   Encounter Summary   Services provided to: Patient   Referral/Consult From: [facility]   (6-16-19 PT: sleeping)   Complexity of Encounter Low   Length of Encounter 15 minutes   Spiritual Assessment Completed Yes   Routine   Type Initial   Assessment Sleeping   Intervention Prayer

## 2019-06-16 NOTE — PROGRESS NOTES
Pt's blood sugar noted at 41, given crackers and juice and tyrone to 63. Pt unable to eat the sandwich ordered by her son, family providing additional food from the cafeteria and pt is eating; feeling better. Will recheck. Family at bedside.

## 2019-06-17 LAB
ABSOLUTE EOS #: <0.03 K/UL (ref 0–0.44)
ABSOLUTE IMMATURE GRANULOCYTE: 0.02 K/UL (ref 0–0.3)
ABSOLUTE LYMPH #: 0.8 K/UL (ref 1.1–3.7)
ABSOLUTE MONO #: 0.28 K/UL (ref 0.1–1.2)
ANION GAP SERPL CALCULATED.3IONS-SCNC: 12 MMOL/L (ref 9–17)
BASOPHILS # BLD: 0 % (ref 0–2)
BASOPHILS ABSOLUTE: <0.03 K/UL (ref 0–0.2)
BNP INTERPRETATION: ABNORMAL
BUN BLDV-MCNC: 39 MG/DL (ref 8–23)
BUN/CREAT BLD: 39 (ref 9–20)
CALCIUM SERPL-MCNC: 9.2 MG/DL (ref 8.6–10.4)
CHLORIDE BLD-SCNC: 95 MMOL/L (ref 98–107)
CO2: 30 MMOL/L (ref 20–31)
CREAT SERPL-MCNC: 1.01 MG/DL (ref 0.5–0.9)
DIFFERENTIAL TYPE: ABNORMAL
EOSINOPHILS RELATIVE PERCENT: 0 % (ref 1–4)
ESTIMATED AVERAGE GLUCOSE: 157 MG/DL
GFR AFRICAN AMERICAN: >60 ML/MIN
GFR NON-AFRICAN AMERICAN: 51 ML/MIN
GFR SERPL CREATININE-BSD FRML MDRD: ABNORMAL ML/MIN/{1.73_M2}
GFR SERPL CREATININE-BSD FRML MDRD: ABNORMAL ML/MIN/{1.73_M2}
GLUCOSE BLD-MCNC: 228 MG/DL (ref 65–105)
GLUCOSE BLD-MCNC: 254 MG/DL (ref 65–105)
GLUCOSE BLD-MCNC: 261 MG/DL (ref 70–99)
GLUCOSE BLD-MCNC: 262 MG/DL (ref 65–105)
GLUCOSE BLD-MCNC: 302 MG/DL (ref 65–105)
GLUCOSE BLD-MCNC: 367 MG/DL (ref 65–105)
GLUCOSE BLD-MCNC: 413 MG/DL (ref 65–105)
HBA1C MFR BLD: 7.1 % (ref 4–6)
HCT VFR BLD CALC: 33.6 % (ref 36.3–47.1)
HEMOGLOBIN: 10.4 G/DL (ref 11.9–15.1)
IMMATURE GRANULOCYTES: 0 %
LYMPHOCYTES # BLD: 16 % (ref 24–43)
MAGNESIUM: 1.9 MG/DL (ref 1.6–2.6)
MCH RBC QN AUTO: 29.9 PG (ref 25.2–33.5)
MCHC RBC AUTO-ENTMCNC: 31 G/DL (ref 28.4–34.8)
MCV RBC AUTO: 96.6 FL (ref 82.6–102.9)
MONOCYTES # BLD: 5 % (ref 3–12)
NRBC AUTOMATED: 0 PER 100 WBC
PDW BLD-RTO: 14.6 % (ref 11.8–14.4)
PLATELET # BLD: 209 K/UL (ref 138–453)
PLATELET ESTIMATE: ABNORMAL
PMV BLD AUTO: 10.2 FL (ref 8.1–13.5)
POTASSIUM SERPL-SCNC: 4.1 MMOL/L (ref 3.7–5.3)
PRO-BNP: 2106 PG/ML
RBC # BLD: 3.48 M/UL (ref 3.95–5.11)
RBC # BLD: ABNORMAL 10*6/UL
SEG NEUTROPHILS: 79 % (ref 36–65)
SEGMENTED NEUTROPHILS ABSOLUTE COUNT: 4.07 K/UL (ref 1.5–8.1)
SODIUM BLD-SCNC: 137 MMOL/L (ref 135–144)
WBC # BLD: 5.2 K/UL (ref 3.5–11.3)
WBC # BLD: ABNORMAL 10*3/UL

## 2019-06-17 PROCEDURE — 83880 ASSAY OF NATRIURETIC PEPTIDE: CPT

## 2019-06-17 PROCEDURE — 6360000002 HC RX W HCPCS: Performed by: INTERNAL MEDICINE

## 2019-06-17 PROCEDURE — 85025 COMPLETE CBC W/AUTO DIFF WBC: CPT

## 2019-06-17 PROCEDURE — 2700000000 HC OXYGEN THERAPY PER DAY

## 2019-06-17 PROCEDURE — 6370000000 HC RX 637 (ALT 250 FOR IP): Performed by: INTERNAL MEDICINE

## 2019-06-17 PROCEDURE — 36415 COLL VENOUS BLD VENIPUNCTURE: CPT

## 2019-06-17 PROCEDURE — 83735 ASSAY OF MAGNESIUM: CPT

## 2019-06-17 PROCEDURE — 94640 AIRWAY INHALATION TREATMENT: CPT

## 2019-06-17 PROCEDURE — 2060000000 HC ICU INTERMEDIATE R&B

## 2019-06-17 PROCEDURE — 80048 BASIC METABOLIC PNL TOTAL CA: CPT

## 2019-06-17 PROCEDURE — 82947 ASSAY GLUCOSE BLOOD QUANT: CPT

## 2019-06-17 PROCEDURE — 2580000003 HC RX 258: Performed by: INTERNAL MEDICINE

## 2019-06-17 RX ORDER — FUROSEMIDE 40 MG/1
40 TABLET ORAL DAILY
Status: DISCONTINUED | OUTPATIENT
Start: 2019-06-18 | End: 2019-06-22

## 2019-06-17 RX ADMIN — GUAIFENESIN 600 MG: 600 TABLET, EXTENDED RELEASE ORAL at 21:54

## 2019-06-17 RX ADMIN — FUROSEMIDE 40 MG: 10 INJECTION, SOLUTION INTRAMUSCULAR; INTRAVENOUS at 17:21

## 2019-06-17 RX ADMIN — PANTOPRAZOLE SODIUM 40 MG: 40 TABLET, DELAYED RELEASE ORAL at 05:28

## 2019-06-17 RX ADMIN — INSULIN LISPRO 4 UNITS: 100 INJECTION, SOLUTION INTRAVENOUS; SUBCUTANEOUS at 16:50

## 2019-06-17 RX ADMIN — AZITHROMYCIN 500 MG: 250 TABLET, FILM COATED ORAL at 08:17

## 2019-06-17 RX ADMIN — GLIMEPIRIDE 2 MG: 2 TABLET ORAL at 08:20

## 2019-06-17 RX ADMIN — Medication 10 ML: at 08:20

## 2019-06-17 RX ADMIN — POTASSIUM CHLORIDE 20 MEQ: 750 CAPSULE, EXTENDED RELEASE ORAL at 08:20

## 2019-06-17 RX ADMIN — MAGNESIUM OXIDE TAB 400 MG (241.3 MG ELEMENTAL MG) 400 MG: 400 (241.3 MG) TAB at 21:54

## 2019-06-17 RX ADMIN — MAGNESIUM OXIDE TAB 400 MG (241.3 MG ELEMENTAL MG) 400 MG: 400 (241.3 MG) TAB at 08:18

## 2019-06-17 RX ADMIN — APIXABAN 2.5 MG: 2.5 TABLET, FILM COATED ORAL at 21:54

## 2019-06-17 RX ADMIN — GUAIFENESIN 600 MG: 600 TABLET, EXTENDED RELEASE ORAL at 08:18

## 2019-06-17 RX ADMIN — DORZOLAMIDE HYDROCHLORIDE 1 DROP: 20 SOLUTION/ DROPS OPHTHALMIC at 21:55

## 2019-06-17 RX ADMIN — VITAMIN D, TAB 1000IU (100/BT) 2000 UNITS: 25 TAB at 08:15

## 2019-06-17 RX ADMIN — INSULIN LISPRO 5 UNITS: 100 INJECTION, SOLUTION INTRAVENOUS; SUBCUTANEOUS at 13:33

## 2019-06-17 RX ADMIN — ATORVASTATIN CALCIUM 20 MG: 20 TABLET, FILM COATED ORAL at 21:59

## 2019-06-17 RX ADMIN — DILTIAZEM HYDROCHLORIDE 120 MG: 120 CAPSULE, COATED, EXTENDED RELEASE ORAL at 08:20

## 2019-06-17 RX ADMIN — GABAPENTIN 100 MG: 100 CAPSULE ORAL at 08:15

## 2019-06-17 RX ADMIN — BRIMONIDINE TARTRATE 1 DROP: 2 SOLUTION OPHTHALMIC at 08:20

## 2019-06-17 RX ADMIN — TIMOLOL MALEATE 1 DROP: 5 SOLUTION OPHTHALMIC at 08:17

## 2019-06-17 RX ADMIN — BRIMONIDINE TARTRATE 1 DROP: 2 SOLUTION OPHTHALMIC at 21:54

## 2019-06-17 RX ADMIN — MONTELUKAST 10 MG: 10 TABLET, FILM COATED ORAL at 21:54

## 2019-06-17 RX ADMIN — Medication 10 ML: at 21:55

## 2019-06-17 RX ADMIN — MOMETASONE FUROATE AND FORMOTEROL FUMARATE DIHYDRATE 2 PUFF: 200; 5 AEROSOL RESPIRATORY (INHALATION) at 21:07

## 2019-06-17 RX ADMIN — INSULIN LISPRO 2 UNITS: 100 INJECTION, SOLUTION INTRAVENOUS; SUBCUTANEOUS at 09:55

## 2019-06-17 RX ADMIN — INSULIN LISPRO 2 UNITS: 100 INJECTION, SOLUTION INTRAVENOUS; SUBCUTANEOUS at 21:54

## 2019-06-17 RX ADMIN — VITAMIN D, TAB 1000IU (100/BT) 2000 UNITS: 25 TAB at 21:54

## 2019-06-17 RX ADMIN — INSULIN LISPRO 3 UNITS: 100 INJECTION, SOLUTION INTRAVENOUS; SUBCUTANEOUS at 01:11

## 2019-06-17 RX ADMIN — MOMETASONE FUROATE AND FORMOTEROL FUMARATE DIHYDRATE 2 PUFF: 200; 5 AEROSOL RESPIRATORY (INHALATION) at 09:31

## 2019-06-17 RX ADMIN — LISINOPRIL 5 MG: 5 TABLET ORAL at 08:18

## 2019-06-17 RX ADMIN — TIMOLOL MALEATE 1 DROP: 5 SOLUTION OPHTHALMIC at 21:55

## 2019-06-17 RX ADMIN — METHYLPREDNISOLONE SODIUM SUCCINATE 40 MG: 40 INJECTION, POWDER, FOR SOLUTION INTRAMUSCULAR; INTRAVENOUS at 01:11

## 2019-06-17 RX ADMIN — FUROSEMIDE 40 MG: 10 INJECTION, SOLUTION INTRAMUSCULAR; INTRAVENOUS at 08:20

## 2019-06-17 RX ADMIN — DORZOLAMIDE HYDROCHLORIDE 1 DROP: 20 SOLUTION/ DROPS OPHTHALMIC at 08:15

## 2019-06-17 RX ADMIN — APIXABAN 2.5 MG: 2.5 TABLET, FILM COATED ORAL at 08:20

## 2019-06-17 ASSESSMENT — PAIN SCALES - GENERAL: PAINLEVEL_OUTOF10: 0

## 2019-06-17 NOTE — PROGRESS NOTES
11/13/2018        Assessment:  Patient Active Problem List   Diagnosis    Pneumonia    Sepsis ([address]    Moderate malnutrition ([address]    Pulmonary HTN ([address]    Non-rheumatic mitral regurgitation    Acute on chronic combined systolic and diastolic congestive heart failure (HCC)    Chronic atrial fibrillation (HCC)    Type 2 diabetes mellitus with hyperglycemia (HCC)    Acute bronchitis    Acute congestive heart failure (HCC)    Hypotensive episode    Acute respiratory failure ([address]    Other dysphagia    Unintentional weight loss    Moderate malnutrition ([address]    CHF (congestive heart failure), NYHA class I, acute on chronic, combined ([address]     Acute bronchitis continue continue with antibiotics  COPD exacerbation better continue with bronchodilators  Chronic respiratory failure on home O2 continue with oxygen  Chronic atrial fibrillation on long-term anticoagulation  Acute on chronic diastolic combined CHF  Type 2 diabetes with hyperglycemia  Plan:    Meds labs reviewed continue with diuresis switch to oral diuretics tomorrow recheck BMP CAD and BMP and chest x-ray in the morning physical therapy occupational therapy switch to continue with oral antibiotics see orders      Jackie Sales MD  7:42 PM

## 2019-06-17 NOTE — PLAN OF CARE
Problem: Falls - Risk of:  Goal: Will remain free from falls  Description  Will remain free from falls  Outcome: Ongoing  Note:   Pt fall risk, fall band present, falling star, safety alarm activated and in use as needed. Bed in lowest position, call light within reach. Hourly rounding performed. Pt encouraged to use call light. See Rosa Ahmadi fall risk assessment. Patient offered toileting assistance during rounding. Hourly rounds performed. Problem: Risk for Impaired Skin Integrity  Goal: Tissue integrity - skin and mucous membranes  Description  Structural intactness and normal physiological function of skin and  mucous membranes. Outcome: Ongoing  Note:   Skin assessment complete. Pt. Monitored for s/s of infection. Turn and reposition in bed, pressure reducing mattress, monitoring skin with every assessment and prn. Pressure ulcer preventions being practiced. Will continue to monitor.        Problem: HEMODYNAMIC STATUS  Goal: Patient has stable vital signs and fluid balance  Outcome: Ongoing

## 2019-06-17 NOTE — PLAN OF CARE
Problem: Falls - Risk of:  Goal: Will remain free from falls  Description  Will remain free from falls  6/17/2019 1045 by Osbaldo Mercado RN  Outcome: Ongoing   Patient is a high fall risk per falls risk assessment. Remains free from falls and injuries, call light at reach and utilized appropriately and waits for assist. Bed in lowest position with wheels locked and alarm armed. Personal items that are used frequently are within reach. Up to chair with assist, no attempts to get out of bed unassisted noted or reported, falling star program and hourly rounding implemented, will continue to monitor and educate as needed       Problem: Risk for Impaired Skin Integrity  Goal: Tissue integrity - skin and mucous membranes  Description  Structural intactness and normal physiological function of skin and  mucous membranes. 6/17/2019 1045 by Osbaldo Mercado RN  Outcome: Ongoing    Skin assessment completed every shift and prn, pt continent of bowel and bladder. Waffle mattress inflated, pillow support used, nutritional intake monitored and supplemented with meals. Skin remains intact, breakdown noted, slight redness to buttock with protective treatment (mepilex) in place,  will continue to monitor and report abnormal findings.

## 2019-06-17 NOTE — DISCHARGE INSTR - COC
failure), NYHA class I, acute on chronic, combined (HCC) I50.43       Isolation/Infection:   Isolation          No Isolation            Nurse Assessment:  Last Vital Signs: /60   Pulse 85   Temp 98.1 °F (36.7 °C) (Oral)   Resp 16   Ht 4' 6\" (1.372 m)   Wt 97 lb (44 kg)   SpO2 100%   BMI 23.39 kg/m²     Last documented pain score (0-10 scale): Pain Level: 0  Last Weight:   Wt Readings from Last 1 Encounters:   06/17/19 97 lb (44 kg)     Mental Status:  oriented and alert    IV Access:  - None    Nursing Mobility/ADLs:  Walking   Assisted  Transfer  Assisted  Bathing  Assisted  Dressing  Assisted  Toileting  Assisted  Feeding  Independent  Med Admin  Assisted  Med Delivery   whole    Wound Care Documentation and Therapy:        Elimination:  Continence:   · Bowel: Yes  · Bladder: No  Urinary Catheter: Removal Date 6/25/19 @ 1200   Colostomy/Ileostomy/Ileal Conduit: No       Date of Last BM: 6/25/19    Intake/Output Summary (Last 24 hours) at 6/17/2019 1552  Last data filed at 6/17/2019 0713  Gross per 24 hour   Intake 240 ml   Output 1700 ml   Net -1460 ml     I/O last 3 completed shifts: In: 240 [P.O.:240]  Out: 1950 [Urine:1950]    Safety Concerns: At Risk for Falls    Impairments/Disabilities:      Language Barrier - Slovak     Nutrition Therapy:  Current Nutrition Therapy:   - Oral Diet:  Carb Control 4 carbs/meal (1800kcals/day)    Routes of Feeding: Oral  Liquids: No Restrictions  Daily Fluid Restriction: yes - amount 2000/day  Last Modified Barium Swallow with Video (Video Swallowing Test): 6/24/19 - pt passed video swallow study     Treatments at the Time of Hospital Discharge:   Respiratory Treatments: ***  Oxygen Therapy:  is on oxygen at 3 L/min per nasal cannula.   Ventilator:    - No ventilator support    Rehab Therapies: Physical Therapy and Occupational Therapy  Weight Bearing Status/Restrictions: No weight bearing restirctions  Other Medical Equipment (for information only, NOT a DME order):  walker  Other Treatments:   Skilled nurse assessment  Medication teaching and compliance    Patient's personal belongings (please select all that are sent with patient):  Dentures upper and lower    RN SIGNATURE:  Electronically signed by Светлана Norris RN on 6/25/19 at 2:12 PM    CASE MANAGEMENT/SOCIAL WORK SECTION    Inpatient Status Date: 06/15/2019    Readmission Risk Assessment Score:  Readmission Risk              Risk of Unplanned Readmission:        24           Discharging to Facility/ Agency   · Name: [facility]  · Address:  · Phone: [phone]  · Fax: [phone]    Dialysis Facility (if applicable)   · Name:  · Address:  · Dialysis Schedule:  · Phone:  · Fax:    / signature: Electronically signed by Christina Panchal on 6/17/19 at 3:59 PM    PHYSICIAN SECTION    Prognosis: Fair    Condition at Discharge: Stable    Rehab Potential (if transferring to Rehab): Fair    Recommended Labs or Other Treatments After Discharge: BMP CBC urine analysis in 3 days, check blood sugars before meals and at bedtime check    Physician Certification: I certify the above information and transfer of Moody Julio  is necessary for the continuing treatment of the diagnosis listed and that she requires Home Care for less 30 days.      Update Admission H&P: No change in H&P    PHYSICIAN SIGNATURE:  Electronically signed by Ulysses Brenner, MD on 6/25/19 at 5:59 PM

## 2019-06-18 ENCOUNTER — APPOINTMENT (OUTPATIENT)
Dept: GENERAL RADIOLOGY | Age: 84
DRG: 871 | End: 2019-06-18
Payer: MEDICARE

## 2019-06-18 LAB
-: NORMAL
ABSOLUTE EOS #: 0 K/UL (ref 0–0.44)
ABSOLUTE IMMATURE GRANULOCYTE: 0 K/UL (ref 0–0.3)
ABSOLUTE LYMPH #: 0.78 K/UL (ref 1.1–3.7)
ABSOLUTE MONO #: 1.72 K/UL (ref 0.1–1.2)
ANION GAP SERPL CALCULATED.3IONS-SCNC: 14 MMOL/L (ref 9–17)
BASOPHILS # BLD: 0 % (ref 0–2)
BASOPHILS ABSOLUTE: 0 K/UL (ref 0–0.2)
BNP INTERPRETATION: ABNORMAL
BUN BLDV-MCNC: 37 MG/DL (ref 8–23)
BUN/CREAT BLD: 44 (ref 9–20)
CALCIUM SERPL-MCNC: 9.3 MG/DL (ref 8.6–10.4)
CHLORIDE BLD-SCNC: 97 MMOL/L (ref 98–107)
CO2: 27 MMOL/L (ref 20–31)
CREAT SERPL-MCNC: 0.85 MG/DL (ref 0.5–0.9)
DIFFERENTIAL TYPE: ABNORMAL
EOSINOPHILS RELATIVE PERCENT: 0 % (ref 1–4)
FIO2: ABNORMAL
GFR AFRICAN AMERICAN: >60 ML/MIN
GFR NON-AFRICAN AMERICAN: >60 ML/MIN
GFR SERPL CREATININE-BSD FRML MDRD: ABNORMAL ML/MIN/{1.73_M2}
GFR SERPL CREATININE-BSD FRML MDRD: ABNORMAL ML/MIN/{1.73_M2}
GLUCOSE BLD-MCNC: 150 MG/DL (ref 65–105)
GLUCOSE BLD-MCNC: 213 MG/DL (ref 65–105)
GLUCOSE BLD-MCNC: 215 MG/DL (ref 70–99)
GLUCOSE BLD-MCNC: 281 MG/DL (ref 65–105)
HCT VFR BLD CALC: 37 % (ref 36.3–47.1)
HEMOGLOBIN: 11.2 G/DL (ref 11.9–15.1)
IMMATURE GRANULOCYTES: 0 %
INR BLD: 1.4
LACTIC ACID: 2.7 MMOL/L (ref 0.5–2.2)
LYMPHOCYTES # BLD: 5 % (ref 24–43)
MAGNESIUM: 1.9 MG/DL (ref 1.6–2.6)
MCH RBC QN AUTO: 29.7 PG (ref 25.2–33.5)
MCHC RBC AUTO-ENTMCNC: 30.3 G/DL (ref 28.4–34.8)
MCV RBC AUTO: 98.1 FL (ref 82.6–102.9)
MONOCYTES # BLD: 11 % (ref 3–12)
NEGATIVE BASE EXCESS, ART: ABNORMAL (ref 0–2)
NRBC AUTOMATED: 0 PER 100 WBC
O2 DEVICE/FLOW/%: ABNORMAL
PARTIAL THROMBOPLASTIN TIME: 29.3 SEC (ref 23–31)
PATIENT TEMP: ABNORMAL
PDW BLD-RTO: 15.1 % (ref 11.8–14.4)
PLATELET # BLD: 233 K/UL (ref 138–453)
PLATELET ESTIMATE: ABNORMAL
PMV BLD AUTO: 10.3 FL (ref 8.1–13.5)
POC HCO3: 31.6 MMOL/L (ref 22–27)
POC O2 SATURATION: 91 %
POC PCO2 TEMP: ABNORMAL MM HG
POC PCO2: 76 MM HG (ref 32–45)
POC PH TEMP: ABNORMAL
POC PH: 7.23 (ref 7.35–7.45)
POC PO2 TEMP: ABNORMAL MM HG
POC PO2: 76 MM HG (ref 75–95)
POSITIVE BASE EXCESS, ART: 4 (ref 0–2)
POTASSIUM SERPL-SCNC: 4.2 MMOL/L (ref 3.7–5.3)
PRO-BNP: 2119 PG/ML
PROCALCITONIN: 0.23 NG/ML
PROTHROMBIN TIME: 13.7 SEC (ref 9.7–11.6)
RBC # BLD: 3.77 M/UL (ref 3.95–5.11)
RBC # BLD: ABNORMAL 10*6/UL
REASON FOR REJECTION: NORMAL
SEG NEUTROPHILS: 84 % (ref 36–65)
SEGMENTED NEUTROPHILS ABSOLUTE COUNT: 13.1 K/UL (ref 1.5–8.1)
SODIUM BLD-SCNC: 138 MMOL/L (ref 135–144)
TCO2 (CALC), ART: 34 MMOL/L (ref 23–28)
WBC # BLD: 15.6 K/UL (ref 3.5–11.3)
WBC # BLD: ABNORMAL 10*3/UL
ZZ NTE CLEAN UP: ORDERED TEST: NORMAL
ZZ NTE WITH NAME CLEAN UP: SPECIMEN SOURCE: NORMAL

## 2019-06-18 PROCEDURE — 87486 CHLMYD PNEUM DNA AMP PROBE: CPT

## 2019-06-18 PROCEDURE — 36600 WITHDRAWAL OF ARTERIAL BLOOD: CPT

## 2019-06-18 PROCEDURE — 82803 BLOOD GASES ANY COMBINATION: CPT

## 2019-06-18 PROCEDURE — 85610 PROTHROMBIN TIME: CPT

## 2019-06-18 PROCEDURE — 2700000000 HC OXYGEN THERAPY PER DAY

## 2019-06-18 PROCEDURE — 94660 CPAP INITIATION&MGMT: CPT

## 2019-06-18 PROCEDURE — 83605 ASSAY OF LACTIC ACID: CPT

## 2019-06-18 PROCEDURE — 80048 BASIC METABOLIC PNL TOTAL CA: CPT

## 2019-06-18 PROCEDURE — 6370000000 HC RX 637 (ALT 250 FOR IP): Performed by: INTERNAL MEDICINE

## 2019-06-18 PROCEDURE — 82805 BLOOD GASES W/O2 SATURATION: CPT

## 2019-06-18 PROCEDURE — 94640 AIRWAY INHALATION TREATMENT: CPT

## 2019-06-18 PROCEDURE — 87633 RESP VIRUS 12-25 TARGETS: CPT

## 2019-06-18 PROCEDURE — 71045 X-RAY EXAM CHEST 1 VIEW: CPT

## 2019-06-18 PROCEDURE — 85730 THROMBOPLASTIN TIME PARTIAL: CPT

## 2019-06-18 PROCEDURE — 6370000000 HC RX 637 (ALT 250 FOR IP): Performed by: NURSE PRACTITIONER

## 2019-06-18 PROCEDURE — 2580000003 HC RX 258: Performed by: INTERNAL MEDICINE

## 2019-06-18 PROCEDURE — 87086 URINE CULTURE/COLONY COUNT: CPT

## 2019-06-18 PROCEDURE — 87798 DETECT AGENT NOS DNA AMP: CPT

## 2019-06-18 PROCEDURE — 87581 M.PNEUMON DNA AMP PROBE: CPT

## 2019-06-18 PROCEDURE — 51702 INSERT TEMP BLADDER CATH: CPT

## 2019-06-18 PROCEDURE — 82947 ASSAY GLUCOSE BLOOD QUANT: CPT

## 2019-06-18 PROCEDURE — 36415 COLL VENOUS BLD VENIPUNCTURE: CPT

## 2019-06-18 PROCEDURE — 94761 N-INVAS EAR/PLS OXIMETRY MLT: CPT

## 2019-06-18 PROCEDURE — 83880 ASSAY OF NATRIURETIC PEPTIDE: CPT

## 2019-06-18 PROCEDURE — 83735 ASSAY OF MAGNESIUM: CPT

## 2019-06-18 PROCEDURE — 2000000000 HC ICU R&B

## 2019-06-18 PROCEDURE — 84145 PROCALCITONIN (PCT): CPT

## 2019-06-18 PROCEDURE — 94760 N-INVAS EAR/PLS OXIMETRY 1: CPT

## 2019-06-18 PROCEDURE — 85025 COMPLETE CBC W/AUTO DIFF WBC: CPT

## 2019-06-18 RX ORDER — IPRATROPIUM BROMIDE AND ALBUTEROL SULFATE 2.5; .5 MG/3ML; MG/3ML
1 SOLUTION RESPIRATORY (INHALATION)
Status: DISCONTINUED | OUTPATIENT
Start: 2019-06-18 | End: 2019-06-26 | Stop reason: HOSPADM

## 2019-06-18 RX ORDER — LORAZEPAM 1 MG/1
1 TABLET ORAL ONCE
Status: COMPLETED | OUTPATIENT
Start: 2019-06-18 | End: 2019-06-18

## 2019-06-18 RX ADMIN — TIMOLOL MALEATE 1 DROP: 5 SOLUTION OPHTHALMIC at 08:31

## 2019-06-18 RX ADMIN — GLIMEPIRIDE 2 MG: 2 TABLET ORAL at 09:42

## 2019-06-18 RX ADMIN — INSULIN LISPRO 2 UNITS: 100 INJECTION, SOLUTION INTRAVENOUS; SUBCUTANEOUS at 09:49

## 2019-06-18 RX ADMIN — TIMOLOL MALEATE 1 DROP: 5 SOLUTION OPHTHALMIC at 21:25

## 2019-06-18 RX ADMIN — ALBUTEROL SULFATE 2 PUFF: 90 AEROSOL, METERED RESPIRATORY (INHALATION) at 07:36

## 2019-06-18 RX ADMIN — GUAIFENESIN 600 MG: 600 TABLET, EXTENDED RELEASE ORAL at 09:42

## 2019-06-18 RX ADMIN — Medication 10 ML: at 08:32

## 2019-06-18 RX ADMIN — FUROSEMIDE 40 MG: 40 TABLET ORAL at 09:42

## 2019-06-18 RX ADMIN — IPRATROPIUM BROMIDE AND ALBUTEROL SULFATE 1 AMPULE: .5; 3 SOLUTION RESPIRATORY (INHALATION) at 15:10

## 2019-06-18 RX ADMIN — POTASSIUM CHLORIDE 20 MEQ: 750 CAPSULE, EXTENDED RELEASE ORAL at 09:42

## 2019-06-18 RX ADMIN — LORAZEPAM 1 MG: 1 TABLET ORAL at 13:27

## 2019-06-18 RX ADMIN — MOMETASONE FUROATE AND FORMOTEROL FUMARATE DIHYDRATE 2 PUFF: 200; 5 AEROSOL RESPIRATORY (INHALATION) at 07:35

## 2019-06-18 RX ADMIN — INSULIN LISPRO 3 UNITS: 100 INJECTION, SOLUTION INTRAVENOUS; SUBCUTANEOUS at 13:27

## 2019-06-18 RX ADMIN — DORZOLAMIDE HYDROCHLORIDE 1 DROP: 20 SOLUTION/ DROPS OPHTHALMIC at 21:25

## 2019-06-18 RX ADMIN — MAGNESIUM OXIDE TAB 400 MG (241.3 MG ELEMENTAL MG) 400 MG: 400 (241.3 MG) TAB at 09:42

## 2019-06-18 RX ADMIN — APIXABAN 2.5 MG: 2.5 TABLET, FILM COATED ORAL at 09:42

## 2019-06-18 RX ADMIN — BRIMONIDINE TARTRATE 1 DROP: 2 SOLUTION OPHTHALMIC at 21:25

## 2019-06-18 RX ADMIN — AZITHROMYCIN 500 MG: 250 TABLET, FILM COATED ORAL at 09:42

## 2019-06-18 RX ADMIN — INSULIN LISPRO 1 UNITS: 100 INJECTION, SOLUTION INTRAVENOUS; SUBCUTANEOUS at 23:45

## 2019-06-18 RX ADMIN — VITAMIN D, TAB 1000IU (100/BT) 2000 UNITS: 25 TAB at 09:42

## 2019-06-18 RX ADMIN — LISINOPRIL 5 MG: 5 TABLET ORAL at 09:42

## 2019-06-18 RX ADMIN — IPRATROPIUM BROMIDE AND ALBUTEROL SULFATE 1 AMPULE: .5; 3 SOLUTION RESPIRATORY (INHALATION) at 19:10

## 2019-06-18 RX ADMIN — DILTIAZEM HYDROCHLORIDE 120 MG: 120 CAPSULE, COATED, EXTENDED RELEASE ORAL at 09:42

## 2019-06-18 RX ADMIN — PANTOPRAZOLE SODIUM 40 MG: 40 TABLET, DELAYED RELEASE ORAL at 06:11

## 2019-06-18 RX ADMIN — BRIMONIDINE TARTRATE 1 DROP: 2 SOLUTION OPHTHALMIC at 08:33

## 2019-06-18 RX ADMIN — DORZOLAMIDE HYDROCHLORIDE 1 DROP: 20 SOLUTION/ DROPS OPHTHALMIC at 08:34

## 2019-06-18 RX ADMIN — GABAPENTIN 100 MG: 100 CAPSULE ORAL at 09:42

## 2019-06-18 NOTE — PROGRESS NOTES
06/18/19 1700 06/18/19 1713   BP: (!) 101/58  (!) 83/46 (!) 104/55   Pulse: 104  101 107   Resp: 23 28 28 23   Temp: 99 °F (37.2 °C)      TempSrc: Infrared      SpO2: 100%  95% 96%   Weight:       Height:             Intake/Output Summary (Last 24 hours) at 6/18/2019 1737  Last data filed at 6/18/2019 1244  Gross per 24 hour   Intake 120 ml   Output 1400 ml   Net -1280 ml       Exam:  CONSTITUTIONAL:  awake, alert, cooperative, no apparent distress, and appears stated age. HEENT: Normal jugular venous pulsations, no carotid bruits. Head is atraumatic, normocephalic. Eyes and oral mucosa are normal.  LUNGS: Good respiratory effort On auscultation: rales bilaterally  CARDIOVASCULAR:  Normal apical impulse, irregular rate and rhythm, normal  S2, no S3 or S4, and no murmur or rub noted. ABDOMEN: Soft, nontender, nondistended. Bowel sounds present. No masses or tenderness. No bruit. SKIN: Warm and dry. EXTREMITIES: No bilateral lower extremity edema. Motor movement grossly intact. No cyanosis or clubbing.       DIAGNOSTICS     Studies:    Telemetry: afib  EKG: afib with NS T changes  Echocardiogram: EF 40%, Ant/AS/AL hypok, MR (m/M), TR (M/S), pH (M/S)      Laboratory testing:  Lab Results   Component Value Date     06/18/2019     06/17/2019     06/16/2019    K 4.2 06/18/2019    K 4.1 06/17/2019    K 4.4 06/16/2019    CL 97 (L) 06/18/2019    CL 95 (L) 06/17/2019    CL 99 06/16/2019    CO2 27 06/18/2019    CO2 30 06/17/2019    CO2 29 06/16/2019    BUN 37 (H) 06/18/2019    BUN 39 (H) 06/17/2019    BUN 36 (H) 06/16/2019    CREATININE 0.85 06/18/2019    CREATININE 1.01 (H) 06/17/2019    CREATININE 1.03 (H) 06/16/2019    CALCIUM 9.3 06/18/2019    CALCIUM 9.2 06/17/2019    CALCIUM 9.6 06/16/2019    LABALBU 4.2 11/13/2018    LABALBU 4.3 05/09/2018    LABALBU 3.4 (L) 09/24/2017    PROT 7.9 11/13/2018    PROT 7.5 05/09/2018    PROT 6.4 09/24/2017    BILITOT 0.84 11/13/2018    BILITOT 0.51 05/09/2018

## 2019-06-18 NOTE — CONSULTS
Resp (!) 36   Ht 4' 6\" (1.372 m)   Wt 96 lb 14.4 oz (44 kg)   SpO2 95%   BMI 23.36 kg/m²   Body mass index is 23.36 kg/m². I/O        Intake/Output Summary (Last 24 hours) at 6/18/2019 1210  Last data filed at 6/17/2019 2348  Gross per 24 hour   Intake 120 ml   Output 600 ml   Net -480 ml     I/O last 3 completed shifts: In: 120 [P.O.:120]  Out: 1000 [Urine:1000]   Patient Vitals for the past 96 hrs (Last 3 readings):   Weight   06/18/19 0454 96 lb 14.4 oz (44 kg)   06/17/19 0455 97 lb (44 kg)   06/16/19 0436 102 lb 3.2 oz (46.4 kg)     Exam   General Appearance + respiratory distress, son at bedside, on BiPAP  HEENT - Head is normocephalic, atraumatic. Pupil reactive to light  Neck - Supple, symmetrical, trachea midline and Soft, trachea midline and straight  Lungs - + coarse lung sounds + wheeze  Cardiovascular - tachycardic   abdomen - Soft, nontender, nondistended, no masses or organomegaly  Neurologic - CN II-XII are grossly intact.  There are no focal motor or sensory deficits  Skin - No bruising or bleeding  Extremities - No cyanosis, clubbing or + edema    Labs  - Old records and notes have been reviewed in [facility]   CBC     Lab Results   Component Value Date    WBC 15.6 06/18/2019    RBC 3.77 06/18/2019    HGB 11.2 06/18/2019    HCT 37.0 06/18/2019     06/18/2019    MCV 98.1 06/18/2019    MCH 29.7 06/18/2019    MCHC 30.3 06/18/2019    RDW 15.1 06/18/2019    LYMPHOPCT 5 06/18/2019    MONOPCT 11 06/18/2019    BASOPCT 0 06/18/2019    MONOSABS 1.72 06/18/2019    LYMPHSABS 0.78 06/18/2019    EOSABS 0.00 06/18/2019    BASOSABS 0.00 06/18/2019    DIFFTYPE NOT REPORTED 06/18/2019     BMP   Lab Results   Component Value Date     06/18/2019    K 4.2 06/18/2019    CL 97 06/18/2019    CO2 27 06/18/2019    BUN 37 06/18/2019    CREATININE 0.85 06/18/2019    GLUCOSE 215 06/18/2019    GLUCOSE 117 09/02/2011    CALCIUM 9.3 06/18/2019    MG 1.9 06/18/2019     LFTS  Lab Results   Component Value Date

## 2019-06-18 NOTE — FLOWSHEET NOTE
06/18/19 1032   Provider Notification   Reason for Communication Evaluate  (update, sepsis alert)   Provider Name Dr Kumar Marroquin   Provider Notification Physician   Method of Communication Call  (call to office)   Response Waiting for response   Notification Time [phone]

## 2019-06-19 ENCOUNTER — APPOINTMENT (OUTPATIENT)
Dept: GENERAL RADIOLOGY | Age: 84
DRG: 871 | End: 2019-06-19
Payer: MEDICARE

## 2019-06-19 LAB
ABSOLUTE EOS #: 0 K/UL (ref 0–0.4)
ABSOLUTE IMMATURE GRANULOCYTE: 0.12 K/UL (ref 0–0.3)
ABSOLUTE LYMPH #: 1.22 K/UL (ref 1–4.8)
ABSOLUTE MONO #: 1.95 K/UL (ref 0.2–0.8)
ANION GAP SERPL CALCULATED.3IONS-SCNC: 13 MMOL/L (ref 9–17)
BASOPHILS # BLD: 0 %
BASOPHILS ABSOLUTE: 0 K/UL (ref 0–0.2)
BNP INTERPRETATION: ABNORMAL
BUN BLDV-MCNC: 46 MG/DL (ref 8–23)
BUN/CREAT BLD: 39 (ref 9–20)
CALCIUM SERPL-MCNC: 9.4 MG/DL (ref 8.6–10.4)
CHLORIDE BLD-SCNC: 97 MMOL/L (ref 98–107)
CO2: 31 MMOL/L (ref 20–31)
CREAT SERPL-MCNC: 1.18 MG/DL (ref 0.5–0.9)
CULTURE: NO GROWTH
DIFFERENTIAL TYPE: ABNORMAL
EOSINOPHILS RELATIVE PERCENT: 0 % (ref 1–4)
GFR AFRICAN AMERICAN: 52 ML/MIN
GFR NON-AFRICAN AMERICAN: 43 ML/MIN
GFR SERPL CREATININE-BSD FRML MDRD: ABNORMAL ML/MIN/{1.73_M2}
GFR SERPL CREATININE-BSD FRML MDRD: ABNORMAL ML/MIN/{1.73_M2}
GLUCOSE BLD-MCNC: 158 MG/DL (ref 65–105)
GLUCOSE BLD-MCNC: 158 MG/DL (ref 70–99)
GLUCOSE BLD-MCNC: 177 MG/DL (ref 65–105)
GLUCOSE BLD-MCNC: 193 MG/DL (ref 65–105)
GLUCOSE BLD-MCNC: 236 MG/DL (ref 65–105)
GLUCOSE BLD-MCNC: 313 MG/DL (ref 65–105)
HCT VFR BLD CALC: 40.4 % (ref 36.3–47.1)
HEMOGLOBIN: 12.6 G/DL (ref 11.9–15.1)
IMMATURE GRANULOCYTES: 1 %
LYMPHOCYTES # BLD: 10 % (ref 24–44)
Lab: NORMAL
MAGNESIUM: 2.3 MG/DL (ref 1.6–2.6)
MCH RBC QN AUTO: 30 PG (ref 25.2–33.5)
MCHC RBC AUTO-ENTMCNC: 31.2 G/DL (ref 28.4–34.8)
MCV RBC AUTO: 96.2 FL (ref 82.6–102.9)
MONOCYTES # BLD: 16 % (ref 1–7)
NRBC AUTOMATED: 0 PER 100 WBC
PDW BLD-RTO: 14.9 % (ref 11.8–14.4)
PLATELET # BLD: 227 K/UL (ref 138–453)
PLATELET ESTIMATE: ABNORMAL
PMV BLD AUTO: 10.3 FL (ref 8.1–13.5)
POTASSIUM SERPL-SCNC: 4.6 MMOL/L (ref 3.7–5.3)
PRO-BNP: ABNORMAL PG/ML
RBC # BLD: 4.2 M/UL (ref 3.95–5.11)
RBC # BLD: ABNORMAL 10*6/UL
SEG NEUTROPHILS: 73 % (ref 36–66)
SEGMENTED NEUTROPHILS ABSOLUTE COUNT: 8.91 K/UL (ref 1.8–7.7)
SODIUM BLD-SCNC: 141 MMOL/L (ref 135–144)
SPECIMEN DESCRIPTION: NORMAL
WBC # BLD: 12.2 K/UL (ref 3.5–11.3)
WBC # BLD: ABNORMAL 10*3/UL

## 2019-06-19 PROCEDURE — 82947 ASSAY GLUCOSE BLOOD QUANT: CPT

## 2019-06-19 PROCEDURE — 6370000000 HC RX 637 (ALT 250 FOR IP): Performed by: NURSE PRACTITIONER

## 2019-06-19 PROCEDURE — 2000000000 HC ICU R&B

## 2019-06-19 PROCEDURE — 71045 X-RAY EXAM CHEST 1 VIEW: CPT

## 2019-06-19 PROCEDURE — 83735 ASSAY OF MAGNESIUM: CPT

## 2019-06-19 PROCEDURE — 94640 AIRWAY INHALATION TREATMENT: CPT

## 2019-06-19 PROCEDURE — 2500000003 HC RX 250 WO HCPCS: Performed by: INTERNAL MEDICINE

## 2019-06-19 PROCEDURE — 36415 COLL VENOUS BLD VENIPUNCTURE: CPT

## 2019-06-19 PROCEDURE — 83880 ASSAY OF NATRIURETIC PEPTIDE: CPT

## 2019-06-19 PROCEDURE — 94660 CPAP INITIATION&MGMT: CPT

## 2019-06-19 PROCEDURE — 6370000000 HC RX 637 (ALT 250 FOR IP): Performed by: INTERNAL MEDICINE

## 2019-06-19 PROCEDURE — 80048 BASIC METABOLIC PNL TOTAL CA: CPT

## 2019-06-19 PROCEDURE — 85025 COMPLETE CBC W/AUTO DIFF WBC: CPT

## 2019-06-19 PROCEDURE — 2700000000 HC OXYGEN THERAPY PER DAY

## 2019-06-19 PROCEDURE — 6360000002 HC RX W HCPCS: Performed by: INTERNAL MEDICINE

## 2019-06-19 PROCEDURE — 2580000003 HC RX 258: Performed by: INTERNAL MEDICINE

## 2019-06-19 RX ORDER — METOPROLOL TARTRATE 5 MG/5ML
2.5 INJECTION INTRAVENOUS ONCE
Status: COMPLETED | OUTPATIENT
Start: 2019-06-19 | End: 2019-06-19

## 2019-06-19 RX ORDER — METHYLPREDNISOLONE SODIUM SUCCINATE 40 MG/ML
40 INJECTION, POWDER, LYOPHILIZED, FOR SOLUTION INTRAMUSCULAR; INTRAVENOUS EVERY 6 HOURS
Status: COMPLETED | OUTPATIENT
Start: 2019-06-19 | End: 2019-06-20

## 2019-06-19 RX ADMIN — INSULIN LISPRO 2 UNITS: 100 INJECTION, SOLUTION INTRAVENOUS; SUBCUTANEOUS at 17:03

## 2019-06-19 RX ADMIN — APIXABAN 2.5 MG: 2.5 TABLET, FILM COATED ORAL at 11:18

## 2019-06-19 RX ADMIN — GABAPENTIN 100 MG: 100 CAPSULE ORAL at 11:28

## 2019-06-19 RX ADMIN — INSULIN LISPRO 2 UNITS: 100 INJECTION, SOLUTION INTRAVENOUS; SUBCUTANEOUS at 23:34

## 2019-06-19 RX ADMIN — ATORVASTATIN CALCIUM 20 MG: 20 TABLET, FILM COATED ORAL at 22:39

## 2019-06-19 RX ADMIN — LISINOPRIL 5 MG: 5 TABLET ORAL at 11:20

## 2019-06-19 RX ADMIN — IPRATROPIUM BROMIDE AND ALBUTEROL SULFATE 1 AMPULE: .5; 3 SOLUTION RESPIRATORY (INHALATION) at 19:34

## 2019-06-19 RX ADMIN — INSULIN LISPRO 1 UNITS: 100 INJECTION, SOLUTION INTRAVENOUS; SUBCUTANEOUS at 11:46

## 2019-06-19 RX ADMIN — METOPROLOL TARTRATE 2.5 MG: 5 INJECTION INTRAVENOUS at 15:21

## 2019-06-19 RX ADMIN — FUROSEMIDE 40 MG: 40 TABLET ORAL at 11:19

## 2019-06-19 RX ADMIN — MOMETASONE FUROATE AND FORMOTEROL FUMARATE DIHYDRATE 2 PUFF: 200; 5 AEROSOL RESPIRATORY (INHALATION) at 19:35

## 2019-06-19 RX ADMIN — Medication 10 ML: at 21:45

## 2019-06-19 RX ADMIN — DORZOLAMIDE HYDROCHLORIDE 1 DROP: 20 SOLUTION/ DROPS OPHTHALMIC at 21:45

## 2019-06-19 RX ADMIN — AZITHROMYCIN 500 MG: 250 TABLET, FILM COATED ORAL at 11:19

## 2019-06-19 RX ADMIN — MAGNESIUM OXIDE TAB 400 MG (241.3 MG ELEMENTAL MG) 400 MG: 400 (241.3 MG) TAB at 11:27

## 2019-06-19 RX ADMIN — DILTIAZEM HYDROCHLORIDE 120 MG: 120 CAPSULE, COATED, EXTENDED RELEASE ORAL at 11:19

## 2019-06-19 RX ADMIN — METHYLPREDNISOLONE SODIUM SUCCINATE 40 MG: 40 INJECTION, POWDER, FOR SOLUTION INTRAMUSCULAR; INTRAVENOUS at 22:15

## 2019-06-19 RX ADMIN — VITAMIN D, TAB 1000IU (100/BT) 2000 UNITS: 25 TAB at 22:39

## 2019-06-19 RX ADMIN — MAGNESIUM OXIDE TAB 400 MG (241.3 MG ELEMENTAL MG) 400 MG: 400 (241.3 MG) TAB at 22:39

## 2019-06-19 RX ADMIN — Medication 10 ML: at 09:30

## 2019-06-19 RX ADMIN — GUAIFENESIN 600 MG: 600 TABLET, EXTENDED RELEASE ORAL at 22:39

## 2019-06-19 RX ADMIN — MONTELUKAST 10 MG: 10 TABLET, FILM COATED ORAL at 22:39

## 2019-06-19 RX ADMIN — GLIMEPIRIDE 2 MG: 2 TABLET ORAL at 11:19

## 2019-06-19 RX ADMIN — IPRATROPIUM BROMIDE AND ALBUTEROL SULFATE 1 AMPULE: .5; 3 SOLUTION RESPIRATORY (INHALATION) at 08:20

## 2019-06-19 RX ADMIN — POTASSIUM CHLORIDE 20 MEQ: 750 CAPSULE, EXTENDED RELEASE ORAL at 11:27

## 2019-06-19 RX ADMIN — IPRATROPIUM BROMIDE AND ALBUTEROL SULFATE 1 AMPULE: .5; 3 SOLUTION RESPIRATORY (INHALATION) at 15:33

## 2019-06-19 RX ADMIN — IPRATROPIUM BROMIDE AND ALBUTEROL SULFATE 1 AMPULE: .5; 3 SOLUTION RESPIRATORY (INHALATION) at 11:33

## 2019-06-19 RX ADMIN — BRIMONIDINE TARTRATE 1 DROP: 2 SOLUTION OPHTHALMIC at 11:48

## 2019-06-19 RX ADMIN — DORZOLAMIDE HYDROCHLORIDE 1 DROP: 20 SOLUTION/ DROPS OPHTHALMIC at 11:51

## 2019-06-19 RX ADMIN — APIXABAN 2.5 MG: 2.5 TABLET, FILM COATED ORAL at 22:39

## 2019-06-19 RX ADMIN — BRIMONIDINE TARTRATE 1 DROP: 2 SOLUTION OPHTHALMIC at 21:45

## 2019-06-19 RX ADMIN — TIMOLOL MALEATE 1 DROP: 5 SOLUTION OPHTHALMIC at 21:45

## 2019-06-19 RX ADMIN — TIMOLOL MALEATE 1 DROP: 5 SOLUTION OPHTHALMIC at 11:30

## 2019-06-19 RX ADMIN — INSULIN LISPRO 1 UNITS: 100 INJECTION, SOLUTION INTRAVENOUS; SUBCUTANEOUS at 08:30

## 2019-06-19 NOTE — PROGRESS NOTES
COPD  · Tracheobronchitis  · Acute on chronic combined systolic and diastolic heart failure  · Moderate to severe secondary pulmonary hypertension  · A. Fib  · History of dysphagia    Recommendations:  · BiPAP for sleep and as needed, Will try high flow oxygen by nasal cannula  · Continue IV antibiotics, Zithromax  · Albuterol and Ipratropium Q 4 hours and prn  · Continue dulera 200  · X-ray chest in am  · Labs: CBC and BMP in am  · DVT prophylaxis, On Eliquis  · A very detailed discussion patient's son about patient's poor prognosis. Different treatment options and plan of care was discussed with him. Hospice care suggested.   Patient's son will let us know after having a family discussion  · Will follow with you    Palma Lamas MD, [facility]  Pulmonary Critical Care and Sleep Medicine,  [facility]  Cell: [phone]  Office: [phone]

## 2019-06-19 NOTE — PLAN OF CARE
Problem: Risk for Impaired Skin Integrity  Goal: Tissue integrity - skin and mucous membranes  Description  Structural intactness and normal physiological function of skin and  mucous membranes. Outcome: Ongoing  Intervention: TURN PATIENT  Note:   Skin care assessment performed and charted. Assessed for areas of redness and or breakdown. David scale order set in place. Waffle mattress present. Patient is dependent and requires assistance with position changes. Mepilex to sacral area. Coccyx is reddened. Oral mucosa assessed. Assessed for areas of thrush, open lesions and sores. Monitored nutritional intake. Problem: HEMODYNAMIC STATUS  Goal: Patient has stable vital signs and fluid balance  Outcome: Ongoing  Intervention: MONITOR PATIENT'S WEIGHT  Note:   Cardiac assessment performed and charted. VS monitored. Monitored for s/sx of hemodynamic decompensation. Cardiac assessment performed and charted. Monitored heart rate, rhythm and pattern. Assessed for c/o chest pain and or pressure. Problem: Falls - Risk of: Intervention: Appropriate assistance to ensure safe transfer  Note:   Continue fall precautions including arm band identification, falling star placed outside of the room and alarm at night and when unattended. Use of ambulatory aids when indicated and frequent visual checks. Monitored orientation status. Call light in reach at all times. Bed remains in lowest locked position. All clutter removed from room. All personal belongings in reach. Instructed to call for assistance PRN. Fall precautions remain in place.

## 2019-06-19 NOTE — PROGRESS NOTES
BP: (!) 84/44 (!) 106/51 (!) 113/55 115/68   Pulse: 108 109 111 115   Resp: 25 27 24 24   Temp:       TempSrc:       SpO2: 99% 98% 98% 99%   Weight:       Height:             Intake/Output Summary (Last 24 hours) at 6/19/2019 1827  Last data filed at 6/19/2019 1751  Gross per 24 hour   Intake 626 ml   Output 1025 ml   Net -399 ml       Exam:  CONSTITUTIONAL:  awake, alert, cooperative, no apparent distress, and appears stated age. HEENT: Normal jugular venous pulsations, no carotid bruits. Head is atraumatic, normocephalic. Eyes and oral mucosa are normal.  LUNGS: Good respiratory effort On auscultation: wheezes bilaterally  CARDIOVASCULAR:  Normal apical impulse, regular rate and rhythm, normal S1 and S2, no S3 or S4, and no murmur or rub noted. ABDOMEN: Soft, nontender, nondistended. Bowel sounds present. No masses or tenderness. No bruit. SKIN: Warm and dry. EXTREMITIES: No bilateral lower extremity edema. Motor movement grossly intact. No cyanosis or clubbing.       DIAGNOSTICS     Studies:    Telemetry: afib  EKG: afib with NS T changes  Echocardiogram: EF 40%, Ant/AS/AL hypok, MR (m/M), TR (M/S), PH (M/S)      Laboratory testing:  Lab Results   Component Value Date     06/19/2019     06/18/2019     06/17/2019    K 4.6 06/19/2019    K 4.2 06/18/2019    K 4.1 06/17/2019    CL 97 (L) 06/19/2019    CL 97 (L) 06/18/2019    CL 95 (L) 06/17/2019    CO2 31 06/19/2019    CO2 27 06/18/2019    CO2 30 06/17/2019    BUN 46 (H) 06/19/2019    BUN 37 (H) 06/18/2019    BUN 39 (H) 06/17/2019    CREATININE 1.18 (H) 06/19/2019    CREATININE 0.85 06/18/2019    CREATININE 1.01 (H) 06/17/2019    CALCIUM 9.4 06/19/2019    CALCIUM 9.3 06/18/2019    CALCIUM 9.2 06/17/2019    LABALBU 4.2 11/13/2018    LABALBU 4.3 05/09/2018    LABALBU 3.4 (L) 09/24/2017    PROT 7.9 11/13/2018    PROT 7.5 05/09/2018    PROT 6.4 09/24/2017    BILITOT 0.84 11/13/2018    BILITOT 0.51 05/09/2018    BILITOT 0.67 09/24/2017    ALKPHOS Hypotensive episode    Acute respiratory failure (HCC)    Other dysphagia    Unintentional weight loss    Moderate malnutrition (HCC)    CHF (congestive heart failure), NYHA class I, acute on chronic, combined ([facility] [address]         ASSESSMENT and PLAN     · Acute respiratory failure; managed by others  · Acute on chronic combined (systolic/diastolic) heart failure, with excellent urine output (-3.4 liters since admission)  · Cardiomyopathy, with EF 40%  · Anterior/anteroseptal and anterolateral wall hypokinesis suggestive of CAD  · Moderate to severe tricuspid regurgitation   · Moderate to severe pulmonary hypertension   · Mild to moderate mitral regurgitation  · Chronic atrial fibrillation with controlled HR  · Chronic anticoagulation with low-dose apixaban  · COPD     Continue Eliquis 2.5x2, lipitor 20, cardizem , lasix 40x1, and lisinopril 5  Continue conservative medical therapy  Slowly improving in fragile pt      Thank you for allowing me to participate in the care of Jacalyn Sandifer. I had the opportunity to review the clinical symptoms and presentation of Jacalyn Sandifer and discussed with the caring RN. Further evaluation will be based upon the patient's clinical course and testing results. All questions and concerns were addressed to the patient. Alternatives to my treatment were discussed. The note was completed by using EMR. However, inadvertent computerized transcription errors may be present. Although every effort was made to ensure accuracy, no guarantees can be provided that every mistake has been identified and corrected by editing.       Ioana Ramos M.D.

## 2019-06-19 NOTE — PROGRESS NOTES
.. PALLIATIVE CARE TEAM    Patient: Meet Castanon  Room: 1101/1101-01    Reason For Consult   Goals of care evaluation  Distress management  Symptom Management  Guidance and support  Facilitate communications  Assistance in coordinating care  Recommendations for the above    Impression: Meet Castanon is a 80y.o. year old female with  has a past medical history of Arthritis, Atrial fibrillation ([address] CAD (coronary artery disease), CHF (congestive heart failure) ([address] COPD (chronic obstructive pulmonary disease) ([address] Diabetes mellitus ([address] Femur fracture ([address] Hyperlipidemia, Hypertension, Moderate malnutrition ([address] and Respiratory failure ([address] .  Currently hospitalized for the management of respiratory distress. The Palliative Care Team is following to assist with goals of care. Code Status  Full Code    Vital Signs:  BP (!) 138/95   Pulse 112   Temp 98.8 °F (37.1 °C) (Infrared)   Resp 24   Ht 4' 6\" (1.372 m)   Wt 101 lb 6.4 oz (46 kg)   SpO2 94%   BMI 24.45 kg/m²     Patient Active Problem List   Diagnosis    Pneumonia    Sepsis ([address]    Moderate malnutrition ([address]    Pulmonary HTN ([address]    Non-rheumatic mitral regurgitation    Acute on chronic combined systolic and diastolic congestive heart failure (HCC)    Chronic atrial fibrillation (HCC)    Type 2 diabetes mellitus with hyperglycemia (HCC)    Acute bronchitis    Acute congestive heart failure (HCC)    Hypotensive episode    Acute respiratory failure ([address]    Other dysphagia    Unintentional weight loss    Moderate malnutrition ([address]    CHF (congestive heart failure), NYHA class I, acute on chronic, combined ([address]       Palliative Interaction: Was asked by nursing to see patient for family support. Pt in ICU on bipap. She has been bipap dependent today according to nurse. They attempted to take patient off bipap but patient could not tolerate for more than 5 minutes. She is sleeping upon my visit.   I did talk with patient's son Jerome Medina at bedside. He is the oldest son. Pt has 4 children. Jerome Medina states they all make decisions for the patient together. He reviews patient's medical history with me and that they usually bring patient in every 5-6 months to \"take fluid off\". Jerome Medina states patient has never been this bad. I explained what was going on with patient in layman's terms including, pleural effusion, pulmonary edema and CHF. We also discussed code status. I reviewed each code status classification with Jerome Medina in detail. He states she would not want to be intubated nor CPR/Defibrillation. Jerome Medina states the family have talked on the phone and agree. He would like to change her code status to [facility] intubation. I told him to talk with pulmonologist about this when he rounds. I also updated the nurse on our conversation. I also encouraged Jerome Medina to talk with pulmonologist about patient's overall condition and if he feels she will improve. We briefly discussed comfort care as an option if the patient does not improve. Emotional support offered. Will follow along for support. Goals/Plan of care  Education/support to family  Discharge planning/helping to coordinate care  Communications with primary service  Providing support for coping/adaptation/distress of family  Discussing meaning/purpose   Decision making regarding life prolonging treatment  Continue with current plan of care  Clarification of medical condition to patient and family  Talked with patient's son Jerome Medina, he states the family would like to make patient a DNRCCA-no intubation. He will speak to pulmonologist when he comes around. We also briefly discussed comfort care as an option if patient does not improve. Pt is bipap dependent at this time. Will follow along for support.     Electronically signed by   Austin Alvares RN  Palliative Care Team  on 6/19/2019 at 12:55 PM

## 2019-06-20 LAB
ABSOLUTE EOS #: 0 K/UL (ref 0–0.44)
ABSOLUTE IMMATURE GRANULOCYTE: 0 K/UL (ref 0–0.3)
ABSOLUTE LYMPH #: 0.63 K/UL (ref 1.1–3.7)
ABSOLUTE MONO #: 0.38 K/UL (ref 0.1–1.2)
ANION GAP SERPL CALCULATED.3IONS-SCNC: 13 MMOL/L (ref 9–17)
BASOPHILS # BLD: 0 % (ref 0–2)
BASOPHILS ABSOLUTE: 0 K/UL (ref 0–0.2)
BUN BLDV-MCNC: 40 MG/DL (ref 8–23)
BUN/CREAT BLD: 52 (ref 9–20)
CALCIUM SERPL-MCNC: 9.2 MG/DL (ref 8.6–10.4)
CHLORIDE BLD-SCNC: 98 MMOL/L (ref 98–107)
CO2: 27 MMOL/L (ref 20–31)
CREAT SERPL-MCNC: 0.77 MG/DL (ref 0.5–0.9)
DIFFERENTIAL TYPE: ABNORMAL
EOSINOPHILS RELATIVE PERCENT: 0 % (ref 1–4)
GFR AFRICAN AMERICAN: >60 ML/MIN
GFR NON-AFRICAN AMERICAN: >60 ML/MIN
GFR SERPL CREATININE-BSD FRML MDRD: ABNORMAL ML/MIN/{1.73_M2}
GFR SERPL CREATININE-BSD FRML MDRD: ABNORMAL ML/MIN/{1.73_M2}
GLUCOSE BLD-MCNC: 253 MG/DL (ref 70–99)
GLUCOSE BLD-MCNC: 283 MG/DL (ref 65–105)
GLUCOSE BLD-MCNC: 313 MG/DL (ref 65–105)
GLUCOSE BLD-MCNC: 357 MG/DL (ref 65–105)
GLUCOSE BLD-MCNC: 384 MG/DL (ref 65–105)
HCT VFR BLD CALC: 36.3 % (ref 36.3–47.1)
HEMOGLOBIN: 11 G/DL (ref 11.9–15.1)
IMMATURE GRANULOCYTES: 0 %
LYMPHOCYTES # BLD: 5 % (ref 24–43)
MAGNESIUM: 2.2 MG/DL (ref 1.6–2.6)
MCH RBC QN AUTO: 30.1 PG (ref 25.2–33.5)
MCHC RBC AUTO-ENTMCNC: 30.3 G/DL (ref 28.4–34.8)
MCV RBC AUTO: 99.5 FL (ref 82.6–102.9)
MONOCYTES # BLD: 3 % (ref 3–12)
NRBC AUTOMATED: ABNORMAL PER 100 WBC
PDW BLD-RTO: 15.1 % (ref 11.8–14.4)
PLATELET # BLD: 223 K/UL (ref 138–453)
PLATELET ESTIMATE: ABNORMAL
PMV BLD AUTO: 10.4 FL (ref 8.1–13.5)
POTASSIUM SERPL-SCNC: 4.4 MMOL/L (ref 3.7–5.3)
RBC # BLD: 3.65 M/UL (ref 3.95–5.11)
RBC # BLD: ABNORMAL 10*6/UL
SEG NEUTROPHILS: 92 % (ref 36–65)
SEGMENTED NEUTROPHILS ABSOLUTE COUNT: 11.59 K/UL (ref 1.5–8.1)
SODIUM BLD-SCNC: 138 MMOL/L (ref 135–144)
WBC # BLD: 12.6 K/UL (ref 3.5–11.3)
WBC # BLD: ABNORMAL 10*3/UL

## 2019-06-20 PROCEDURE — 2000000000 HC ICU R&B

## 2019-06-20 PROCEDURE — 94640 AIRWAY INHALATION TREATMENT: CPT

## 2019-06-20 PROCEDURE — 83735 ASSAY OF MAGNESIUM: CPT

## 2019-06-20 PROCEDURE — 2580000003 HC RX 258: Performed by: INTERNAL MEDICINE

## 2019-06-20 PROCEDURE — 2700000000 HC OXYGEN THERAPY PER DAY

## 2019-06-20 PROCEDURE — 94660 CPAP INITIATION&MGMT: CPT

## 2019-06-20 PROCEDURE — 6370000000 HC RX 637 (ALT 250 FOR IP): Performed by: INTERNAL MEDICINE

## 2019-06-20 PROCEDURE — 94761 N-INVAS EAR/PLS OXIMETRY MLT: CPT

## 2019-06-20 PROCEDURE — 85025 COMPLETE CBC W/AUTO DIFF WBC: CPT

## 2019-06-20 PROCEDURE — 80048 BASIC METABOLIC PNL TOTAL CA: CPT

## 2019-06-20 PROCEDURE — 6360000002 HC RX W HCPCS: Performed by: INTERNAL MEDICINE

## 2019-06-20 PROCEDURE — 6370000000 HC RX 637 (ALT 250 FOR IP): Performed by: NURSE PRACTITIONER

## 2019-06-20 PROCEDURE — 82947 ASSAY GLUCOSE BLOOD QUANT: CPT

## 2019-06-20 PROCEDURE — 36415 COLL VENOUS BLD VENIPUNCTURE: CPT

## 2019-06-20 RX ADMIN — BRIMONIDINE TARTRATE 1 DROP: 2 SOLUTION OPHTHALMIC at 09:00

## 2019-06-20 RX ADMIN — TIMOLOL MALEATE 1 DROP: 5 SOLUTION OPHTHALMIC at 08:30

## 2019-06-20 RX ADMIN — INSULIN LISPRO 5 UNITS: 100 INJECTION, SOLUTION INTRAVENOUS; SUBCUTANEOUS at 17:34

## 2019-06-20 RX ADMIN — ATORVASTATIN CALCIUM 20 MG: 20 TABLET, FILM COATED ORAL at 21:15

## 2019-06-20 RX ADMIN — MAGNESIUM OXIDE TAB 400 MG (241.3 MG ELEMENTAL MG) 400 MG: 400 (241.3 MG) TAB at 21:16

## 2019-06-20 RX ADMIN — VITAMIN D, TAB 1000IU (100/BT) 2000 UNITS: 25 TAB at 21:16

## 2019-06-20 RX ADMIN — MONTELUKAST 10 MG: 10 TABLET, FILM COATED ORAL at 21:16

## 2019-06-20 RX ADMIN — BRIMONIDINE TARTRATE 1 DROP: 2 SOLUTION OPHTHALMIC at 21:15

## 2019-06-20 RX ADMIN — INSULIN LISPRO 4 UNITS: 100 INJECTION, SOLUTION INTRAVENOUS; SUBCUTANEOUS at 11:51

## 2019-06-20 RX ADMIN — VITAMIN D, TAB 1000IU (100/BT) 2000 UNITS: 25 TAB at 08:20

## 2019-06-20 RX ADMIN — DORZOLAMIDE HYDROCHLORIDE 1 DROP: 20 SOLUTION/ DROPS OPHTHALMIC at 09:00

## 2019-06-20 RX ADMIN — GUAIFENESIN 600 MG: 600 TABLET, EXTENDED RELEASE ORAL at 21:16

## 2019-06-20 RX ADMIN — IPRATROPIUM BROMIDE AND ALBUTEROL SULFATE 1 AMPULE: .5; 3 SOLUTION RESPIRATORY (INHALATION) at 07:02

## 2019-06-20 RX ADMIN — FUROSEMIDE 40 MG: 40 TABLET ORAL at 08:20

## 2019-06-20 RX ADMIN — DORZOLAMIDE HYDROCHLORIDE 1 DROP: 20 SOLUTION/ DROPS OPHTHALMIC at 21:15

## 2019-06-20 RX ADMIN — MOMETASONE FUROATE AND FORMOTEROL FUMARATE DIHYDRATE 2 PUFF: 200; 5 AEROSOL RESPIRATORY (INHALATION) at 21:14

## 2019-06-20 RX ADMIN — GLIMEPIRIDE 2 MG: 2 TABLET ORAL at 08:20

## 2019-06-20 RX ADMIN — LISINOPRIL 5 MG: 5 TABLET ORAL at 08:20

## 2019-06-20 RX ADMIN — POTASSIUM CHLORIDE 20 MEQ: 750 CAPSULE, EXTENDED RELEASE ORAL at 08:20

## 2019-06-20 RX ADMIN — MAGNESIUM OXIDE TAB 400 MG (241.3 MG ELEMENTAL MG) 400 MG: 400 (241.3 MG) TAB at 08:20

## 2019-06-20 RX ADMIN — TIMOLOL MALEATE 1 DROP: 5 SOLUTION OPHTHALMIC at 21:16

## 2019-06-20 RX ADMIN — AZITHROMYCIN 500 MG: 250 TABLET, FILM COATED ORAL at 08:20

## 2019-06-20 RX ADMIN — GUAIFENESIN 600 MG: 600 TABLET, EXTENDED RELEASE ORAL at 08:20

## 2019-06-20 RX ADMIN — APIXABAN 2.5 MG: 2.5 TABLET, FILM COATED ORAL at 21:15

## 2019-06-20 RX ADMIN — INSULIN LISPRO 3 UNITS: 100 INJECTION, SOLUTION INTRAVENOUS; SUBCUTANEOUS at 08:18

## 2019-06-20 RX ADMIN — INSULIN LISPRO 3 UNITS: 100 INJECTION, SOLUTION INTRAVENOUS; SUBCUTANEOUS at 21:17

## 2019-06-20 RX ADMIN — GABAPENTIN 100 MG: 100 CAPSULE ORAL at 08:20

## 2019-06-20 RX ADMIN — METHYLPREDNISOLONE SODIUM SUCCINATE 40 MG: 40 INJECTION, POWDER, FOR SOLUTION INTRAMUSCULAR; INTRAVENOUS at 10:30

## 2019-06-20 RX ADMIN — Medication 10 ML: at 21:30

## 2019-06-20 RX ADMIN — APIXABAN 2.5 MG: 2.5 TABLET, FILM COATED ORAL at 08:20

## 2019-06-20 RX ADMIN — MOMETASONE FUROATE AND FORMOTEROL FUMARATE DIHYDRATE 2 PUFF: 200; 5 AEROSOL RESPIRATORY (INHALATION) at 07:03

## 2019-06-20 RX ADMIN — IPRATROPIUM BROMIDE AND ALBUTEROL SULFATE 1 AMPULE: .5; 3 SOLUTION RESPIRATORY (INHALATION) at 15:51

## 2019-06-20 RX ADMIN — IPRATROPIUM BROMIDE AND ALBUTEROL SULFATE 1 AMPULE: .5; 3 SOLUTION RESPIRATORY (INHALATION) at 11:55

## 2019-06-20 RX ADMIN — DILTIAZEM HYDROCHLORIDE 120 MG: 120 CAPSULE, COATED, EXTENDED RELEASE ORAL at 08:20

## 2019-06-20 RX ADMIN — METHYLPREDNISOLONE SODIUM SUCCINATE 40 MG: 40 INJECTION, POWDER, FOR SOLUTION INTRAMUSCULAR; INTRAVENOUS at 04:42

## 2019-06-20 RX ADMIN — IPRATROPIUM BROMIDE AND ALBUTEROL SULFATE 1 AMPULE: .5; 3 SOLUTION RESPIRATORY (INHALATION) at 21:14

## 2019-06-20 ASSESSMENT — PAIN SCALES - GENERAL: PAINLEVEL_OUTOF10: 0

## 2019-06-20 NOTE — PROGRESS NOTES
Subjective:     Follow-up diabetes  No polyuria no polydipsia no hypoglycemia blood sugars reviewed    ROS  Fever no chills no GI/ complaints, still with mild shortness of breath tolerating high flow occasional cough with no phlegm denies any chest pain no palpitation, no TIA no bleeding no headache no sore throat no skin lesions  physical exam  General Appearance: alert and oriented to person, place and time and in no acute distress  Skin: warm and dry, no rash or erythema  Head: normocephalic and atraumatic  Eyes: pupils equal, round, and reactive to light, conjunctivae normal and sclera anicteric    Neck: neck supple and non tender without mass   Pulmonary/Chest: Air entry equal few rhonchi no crackles no use of intercostal muscles  Cardiovascular: normal rate, chronic A. fib rate controlled continue with anticoagulation irregular rhythm, normal S1 and S2, no gallops, intact distal pulses and no carotid bruits  Abdomen: soft, non-tender, non-distended, normal bowel sounds, no masses or organomegaly  Extremities: no edema and good pulses no Homans sign  Neurologic: Alert oriented x2 no focal deficit    BP (!) 120/90   Pulse 112   Temp 99.3 °F (37.4 °C) (Oral)   Resp 19   Ht 4' 6\" (1.372 m)   Wt 101 lb 6.4 oz (46 kg)   SpO2 97%   BMI 24.45 kg/m²     CBC:   Lab Results   Component Value Date    WBC 12.6 06/20/2019    RBC 3.65 06/20/2019    HGB 11.0 06/20/2019    HCT 36.3 06/20/2019    MCV 99.5 06/20/2019    MCH 30.1 06/20/2019    MCHC 30.3 06/20/2019    RDW 15.1 06/20/2019     06/20/2019    MPV 10.4 06/20/2019     CMP:    Lab Results   Component Value Date     06/20/2019    K 4.4 06/20/2019    CL 98 06/20/2019    CO2 27 06/20/2019    BUN 40 06/20/2019    CREATININE 0.77 06/20/2019    GFRAA >60 06/20/2019    LABGLOM >60 06/20/2019    GLUCOSE 253 06/20/2019    GLUCOSE 117 09/02/2011    PROT 7.9 11/13/2018    LABALBU 4.2 11/13/2018    CALCIUM 9.2 06/20/2019    BILITOT 0.84 11/13/2018    ALKPHOS 108

## 2019-06-20 NOTE — PROGRESS NOTES
Patient remains on High Flow. Patient is awake, daughter present in room. Congested cough. Lung fields with scattered expiratory wheezes, rhonchi and crackles in bases posteriorly.

## 2019-06-21 ENCOUNTER — APPOINTMENT (OUTPATIENT)
Dept: GENERAL RADIOLOGY | Age: 84
DRG: 871 | End: 2019-06-21
Payer: MEDICARE

## 2019-06-21 LAB
ABSOLUTE EOS #: <0.03 K/UL (ref 0–0.44)
ABSOLUTE IMMATURE GRANULOCYTE: 0.05 K/UL (ref 0–0.3)
ABSOLUTE LYMPH #: 0.85 K/UL (ref 1.1–3.7)
ABSOLUTE MONO #: 0.8 K/UL (ref 0.1–1.2)
ANION GAP SERPL CALCULATED.3IONS-SCNC: 14 MMOL/L (ref 9–17)
BASOPHILS # BLD: 0 % (ref 0–2)
BASOPHILS ABSOLUTE: <0.03 K/UL (ref 0–0.2)
BNP INTERPRETATION: ABNORMAL
BUN BLDV-MCNC: 40 MG/DL (ref 8–23)
BUN/CREAT BLD: 53 (ref 9–20)
CALCIUM SERPL-MCNC: 9.2 MG/DL (ref 8.6–10.4)
CHLORIDE BLD-SCNC: 97 MMOL/L (ref 98–107)
CO2: 30 MMOL/L (ref 20–31)
CREAT SERPL-MCNC: 0.75 MG/DL (ref 0.5–0.9)
DIFFERENTIAL TYPE: ABNORMAL
EOSINOPHILS RELATIVE PERCENT: 0 % (ref 1–4)
GFR AFRICAN AMERICAN: >60 ML/MIN
GFR NON-AFRICAN AMERICAN: >60 ML/MIN
GFR SERPL CREATININE-BSD FRML MDRD: ABNORMAL ML/MIN/{1.73_M2}
GFR SERPL CREATININE-BSD FRML MDRD: ABNORMAL ML/MIN/{1.73_M2}
GLUCOSE BLD-MCNC: 251 MG/DL (ref 65–105)
GLUCOSE BLD-MCNC: 276 MG/DL (ref 70–99)
GLUCOSE BLD-MCNC: 280 MG/DL (ref 65–105)
GLUCOSE BLD-MCNC: 333 MG/DL (ref 65–105)
GLUCOSE BLD-MCNC: 376 MG/DL (ref 65–105)
HCT VFR BLD CALC: 38.9 % (ref 36.3–47.1)
HEMOGLOBIN: 12.1 G/DL (ref 11.9–15.1)
IMMATURE GRANULOCYTES: 0 %
LYMPHOCYTES # BLD: 7 % (ref 24–43)
MCH RBC QN AUTO: 30 PG (ref 25.2–33.5)
MCHC RBC AUTO-ENTMCNC: 31.1 G/DL (ref 28.4–34.8)
MCV RBC AUTO: 96.3 FL (ref 82.6–102.9)
MONOCYTES # BLD: 7 % (ref 3–12)
NRBC AUTOMATED: ABNORMAL PER 100 WBC
PDW BLD-RTO: 14.6 % (ref 11.8–14.4)
PLATELET # BLD: 235 K/UL (ref 138–453)
PLATELET ESTIMATE: ABNORMAL
PMV BLD AUTO: 10.3 FL (ref 8.1–13.5)
POTASSIUM SERPL-SCNC: 4 MMOL/L (ref 3.7–5.3)
PRO-BNP: 4942 PG/ML
RBC # BLD: 4.04 M/UL (ref 3.95–5.11)
RBC # BLD: ABNORMAL 10*6/UL
SEG NEUTROPHILS: 86 % (ref 36–65)
SEGMENTED NEUTROPHILS ABSOLUTE COUNT: 10.23 K/UL (ref 1.5–8.1)
SODIUM BLD-SCNC: 141 MMOL/L (ref 135–144)
WBC # BLD: 11.9 K/UL (ref 3.5–11.3)
WBC # BLD: ABNORMAL 10*3/UL

## 2019-06-21 PROCEDURE — 97110 THERAPEUTIC EXERCISES: CPT

## 2019-06-21 PROCEDURE — 36415 COLL VENOUS BLD VENIPUNCTURE: CPT

## 2019-06-21 PROCEDURE — 6370000000 HC RX 637 (ALT 250 FOR IP): Performed by: INTERNAL MEDICINE

## 2019-06-21 PROCEDURE — 82947 ASSAY GLUCOSE BLOOD QUANT: CPT

## 2019-06-21 PROCEDURE — 85025 COMPLETE CBC W/AUTO DIFF WBC: CPT

## 2019-06-21 PROCEDURE — 2700000000 HC OXYGEN THERAPY PER DAY

## 2019-06-21 PROCEDURE — 80048 BASIC METABOLIC PNL TOTAL CA: CPT

## 2019-06-21 PROCEDURE — 94761 N-INVAS EAR/PLS OXIMETRY MLT: CPT

## 2019-06-21 PROCEDURE — 94640 AIRWAY INHALATION TREATMENT: CPT

## 2019-06-21 PROCEDURE — 2580000003 HC RX 258: Performed by: INTERNAL MEDICINE

## 2019-06-21 PROCEDURE — 71045 X-RAY EXAM CHEST 1 VIEW: CPT

## 2019-06-21 PROCEDURE — 2000000000 HC ICU R&B

## 2019-06-21 PROCEDURE — 83880 ASSAY OF NATRIURETIC PEPTIDE: CPT

## 2019-06-21 PROCEDURE — 2500000003 HC RX 250 WO HCPCS: Performed by: INTERNAL MEDICINE

## 2019-06-21 PROCEDURE — 94660 CPAP INITIATION&MGMT: CPT

## 2019-06-21 PROCEDURE — 97530 THERAPEUTIC ACTIVITIES: CPT

## 2019-06-21 PROCEDURE — 6370000000 HC RX 637 (ALT 250 FOR IP): Performed by: NURSE PRACTITIONER

## 2019-06-21 PROCEDURE — 97166 OT EVAL MOD COMPLEX 45 MIN: CPT

## 2019-06-21 PROCEDURE — 97162 PT EVAL MOD COMPLEX 30 MIN: CPT

## 2019-06-21 RX ORDER — INSULIN GLARGINE 100 [IU]/ML
6 INJECTION, SOLUTION SUBCUTANEOUS NIGHTLY
Status: CANCELLED | OUTPATIENT
Start: 2019-06-21

## 2019-06-21 RX ADMIN — ATORVASTATIN CALCIUM 20 MG: 20 TABLET, FILM COATED ORAL at 21:07

## 2019-06-21 RX ADMIN — INSULIN LISPRO 2 UNITS: 100 INJECTION, SOLUTION INTRAVENOUS; SUBCUTANEOUS at 21:09

## 2019-06-21 RX ADMIN — VITAMIN D, TAB 1000IU (100/BT) 2000 UNITS: 25 TAB at 21:07

## 2019-06-21 RX ADMIN — BRIMONIDINE TARTRATE 1 DROP: 2 SOLUTION OPHTHALMIC at 21:16

## 2019-06-21 RX ADMIN — IPRATROPIUM BROMIDE AND ALBUTEROL SULFATE 1 AMPULE: .5; 3 SOLUTION RESPIRATORY (INHALATION) at 10:46

## 2019-06-21 RX ADMIN — INSULIN LISPRO 3 UNITS: 100 INJECTION, SOLUTION INTRAVENOUS; SUBCUTANEOUS at 08:30

## 2019-06-21 RX ADMIN — DILTIAZEM HYDROCHLORIDE 120 MG: 120 CAPSULE, COATED, EXTENDED RELEASE ORAL at 08:29

## 2019-06-21 RX ADMIN — IPRATROPIUM BROMIDE AND ALBUTEROL SULFATE 1 AMPULE: .5; 3 SOLUTION RESPIRATORY (INHALATION) at 07:26

## 2019-06-21 RX ADMIN — GABAPENTIN 100 MG: 100 CAPSULE ORAL at 08:29

## 2019-06-21 RX ADMIN — LISINOPRIL 5 MG: 5 TABLET ORAL at 08:29

## 2019-06-21 RX ADMIN — MAGNESIUM OXIDE TAB 400 MG (241.3 MG ELEMENTAL MG) 400 MG: 400 (241.3 MG) TAB at 21:07

## 2019-06-21 RX ADMIN — FUROSEMIDE 40 MG: 40 TABLET ORAL at 08:29

## 2019-06-21 RX ADMIN — BRIMONIDINE TARTRATE 1 DROP: 2 SOLUTION OPHTHALMIC at 08:30

## 2019-06-21 RX ADMIN — MONTELUKAST 10 MG: 10 TABLET, FILM COATED ORAL at 21:07

## 2019-06-21 RX ADMIN — TIMOLOL MALEATE 1 DROP: 5 SOLUTION OPHTHALMIC at 08:30

## 2019-06-21 RX ADMIN — Medication 10 ML: at 09:01

## 2019-06-21 RX ADMIN — DORZOLAMIDE HYDROCHLORIDE 1 DROP: 20 SOLUTION/ DROPS OPHTHALMIC at 08:30

## 2019-06-21 RX ADMIN — AMIODARONE HYDROCHLORIDE 150 MG: 1.5 INJECTION, SOLUTION INTRAVENOUS at 13:33

## 2019-06-21 RX ADMIN — DORZOLAMIDE HYDROCHLORIDE 1 DROP: 20 SOLUTION/ DROPS OPHTHALMIC at 21:16

## 2019-06-21 RX ADMIN — VITAMIN D, TAB 1000IU (100/BT) 2000 UNITS: 25 TAB at 08:29

## 2019-06-21 RX ADMIN — GUAIFENESIN 600 MG: 600 TABLET, EXTENDED RELEASE ORAL at 08:28

## 2019-06-21 RX ADMIN — APIXABAN 2.5 MG: 2.5 TABLET, FILM COATED ORAL at 21:07

## 2019-06-21 RX ADMIN — GUAIFENESIN 600 MG: 600 TABLET, EXTENDED RELEASE ORAL at 21:07

## 2019-06-21 RX ADMIN — Medication 10 ML: at 21:16

## 2019-06-21 RX ADMIN — AZITHROMYCIN 500 MG: 250 TABLET, FILM COATED ORAL at 08:28

## 2019-06-21 RX ADMIN — AMIODARONE HYDROCHLORIDE 1 MG/MIN: 1.8 INJECTION, SOLUTION INTRAVENOUS at 14:06

## 2019-06-21 RX ADMIN — POTASSIUM CHLORIDE 20 MEQ: 750 CAPSULE, EXTENDED RELEASE ORAL at 08:28

## 2019-06-21 RX ADMIN — AMIODARONE HYDROCHLORIDE 1 MG/MIN: 1.8 INJECTION, SOLUTION INTRAVENOUS at 20:13

## 2019-06-21 RX ADMIN — MOMETASONE FUROATE AND FORMOTEROL FUMARATE DIHYDRATE 2 PUFF: 200; 5 AEROSOL RESPIRATORY (INHALATION) at 07:27

## 2019-06-21 RX ADMIN — PANTOPRAZOLE SODIUM 40 MG: 40 TABLET, DELAYED RELEASE ORAL at 08:29

## 2019-06-21 RX ADMIN — INSULIN LISPRO 3 UNITS: 100 INJECTION, SOLUTION INTRAVENOUS; SUBCUTANEOUS at 16:43

## 2019-06-21 RX ADMIN — IPRATROPIUM BROMIDE AND ALBUTEROL SULFATE 1 AMPULE: .5; 3 SOLUTION RESPIRATORY (INHALATION) at 16:10

## 2019-06-21 RX ADMIN — GLIMEPIRIDE 2 MG: 2 TABLET ORAL at 08:30

## 2019-06-21 RX ADMIN — TIMOLOL MALEATE 1 DROP: 5 SOLUTION OPHTHALMIC at 21:16

## 2019-06-21 RX ADMIN — INSULIN LISPRO 5 UNITS: 100 INJECTION, SOLUTION INTRAVENOUS; SUBCUTANEOUS at 11:56

## 2019-06-21 RX ADMIN — MAGNESIUM OXIDE TAB 400 MG (241.3 MG ELEMENTAL MG) 400 MG: 400 (241.3 MG) TAB at 08:28

## 2019-06-21 RX ADMIN — INSULIN LISPRO 4 UNITS: 100 INJECTION, SOLUTION INTRAVENOUS; SUBCUTANEOUS at 15:43

## 2019-06-21 RX ADMIN — APIXABAN 2.5 MG: 2.5 TABLET, FILM COATED ORAL at 08:29

## 2019-06-21 ASSESSMENT — PAIN SCALES - GENERAL
PAINLEVEL_OUTOF10: 0

## 2019-06-21 NOTE — PROGRESS NOTES
PALLIATIVE CARE TEAM    Patient: Gloria Dickson  Room: 1101/1101-01    Reason For Consult   Goals of care evaluation  Distress management  Symptom Management  Guidance and support  Facilitate communications  Assistance in coordinating care  Recommendations for the above    Impression: Gloria Dickson is a 80y.o. year old female with  has a past medical history of Arthritis, Atrial fibrillation ([address] CAD (coronary artery disease), CHF (congestive heart failure) ([address] COPD (chronic obstructive pulmonary disease) ([address] Diabetes mellitus ([address] Femur fracture ([address] Hyperlipidemia, Hypertension, Moderate malnutrition ([address] and Respiratory failure ([address] .  Currently hospitalized for the management of CHF. The Palliative Care Team is following to assist with support. Code Status  DNR-CCA    Vital Signs:  /84   Pulse 105   Temp 98.7 °F (37.1 °C) (Oral)   Resp 20   Ht 4' 6\" (1.372 m)   Wt 98 lb 11.2 oz (44.8 kg)   SpO2 97%   BMI 23.80 kg/m²     Patient Active Problem List   Diagnosis    Pneumonia    Sepsis ([address]    Moderate malnutrition ([address]    Pulmonary HTN ([address]    Non-rheumatic mitral regurgitation    Acute on chronic combined systolic and diastolic congestive heart failure (HCC)    Chronic atrial fibrillation (HCC)    Type 2 diabetes mellitus with hyperglycemia (HCC)    Acute bronchitis    Acute congestive heart failure (HCC)    Hypotensive episode    Acute respiratory failure (HCC)    Other dysphagia    Unintentional weight loss    Moderate malnutrition ([address]    CHF (congestive heart failure), NYHA class I, acute on chronic, combined ([address]       Palliative Interaction:  Patient was lying in bed today, high-flow nasal cannula in place. Writer spoke briefly with patient's RN Peter Haley and update received that plan is to hopefully wean down to just nasal cannula today. Patient speaks limited English with Slovenian being her primary language. Patient's daughter Francesca Nielson was at the bedside. Genaro Paiz translated through the patient that she's feeling a bit better today and is hoping to be able to be discharged soon. Genaro Paiz did not appear open to discussion or further conversation, politely thanking Writer for coming. Emotional support provided.       Goals/Plan of care  Education/support to family  Education/support to patient    Electronically signed by   Jenn Patterson RN  Palliative Care Team  on 6/21/2019 at 1:57 PM

## 2019-06-21 NOTE — PLAN OF CARE
Problem: Falls - Risk of:  Goal: Will remain free from falls  Description  Will remain free from falls  Outcome: Ongoing  Goal: Absence of physical injury  Description  Absence of physical injury  Outcome: Ongoing     Problem: Risk for Impaired Skin Integrity  Goal: Tissue integrity - skin and mucous membranes  Description  Structural intactness and normal physiological function of skin and  mucous membranes.   Outcome: Ongoing     Problem: HEMODYNAMIC STATUS  Goal: Patient has stable vital signs and fluid balance  Outcome: Ongoing     Problem: FLUID AND ELECTROLYTE IMBALANCE  Goal: Fluid and electrolyte balance are achieved/maintained  Outcome: Ongoing     Problem: ACTIVITY INTOLERANCE/IMPAIRED MOBILITY  Goal: Mobility/activity is maintained at optimum level for patient  Outcome: Ongoing

## 2019-06-21 NOTE — PROGRESS NOTES
Subjective:     Follow-up diabetes  No polyuria no polydipsia no hypoglycemia blood sugars reviewed    ROS  Fever no chills no GI  complaints no cardiopulmonary complaints no TIA no bleeding no headache no sore throat no skin lesions doing much better and of the high flow oxygen using BiPAP at night  physical exam  General Appearance: alert and oriented to person, place and time and in no acute distress  Skin: warm and dry, no rash or erythema  Head: normocephalic and atraumatic  Eyes: pupils equal, round, and reactive to light, conjunctivae normal and sclera anicteric  Neck: neck supple and non tender without mass   Pulmonary/Chest: Air entry equal few rhonchi no wheezing few crackles no use of intercostal muscles  Cardiovascular: normal rate, irregular rhythm, normal S1 and S2, no gallops, intact distal pulses and no carotid bruits  Abdomen: soft, non-tender, non-distended, normal bowel sounds, no masses or organomegaly  Extremities: no edema and good pulses no Homans sign  Neurologic: Alert oriented x2 no focal deficit    /68   Pulse 98   Temp 98.6 °F (37 °C) (Oral)   Resp 22   Ht 4' 6\" (1.372 m)   Wt 98 lb 11.2 oz (44.8 kg)   SpO2 100%   BMI 23.80 kg/m²     CBC:   Lab Results   Component Value Date    WBC 11.9 06/21/2019    RBC 4.04 06/21/2019    HGB 12.1 06/21/2019    HCT 38.9 06/21/2019    MCV 96.3 06/21/2019    MCH 30.0 06/21/2019    MCHC 31.1 06/21/2019    RDW 14.6 06/21/2019     06/21/2019    MPV 10.3 06/21/2019     CMP:    Lab Results   Component Value Date     06/21/2019    K 4.0 06/21/2019    CL 97 06/21/2019    CO2 30 06/21/2019    BUN 40 06/21/2019    CREATININE 0.75 06/21/2019    GFRAA >60 06/21/2019    LABGLOM >60 06/21/2019    GLUCOSE 276 06/21/2019    GLUCOSE 117 09/02/2011    PROT 7.9 11/13/2018    LABALBU 4.2 11/13/2018    CALCIUM 9.2 06/21/2019    BILITOT 0.84 11/13/2018    ALKPHOS 108 11/13/2018    AST 32 11/13/2018    ALT 21 11/13/2018        Assessment:  Patient

## 2019-06-21 NOTE — PROGRESS NOTES
Physical Therapy    Facility/Department: [facility] ICU  Initial Assessment    NAME: Cami Vega  : 1923  MRN: [id number]    Date of Service: 2019    Discharge Recommendations:  Continue to assess pending progress       Past Surgical History:   Procedure Laterality Date    TOTAL HIP ARTHROPLASTY Left     UPPER GASTROINTESTINAL ENDOSCOPY  2019    Biopsy    UPPER GASTROINTESTINAL ENDOSCOPY N/A 2019    EGD BIOPSY performed by Basim Huang MD at [address] [place] [address]     Past Medical History:   Diagnosis Date    Arthritis     Atrial fibrillation ([address]     CAD (coronary artery disease)     CHF (congestive heart failure) (HCC)     COPD (chronic obstructive pulmonary disease) ([address]     Diabetes mellitus ([address]     Femur fracture ([address]     Hyperlipidemia     Hypertension     Moderate malnutrition ([address] 2019    Respiratory failure (HCC)          Assessment   Body structures, Functions, Activity limitations: Decreased functional mobility ; Decreased endurance;Decreased strength;Decreased balance  Treatment Diagnosis: difficulty in walking, unsteadiness on feet  Specific instructions for Next Treatment: transfers, gait  Prognosis: Good  Decision Making: Medium Complexity  Patient Education: PT POC, therex  Barriers to Learning: language  REQUIRES PT FOLLOW UP: Yes  Activity Tolerance  Activity Tolerance: Patient Tolerated treatment well       Patient Diagnosis(es): The encounter diagnosis was Acute on chronic congestive heart failure, unspecified heart failure type ([address] has a past medical history of Arthritis, Atrial fibrillation ([address] CAD (coronary artery disease), CHF (congestive heart failure) ([address] COPD (chronic obstructive pulmonary disease) ([address] Diabetes mellitus ([address] Femur fracture ([address] Hyperlipidemia, Hypertension, Moderate malnutrition ([address] and Respiratory failure ([address] has a past surgical history that includes Total hip arthroplasty (Left);  Upper gastrointestinal endoscopy LLE: WFL  Comment: daughter states her mothers LEs are weak, 3+/5 for B hips, 4/5 lower leg and 5/5 ankle  Strength RUE  Strength RUE: WFL  Strength LUE  Strength LUE: WFL  Tone RLE  RLE Tone: Normotonic  Tone LLE  LLE Tone: Normotonic  Sensation  Overall Sensation Status: WFL  Bed mobility  Rolling to Left: Supervision  Supine to Sit: Supervision  Sit to Supine: Supervision  Scooting: Supervision  Transfers  Sit to Stand: Stand by assistance  Stand to sit: Contact guard assistance  Bed to Chair: Contact guard assistance  Lateral Transfers: Contact guard assistance  Ambulation  Ambulation?: Yes  WB Status: FWB  Ambulation 1  Surface: level tile  Device: Rolling Walker  Assistance: Contact guard assistance  Quality of Gait: FB posture, slow chuy  Gait Deviations: Slow Chuy; Increased DALTON; Decreased step length  Distance: 5 ft to chair  Stairs/Curb  Stairs?: No     Balance  Posture: Poor  Sitting - Static: Good  Sitting - Dynamic: Good  Standing - Static: Good;-  Standing - Dynamic: Fair  Exercises  Hip Flexion: 10  Hip Abduction: 10  Knee Long Arc Quad: 10  Knee Active Range of Motion: 10  Ankle Pumps: 20  Shoulder Active Range of Motion: chest press 10x ea UE  Comments: standing mini squats, 15x, toe raises in standing 15x     Plan   Plan  Times per week: 4-5x per week  Plan weeks: 10 visits  Specific instructions for Next Treatment: transfers, gait  Current Treatment Recommendations: Strengthening, Transfer Training, Endurance Training, Neuromuscular Re-education, Balance Training, Gait Training, Functional Mobility Training, Stair training, Safety Education & Training  Safety Devices  Type of devices:  All fall risk precautions in place, Left in chair, Patient at risk for falls, Call light within reach  Restraints  Initially in place: No    G-Code       OutComes Score                                                  AM-PAC Score     AM-PAC Inpatient Mobility without Stair Climbing Raw Score : 17 (06/21/19

## 2019-06-21 NOTE — PROGRESS NOTES
on chronic combined systolic and diastolic heart failure  · Moderate to severe secondary pulmonary hypertension  · A. Fib  · History of dysphagia    Recommendations:  · BiPAP for sleep and as needed, wean high flow oxygen by nasal cannula  · Discontinue IV Zithromax  · Albuterol and Ipratropium Q 4 hours and prn  · Continue dulera 200  · Continue Singulair  · Continue diuresis  · X-ray chest in am  · Labs: CBC and BMP in am  · DVT prophylaxis, On Eliquis  · PT OT  · Overall prognosis is poor. CODE STATUS is DNR CCA.     · She may need LTAC  · Will follow with you    Madalyn Craig MD, [facility]  Pulmonary Critical Care and Sleep Medicine,  [facility]  Cell: [phone]  Office: [phone]

## 2019-06-21 NOTE — PROGRESS NOTES
hours) at 6/21/2019 1006  Last data filed at 6/21/2019 0520  Gross per 24 hour   Intake 680 ml   Output 1350 ml   Net -670 ml       Exam:  CONSTITUTIONAL:  awake, alert, cooperative, no apparent distress, and appears stated age. HEENT: Normal jugular venous pulsations, no carotid bruits. Head is atraumatic, normocephalic. Eyes and oral mucosa are normal.  LUNGS: Good respiratory effort On auscultation: bilateral exp. wheezing  CARDIOVASCULAR:  Normal apical impulse, irregular rate and rhythm, normal S2, no S3 or S4, and no murmur or rub noted. ABDOMEN: Soft, nontender, nondistended. Bowel sounds present. No masses or tenderness. No bruit. SKIN: Warm and dry. EXTREMITIES: No bilateral lower extremity edema. Motor movement grossly intact. No cyanosis or clubbing.       DIAGNOSTICS     Studies:    Telemetry: afib  EKG: afib with NS T changes  Echocardiogram: EF 40%, Ant/AS/AL hypok, MR (m/M), TR (M/S), PH (M/S)      Laboratory testing:  Lab Results   Component Value Date     06/21/2019     06/20/2019     06/19/2019    K 4.0 06/21/2019    K 4.4 06/20/2019    K 4.6 06/19/2019    CL 97 (L) 06/21/2019    CL 98 06/20/2019    CL 97 (L) 06/19/2019    CO2 30 06/21/2019    CO2 27 06/20/2019    CO2 31 06/19/2019    BUN 40 (H) 06/21/2019    BUN 40 (H) 06/20/2019    BUN 46 (H) 06/19/2019    CREATININE 0.75 06/21/2019    CREATININE 0.77 06/20/2019    CREATININE 1.18 (H) 06/19/2019    CALCIUM 9.2 06/21/2019    CALCIUM 9.2 06/20/2019    CALCIUM 9.4 06/19/2019    LABALBU 4.2 11/13/2018    LABALBU 4.3 05/09/2018    LABALBU 3.4 (L) 09/24/2017    PROT 7.9 11/13/2018    PROT 7.5 05/09/2018    PROT 6.4 09/24/2017    BILITOT 0.84 11/13/2018    BILITOT 0.51 05/09/2018    BILITOT 0.67 09/24/2017    ALKPHOS 108 (H) 11/13/2018    ALKPHOS 96 05/09/2018    ALKPHOS 81 09/24/2017    ALT 21 11/13/2018    ALT 14 05/09/2018    ALT 24 09/24/2017    AST 32 (H) 11/13/2018    AST 23 05/09/2018    AST 24 09/24/2017    GLUCOSE 276 (H) ASSESSMENT and PLAN       · Acute respiratory failure; managed by others  · Acute on chronic combined (systolic/diastolic) heart failure, with excellent urine output (-4.7 liters since admission)  · Cardiomyopathy, with EF 40%  · Anterior/anteroseptal and anterolateral wall hypokinesis suggestive of CAD  · Moderate to severe tricuspid regurgitation   · Moderate to severe pulmonary hypertension   · Mild to moderate mitral regurgitation  · Chronic atrial fibrillation with controlled HR  · Chronic anticoagulation with low-dose apixaban  · COPD     Continue Eliquis 2.5x2, lipitor 20, cardizem , lasix 40x1, and lisinopril 5  Continue conservative medical therapy  Continue supportive care      Thank you for allowing me to participate in the care of Savannah Membreno. I had the opportunity to review the clinical symptoms and presentation of Savannah Membreno and discussed with the caring RN. Further evaluation will be based upon the patient's clinical course and testing results. All questions and concerns were addressed to the patient. Alternatives to my treatment were discussed. The note was completed by using EMR. However, inadvertent computerized transcription errors may be present. Although every effort was made to ensure accuracy, no guarantees can be provided that every mistake has been identified and corrected by editing.       Susie Pa M.D.

## 2019-06-22 ENCOUNTER — APPOINTMENT (OUTPATIENT)
Dept: GENERAL RADIOLOGY | Age: 84
DRG: 871 | End: 2019-06-22
Payer: MEDICARE

## 2019-06-22 LAB
ABSOLUTE EOS #: <0.03 K/UL (ref 0–0.44)
ABSOLUTE IMMATURE GRANULOCYTE: 0.12 K/UL (ref 0–0.3)
ABSOLUTE LYMPH #: 1.45 K/UL (ref 1.1–3.7)
ABSOLUTE MONO #: 1.64 K/UL (ref 0.1–1.2)
ANION GAP SERPL CALCULATED.3IONS-SCNC: 10 MMOL/L (ref 9–17)
BASOPHILS # BLD: 0 % (ref 0–2)
BASOPHILS ABSOLUTE: 0.03 K/UL (ref 0–0.2)
BUN BLDV-MCNC: 41 MG/DL (ref 8–23)
BUN/CREAT BLD: 55 (ref 9–20)
CALCIUM SERPL-MCNC: 8.8 MG/DL (ref 8.6–10.4)
CHLORIDE BLD-SCNC: 95 MMOL/L (ref 98–107)
CO2: 31 MMOL/L (ref 20–31)
CREAT SERPL-MCNC: 0.75 MG/DL (ref 0.5–0.9)
DIFFERENTIAL TYPE: ABNORMAL
EOSINOPHILS RELATIVE PERCENT: 0 % (ref 1–4)
GFR AFRICAN AMERICAN: >60 ML/MIN
GFR NON-AFRICAN AMERICAN: >60 ML/MIN
GFR SERPL CREATININE-BSD FRML MDRD: ABNORMAL ML/MIN/{1.73_M2}
GFR SERPL CREATININE-BSD FRML MDRD: ABNORMAL ML/MIN/{1.73_M2}
GLUCOSE BLD-MCNC: 208 MG/DL (ref 65–105)
GLUCOSE BLD-MCNC: 223 MG/DL (ref 70–99)
GLUCOSE BLD-MCNC: 273 MG/DL (ref 65–105)
GLUCOSE BLD-MCNC: 273 MG/DL (ref 65–105)
GLUCOSE BLD-MCNC: 302 MG/DL (ref 65–105)
HCT VFR BLD CALC: 35.2 % (ref 36.3–47.1)
HEMOGLOBIN: 11.1 G/DL (ref 11.9–15.1)
IMMATURE GRANULOCYTES: 1 %
LYMPHOCYTES # BLD: 8 % (ref 24–43)
MCH RBC QN AUTO: 29.9 PG (ref 25.2–33.5)
MCHC RBC AUTO-ENTMCNC: 31.5 G/DL (ref 28.4–34.8)
MCV RBC AUTO: 94.9 FL (ref 82.6–102.9)
MONOCYTES # BLD: 10 % (ref 3–12)
NRBC AUTOMATED: 0 PER 100 WBC
PDW BLD-RTO: 14.5 % (ref 11.8–14.4)
PLATELET # BLD: 217 K/UL (ref 138–453)
PLATELET ESTIMATE: ABNORMAL
PMV BLD AUTO: 10.4 FL (ref 8.1–13.5)
POTASSIUM SERPL-SCNC: 4.1 MMOL/L (ref 3.7–5.3)
RBC # BLD: 3.71 M/UL (ref 3.95–5.11)
RBC # BLD: ABNORMAL 10*6/UL
SEG NEUTROPHILS: 81 % (ref 36–65)
SEGMENTED NEUTROPHILS ABSOLUTE COUNT: 13.97 K/UL (ref 1.5–8.1)
SODIUM BLD-SCNC: 136 MMOL/L (ref 135–144)
WBC # BLD: 17.2 K/UL (ref 3.5–11.3)
WBC # BLD: ABNORMAL 10*3/UL

## 2019-06-22 PROCEDURE — 6370000000 HC RX 637 (ALT 250 FOR IP): Performed by: INTERNAL MEDICINE

## 2019-06-22 PROCEDURE — 2580000003 HC RX 258: Performed by: INTERNAL MEDICINE

## 2019-06-22 PROCEDURE — 94640 AIRWAY INHALATION TREATMENT: CPT

## 2019-06-22 PROCEDURE — 2700000000 HC OXYGEN THERAPY PER DAY

## 2019-06-22 PROCEDURE — 80048 BASIC METABOLIC PNL TOTAL CA: CPT

## 2019-06-22 PROCEDURE — 94660 CPAP INITIATION&MGMT: CPT

## 2019-06-22 PROCEDURE — 2060000000 HC ICU INTERMEDIATE R&B

## 2019-06-22 PROCEDURE — 94761 N-INVAS EAR/PLS OXIMETRY MLT: CPT

## 2019-06-22 PROCEDURE — 6360000002 HC RX W HCPCS: Performed by: INTERNAL MEDICINE

## 2019-06-22 PROCEDURE — 85025 COMPLETE CBC W/AUTO DIFF WBC: CPT

## 2019-06-22 PROCEDURE — 71045 X-RAY EXAM CHEST 1 VIEW: CPT

## 2019-06-22 PROCEDURE — 82947 ASSAY GLUCOSE BLOOD QUANT: CPT

## 2019-06-22 PROCEDURE — 6370000000 HC RX 637 (ALT 250 FOR IP): Performed by: NURSE PRACTITIONER

## 2019-06-22 PROCEDURE — 36415 COLL VENOUS BLD VENIPUNCTURE: CPT

## 2019-06-22 RX ORDER — INSULIN GLARGINE 100 [IU]/ML
4 INJECTION, SOLUTION SUBCUTANEOUS NIGHTLY
Status: DISCONTINUED | OUTPATIENT
Start: 2019-06-22 | End: 2019-06-23

## 2019-06-22 RX ORDER — FUROSEMIDE 20 MG/1
20 TABLET ORAL DAILY
Status: DISCONTINUED | OUTPATIENT
Start: 2019-06-23 | End: 2019-06-23

## 2019-06-22 RX ORDER — DIPHENHYDRAMINE HCL 25 MG
12.5 TABLET ORAL ONCE
Status: COMPLETED | OUTPATIENT
Start: 2019-06-22 | End: 2019-06-22

## 2019-06-22 RX ADMIN — MONTELUKAST 10 MG: 10 TABLET, FILM COATED ORAL at 21:11

## 2019-06-22 RX ADMIN — METOPROLOL TARTRATE 25 MG: 25 TABLET ORAL at 10:29

## 2019-06-22 RX ADMIN — APIXABAN 2.5 MG: 2.5 TABLET, FILM COATED ORAL at 09:30

## 2019-06-22 RX ADMIN — MAGNESIUM OXIDE TAB 400 MG (241.3 MG ELEMENTAL MG) 400 MG: 400 (241.3 MG) TAB at 09:30

## 2019-06-22 RX ADMIN — AMIODARONE HYDROCHLORIDE 0.5 MG/MIN: 50 INJECTION, SOLUTION INTRAVENOUS at 07:54

## 2019-06-22 RX ADMIN — POTASSIUM CHLORIDE 20 MEQ: 750 CAPSULE, EXTENDED RELEASE ORAL at 09:32

## 2019-06-22 RX ADMIN — DIPHENHYDRAMINE HCL 12.5 MG: 25 TABLET ORAL at 23:14

## 2019-06-22 RX ADMIN — INSULIN LISPRO 2 UNITS: 100 INJECTION, SOLUTION INTRAVENOUS; SUBCUTANEOUS at 08:30

## 2019-06-22 RX ADMIN — ACETAMINOPHEN 650 MG: 325 TABLET ORAL at 21:11

## 2019-06-22 RX ADMIN — IPRATROPIUM BROMIDE AND ALBUTEROL SULFATE 1 AMPULE: .5; 3 SOLUTION RESPIRATORY (INHALATION) at 07:24

## 2019-06-22 RX ADMIN — APIXABAN 2.5 MG: 2.5 TABLET, FILM COATED ORAL at 21:11

## 2019-06-22 RX ADMIN — INSULIN LISPRO 2 UNITS: 100 INJECTION, SOLUTION INTRAVENOUS; SUBCUTANEOUS at 21:13

## 2019-06-22 RX ADMIN — BRIMONIDINE TARTRATE 1 DROP: 2 SOLUTION OPHTHALMIC at 09:31

## 2019-06-22 RX ADMIN — AZITHROMYCIN 500 MG: 250 TABLET, FILM COATED ORAL at 09:30

## 2019-06-22 RX ADMIN — GUAIFENESIN 600 MG: 600 TABLET, EXTENDED RELEASE ORAL at 09:33

## 2019-06-22 RX ADMIN — INSULIN LISPRO 3 UNITS: 100 INJECTION, SOLUTION INTRAVENOUS; SUBCUTANEOUS at 12:14

## 2019-06-22 RX ADMIN — MAGNESIUM OXIDE TAB 400 MG (241.3 MG ELEMENTAL MG) 400 MG: 400 (241.3 MG) TAB at 21:11

## 2019-06-22 RX ADMIN — Medication 10 ML: at 21:11

## 2019-06-22 RX ADMIN — FUROSEMIDE 40 MG: 40 TABLET ORAL at 09:32

## 2019-06-22 RX ADMIN — IPRATROPIUM BROMIDE AND ALBUTEROL SULFATE 1 AMPULE: .5; 3 SOLUTION RESPIRATORY (INHALATION) at 11:00

## 2019-06-22 RX ADMIN — PANTOPRAZOLE SODIUM 40 MG: 40 TABLET, DELAYED RELEASE ORAL at 09:30

## 2019-06-22 RX ADMIN — GLIMEPIRIDE 2 MG: 2 TABLET ORAL at 09:33

## 2019-06-22 RX ADMIN — VITAMIN D, TAB 1000IU (100/BT) 2000 UNITS: 25 TAB at 21:11

## 2019-06-22 RX ADMIN — VITAMIN D, TAB 1000IU (100/BT) 2000 UNITS: 25 TAB at 09:32

## 2019-06-22 RX ADMIN — MOMETASONE FUROATE AND FORMOTEROL FUMARATE DIHYDRATE 2 PUFF: 200; 5 AEROSOL RESPIRATORY (INHALATION) at 07:24

## 2019-06-22 RX ADMIN — TIMOLOL MALEATE 1 DROP: 5 SOLUTION OPHTHALMIC at 21:20

## 2019-06-22 RX ADMIN — METOPROLOL TARTRATE 25 MG: 25 TABLET ORAL at 21:11

## 2019-06-22 RX ADMIN — LISINOPRIL 5 MG: 5 TABLET ORAL at 09:32

## 2019-06-22 RX ADMIN — DORZOLAMIDE HYDROCHLORIDE 1 DROP: 20 SOLUTION/ DROPS OPHTHALMIC at 21:20

## 2019-06-22 RX ADMIN — DORZOLAMIDE HYDROCHLORIDE 1 DROP: 20 SOLUTION/ DROPS OPHTHALMIC at 09:31

## 2019-06-22 RX ADMIN — INSULIN GLARGINE 4 UNITS: 100 INJECTION, SOLUTION SUBCUTANEOUS at 21:12

## 2019-06-22 RX ADMIN — ATORVASTATIN CALCIUM 20 MG: 20 TABLET, FILM COATED ORAL at 21:11

## 2019-06-22 RX ADMIN — GABAPENTIN 100 MG: 100 CAPSULE ORAL at 09:33

## 2019-06-22 RX ADMIN — IPRATROPIUM BROMIDE AND ALBUTEROL SULFATE 1 AMPULE: .5; 3 SOLUTION RESPIRATORY (INHALATION) at 18:04

## 2019-06-22 RX ADMIN — IPRATROPIUM BROMIDE AND ALBUTEROL SULFATE 1 AMPULE: .5; 3 SOLUTION RESPIRATORY (INHALATION) at 15:05

## 2019-06-22 RX ADMIN — INSULIN LISPRO 3 UNITS: 100 INJECTION, SOLUTION INTRAVENOUS; SUBCUTANEOUS at 17:55

## 2019-06-22 RX ADMIN — GUAIFENESIN 600 MG: 600 TABLET, EXTENDED RELEASE ORAL at 21:11

## 2019-06-22 RX ADMIN — BRIMONIDINE TARTRATE 1 DROP: 2 SOLUTION OPHTHALMIC at 21:20

## 2019-06-22 RX ADMIN — MOMETASONE FUROATE AND FORMOTEROL FUMARATE DIHYDRATE 2 PUFF: 200; 5 AEROSOL RESPIRATORY (INHALATION) at 18:10

## 2019-06-22 RX ADMIN — DILTIAZEM HYDROCHLORIDE 120 MG: 120 CAPSULE, COATED, EXTENDED RELEASE ORAL at 09:33

## 2019-06-22 RX ADMIN — TIMOLOL MALEATE 1 DROP: 5 SOLUTION OPHTHALMIC at 09:31

## 2019-06-22 ASSESSMENT — PAIN SCALES - GENERAL
PAINLEVEL_OUTOF10: 0
PAINLEVEL_OUTOF10: 1

## 2019-06-22 NOTE — PLAN OF CARE
Problem: Falls - Risk of:  Goal: Will remain free from falls  Description  Will remain free from falls  Outcome: Ongoing  Note:   Patient remains free from falls at this time. Bed in lowest position with wheels locked. Tray table and call light within reach. Hourly rounding. Bed alarm set. Problem: Risk for Impaired Skin Integrity  Goal: Tissue integrity - skin and mucous membranes  Description  Structural intactness and normal physiological function of skin and  mucous membranes. Outcome: Ongoing  Note:   Skin assessment performed. No new signs of skin breakdown. Patient repositions self. Pillow support provided.       Problem: HEMODYNAMIC STATUS  Goal: Patient has stable vital signs and fluid balance  Outcome: Ongoing

## 2019-06-22 NOTE — PROGRESS NOTES
Pulmonary Critical Care Progress Note       Patient seen for the follow up of Acute on chronic respiratory failure, acute on chronic diastolic and systolic congestive heart failure, pulmonary edema, pleural effusion, COPD exacerbation, tracheobronchitis, secondary pulmonary hypertension    Subjective:  Patient has improved shortness breath and is on oxygen nasal cannula. She has occasional choking on food intake. She has been on dysphagia diet. She has  No chest pain. Urine output is good. Her cough is mostly dry. Examination:  Vitals: /68   Pulse 98   Temp 97.9 °F (36.6 °C) (Oral)   Resp 21   Ht 4' 6\" (1.372 m)   Wt 99 lb 8 oz (45.1 kg)   SpO2 99%   BMI 23.99 kg/m²   General appearance: alert and cooperative with exam  Neck: No JVD  Lungs: diminished breath sounds bilaterally  No crackles or wheezing  Heart: regular rate and rhythm, S1, S2 normal, no gallop  Abdomen: Soft, non tender, + BS  Extremities: no cyanosis or clubbing. No significant edema    LABs:  CBC:   Recent Labs     06/21/19  0428 06/22/19  0548   WBC 11.9* 17.2*   HGB 12.1 11.1*   HCT 38.9 35.2*    217     BMP:   Recent Labs     06/21/19  0428 06/22/19  0548    136   K 4.0 4.1   CO2 30 31   BUN 40* 41*   CREATININE 0.75 0.75   LABGLOM >60 >60   GLUCOSE 276* 223*     ABG:  Lab Results   Component Value Date    TWR6DTA 34 06/18/2019    FIO2 NOT REPORTED 06/18/2019       Lab Results   Component Value Date    POCPH 7.23 06/18/2019    POCPCO2 76 06/18/2019    POCPO2 76 06/18/2019    POCHCO3 31.6 06/18/2019    NBEA NOT REPORTED 06/18/2019    PBEA 4 06/18/2019    ATB0EXE 34 06/18/2019    MZMZ8GJM 91 06/18/2019    FIO2 NOT REPORTED 06/18/2019     Radiology:  6/ 21/2019  Slight improvement in right basilar opacity.       Stable left effusion.       Chronic pulmonary changes.          Impression:  · Acute on chronic hypoxic respiratory failure  · Pulmonary edema/small pleural effusions bilaterally  · Acute exacerbation of

## 2019-06-22 NOTE — PROGRESS NOTES
Subjective:     Follow-up diabetes  No polyuria no polydipsia no hypoglycemia blood sugar still in the 200 range  ROS  No fever no chills no GI/ complaints at present still with occasional cough congested no phlegm mild shortness of breath no chest pain no palpitation no dizziness no syncopal episode, no TIA no bleeding no headache no sore throat no skin lesions  physical exam  General Appearance: alert and oriented to person, place and time and in no acute distress  Skin: warm and dry, no rash or erythema  Head: normocephalic and atraumatic  Eyes: pupils equal, round, and reactive to light and sclera anicteric     Neck: neck supple and non tender without mass   Pulmonary/Chest: Air entry equal bilateral rhonchi no crackles no use of intercostal muscles cardiovascular: normal rate, normal S1 and S2, no gallops, intact distal pulses, no carotid bruits and irregularly irregular rhythm noted  Abdomen: soft, non-tender, non-distended, normal bowel sounds, no masses or organomegaly  Extremities: no edema and good pulses no Homans sign    Neurologic: Alert oriented x2 no focal deficit    /68   Pulse 98   Temp 97.9 °F (36.6 °C) (Oral)   Resp 21   Ht 4' 6\" (1.372 m)   Wt 99 lb 8 oz (45.1 kg)   SpO2 99%   BMI 23.99 kg/m²     CBC:   Lab Results   Component Value Date    WBC 17.2 06/22/2019    RBC 3.71 06/22/2019    HGB 11.1 06/22/2019    HCT 35.2 06/22/2019    MCV 94.9 06/22/2019    MCH 29.9 06/22/2019    MCHC 31.5 06/22/2019    RDW 14.5 06/22/2019     06/22/2019    MPV 10.4 06/22/2019     CMP:    Lab Results   Component Value Date     06/22/2019    K 4.1 06/22/2019    CL 95 06/22/2019    CO2 31 06/22/2019    BUN 41 06/22/2019    CREATININE 0.75 06/22/2019    GFRAA >60 06/22/2019    LABGLOM >60 06/22/2019    GLUCOSE 223 06/22/2019    GLUCOSE 117 09/02/2011    PROT 7.9 11/13/2018    LABALBU 4.2 11/13/2018    CALCIUM 8.8 06/22/2019    BILITOT 0.84 11/13/2018    ALKPHOS 108 11/13/2018    AST 32

## 2019-06-22 NOTE — PROGRESS NOTES
05/09/2018    AST 24 09/24/2017    GLUCOSE 223 (H) 06/22/2019    GLUCOSE 276 (H) 06/21/2019    GLUCOSE 253 (H) 06/20/2019     Lab Results   Component Value Date    WBC 17.2 (H) 06/22/2019    WBC 11.9 (H) 06/21/2019    WBC 12.6 (H) 06/20/2019    HGB 11.1 (L) 06/22/2019    HGB 12.1 06/21/2019    HGB 11.0 (L) 06/20/2019    HCT 35.2 (L) 06/22/2019    HCT 38.9 06/21/2019    HCT 36.3 06/20/2019     06/22/2019     06/21/2019     06/20/2019     Lab Results   Component Value Date    MG 2.2 06/20/2019    MG 2.3 06/19/2019    MG 1.9 06/18/2019     Lab Results   Component Value Date    CKTOTAL 48 04/04/2019    CKTOTAL 71 09/18/2017    CKMB 2.2 04/04/2019    CKMB 3.1 09/18/2017    TROPONINT NOT REPORTED 06/15/2019    TROPONINT NOT REPORTED 06/15/2019    TROPONINT NOT REPORTED 06/15/2019     Lab Results   Component Value Date    PROBNP 4,942 (H) 06/21/2019    PROBNP 21,305 (H) 06/19/2019    PROBNP 2,119 (H) 06/18/2019     Lab Results   Component Value Date    DDIMER 0.51 09/18/2017     Lab Results   Component Value Date    CHOL 104 06/16/2019    HDL 55 06/16/2019    TRIG 75 06/16/2019     Lab Results   Component Value Date    INR 1.4 06/18/2019    INR 1.2 04/04/2019    INR 1.2 11/13/2018    APTT 29.3 06/18/2019    APTT 29.9 04/04/2019    APTT 32.2 (H) 09/18/2017    TSH 1.38 06/15/2019    TSH 1.06 05/09/2018    TSH 0.38 09/18/2017           Patient Active Problem List   Diagnosis    Pneumonia    Sepsis ([facility]    Moderate malnutrition ([facility]    Pulmonary HTN ([facility]    Non-rheumatic mitral regurgitation    Acute on chronic combined systolic and diastolic congestive heart failure (HCC)    Chronic atrial fibrillation (HCC)    Type 2 diabetes mellitus with hyperglycemia (HCC)    Acute bronchitis    Acute congestive heart failure (HCC)    Hypotensive episode    Acute respiratory failure (HCC)    Other dysphagia    Unintentional weight loss    Moderate malnutrition (HCC)    CHF (congestive heart

## 2019-06-23 ENCOUNTER — APPOINTMENT (OUTPATIENT)
Dept: GENERAL RADIOLOGY | Age: 84
DRG: 871 | End: 2019-06-23
Payer: MEDICARE

## 2019-06-23 LAB
ABSOLUTE EOS #: 0.05 K/UL (ref 0–0.44)
ABSOLUTE IMMATURE GRANULOCYTE: 0.16 K/UL (ref 0–0.3)
ABSOLUTE LYMPH #: 1.66 K/UL (ref 1.1–3.7)
ABSOLUTE MONO #: 1.83 K/UL (ref 0.1–1.2)
ANION GAP SERPL CALCULATED.3IONS-SCNC: 9 MMOL/L (ref 9–17)
BASOPHILS # BLD: 0 % (ref 0–2)
BASOPHILS ABSOLUTE: 0.03 K/UL (ref 0–0.2)
BUN BLDV-MCNC: 31 MG/DL (ref 8–23)
BUN/CREAT BLD: 44 (ref 9–20)
CALCIUM SERPL-MCNC: 8.6 MG/DL (ref 8.6–10.4)
CHLORIDE BLD-SCNC: 96 MMOL/L (ref 98–107)
CO2: 32 MMOL/L (ref 20–31)
CREAT SERPL-MCNC: 0.7 MG/DL (ref 0.5–0.9)
DIFFERENTIAL TYPE: ABNORMAL
EOSINOPHILS RELATIVE PERCENT: 0 % (ref 1–4)
GFR AFRICAN AMERICAN: >60 ML/MIN
GFR NON-AFRICAN AMERICAN: >60 ML/MIN
GFR SERPL CREATININE-BSD FRML MDRD: ABNORMAL ML/MIN/{1.73_M2}
GFR SERPL CREATININE-BSD FRML MDRD: ABNORMAL ML/MIN/{1.73_M2}
GLUCOSE BLD-MCNC: 109 MG/DL (ref 65–105)
GLUCOSE BLD-MCNC: 128 MG/DL (ref 70–99)
GLUCOSE BLD-MCNC: 173 MG/DL (ref 65–105)
GLUCOSE BLD-MCNC: 207 MG/DL (ref 65–105)
GLUCOSE BLD-MCNC: 344 MG/DL (ref 65–105)
HCT VFR BLD CALC: 36.3 % (ref 36.3–47.1)
HEMOGLOBIN: 11.4 G/DL (ref 11.9–15.1)
IMMATURE GRANULOCYTES: 1 %
LACTIC ACID: 1.7 MMOL/L (ref 0.5–2.2)
LYMPHOCYTES # BLD: 9 % (ref 24–43)
MCH RBC QN AUTO: 29.6 PG (ref 25.2–33.5)
MCHC RBC AUTO-ENTMCNC: 31.4 G/DL (ref 28.4–34.8)
MCV RBC AUTO: 94.3 FL (ref 82.6–102.9)
MONOCYTES # BLD: 10 % (ref 3–12)
NRBC AUTOMATED: 0 PER 100 WBC
PDW BLD-RTO: 14.6 % (ref 11.8–14.4)
PLATELET # BLD: 240 K/UL (ref 138–453)
PLATELET ESTIMATE: ABNORMAL
PMV BLD AUTO: 10.7 FL (ref 8.1–13.5)
POTASSIUM SERPL-SCNC: 4.1 MMOL/L (ref 3.7–5.3)
RBC # BLD: 3.85 M/UL (ref 3.95–5.11)
RBC # BLD: ABNORMAL 10*6/UL
SEG NEUTROPHILS: 80 % (ref 36–65)
SEGMENTED NEUTROPHILS ABSOLUTE COUNT: 14.49 K/UL (ref 1.5–8.1)
SODIUM BLD-SCNC: 137 MMOL/L (ref 135–144)
WBC # BLD: 18.2 K/UL (ref 3.5–11.3)
WBC # BLD: ABNORMAL 10*3/UL

## 2019-06-23 PROCEDURE — 6370000000 HC RX 637 (ALT 250 FOR IP): Performed by: INTERNAL MEDICINE

## 2019-06-23 PROCEDURE — 6360000002 HC RX W HCPCS: Performed by: INTERNAL MEDICINE

## 2019-06-23 PROCEDURE — 94640 AIRWAY INHALATION TREATMENT: CPT

## 2019-06-23 PROCEDURE — 94761 N-INVAS EAR/PLS OXIMETRY MLT: CPT

## 2019-06-23 PROCEDURE — 6370000000 HC RX 637 (ALT 250 FOR IP): Performed by: NURSE PRACTITIONER

## 2019-06-23 PROCEDURE — 2580000003 HC RX 258: Performed by: INTERNAL MEDICINE

## 2019-06-23 PROCEDURE — 36415 COLL VENOUS BLD VENIPUNCTURE: CPT

## 2019-06-23 PROCEDURE — 2700000000 HC OXYGEN THERAPY PER DAY

## 2019-06-23 PROCEDURE — 82947 ASSAY GLUCOSE BLOOD QUANT: CPT

## 2019-06-23 PROCEDURE — 83605 ASSAY OF LACTIC ACID: CPT

## 2019-06-23 PROCEDURE — 71045 X-RAY EXAM CHEST 1 VIEW: CPT

## 2019-06-23 PROCEDURE — 85025 COMPLETE CBC W/AUTO DIFF WBC: CPT

## 2019-06-23 PROCEDURE — 2060000000 HC ICU INTERMEDIATE R&B

## 2019-06-23 PROCEDURE — 80048 BASIC METABOLIC PNL TOTAL CA: CPT

## 2019-06-23 RX ORDER — INSULIN GLARGINE 100 [IU]/ML
8 INJECTION, SOLUTION SUBCUTANEOUS NIGHTLY
Status: DISCONTINUED | OUTPATIENT
Start: 2019-06-23 | End: 2019-06-24

## 2019-06-23 RX ORDER — FUROSEMIDE 10 MG/ML
40 INJECTION INTRAMUSCULAR; INTRAVENOUS ONCE
Status: COMPLETED | OUTPATIENT
Start: 2019-06-23 | End: 2019-06-23

## 2019-06-23 RX ORDER — FUROSEMIDE 40 MG/1
40 TABLET ORAL DAILY
Status: DISCONTINUED | OUTPATIENT
Start: 2019-06-24 | End: 2019-06-26 | Stop reason: HOSPADM

## 2019-06-23 RX ORDER — DIPHENHYDRAMINE HCL 25 MG
12.5 TABLET ORAL NIGHTLY PRN
Status: DISCONTINUED | OUTPATIENT
Start: 2019-06-23 | End: 2019-06-26 | Stop reason: HOSPADM

## 2019-06-23 RX ADMIN — DORZOLAMIDE HYDROCHLORIDE 1 DROP: 20 SOLUTION/ DROPS OPHTHALMIC at 09:17

## 2019-06-23 RX ADMIN — GLIMEPIRIDE 2 MG: 2 TABLET ORAL at 09:19

## 2019-06-23 RX ADMIN — GABAPENTIN 100 MG: 100 CAPSULE ORAL at 09:19

## 2019-06-23 RX ADMIN — INSULIN GLARGINE 8 UNITS: 100 INJECTION, SOLUTION SUBCUTANEOUS at 21:48

## 2019-06-23 RX ADMIN — AZITHROMYCIN 500 MG: 250 TABLET, FILM COATED ORAL at 09:17

## 2019-06-23 RX ADMIN — MOMETASONE FUROATE AND FORMOTEROL FUMARATE DIHYDRATE 2 PUFF: 200; 5 AEROSOL RESPIRATORY (INHALATION) at 18:02

## 2019-06-23 RX ADMIN — METOPROLOL TARTRATE 25 MG: 25 TABLET ORAL at 21:47

## 2019-06-23 RX ADMIN — MAGNESIUM OXIDE TAB 400 MG (241.3 MG ELEMENTAL MG) 400 MG: 400 (241.3 MG) TAB at 09:19

## 2019-06-23 RX ADMIN — VITAMIN D, TAB 1000IU (100/BT) 2000 UNITS: 25 TAB at 09:17

## 2019-06-23 RX ADMIN — BRIMONIDINE TARTRATE 1 DROP: 2 SOLUTION OPHTHALMIC at 21:48

## 2019-06-23 RX ADMIN — IPRATROPIUM BROMIDE AND ALBUTEROL SULFATE 1 AMPULE: .5; 3 SOLUTION RESPIRATORY (INHALATION) at 18:00

## 2019-06-23 RX ADMIN — ACETAMINOPHEN 650 MG: 325 TABLET ORAL at 14:28

## 2019-06-23 RX ADMIN — APIXABAN 2.5 MG: 2.5 TABLET, FILM COATED ORAL at 21:48

## 2019-06-23 RX ADMIN — POTASSIUM CHLORIDE 20 MEQ: 750 CAPSULE, EXTENDED RELEASE ORAL at 09:18

## 2019-06-23 RX ADMIN — VITAMIN D, TAB 1000IU (100/BT) 2000 UNITS: 25 TAB at 21:47

## 2019-06-23 RX ADMIN — PANTOPRAZOLE SODIUM 40 MG: 40 TABLET, DELAYED RELEASE ORAL at 05:23

## 2019-06-23 RX ADMIN — Medication 10 ML: at 13:14

## 2019-06-23 RX ADMIN — TIMOLOL MALEATE 1 DROP: 5 SOLUTION OPHTHALMIC at 21:48

## 2019-06-23 RX ADMIN — GUAIFENESIN 600 MG: 600 TABLET, EXTENDED RELEASE ORAL at 09:18

## 2019-06-23 RX ADMIN — IPRATROPIUM BROMIDE AND ALBUTEROL SULFATE 1 AMPULE: .5; 3 SOLUTION RESPIRATORY (INHALATION) at 14:40

## 2019-06-23 RX ADMIN — INSULIN LISPRO 3 UNITS: 100 INJECTION, SOLUTION INTRAVENOUS; SUBCUTANEOUS at 14:27

## 2019-06-23 RX ADMIN — MONTELUKAST 10 MG: 10 TABLET, FILM COATED ORAL at 21:47

## 2019-06-23 RX ADMIN — DORZOLAMIDE HYDROCHLORIDE 1 DROP: 20 SOLUTION/ DROPS OPHTHALMIC at 21:48

## 2019-06-23 RX ADMIN — FUROSEMIDE 40 MG: 10 INJECTION, SOLUTION INTRAMUSCULAR; INTRAVENOUS at 13:14

## 2019-06-23 RX ADMIN — GUAIFENESIN 600 MG: 600 TABLET, EXTENDED RELEASE ORAL at 21:48

## 2019-06-23 RX ADMIN — FUROSEMIDE 20 MG: 20 TABLET ORAL at 09:18

## 2019-06-23 RX ADMIN — LISINOPRIL 5 MG: 5 TABLET ORAL at 09:18

## 2019-06-23 RX ADMIN — Medication 10 ML: at 21:49

## 2019-06-23 RX ADMIN — INSULIN LISPRO 6 UNITS: 100 INJECTION, SOLUTION INTRAVENOUS; SUBCUTANEOUS at 12:18

## 2019-06-23 RX ADMIN — IPRATROPIUM BROMIDE AND ALBUTEROL SULFATE 1 AMPULE: .5; 3 SOLUTION RESPIRATORY (INHALATION) at 11:19

## 2019-06-23 RX ADMIN — MAGNESIUM OXIDE TAB 400 MG (241.3 MG ELEMENTAL MG) 400 MG: 400 (241.3 MG) TAB at 21:48

## 2019-06-23 RX ADMIN — ATORVASTATIN CALCIUM 20 MG: 20 TABLET, FILM COATED ORAL at 21:47

## 2019-06-23 RX ADMIN — BRIMONIDINE TARTRATE 1 DROP: 2 SOLUTION OPHTHALMIC at 09:17

## 2019-06-23 RX ADMIN — APIXABAN 2.5 MG: 2.5 TABLET, FILM COATED ORAL at 09:19

## 2019-06-23 RX ADMIN — METOPROLOL TARTRATE 25 MG: 25 TABLET ORAL at 09:18

## 2019-06-23 RX ADMIN — IPRATROPIUM BROMIDE AND ALBUTEROL SULFATE 1 AMPULE: .5; 3 SOLUTION RESPIRATORY (INHALATION) at 06:08

## 2019-06-23 RX ADMIN — TIMOLOL MALEATE 1 DROP: 5 SOLUTION OPHTHALMIC at 09:17

## 2019-06-23 RX ADMIN — INSULIN LISPRO 2 UNITS: 100 INJECTION, SOLUTION INTRAVENOUS; SUBCUTANEOUS at 18:06

## 2019-06-23 RX ADMIN — INSULIN LISPRO 2 UNITS: 100 INJECTION, SOLUTION INTRAVENOUS; SUBCUTANEOUS at 18:05

## 2019-06-23 RX ADMIN — DILTIAZEM HYDROCHLORIDE 120 MG: 120 CAPSULE, COATED, EXTENDED RELEASE ORAL at 09:18

## 2019-06-23 ASSESSMENT — PAIN SCALES - GENERAL
PAINLEVEL_OUTOF10: 0
PAINLEVEL_OUTOF10: 6
PAINLEVEL_OUTOF10: 0

## 2019-06-23 NOTE — PLAN OF CARE
Problem: OXYGENATION/RESPIRATORY FUNCTION  Goal: Patient will achieve/maintain normal respiratory rate/effort  Description  Respiratory rate and effort will be within normal limits for the patient  Outcome: Ongoing  Note:   Patient remained on 3L NC. No resp distress noted. See assessment for pulmonary assessment details.

## 2019-06-23 NOTE — PROGRESS NOTES
Section of Cardiology  CARDIOLOGY PROGRESS NOTE          Date:  6/23/2019  Patient: Sherry Stoll  Admission:  6/15/2019 10:09 AM                           LOS: 8 days   Admit DX: CHF (congestive heart failure), NYHA class I, acute on chronic, combined ([facility] [I50.43]  Age:  80 y. o., 12/31/1923          REASON OF EVALUATION   cardiomyopathy and CHF  Atrial fibrillation. SUBJECTIVE     The patient was seen and examined. Notes and labs reviewed. There were not complications over night. She has improved overnight with no new issues.      MEDICATONS     Current Inpatient Medications:   metoprolol tartrate  25 mg Oral BID    insulin glargine  4 Units Subcutaneous Nightly    furosemide  20 mg Oral Daily    ipratropium-albuterol  1 ampule Inhalation Q4H While awake    insulin lispro  0-6 Units Subcutaneous TID WC    insulin lispro  0-3 Units Subcutaneous Nightly    apixaban  2.5 mg Oral BID    atorvastatin  20 mg Oral Daily    brimonidine  1 drop Both Eyes BID    mometasone-formoterol  2 puff Inhalation BID    vitamin D  2,000 Units Oral BID    diltiazem  120 mg Oral Daily    gabapentin  100 mg Oral Daily    glimepiride  2 mg Oral Daily    magnesium oxide  400 mg Oral BID    montelukast  10 mg Oral Nightly    pantoprazole  40 mg Oral QAM AC    potassium chloride  20 mEq Oral Daily    sodium chloride flush  10 mL Intravenous 2 times per day    lisinopril  5 mg Oral Daily    azithromycin  500 mg Oral Daily    guaiFENesin  600 mg Oral BID    dorzolamide  1 drop Both Eyes BID    And    timolol  1 drop Both Eyes BID       IV Infusions (if any):   norepinephrine      dextrose         OBJECTIVE     Vitals:    Vitals:    06/23/19 0520 06/23/19 0600 06/23/19 0608 06/23/19 0815   BP:  (!) 154/97     Pulse:  94     Resp:  17     Temp:    97.5 °F (36.4 °C)   TempSrc:    Oral   SpO2:  96% 97%    Weight: 99 lb 4.8 oz (45 kg)      Height:             Intake/Output Summary (Last 24 hours) at 6/23/2019 GLUCOSE 223 (H) 06/22/2019    GLUCOSE 276 (H) 06/21/2019     Lab Results   Component Value Date    WBC 18.2 (H) 06/23/2019    WBC 17.2 (H) 06/22/2019    WBC 11.9 (H) 06/21/2019    HGB 11.4 (L) 06/23/2019    HGB 11.1 (L) 06/22/2019    HGB 12.1 06/21/2019    HCT 36.3 06/23/2019    HCT 35.2 (L) 06/22/2019    HCT 38.9 06/21/2019     06/23/2019     06/22/2019     06/21/2019     Lab Results   Component Value Date    MG 2.2 06/20/2019    MG 2.3 06/19/2019    MG 1.9 06/18/2019     Lab Results   Component Value Date    CKTOTAL 48 04/04/2019    CKTOTAL 71 09/18/2017    CKMB 2.2 04/04/2019    CKMB 3.1 09/18/2017    TROPONINT NOT REPORTED 06/15/2019    TROPONINT NOT REPORTED 06/15/2019    TROPONINT NOT REPORTED 06/15/2019     Lab Results   Component Value Date    PROBNP 4,942 (H) 06/21/2019    PROBNP 21,305 (H) 06/19/2019    PROBNP 2,119 (H) 06/18/2019     Lab Results   Component Value Date    DDIMER 0.51 09/18/2017     Lab Results   Component Value Date    CHOL 104 06/16/2019    HDL 55 06/16/2019    TRIG 75 06/16/2019     Lab Results   Component Value Date    INR 1.4 06/18/2019    INR 1.2 04/04/2019    INR 1.2 11/13/2018    APTT 29.3 06/18/2019    APTT 29.9 04/04/2019    APTT 32.2 (H) 09/18/2017    TSH 1.38 06/15/2019    TSH 1.06 05/09/2018    TSH 0.38 09/18/2017           Patient Active Problem List   Diagnosis    Pneumonia    Sepsis ([facility]    Moderate malnutrition ([facility]    Pulmonary HTN ([facility]    Non-rheumatic mitral regurgitation    Acute on chronic combined systolic and diastolic congestive heart failure (HCC)    Chronic atrial fibrillation (HCC)    Type 2 diabetes mellitus with hyperglycemia (HCC)    Acute bronchitis    Acute congestive heart failure (HCC)    Hypotensive episode    Acute respiratory failure ([facility])    Other dysphagia    Unintentional weight loss    Moderate malnutrition (HCC)    CHF (congestive heart failure), NYHA class I, acute on chronic, combined ([facility]

## 2019-06-23 NOTE — PROGRESS NOTES
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 I was contacted  And ordered Ambien 5 mg by phone. Patient did not receive Ambien and received Benadryl 12.5 and was up most night. Daughter reports that patient takes Tylenol  3  Tylenol 4 for insomnia at home. She.has improved shortness breath and is on oxygen  At 3 Lnasal cannula. She has  stilloccasional choking on food intake. She has been on dysphagia diet. She has  No chest pain. Urine output is good. Her cough is mostly dry. Examination:  Vitals: BP (!) 135/59   Pulse 110   Temp 98.6 °F (37 °C) (Oral)   Resp 23   Ht 4' 6\" (1.372 m)   Wt 99 lb 4.8 oz (45 kg)   SpO2 94%   BMI 23.94 kg/m²   General appearance: alert and cooperative with exam  Neck: No JVD  Lungs: diminished breath sounds bilaterally  basilar crackles or wheezing  Heart: regular rate and rhythm, S1, S2 normal, no gallop  Abdomen: Soft, non tender, + BS  Extremities: no cyanosis or clubbing.  No significant edema    LABs:  CBC:   Recent Labs     06/22/19  0548 06/23/19  0522   WBC 17.2* 18.2*   HGB 11.1* 11.4*   HCT 35.2* 36.3    240     BMP:   Recent Labs     06/22/19  0548 06/23/19  0522    137   K 4.1 4.1   CO2 31 32*   BUN 41* 31*   CREATININE 0.75 0.70   LABGLOM >60 >60   GLUCOSE 223* 128*     ABG:  Lab Results   Component Value Date    KEL8PGF 34 06/18/2019    FIO2 NOT REPORTED 06/18/2019       Lab Results   Component Value Date    POCPH 7.23 06/18/2019    POCPCO2 76 06/18/2019    POCPO2 76 06/18/2019    POCHCO3 31.6 06/18/2019    NBEA NOT REPORTED 06/18/2019    PBEA 4 06/18/2019    PUK4BIZ 34 06/18/2019    FYIN7DBN 91 06/18/2019    FIO2 NOT REPORTED 06/18/2019 Radiology:  6/ 23/2019  Increasing reticular opacities upper lungs suggesting volume overload.       Stable to slightly improved left effusion and retrocardiac airspace disease.               Impression:    · Acute on chronic hypoxic respiratory failure  · Pulmonary edema/small pleural effusions bilaterally  · Acute exacerbation of COPD  · Tracheobronchitis  · Acute on chronic combined systolic and diastolic heart failure  · Moderate to severe secondary pulmonary hypertension  ·  insomnia  · A. Fib  · History of dysphagia    Recommendations:    · Discontinue BiPAP   ·  O2 nasal cannula  ·  incentive spirometer every hour awake  · Albuterol and Ipratropium Q 4 hours and prn  ·  dulera 200  · Continue Singulair  · Continue diuresis; on Lasix p.o. Give  Lasix40 IV x1 now  ·  consider Ambien for sleep  · speech eval/ video swallow  · DVT prophylaxis, On Eliquis  · PT OT  ·  discussed with RN  · Poor prognosis  · DNR CCA.     ·  discussed with  Family; advised fall risks with sedatives/narcotics  ·  discussed with Dr. Chapin Pearson; discharge planning    Rhiannon Tobias MD, [facility]  Pulmonary Critical Care and Sleep Medicine,  [facility] AT [place]: [phone]

## 2019-06-23 NOTE — PLAN OF CARE
Problem: Falls - Risk of:  Goal: Will remain free from falls  Description  Will remain free from falls  Outcome: Ongoing  Note:   Patient remains free from falls at this time. Bed in lowest position with wheels locked. Tray table and call light within reach. Hourly rounding. Bed alarm set. Problem: Risk for Impaired Skin Integrity  Goal: Tissue integrity - skin and mucous membranes  Description  Structural intactness and normal physiological function of skin and  mucous membranes. Outcome: Ongoing  Note:   No new skin breakdown noted. Pt repositions self. Assisted as needed. Pillow support provided.       Problem: HEMODYNAMIC STATUS  Goal: Patient has stable vital signs and fluid balance  Outcome: Ongoing

## 2019-06-23 NOTE — PROGRESS NOTES
Subjective:     Follow-up diabetes  No polyuria no polydipsia no hypoglycemia blood sugars reviewed still in the high range in the 200s  ROS  No fever no chills no GI  complaints except minor constipation occasional choking occasional dysphasia, less cough less shortness of breath no chest pain no palpitation, no headache no TIA redness skin lesions no bleeding  physical exam  General Appearance: in no acute distress and alert  Skin: warm and dry, no rash or erythema  Head: normocephalic and atraumatic  Eyes: pupils equal, round, and reactive to light, conjunctivae normal and sclera anicteric    Neck: neck supple and non tender without mass   Pulmonary/Chest: Air entry equal few rhonchi no crackles no use of intercostal muscles  Cardiovascular: normal rate, normal S1 and S2, no gallops, intact distal pulses, no carotid bruits and irregularly irregular rhythm noted  Abdomen: soft, non-tender, non-distended, normal bowel sounds, no masses or organomegaly  Extremities: no edema and pulses no Homans    Neurologic: Alert oriented x2 no focal deficit    BP (!) 135/59   Pulse 110   Temp 98.6 °F (37 °C) (Oral)   Resp 23   Ht 4' 6\" (1.372 m)   Wt 99 lb 4.8 oz (45 kg)   SpO2 94%   BMI 23.94 kg/m²     CBC:   Lab Results   Component Value Date    WBC 18.2 06/23/2019    RBC 3.85 06/23/2019    HGB 11.4 06/23/2019    HCT 36.3 06/23/2019    MCV 94.3 06/23/2019    MCH 29.6 06/23/2019    MCHC 31.4 06/23/2019    RDW 14.6 06/23/2019     06/23/2019    MPV 10.7 06/23/2019     CMP:    Lab Results   Component Value Date     06/23/2019    K 4.1 06/23/2019    CL 96 06/23/2019    CO2 32 06/23/2019    BUN 31 06/23/2019    CREATININE 0.70 06/23/2019    GFRAA >60 06/23/2019    LABGLOM >60 06/23/2019    GLUCOSE 128 06/23/2019    GLUCOSE 117 09/02/2011    PROT 7.9 11/13/2018    LABALBU 4.2 11/13/2018    CALCIUM 8.6 06/23/2019    BILITOT 0.84 11/13/2018    ALKPHOS 108 11/13/2018    AST 32 11/13/2018    ALT 21 11/13/2018

## 2019-06-23 NOTE — PLAN OF CARE
Problem: Risk for Impaired Skin Integrity  Goal: Tissue integrity - skin and mucous membranes  Description  Structural intactness and normal physiological function of skin and  mucous membranes. 6/23/2019 0828 by Hubert Martinez RN  Outcome: Ongoing  6/23/2019 0544 by Adams Rogel RN  Outcome: Ongoing  Note:   No new skin breakdown noted. Pt repositions self. Assisted as needed. Pillow support provided. Intervention: TURN PATIENT  Note:   Skin assessment performed and charted. David scale order set in place. Waffle mattress on bed. Assisted with position changes PRN. Assessed for areas of redness and or breakdown. Instructed on the importance of position changes for prevention of skin breakdown. Assessed oral mucosa. Assessed for areas of redness, oral lesions, thrush and sores. Monitored nutritional intake. Encouraged adequate intake for maintenance of skin integrity and prevention of skin breakdown. Problem: HEMODYNAMIC STATUS  Goal: Patient has stable vital signs and fluid balance  6/23/2019 0828 by Hubert Martinez RN  Outcome: Ongoing  6/23/2019 0544 by Adams Rogel RN  Outcome: Ongoing  Intervention: MONITOR PATIENT'S WEIGHT  Note:   Cardiac assessment performed and charted. Assessed for an apical radial deficit. Monitored heart rate, rhythm and pattern. Continuous cardiac monitoring. I&O monitored. Monitored for s/sx of respiratory or cardiac decompensation. Problem: OXYGENATION/RESPIRATORY FUNCTION  Goal: Patient will achieve/maintain normal respiratory rate/effort  Description  Respiratory rate and effort will be within normal limits for the patient  6/23/2019 8597 by Hubert Martinez RN  Outcome: Ongoing  6/23/2019 0545 by Adams Rogel RN  Outcome: Ongoing  Note:   Patient remained on 3L NC. No resp distress noted. See assessment for pulmonary assessment details. Intervention: INITIATE TOBACCO CESSATION REFERRAL/PROTOCOL AS NEEDED  Note:   Respiratory assessment performed and charted.

## 2019-06-24 ENCOUNTER — APPOINTMENT (OUTPATIENT)
Dept: GENERAL RADIOLOGY | Age: 84
DRG: 871 | End: 2019-06-24
Payer: MEDICARE

## 2019-06-24 LAB
-: ABNORMAL
ABSOLUTE EOS #: 0 K/UL (ref 0–0.44)
ABSOLUTE IMMATURE GRANULOCYTE: 0.26 K/UL (ref 0–0.3)
ABSOLUTE LYMPH #: 1.29 K/UL (ref 1.1–3.7)
ABSOLUTE MONO #: 2.06 K/UL (ref 0.1–1.2)
AMORPHOUS: ABNORMAL
ANION GAP SERPL CALCULATED.3IONS-SCNC: 12 MMOL/L (ref 9–17)
BACTERIA: ABNORMAL
BASOPHILS # BLD: 0 % (ref 0–2)
BASOPHILS ABSOLUTE: 0 K/UL (ref 0–0.2)
BILIRUBIN URINE: NEGATIVE
BUN BLDV-MCNC: 27 MG/DL (ref 8–23)
BUN/CREAT BLD: 37 (ref 9–20)
CALCIUM SERPL-MCNC: 9.3 MG/DL (ref 8.6–10.4)
CASTS UA: ABNORMAL /LPF
CHLORIDE BLD-SCNC: 96 MMOL/L (ref 98–107)
CO2: 30 MMOL/L (ref 20–31)
COLOR: YELLOW
COMMENT UA: ABNORMAL
CREAT SERPL-MCNC: 0.73 MG/DL (ref 0.5–0.9)
CRYSTALS, UA: ABNORMAL /HPF
DIFFERENTIAL TYPE: ABNORMAL
EOSINOPHILS RELATIVE PERCENT: 0 % (ref 1–4)
EPITHELIAL CELLS UA: ABNORMAL /HPF (ref 0–5)
GFR AFRICAN AMERICAN: >60 ML/MIN
GFR NON-AFRICAN AMERICAN: >60 ML/MIN
GFR SERPL CREATININE-BSD FRML MDRD: ABNORMAL ML/MIN/{1.73_M2}
GFR SERPL CREATININE-BSD FRML MDRD: ABNORMAL ML/MIN/{1.73_M2}
GLUCOSE BLD-MCNC: 158 MG/DL (ref 65–105)
GLUCOSE BLD-MCNC: 193 MG/DL (ref 65–105)
GLUCOSE BLD-MCNC: 288 MG/DL (ref 65–105)
GLUCOSE BLD-MCNC: 341 MG/DL (ref 65–105)
GLUCOSE BLD-MCNC: 431 MG/DL (ref 65–105)
GLUCOSE BLD-MCNC: 56 MG/DL (ref 70–99)
GLUCOSE URINE: NEGATIVE
HCT VFR BLD CALC: 41.2 % (ref 36.3–47.1)
HEMOGLOBIN: 12.8 G/DL (ref 11.9–15.1)
IMMATURE GRANULOCYTES: 1 %
KETONES, URINE: NEGATIVE
LEUKOCYTE ESTERASE, URINE: ABNORMAL
LYMPHOCYTES # BLD: 5 % (ref 24–43)
MCH RBC QN AUTO: 29.7 PG (ref 25.2–33.5)
MCHC RBC AUTO-ENTMCNC: 31.1 G/DL (ref 28.4–34.8)
MCV RBC AUTO: 95.6 FL (ref 82.6–102.9)
MONOCYTES # BLD: 8 % (ref 3–12)
MUCUS: ABNORMAL
NITRITE, URINE: POSITIVE
NRBC AUTOMATED: 0 PER 100 WBC
OTHER OBSERVATIONS UA: ABNORMAL
PDW BLD-RTO: 14.6 % (ref 11.8–14.4)
PH UA: 6 (ref 5–8)
PLATELET # BLD: 236 K/UL (ref 138–453)
PLATELET ESTIMATE: ABNORMAL
PMV BLD AUTO: 10.5 FL (ref 8.1–13.5)
POTASSIUM SERPL-SCNC: 3.8 MMOL/L (ref 3.7–5.3)
PROTEIN UA: ABNORMAL
RBC # BLD: 4.31 M/UL (ref 3.95–5.11)
RBC # BLD: ABNORMAL 10*6/UL
RBC UA: ABNORMAL /HPF (ref 0–2)
RENAL EPITHELIAL, UA: ABNORMAL /HPF
SEG NEUTROPHILS: 86 % (ref 36–65)
SEGMENTED NEUTROPHILS ABSOLUTE COUNT: 22.19 K/UL (ref 1.5–8.1)
SODIUM BLD-SCNC: 138 MMOL/L (ref 135–144)
SPECIFIC GRAVITY UA: 1.02 (ref 1–1.03)
TRICHOMONAS: ABNORMAL
TURBIDITY: ABNORMAL
URINE HGB: ABNORMAL
UROBILINOGEN, URINE: NORMAL
WBC # BLD: 25.8 K/UL (ref 3.5–11.3)
WBC # BLD: ABNORMAL 10*3/UL
WBC UA: ABNORMAL /HPF (ref 0–5)
YEAST: ABNORMAL

## 2019-06-24 PROCEDURE — 6370000000 HC RX 637 (ALT 250 FOR IP): Performed by: INTERNAL MEDICINE

## 2019-06-24 PROCEDURE — 81001 URINALYSIS AUTO W/SCOPE: CPT

## 2019-06-24 PROCEDURE — 6370000000 HC RX 637 (ALT 250 FOR IP): Performed by: NURSE PRACTITIONER

## 2019-06-24 PROCEDURE — 97535 SELF CARE MNGMENT TRAINING: CPT | Performed by: NURSE PRACTITIONER

## 2019-06-24 PROCEDURE — 99222 1ST HOSP IP/OBS MODERATE 55: CPT | Performed by: INTERNAL MEDICINE

## 2019-06-24 PROCEDURE — 74230 X-RAY XM SWLNG FUNCJ C+: CPT

## 2019-06-24 PROCEDURE — 87040 BLOOD CULTURE FOR BACTERIA: CPT

## 2019-06-24 PROCEDURE — 2580000003 HC RX 258: Performed by: INTERNAL MEDICINE

## 2019-06-24 PROCEDURE — 97530 THERAPEUTIC ACTIVITIES: CPT

## 2019-06-24 PROCEDURE — 94640 AIRWAY INHALATION TREATMENT: CPT

## 2019-06-24 PROCEDURE — 97110 THERAPEUTIC EXERCISES: CPT

## 2019-06-24 PROCEDURE — 71045 X-RAY EXAM CHEST 1 VIEW: CPT

## 2019-06-24 PROCEDURE — 80048 BASIC METABOLIC PNL TOTAL CA: CPT

## 2019-06-24 PROCEDURE — 36415 COLL VENOUS BLD VENIPUNCTURE: CPT

## 2019-06-24 PROCEDURE — 2060000000 HC ICU INTERMEDIATE R&B

## 2019-06-24 PROCEDURE — 92611 MOTION FLUOROSCOPY/SWALLOW: CPT

## 2019-06-24 PROCEDURE — 82947 ASSAY GLUCOSE BLOOD QUANT: CPT

## 2019-06-24 PROCEDURE — 85025 COMPLETE CBC W/AUTO DIFF WBC: CPT

## 2019-06-24 PROCEDURE — 2700000000 HC OXYGEN THERAPY PER DAY

## 2019-06-24 PROCEDURE — 97530 THERAPEUTIC ACTIVITIES: CPT | Performed by: NURSE PRACTITIONER

## 2019-06-24 RX ORDER — INSULIN GLARGINE 100 [IU]/ML
4 INJECTION, SOLUTION SUBCUTANEOUS NIGHTLY
Status: DISCONTINUED | OUTPATIENT
Start: 2019-06-24 | End: 2019-06-26 | Stop reason: HOSPADM

## 2019-06-24 RX ADMIN — IPRATROPIUM BROMIDE AND ALBUTEROL SULFATE 1 AMPULE: .5; 3 SOLUTION RESPIRATORY (INHALATION) at 07:53

## 2019-06-24 RX ADMIN — DIPHENHYDRAMINE HCL 12.5 MG: 25 TABLET ORAL at 01:21

## 2019-06-24 RX ADMIN — DILTIAZEM HYDROCHLORIDE 120 MG: 120 CAPSULE, COATED, EXTENDED RELEASE ORAL at 09:29

## 2019-06-24 RX ADMIN — IPRATROPIUM BROMIDE AND ALBUTEROL SULFATE 1 AMPULE: .5; 3 SOLUTION RESPIRATORY (INHALATION) at 11:27

## 2019-06-24 RX ADMIN — METOPROLOL TARTRATE 25 MG: 25 TABLET ORAL at 09:30

## 2019-06-24 RX ADMIN — INSULIN GLARGINE 4 UNITS: 100 INJECTION, SOLUTION SUBCUTANEOUS at 21:28

## 2019-06-24 RX ADMIN — BRIMONIDINE TARTRATE 1 DROP: 2 SOLUTION OPHTHALMIC at 09:29

## 2019-06-24 RX ADMIN — AZITHROMYCIN 500 MG: 250 TABLET, FILM COATED ORAL at 09:29

## 2019-06-24 RX ADMIN — INSULIN LISPRO 4 UNITS: 100 INJECTION, SOLUTION INTRAVENOUS; SUBCUTANEOUS at 17:30

## 2019-06-24 RX ADMIN — GLIMEPIRIDE 2 MG: 2 TABLET ORAL at 09:30

## 2019-06-24 RX ADMIN — GABAPENTIN 100 MG: 100 CAPSULE ORAL at 09:30

## 2019-06-24 RX ADMIN — INSULIN LISPRO 2 UNITS: 100 INJECTION, SOLUTION INTRAVENOUS; SUBCUTANEOUS at 21:28

## 2019-06-24 RX ADMIN — MAGNESIUM OXIDE TAB 400 MG (241.3 MG ELEMENTAL MG) 400 MG: 400 (241.3 MG) TAB at 09:29

## 2019-06-24 RX ADMIN — PANTOPRAZOLE SODIUM 40 MG: 40 TABLET, DELAYED RELEASE ORAL at 06:08

## 2019-06-24 RX ADMIN — MOMETASONE FUROATE AND FORMOTEROL FUMARATE DIHYDRATE 2 PUFF: 200; 5 AEROSOL RESPIRATORY (INHALATION) at 07:53

## 2019-06-24 RX ADMIN — DIPHENHYDRAMINE HCL 12.5 MG: 25 TABLET ORAL at 21:29

## 2019-06-24 RX ADMIN — IPRATROPIUM BROMIDE AND ALBUTEROL SULFATE 1 AMPULE: .5; 3 SOLUTION RESPIRATORY (INHALATION) at 18:10

## 2019-06-24 RX ADMIN — Medication 10 ML: at 21:30

## 2019-06-24 RX ADMIN — TIMOLOL MALEATE 1 DROP: 5 SOLUTION OPHTHALMIC at 21:27

## 2019-06-24 RX ADMIN — VITAMIN D, TAB 1000IU (100/BT) 2000 UNITS: 25 TAB at 21:29

## 2019-06-24 RX ADMIN — GUAIFENESIN 600 MG: 600 TABLET, EXTENDED RELEASE ORAL at 09:29

## 2019-06-24 RX ADMIN — DORZOLAMIDE HYDROCHLORIDE 1 DROP: 20 SOLUTION/ DROPS OPHTHALMIC at 09:29

## 2019-06-24 RX ADMIN — APIXABAN 2.5 MG: 2.5 TABLET, FILM COATED ORAL at 21:30

## 2019-06-24 RX ADMIN — BRIMONIDINE TARTRATE 1 DROP: 2 SOLUTION OPHTHALMIC at 21:27

## 2019-06-24 RX ADMIN — POTASSIUM CHLORIDE 20 MEQ: 750 CAPSULE, EXTENDED RELEASE ORAL at 09:30

## 2019-06-24 RX ADMIN — FUROSEMIDE 40 MG: 40 TABLET ORAL at 09:29

## 2019-06-24 RX ADMIN — DEXTROSE MONOHYDRATE 12.5 G: 25 INJECTION, SOLUTION INTRAVENOUS at 07:08

## 2019-06-24 RX ADMIN — GUAIFENESIN 600 MG: 600 TABLET, EXTENDED RELEASE ORAL at 21:30

## 2019-06-24 RX ADMIN — METOPROLOL TARTRATE 12.5 MG: 25 TABLET ORAL at 21:29

## 2019-06-24 RX ADMIN — VITAMIN D, TAB 1000IU (100/BT) 2000 UNITS: 25 TAB at 09:29

## 2019-06-24 RX ADMIN — DORZOLAMIDE HYDROCHLORIDE 1 DROP: 20 SOLUTION/ DROPS OPHTHALMIC at 21:27

## 2019-06-24 RX ADMIN — MAGNESIUM OXIDE TAB 400 MG (241.3 MG ELEMENTAL MG) 400 MG: 400 (241.3 MG) TAB at 21:30

## 2019-06-24 RX ADMIN — ATORVASTATIN CALCIUM 20 MG: 20 TABLET, FILM COATED ORAL at 21:29

## 2019-06-24 RX ADMIN — Medication 10 ML: at 09:29

## 2019-06-24 RX ADMIN — LISINOPRIL 5 MG: 5 TABLET ORAL at 09:30

## 2019-06-24 RX ADMIN — APIXABAN 2.5 MG: 2.5 TABLET, FILM COATED ORAL at 09:29

## 2019-06-24 RX ADMIN — TIMOLOL MALEATE 1 DROP: 5 SOLUTION OPHTHALMIC at 09:29

## 2019-06-24 RX ADMIN — MOMETASONE FUROATE AND FORMOTEROL FUMARATE DIHYDRATE 2 PUFF: 200; 5 AEROSOL RESPIRATORY (INHALATION) at 18:22

## 2019-06-24 RX ADMIN — MONTELUKAST 10 MG: 10 TABLET, FILM COATED ORAL at 21:29

## 2019-06-24 NOTE — PROGRESS NOTES
Constipation      Electronically signed by Hayde Reyes RD, LD on 6/24/19 at 10:36 AM    Contact Number: [phone]

## 2019-06-24 NOTE — PROCEDURES
INSTRUMENTAL SWALLOW REPORT  MODIFIED BARIUM SWALLOW    NAME: Sudheer Whittaker   : 1923  MRN: [id number]       Date of Eval: 2019              Referring Diagnosis(es):      Past Medical History:  has a past medical history of Arthritis, Atrial fibrillation ([address] CAD (coronary artery disease), CHF (congestive heart failure) ([address] COPD (chronic obstructive pulmonary disease) ([address] Diabetes mellitus ([address] Femur fracture ([address] Hyperlipidemia, Hypertension, Moderate malnutrition ([address] and Respiratory failure ([address] Past Surgical History:  has a past surgical history that includes Total hip arthroplasty (Left); Upper gastrointestinal endoscopy (2019); and Upper gastrointestinal endoscopy (N/A, 2019). Current Diet Solid Consistency: Regular  Current Diet Liquid Consistency: Thin       Type of Study: Initial MBS      Patient Complaints/Reason for Referral:  Sudheer Whittaker was referred for a MBS to assess the efficiency of his/her swallow function, assess for aspiration, and to make recommendations regarding safe dietary consistencies, effective compensatory strategies, and safe eating environment. Onset of problem:     Sudheer Whittaker is a 80y.o. year old female with  has a past medical history of Arthritis, Atrial fibrillation ([address] CAD (coronary artery disease), CHF (congestive heart failure) ([address] COPD (chronic obstructive pulmonary disease) ([address] Diabetes mellitus ([address] Femur fracture ([address] Hyperlipidemia, Hypertension, Moderate malnutrition ([address] and Respiratory failure ([address] .  Currently hospitalized for the management of CHF. The Palliative Care Team is following to assist with support. Behavior/Cognition/Vision/Hearing:  Behavior/Cognition: Alert; Cooperative  Vision: Impaired  Hearing: Within functional limits    Impressions:  Patient presents with  safe swallow for Regular diet with thin liquids as evidenced by no  aspiration noted with consistencies tested. penetration and no aspiration with min vallec and pyriform stasis.   Puree/Fruit and Cracker: no penetration and no aspiration  with mild vallec and pyriform stasis     Dysphagia Outcome Severity Scale: Level 6: Within functional limits/Modified independence  Penetration-Aspiration Scale (PAS): 1 - Material does not enter the airway          Esophageal Phase  Esophageal Screen: WFL        Pain   Patient Currently in Pain: No  Pain Level: 6      Therapy Time:   Individual Concurrent Group Co-treatment   Time In 1300         Time Out 1315         Minutes 15                   Marcelina Viera, 6/24/2019, 2:09 PM

## 2019-06-24 NOTE — CONSULTS
eyes 2 times daily 17  Yes Historical Provider, MD   gabapentin (NEURONTIN) 100 MG capsule Take 100 mg by mouth daily. Yes Historical Provider, MD   montelukast (SINGULAIR) 10 MG tablet Take 10 mg by mouth nightly   Yes Historical Provider, MD   atorvastatin (LIPITOR) 20 MG tablet Take 20 mg by mouth daily   Yes Historical Provider, MD   apixaban (ELIQUIS) 2.5 MG TABS tablet Take 2.5 mg by mouth 2 times daily    Yes Historical Provider, MD   Cholecalciferol (VITAMIN D) 2000 units CAPS capsule Take 2,000 Units by mouth 2 times daily    Yes Historical Provider, MD   magnesium oxide (MAG-OX) 400 MG tablet Take 400 mg by mouth 2 times daily   Yes Historical Provider, MD        Allergies:       Penicillins    Social History:     Tobacco:    reports that she has never smoked. She has never used smokeless tobacco.  Alcohol:      reports that she does not drink alcohol. Drug Use: reports that she does not use drugs. Family History:     Family History   Family history unknown: Yes       Review of Systems:     Review of Systems   Unable to perform ROS: Other     Patient not able to understand [place]. I did get the information regarding review of systems by reviewing the chart. Code Status:  DNR-CCA    Physical Exam:     Vitals:  BP (!) 93/43   Pulse 98   Temp 98.1 °F (36.7 °C) (Oral)   Resp 18   Ht 4' 6\" (1.372 m)   Wt 98 lb 12.8 oz (44.8 kg)   SpO2 95%   BMI 23.82 kg/m²   Temp (24hrs), Av.9 °F (36.6 °C), Min:97.3 °F (36.3 °C), Max:98.1 °F (36.7 °C)  Elderly patient appears to be frail. May have malnutrition. No icterus. Neck no tracheal deviation. No lymphadenopathy. Abdomen soft. Bowel sounds present. No distention or tenderness. Bowel sounds active. Extremities no edema. Lungs expands poorly. No wheezing. May have some rhonchi. Cardiac examination revealed regular heart rate. Regarding CNS exam, patient not cooperative because of language barrier.     Skin no bruising.     Physical Exam  Data:   CBC:   Lab Results   Component Value Date    WBC 25.8 06/24/2019    RBC 4.31 06/24/2019    HGB 12.8 06/24/2019    HCT 41.2 06/24/2019    MCV 95.6 06/24/2019    MCH 29.7 06/24/2019    MCHC 31.1 06/24/2019    RDW 14.6 06/24/2019     06/24/2019    MPV 10.5 06/24/2019     CBC with Differential:  Lab Results   Component Value Date    WBC 25.8 06/24/2019    RBC 4.31 06/24/2019    HGB 12.8 06/24/2019    HCT 41.2 06/24/2019     06/24/2019    MCV 95.6 06/24/2019    MCH 29.7 06/24/2019    MCHC 31.1 06/24/2019    RDW 14.6 06/24/2019    LYMPHOPCT 5 06/24/2019    MONOPCT 8 06/24/2019    BASOPCT 0 06/24/2019    MONOSABS 2.06 06/24/2019    LYMPHSABS 1.29 06/24/2019    EOSABS 0.00 06/24/2019    BASOSABS 0.00 06/24/2019    DIFFTYPE NOT REPORTED 06/24/2019     Hemoglobin/Hematocrit:  Lab Results   Component Value Date    HGB 12.8 06/24/2019    HCT 41.2 06/24/2019     CMP:    Lab Results   Component Value Date     06/24/2019    K 3.8 06/24/2019    CL 96 06/24/2019    CO2 30 06/24/2019    BUN 27 06/24/2019    CREATININE 0.73 06/24/2019    GFRAA >60 06/24/2019    LABGLOM >60 06/24/2019    GLUCOSE 56 06/24/2019    GLUCOSE 117 09/02/2011    PROT 7.9 11/13/2018    LABALBU 4.2 11/13/2018    CALCIUM 9.3 06/24/2019    BILITOT 0.84 11/13/2018    ALKPHOS 108 11/13/2018    AST 32 11/13/2018    ALT 21 11/13/2018     BMP:  Lab Results   Component Value Date     06/24/2019    K 3.8 06/24/2019    CL 96 06/24/2019    CO2 30 06/24/2019    BUN 27 06/24/2019    LABALBU 4.2 11/13/2018    CREATININE 0.73 06/24/2019    CALCIUM 9.3 06/24/2019    GFRAA >60 06/24/2019    LABGLOM >60 06/24/2019    GLUCOSE 56 06/24/2019    GLUCOSE 117 09/02/2011     PT/INR:    Lab Results   Component Value Date    PROTIME 13.7 06/18/2019    INR 1.4 06/18/2019     PTT:    Lab Results   Component Value Date    APTT 29.3 06/18/2019   [APTT}    Assesment:     Primary Problem  <principal problem not specified>    Active

## 2019-06-24 NOTE — PROGRESS NOTES
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 She has improved shortness breath and is on oxygen  at 3 L nasal cannula. She has mild dry cough. She has been on dysphagia diet. She has  No chest pain. Urine output is good. Not ambulating much. Examination:  Vitals: BP (!) 93/43   Pulse 98   Temp 98.1 °F (36.7 °C) (Oral)   Resp 18   Ht 4' 6\" (1.372 m)   Wt 98 lb 12.8 oz (44.8 kg)   SpO2 95%   BMI 23.82 kg/m²   General appearance: alert and cooperative with exam  Neck: No JVD  Lungs: diminished breath sounds bilaterally improved crackles  Heart: regular rate and rhythm, S1, S2 normal, no gallop  Abdomen: Soft, non tender, + BS  Extremities: no cyanosis or clubbing. No significant edema    LABs:  CBC:   Recent Labs     06/23/19  0522 06/24/19  0608   WBC 18.2* 25.8*   HGB 11.4* 12.8   HCT 36.3 41.2    236     BMP:   Recent Labs     06/23/19  0522 06/24/19  0608    138   K 4.1 3.8   CO2 32* 30   BUN 31* 27*   CREATININE 0.70 0.73   LABGLOM >60 >60   GLUCOSE 128* 56*     ABG:  Lab Results   Component Value Date    ZIS8ICR 34 06/18/2019    FIO2 NOT REPORTED 06/18/2019       Lab Results   Component Value Date    POCPH 7.23 06/18/2019    POCPCO2 76 06/18/2019    POCPO2 76 06/18/2019    POCHCO3 31.6 06/18/2019    NBEA NOT REPORTED 06/18/2019    PBEA 4 06/18/2019    ZOZ0LGY 34 06/18/2019    DPKM2NKQ 91 06/18/2019    FIO2 NOT REPORTED 06/18/2019     Radiology:  6/ 23/2019  Increasing reticular opacities upper lungs suggesting volume overload.       Stable to slightly improved left effusion and retrocardiac airspace disease.             Video swallow study  Mild residuals without evidence for aspiration.

## 2019-06-24 NOTE — PROGRESS NOTES
Occupational Therapy  Facility/Department: [facility] PROGRESSIVE CARE  Daily Treatment Note  NAME: Porsha Tee  : 1923  MRN: [id number]    Date of Service: 2019    Discharge Recommendations:  24 hour supervision or assist, Home with Home health OT(Pt daughter reporting pt close to baseline)    Pt would benefit from skilled home OT services in order to maximize occupational performance, safety, and independence in the home environment. Assessment   Performance deficits / Impairments: Decreased functional mobility ; Decreased strength;Decreased endurance;Decreased ADL status; Decreased balance  Prognosis: Good  Patient Education: Education provided on OT POC including goals, treatment interventions, and discharge plan/recommendations. Pt educated on fall prevention in hospital setting. Pt encouraged to ALWAYS call for assistance from staff for any OOB needs. Room is organized and all fall hazards are eliminated at this time. Alarm is in place prior to end of therapy session and all patient care needs have been addressed. Pt reminded that patient SAFETY is our goal.     Barriers to Learning: Language barrier  REQUIRES OT FOLLOW UP: Yes  Activity Tolerance  Activity Tolerance: Patient Tolerated treatment well  Safety Devices  Safety Devices in place: Yes  Type of devices: All fall risk precautions in place;Gait belt;Patient at risk for falls;Call light within reach; Left in chair;Chair alarm in place;Nurse notified         Patient Diagnosis(es): The encounter diagnosis was Acute on chronic congestive heart failure, unspecified heart failure type ([address] has a past medical history of Arthritis, Atrial fibrillation ([address] CAD (coronary artery disease), CHF (congestive heart failure) ([address] COPD (chronic obstructive pulmonary disease) ([address] Diabetes mellitus ([address] Femur fracture ([address] Hyperlipidemia, Hypertension, Moderate malnutrition ([address] and Respiratory failure ([address]    has a past surgical

## 2019-06-24 NOTE — PROGRESS NOTES
Physical Therapy  Facility/Department: [facility] PROGRESSIVE CARE  Daily Treatment Note  NAME: Sudheer Whittaker  : 1923  MRN: [id number]    Date of Service: 2019    Discharge Recommendations:  24 hour supervision or assist      Assessment   Body structures, Functions, Activity limitations: Decreased functional mobility ; Decreased endurance;Decreased strength;Decreased balance  Assessment: Pt continues with generalized weakness but functional and possibly close to baseline. Pt very motivated and sweet during therapy. Specific instructions for Next Treatment: transfers, gait  Prognosis: Good  Patient Education: PT POC, therex  Barriers to Learning: language  REQUIRES PT FOLLOW UP: Yes  Activity Tolerance  Activity Tolerance: Patient Tolerated treatment well     Patient Diagnosis(es): The encounter diagnosis was Acute on chronic congestive heart failure, unspecified heart failure type ([address] has a past medical history of Arthritis, Atrial fibrillation ([address] CAD (coronary artery disease), CHF (congestive heart failure) ([address] COPD (chronic obstructive pulmonary disease) ([address] Diabetes mellitus ([address] Femur fracture ([address] Hyperlipidemia, Hypertension, Moderate malnutrition ([address] and Respiratory failure ([address] has a past surgical history that includes Total hip arthroplasty (Left); Upper gastrointestinal endoscopy (2019); and Upper gastrointestinal endoscopy (N/A, 2019). Restrictions  Restrictions/Precautions  Restrictions/Precautions: Fall Risk  Required Braces or Orthoses?: No  Position Activity Restriction  Other position/activity restrictions: Up with assist, bed/chair alarm, khoury, telemetry, PICC  Subjective   General  Chart Reviewed: Yes  Response To Previous Treatment: Patient with no complaints from previous session.   Family / Caregiver Present: No  Subjective  Subjective: no complaints  Pain Screening  Patient Currently in Pain: No  Vital Signs  Patient Currently in Pain: No

## 2019-06-25 VITALS
DIASTOLIC BLOOD PRESSURE: 48 MMHG | HEIGHT: 55 IN | RESPIRATION RATE: 16 BRPM | BODY MASS INDEX: 23.14 KG/M2 | WEIGHT: 100 LBS | SYSTOLIC BLOOD PRESSURE: 97 MMHG | TEMPERATURE: 97.9 F | HEART RATE: 87 BPM | OXYGEN SATURATION: 100 %

## 2019-06-25 LAB
-: NORMAL
ABSOLUTE EOS #: 0.2 K/UL (ref 0–0.44)
ABSOLUTE IMMATURE GRANULOCYTE: 0 K/UL (ref 0–0.3)
ABSOLUTE LYMPH #: 1.62 K/UL (ref 1.1–3.7)
ABSOLUTE MONO #: 1.82 K/UL (ref 0.1–1.2)
AMORPHOUS: NORMAL
ANION GAP SERPL CALCULATED.3IONS-SCNC: 11 MMOL/L (ref 9–17)
BACTERIA: NORMAL
BASOPHILS # BLD: 0 % (ref 0–2)
BASOPHILS ABSOLUTE: 0 K/UL (ref 0–0.2)
BILIRUBIN URINE: NEGATIVE
BUN BLDV-MCNC: 33 MG/DL (ref 8–23)
BUN/CREAT BLD: 39 (ref 9–20)
CALCIUM SERPL-MCNC: 9.3 MG/DL (ref 8.6–10.4)
CASTS UA: NORMAL /LPF
CHLORIDE BLD-SCNC: 97 MMOL/L (ref 98–107)
CO2: 29 MMOL/L (ref 20–31)
COLOR: YELLOW
COMMENT UA: ABNORMAL
CREAT SERPL-MCNC: 0.85 MG/DL (ref 0.5–0.9)
CRYSTALS, UA: NORMAL /HPF
DIFFERENTIAL TYPE: ABNORMAL
EOSINOPHILS RELATIVE PERCENT: 1 % (ref 1–4)
EPITHELIAL CELLS UA: NORMAL /HPF (ref 0–5)
GFR AFRICAN AMERICAN: >60 ML/MIN
GFR NON-AFRICAN AMERICAN: >60 ML/MIN
GFR SERPL CREATININE-BSD FRML MDRD: ABNORMAL ML/MIN/{1.73_M2}
GFR SERPL CREATININE-BSD FRML MDRD: ABNORMAL ML/MIN/{1.73_M2}
GLUCOSE BLD-MCNC: 213 MG/DL (ref 65–105)
GLUCOSE BLD-MCNC: 397 MG/DL (ref 65–105)
GLUCOSE BLD-MCNC: 78 MG/DL (ref 70–99)
GLUCOSE BLD-MCNC: 97 MG/DL (ref 65–105)
GLUCOSE URINE: NEGATIVE
HCT VFR BLD CALC: 39.1 % (ref 36.3–47.1)
HEMOGLOBIN: 12.2 G/DL (ref 11.9–15.1)
IMMATURE GRANULOCYTES: 0 %
KETONES, URINE: NEGATIVE
LEUKOCYTE ESTERASE, URINE: ABNORMAL
LYMPHOCYTES # BLD: 8 % (ref 24–43)
MCH RBC QN AUTO: 29.8 PG (ref 25.2–33.5)
MCHC RBC AUTO-ENTMCNC: 31.2 G/DL (ref 28.4–34.8)
MCV RBC AUTO: 95.6 FL (ref 82.6–102.9)
MONOCYTES # BLD: 9 % (ref 3–12)
MUCUS: NORMAL
NITRITE, URINE: NEGATIVE
NRBC AUTOMATED: ABNORMAL PER 100 WBC
OTHER OBSERVATIONS UA: NORMAL
PDW BLD-RTO: 14.2 % (ref 11.8–14.4)
PH UA: 7 (ref 5–8)
PLATELET # BLD: 270 K/UL (ref 138–453)
PLATELET ESTIMATE: ABNORMAL
PMV BLD AUTO: 10.6 FL (ref 8.1–13.5)
POTASSIUM SERPL-SCNC: 4.7 MMOL/L (ref 3.7–5.3)
PROTEIN UA: NEGATIVE
RBC # BLD: 4.09 M/UL (ref 3.95–5.11)
RBC # BLD: ABNORMAL 10*6/UL
RBC UA: NORMAL /HPF (ref 0–2)
RENAL EPITHELIAL, UA: NORMAL /HPF
SEG NEUTROPHILS: 82 % (ref 36–65)
SEGMENTED NEUTROPHILS ABSOLUTE COUNT: 16.56 K/UL (ref 1.5–8.1)
SODIUM BLD-SCNC: 137 MMOL/L (ref 135–144)
SPECIFIC GRAVITY UA: 1.01 (ref 1–1.03)
TRICHOMONAS: NORMAL
TURBIDITY: ABNORMAL
URINE HGB: ABNORMAL
UROBILINOGEN, URINE: NORMAL
WBC # BLD: 20.2 K/UL (ref 3.5–11.3)
WBC # BLD: ABNORMAL 10*3/UL
WBC UA: NORMAL /HPF (ref 0–5)
YEAST: NORMAL

## 2019-06-25 PROCEDURE — 97530 THERAPEUTIC ACTIVITIES: CPT | Performed by: NURSE PRACTITIONER

## 2019-06-25 PROCEDURE — 81001 URINALYSIS AUTO W/SCOPE: CPT

## 2019-06-25 PROCEDURE — 2700000000 HC OXYGEN THERAPY PER DAY

## 2019-06-25 PROCEDURE — 99232 SBSQ HOSP IP/OBS MODERATE 35: CPT | Performed by: INTERNAL MEDICINE

## 2019-06-25 PROCEDURE — 6370000000 HC RX 637 (ALT 250 FOR IP): Performed by: NURSE PRACTITIONER

## 2019-06-25 PROCEDURE — 6370000000 HC RX 637 (ALT 250 FOR IP): Performed by: INTERNAL MEDICINE

## 2019-06-25 PROCEDURE — 87088 URINE BACTERIA CULTURE: CPT

## 2019-06-25 PROCEDURE — 87086 URINE CULTURE/COLONY COUNT: CPT

## 2019-06-25 PROCEDURE — 36415 COLL VENOUS BLD VENIPUNCTURE: CPT

## 2019-06-25 PROCEDURE — 2580000003 HC RX 258: Performed by: INTERNAL MEDICINE

## 2019-06-25 PROCEDURE — 94640 AIRWAY INHALATION TREATMENT: CPT

## 2019-06-25 PROCEDURE — 80048 BASIC METABOLIC PNL TOTAL CA: CPT

## 2019-06-25 PROCEDURE — 97535 SELF CARE MNGMENT TRAINING: CPT | Performed by: NURSE PRACTITIONER

## 2019-06-25 PROCEDURE — 85025 COMPLETE CBC W/AUTO DIFF WBC: CPT

## 2019-06-25 PROCEDURE — 97110 THERAPEUTIC EXERCISES: CPT

## 2019-06-25 PROCEDURE — 82947 ASSAY GLUCOSE BLOOD QUANT: CPT

## 2019-06-25 PROCEDURE — 87186 SC STD MICRODIL/AGAR DIL: CPT

## 2019-06-25 RX ORDER — NICOTINE POLACRILEX 4 MG
15 LOZENGE BUCCAL PRN
Qty: 45 G | Refills: 0 | Status: SHIPPED | OUTPATIENT
Start: 2019-06-25

## 2019-06-25 RX ORDER — LEVOFLOXACIN 500 MG/1
500 TABLET, FILM COATED ORAL DAILY
Qty: 7 TABLET | Refills: 0 | Status: SHIPPED | OUTPATIENT
Start: 2019-06-25 | End: 2019-07-02

## 2019-06-25 RX ORDER — LISINOPRIL 5 MG/1
5 TABLET ORAL DAILY
Qty: 30 TABLET | Refills: 0 | Status: ON HOLD | OUTPATIENT
Start: 2019-06-26 | End: 2019-08-05 | Stop reason: HOSPADM

## 2019-06-25 RX ORDER — INSULIN GLARGINE 100 [IU]/ML
4 INJECTION, SOLUTION SUBCUTANEOUS NIGHTLY
Qty: 1 VIAL | Refills: 0 | Status: SHIPPED | OUTPATIENT
Start: 2019-06-25 | End: 2019-08-10

## 2019-06-25 RX ORDER — GUAIFENESIN 600 MG/1
600 TABLET, EXTENDED RELEASE ORAL 2 TIMES DAILY
Qty: 20 TABLET | Refills: 0 | Status: SHIPPED | OUTPATIENT
Start: 2019-06-25 | End: 2019-08-10 | Stop reason: ALTCHOICE

## 2019-06-25 RX ORDER — FUROSEMIDE 40 MG/1
40 TABLET ORAL DAILY
Qty: 60 TABLET | Refills: 0 | Status: ON HOLD | OUTPATIENT
Start: 2019-06-26 | End: 2019-08-05 | Stop reason: HOSPADM

## 2019-06-25 RX ORDER — IPRATROPIUM BROMIDE AND ALBUTEROL SULFATE 2.5; .5 MG/3ML; MG/3ML
3 SOLUTION RESPIRATORY (INHALATION) 4 TIMES DAILY
Qty: 360 ML | Refills: 0 | Status: SHIPPED | OUTPATIENT
Start: 2019-06-25

## 2019-06-25 RX ORDER — LEVOFLOXACIN 500 MG/1
500 TABLET, FILM COATED ORAL DAILY
Status: DISCONTINUED | OUTPATIENT
Start: 2019-06-25 | End: 2019-06-25 | Stop reason: DRUGHIGH

## 2019-06-25 RX ORDER — LEVOFLOXACIN 250 MG/1
250 TABLET ORAL DAILY
Status: DISCONTINUED | OUTPATIENT
Start: 2019-06-26 | End: 2019-06-26 | Stop reason: HOSPADM

## 2019-06-25 RX ORDER — LEVOFLOXACIN 500 MG/1
500 TABLET, FILM COATED ORAL ONCE
Status: COMPLETED | OUTPATIENT
Start: 2019-06-25 | End: 2019-06-25

## 2019-06-25 RX ADMIN — TIMOLOL MALEATE 1 DROP: 5 SOLUTION OPHTHALMIC at 10:30

## 2019-06-25 RX ADMIN — MOMETASONE FUROATE AND FORMOTEROL FUMARATE DIHYDRATE 2 PUFF: 200; 5 AEROSOL RESPIRATORY (INHALATION) at 06:14

## 2019-06-25 RX ADMIN — IPRATROPIUM BROMIDE AND ALBUTEROL SULFATE 1 AMPULE: .5; 3 SOLUTION RESPIRATORY (INHALATION) at 10:16

## 2019-06-25 RX ADMIN — MAGNESIUM OXIDE TAB 400 MG (241.3 MG ELEMENTAL MG) 400 MG: 400 (241.3 MG) TAB at 10:58

## 2019-06-25 RX ADMIN — ACETAMINOPHEN 650 MG: 325 TABLET ORAL at 00:59

## 2019-06-25 RX ADMIN — DORZOLAMIDE HYDROCHLORIDE 1 DROP: 20 SOLUTION/ DROPS OPHTHALMIC at 10:30

## 2019-06-25 RX ADMIN — METOPROLOL TARTRATE 12.5 MG: 25 TABLET ORAL at 10:58

## 2019-06-25 RX ADMIN — GLIMEPIRIDE 2 MG: 2 TABLET ORAL at 10:58

## 2019-06-25 RX ADMIN — IPRATROPIUM BROMIDE AND ALBUTEROL SULFATE 1 AMPULE: .5; 3 SOLUTION RESPIRATORY (INHALATION) at 06:13

## 2019-06-25 RX ADMIN — FUROSEMIDE 40 MG: 40 TABLET ORAL at 10:57

## 2019-06-25 RX ADMIN — APIXABAN 2.5 MG: 2.5 TABLET, FILM COATED ORAL at 10:57

## 2019-06-25 RX ADMIN — GABAPENTIN 100 MG: 100 CAPSULE ORAL at 10:58

## 2019-06-25 RX ADMIN — INSULIN LISPRO 2 UNITS: 100 INJECTION, SOLUTION INTRAVENOUS; SUBCUTANEOUS at 18:00

## 2019-06-25 RX ADMIN — Medication 10 ML: at 10:57

## 2019-06-25 RX ADMIN — BRIMONIDINE TARTRATE 1 DROP: 2 SOLUTION OPHTHALMIC at 10:31

## 2019-06-25 RX ADMIN — IPRATROPIUM BROMIDE AND ALBUTEROL SULFATE 1 AMPULE: .5; 3 SOLUTION RESPIRATORY (INHALATION) at 14:25

## 2019-06-25 RX ADMIN — POTASSIUM CHLORIDE 20 MEQ: 750 CAPSULE, EXTENDED RELEASE ORAL at 10:58

## 2019-06-25 RX ADMIN — GUAIFENESIN 600 MG: 600 TABLET, EXTENDED RELEASE ORAL at 10:58

## 2019-06-25 RX ADMIN — LEVOFLOXACIN 500 MG: 500 TABLET, FILM COATED ORAL at 18:00

## 2019-06-25 RX ADMIN — VITAMIN D, TAB 1000IU (100/BT) 2000 UNITS: 25 TAB at 10:58

## 2019-06-25 RX ADMIN — INSULIN LISPRO 5 UNITS: 100 INJECTION, SOLUTION INTRAVENOUS; SUBCUTANEOUS at 12:43

## 2019-06-25 RX ADMIN — AZITHROMYCIN 500 MG: 250 TABLET, FILM COATED ORAL at 10:58

## 2019-06-25 RX ADMIN — LISINOPRIL 5 MG: 5 TABLET ORAL at 10:58

## 2019-06-25 RX ADMIN — DILTIAZEM HYDROCHLORIDE 120 MG: 120 CAPSULE, COATED, EXTENDED RELEASE ORAL at 10:57

## 2019-06-25 RX ADMIN — PANTOPRAZOLE SODIUM 40 MG: 40 TABLET, DELAYED RELEASE ORAL at 06:15

## 2019-06-25 ASSESSMENT — PAIN SCALES - GENERAL
PAINLEVEL_OUTOF10: 3
PAINLEVEL_OUTOF10: 0

## 2019-06-25 NOTE — PROGRESS NOTES
GI Progress notes    6/25/2019   2:44 PM    Name:  Alcides Jones  MRN:    [id number]     Acct:     [de-identified]   Room:  [address] [place] [address] Day: 8     Admit Date: 6/15/2019 10:09 AM  PCP: Aida Gallegos MD    Subjective:     C/C:   Chief Complaint   Patient presents with    Shortness of Breath       last 2 days    Cough       Interval History: Status: improved. Not able to get HPI from patient, report from nurse. Patient is having no current episodes of dysphagia. Tolerating diet well. EGD, swallow study, esophagram all normal.  She is quite frail and malnourished. Encouraged use of nutritoinal supplements. Per staff, she will discharge today. Okay with GI.    ROS:  Constitutional: negative for chills, fevers and sweats  Gastrointestinal: negative for abdominal pain, constipation, diarrhea, nausea and vomiting      Medications: Allergies:    Allergies   Allergen Reactions    Penicillins Hives     Received ceftriaxone during 11/13/18 admission       Current Meds:   metoprolol tartrate (LOPRESSOR) tablet 12.5 mg BID   insulin glargine (LANTUS) injection vial 4 Units Nightly   diphenhydrAMINE (BENADRYL) tablet 12.5 mg Nightly PRN   furosemide (LASIX) tablet 40 mg Daily   ipratropium-albuterol (DUONEB) nebulizer solution 1 ampule Q4H While awake   norepinephrine (LEVOPHED) 16 mg in dextrose 5 % 250 mL infusion Continuous   glucose (GLUTOSE) 40 % oral gel 15 g PRN   dextrose 50 % IV solution PRN   glucagon (rDNA) injection 1 mg PRN   dextrose 5 % solution PRN   insulin lispro (HUMALOG) injection vial 0-6 Units TID WC   insulin lispro (HUMALOG) injection vial 0-3 Units Nightly   acetaminophen (TYLENOL) tablet 650 mg Q4H PRN   albuterol sulfate  (90 Base) MCG/ACT inhaler 2 puff Q6H PRN   apixaban (ELIQUIS) tablet 2.5 mg BID   atorvastatin (LIPITOR) tablet 20 mg Daily   brimonidine (ALPHAGAN) 0.2 % ophthalmic solution 1 drop BID   mometasone-formoterol (DULERA) 200-5 MCG/ACT inhaler 2 puff BID vitamin D (CHOLECALCIFEROL) tablet 2,000 Units BID   diltiazem (CARDIZEM CD) extended release capsule 120 mg Daily   gabapentin (NEURONTIN) capsule 100 mg Daily   glimepiride (AMARYL) tablet 2 mg Daily   magnesium oxide (MAG-OX) tablet 400 mg BID   montelukast (SINGULAIR) tablet 10 mg Nightly   pantoprazole (PROTONIX) tablet 40 mg QAM AC   potassium chloride (MICRO-K) extended release capsule 20 mEq Daily   sodium chloride flush 0.9 % injection 10 mL 2 times per day   sodium chloride flush 0.9 % injection 10 mL PRN   magnesium hydroxide (MILK OF MAGNESIA) 400 MG/5ML suspension 30 mL Daily PRN   ondansetron (ZOFRAN) injection 4 mg Q6H PRN   lisinopril (PRINIVIL;ZESTRIL) tablet 5 mg Daily   azithromycin (ZITHROMAX) tablet 500 mg Daily   guaiFENesin (MUCINEX) extended release tablet 600 mg BID   dorzolamide (TRUSOPT) 2 % ophthalmic solution 1 drop BID   And    timolol (TIMOPTIC) 0.5 % ophthalmic solution 1 drop BID       Data:     Code Status:  DNR-CCA    Family History   Family history unknown: Yes       Social History     Socioeconomic History    Marital status:       Spouse name: Not on file    Number of children: Not on file    Years of education: Not on file    Highest education level: Not on file   Occupational History    Not on file   Social Needs    Financial resource strain: Not on file    Food insecurity:     Worry: Not on file     Inability: Not on file    Transportation needs:     Medical: Not on file     Non-medical: Not on file   Tobacco Use    Smoking status: Never Smoker    Smokeless tobacco: Never Used   Substance and Sexual Activity    Alcohol use: No    Drug use: No    Sexual activity: Not on file   Lifestyle    Physical activity:     Days per week: Not on file     Minutes per session: Not on file    Stress: Not on file   Relationships    Social connections:     Talks on phone: Not on file     Gets together: Not on file     Attends [employer] service: Not on file     Active member of club or organization: Not on file     Attends meetings of clubs or organizations: Not on file     Relationship status: Not on file    Intimate partner violence:     Fear of current or ex partner: Not on file     Emotionally abused: Not on file     Physically abused: Not on file     Forced sexual activity: Not on file   Other Topics Concern    Not on file   Social History Narrative    Not on file       Vitals:  BP (!) 107/59   Pulse 96   Temp 97.3 °F (36.3 °C) (Oral)   Resp 18   Ht 4' 6\" (1.372 m)   Wt 100 lb (45.4 kg)   SpO2 99%   BMI 24.11 kg/m²   Temp (24hrs), Av.2 °F (36.8 °C), Min:97.3 °F (36.3 °C), Max:98.8 °F (37.1 °C)    Recent Labs     19  1633 19  0741 19  1129   POCGLU 341* 288* 97 397*       I/O (24Hr):     Intake/Output Summary (Last 24 hours) at 2019 1444  Last data filed at 2019 1200  Gross per 24 hour   Intake 120 ml   Output 1550 ml   Net -1430 ml       Labs:      CBC: Lab Results   Component Value Date    WBC 20.2 2019    RBC 4.09 2019    HGB 12.2 2019    HCT 39.1 2019    MCV 95.6 2019    MCH 29.8 2019    MCHC 31.2 2019    RDW 14.2 2019     2019    MPV 10.6 2019     CBC with Differential:  Lab Results   Component Value Date    WBC 20.2 2019    RBC 4.09 2019    HGB 12.2 2019    HCT 39.1 2019     2019    MCV 95.6 2019    MCH 29.8 2019    MCHC 31.2 2019    RDW 14.2 2019    LYMPHOPCT 8 2019    MONOPCT 9 2019    BASOPCT 0 2019    MONOSABS 1.82 2019    LYMPHSABS 1.62 2019    EOSABS 0.20 2019    BASOSABS 0.00 2019    DIFFTYPE NOT REPORTED 2019     Hemoglobin/Hematocrit:  Lab Results   Component Value Date    HGB 12.2 2019    HCT 39.1 2019     CMP:  Lab Results   Component Value Date     2019    K 4.7 2019    CL 97 2019    CO2 29 performed an evaluation on [facility]. I have reviewed the above documentation completed by the Nurse Practitioner. Please see my additional contributions to the HPI, physical exam, assessment, and medical decision making. Discussed with nursing staff and they indicated that she is doing reasonably well, tolerating diet and going to be discharged. Advised to make food into small pieces, chew well and swallow.         Electronically signed by Funmi Sanz, APRN - NP on 6/25/2019 at 2:44 PM

## 2019-06-25 NOTE — PROGRESS NOTES
Pulmonary Critical Care Progress Note       Patient seen for the follow up of Acute on chronic respiratory failure, acute on chronic diastolic and systolic congestive heart failure, pulmonary edema, pleural effusion, COPD exacerbation, tracheobronchitis, secondary pulmonary hypertension    Subjective:      She has improved shortness breath and is on oxygen  at 3 L nasal cannula. She has mild dry cough. She has been on dysphagia diet. She has  No chest pain. Urine output is good. Not ambulating     Examination:  Vitals: BP (!) 97/48   Pulse 87   Temp 97.9 °F (36.6 °C) (Oral)   Resp 16   Ht 4' 6\" (1.372 m)   Wt 100 lb (45.4 kg)   SpO2 100%   BMI 24.11 kg/m²   General appearance: alert and cooperative with exam  Neck: No JVD  Lungs: diminished breath sounds bilaterally improved crackles  Heart: regular rate and rhythm, S1, S2 normal, no gallop  Abdomen: Soft, non tender, + BS  Extremities: no cyanosis or clubbing. No significant edema    LABs:  CBC:   Recent Labs     06/24/19  0608 06/25/19  0617   WBC 25.8* 20.2*   HGB 12.8 12.2   HCT 41.2 39.1    270     BMP:   Recent Labs     06/24/19  0608 06/25/19  0617    137   K 3.8 4.7   CO2 30 29   BUN 27* 33*   CREATININE 0.73 0.85   LABGLOM >60 >60   GLUCOSE 56* 78     ABG:  Lab Results   Component Value Date    TEJ0SXH 34 06/18/2019    FIO2 NOT REPORTED 06/18/2019       Lab Results   Component Value Date    POCPH 7.23 06/18/2019    POCPCO2 76 06/18/2019    POCPO2 76 06/18/2019    POCHCO3 31.6 06/18/2019    NBEA NOT REPORTED 06/18/2019    PBEA 4 06/18/2019    OKK5WHX 34 06/18/2019    MZSJ0XIP 91 06/18/2019    FIO2 NOT REPORTED 06/18/2019     Radiology:  6/ 24/2019  Left basilar airspace disease and effusion.  Atelectasis is favored but   pneumonia is not excluded.       Improved aeration right lung base compared with previous exams.            Video swallow study  Mild residuals without evidence for aspiration.                 Impression:    · Acute

## 2019-06-25 NOTE — PROGRESS NOTES
Subjective:     Follow-up diabetes  Polyuria no polydipsia no hypoglycemia blood sugars reviewed getting better  ROS  Fever no chills no GI  complaints no cardiopulmonary complaints except cough and mild shortness of breath, no TIA no bleeding no headache no sore throat no skin lesions  physical exam  General Appearance: alert and oriented to person, place and time and in no acute distress  Skin: warm and dry, no rash or erythema multiple bruises  Head: normocephalic and atraumatic  Eyes: pupils equal, round, and reactive to light, conjunctivae normal and sclera anicteric  Neck: neck supple and non tender without mass   Pulmonary/Chest: Air entry equal few rhonchi no crackles no wheezing no use of intercostal muscles  Cardiovascular: normal rate, normal S1 and S2, no gallops, intact distal pulses, no carotid bruits and irregularly irregular rhythm noted  Abdomen: soft, non-tender, non-distended, normal bowel sounds, no masses or organomegaly  Extremities: no edema and pulses no Homans sign  Neurologic: Alert oriented x2 no focal deficit    BP (!) 97/48   Pulse 87   Temp 97.9 °F (36.6 °C) (Oral)   Resp 16   Ht 4' 6\" (1.372 m)   Wt 100 lb (45.4 kg)   SpO2 100%   BMI 24.11 kg/m²     CBC:   Lab Results   Component Value Date    WBC 20.2 06/25/2019    RBC 4.09 06/25/2019    HGB 12.2 06/25/2019    HCT 39.1 06/25/2019    MCV 95.6 06/25/2019    MCH 29.8 06/25/2019    MCHC 31.2 06/25/2019    RDW 14.2 06/25/2019     06/25/2019    MPV 10.6 06/25/2019     CMP:    Lab Results   Component Value Date     06/25/2019    K 4.7 06/25/2019    CL 97 06/25/2019    CO2 29 06/25/2019    BUN 33 06/25/2019    CREATININE 0.85 06/25/2019    GFRAA >60 06/25/2019    LABGLOM >60 06/25/2019    GLUCOSE 78 06/25/2019    GLUCOSE 117 09/02/2011    PROT 7.9 11/13/2018    LABALBU 4.2 11/13/2018    CALCIUM 9.3 06/25/2019    BILITOT 0.84 11/13/2018    ALKPHOS 108 11/13/2018    AST 32 11/13/2018    ALT 21 11/13/2018 Assessment:  Patient Active Problem List   Diagnosis    Pneumonia    Sepsis ([address]    Moderate malnutrition ([address]    Pulmonary HTN ([address]    Non-rheumatic mitral regurgitation    Acute on chronic combined systolic and diastolic congestive heart failure (HCC)    Chronic atrial fibrillation (HCC)    Type 2 diabetes mellitus with hyperglycemia (HCC)    Acute bronchitis    Acute congestive heart failure (HCC)    Hypotensive episode    Acute respiratory failure ([address]    Other dysphagia    Unintentional weight loss    Moderate malnutrition ([address]    CHF (congestive heart failure), NYHA class I, acute on chronic, combined ([address]     Type 2 diabetes with hyperglycemia insulin treated   COPD exacerbation  Acute bronchitis sepsis resolved  Acute on chronic respiratory failure with hypoxia hypercapnia  Chronic atrial fibrillation   long-term anticoagulation  Dysphagia  Acute on chronic combined CHF  Insomnia   UTI  Pulmonary atelectasis  Leukocytosis getting better  plan:    Meds labs reviewed white cell count coming down blood cultures negative so far urine analysis noted will send urine culture start Levaquin instead of the Zithromax, chest x-ray likely atelectasis the Levaquin will cover if there is any infection so we will send home today with visiting home nurse has oxygen at home and she will have trilogy at home, patient advised risk of tendinopathy and tendon ruptures with the Mindi Negro MD  5:44 PM

## 2019-06-25 NOTE — PLAN OF CARE
Problem: Falls - Risk of:  Goal: Will remain free from falls  Description  Will remain free from falls. Fall risk assessment completed. Patient instructed to use call light. Bed locked and in lowest position, side rails up 2/4, call light and bedside table within reach, clutter removed, and non-skid footwear on when pt out of bed. Bed and chair alarms in use. Hourly rounds will continue. Outcome: Ongoing     Problem: Risk for Impaired Skin Integrity  Goal: Tissue integrity - skin and mucous membranes  Description  Structural intactness and normal physiological function of skin and  mucous membranes. Skin assessment completed. Patient repositioned every 2 hours and PRN, risk for pressure ulcer assessed as well as bony prominences. Patient instructed to turn self when applicable. Will continue to monitor. Outcome: Ongoing     Problem: FLUID AND ELECTROLYTE IMBALANCE  Goal: Fluid and electrolyte balance are achieved/maintained  Outcome: Ongoing     Problem: OXYGENATION/RESPIRATORY FUNCTION  Goal: Patient will achieve/maintain normal respiratory rate/effort  Description  Respiratory rate and effort will be within normal limits for the patient. Pt supplemental oxygen requirements at baseline. Pt has home oxygen 24/7. Will continue to monitor.    Outcome: Ongoing

## 2019-06-25 NOTE — PROGRESS NOTES
Subjective:    Diabetes follow  up  No fever no polyuria no polydipsia no hypoglycemia except in the morning blood sugars reviewed    ROS  No fever no chills no GI  complaints no cardiopulmonary complaints except the cough and some shortness of breath, no TIA no bleeding no headache no sore throat no skin lesions  physical exam  General Appearance: in no acute distress and alert  Skin: warm and dry, no rash or erythema  Head: normocephalic and atraumatic  Eyes: pupils equal, round, and reactive to light, conjunctivae normal and sclera anicteric  Neck: neck supple and non tender without mass   Pulmonary/Chest: Air entry equal few rhonchi no crackles no use of intercostal  Cardiovascular: normal rate, regular rhythm, normal S1 and S2, no gallops, intact distal pulses and no carotid bruits  Abdomen: soft, non-tender, non-distended, normal bowel sounds, no masses or organomegaly  Extremities: no edema and good pulses no Homans  Neurologic: Alert oriented x3 with no focal    BP (!) 142/50   Pulse 113   Temp 98.1 °F (36.7 °C) (Oral)   Resp 23   Ht 4' 6\" (1.372 m)   Wt 98 lb 12.8 oz (44.8 kg)   SpO2 99%   BMI 23.82 kg/m²     CBC with Differential:    Lab Results   Component Value Date    WBC 25.8 06/24/2019    RBC 4.31 06/24/2019    HGB 12.8 06/24/2019    HCT 41.2 06/24/2019     06/24/2019    MCV 95.6 06/24/2019    MCH 29.7 06/24/2019    MCHC 31.1 06/24/2019    RDW 14.6 06/24/2019    LYMPHOPCT 5 06/24/2019    MONOPCT 8 06/24/2019    BASOPCT 0 06/24/2019    MONOSABS 2.06 06/24/2019    LYMPHSABS 1.29 06/24/2019    EOSABS 0.00 06/24/2019    BASOSABS 0.00 06/24/2019    DIFFTYPE NOT REPORTED 06/24/2019     CMP:    Lab Results   Component Value Date     06/24/2019    K 3.8 06/24/2019    CL 96 06/24/2019    CO2 30 06/24/2019    BUN 27 06/24/2019    CREATININE 0.73 06/24/2019    GFRAA >60 06/24/2019    LABGLOM >60 06/24/2019    GLUCOSE 56 06/24/2019    GLUCOSE 117 09/02/2011    PROT 7.9 11/13/2018    LABALBU

## 2019-06-25 NOTE — PROGRESS NOTES
4/9/2019). Restrictions  Restrictions/Precautions  Restrictions/Precautions: Fall Risk  Required Braces or Orthoses?: No  Position Activity Restriction  Other position/activity restrictions: Up with assist, bed/chair alarm, khoury, telemetry, PICC  Subjective   General  Chart Reviewed: Yes  Patient assessed for rehabilitation services?: Yes  Response to previous treatment: Patient with no complaints from previous session  Family / Caregiver Present: No  Oxygen Therapy  O2 Device: Nasal cannula  O2 Flow Rate (L/min): 4 L/min   Orientation  Orientation  Overall Orientation Status: Within Functional Limits  Objective    ADL  Grooming: Supervision;Setup(Declining oral care, stating her daughter will assist. Washed face with setup of washcloth sup)  LE Dressing: Maximum assistance(Donning socks)        Balance  Sitting Balance: Supervision  Standing Balance: Minimal assistance  Functional Mobility  Functional - Mobility Device: Rolling Walker  Bed mobility  Supine to Sit: Supervision  Scooting: Supervision  Transfers  Stand Step Transfers: Minimal assistance  Transfer Comments: From EOB to chair about 3 feet away, with use of  RW. Additional Activities Comment  Additional Activities:   Upon writer exit, call light within reach, pt retired to chair. All lines intact and patient positioned comfortably. All patient needs addressed prior to ending therapy session. Chart reviewed prior to treatment and patient is agreeable for therapy. RN reports patient is medically stable for therapy treatment this date.                           Plan   Plan  Times per week: 4-6x/week  Times per day: Daily  Current Treatment Recommendations: Strengthening, Endurance Training, Functional Mobility Training, Patient/Caregiver Education & Training, Equipment Evaluation, Education, & procurement, Self-Care / ADL, Safety Education & Training, Balance Training    AM-PAC Score        AM-PAC Inpatient Daily Activity Raw Score: 12

## 2019-06-26 ENCOUNTER — CARE COORDINATION (OUTPATIENT)
Dept: CASE MANAGEMENT | Age: 84
End: 2019-06-26

## 2019-06-26 NOTE — CARE COORDINATION
Nicolle Brown [facility] Initial Follow Up Call    Call within 2 business days of discharge: Yes    Patient: Arlander Sever Patient : 1923   MRN: [id number]  Reason for Admission: BPCI-A Medical Bundle Patient. DRG: Sepsis (737)  [facility]    Adm Date: 6/15/19  Date of Discharge: 19  End Date: 2019  90-day post-acute tracking and outreach with qualifying DRG from date of discharge. Discharge Date: 19 RARS: Readmission Risk Score: 26      Last Discharge [facility]       Complaint Diagnosis Description Type Department Provider    6/15/19 Shortness of Breath; Cough Acute on chronic congestive heart failure, unspecified heart failure type [facility]) ED to Hosp-Admission (Discharged) (ADMITTED) ATUL Yun MD; Nevin Martin. .. Facility: [facility]    1st attempt to reach patient for initial  [place]. [facility] requesting return call. Contact information provided. [phone]  [facility] called and [facility] hospitals [facility] the [place] 19    Follow Up  No future appointments.     Lanny Castro RN

## 2019-06-27 ENCOUNTER — CARE COORDINATION (OUTPATIENT)
Dept: CASE MANAGEMENT | Age: 84
End: 2019-06-27

## 2019-06-27 LAB
CULTURE: ABNORMAL
Lab: ABNORMAL
SPECIMEN DESCRIPTION: ABNORMAL

## 2019-06-27 NOTE — CARE COORDINATION
Zarina [facility] Transitions Initial Follow Up Call- BPCI-A patient (Sepsis- )     Call within 2 business days of discharge: Yes    Patient: Chaim Valenzuela Patient : 1923   MRN: [id number]  Reason for Admission: CHF   Discharge Date: 19 RARS: Readmission Risk Score: 26      Last Discharge [facility]       Complaint Diagnosis Description Type Department Provider    6/15/19 Shortness of Breath; Cough Acute on chronic congestive heart failure, unspecified heart failure type [facility]) ED to Hosp-Admission (Discharged) (ADMITTED) ATUL iDetz MD; Dmitry Stephens. .. Spoke with: daughter Maria E Santiago    Call to pt daughter who states pt doing well. States she is getting ready to leave for appt with Dr Yogi Brownlee (changed to today at 1500 from 19)  Confirms meds received from pharmacy. Declines to review med list as she has no time  States she has not had time to call to schedule with Nima Escoto and Kate but will call soon  Denies needs  Encouraged to call writer/ CTC or providers if questions or concerns- v/u     Facility: [address]     Non-face-to-face services provided:  Scheduled appointment with PCP-appt rescheduled for today at 1500  Scheduled appointment with Specialist-pt dtr to call to schedule with Nima Rodriguez  Obtained and reviewed discharge summary and/or continuity of care documents  Communication with home health agencies or other community services the patient is currently using-SOC yestereday  Assessment and support for treatment adherence and medication management-confirmed new meds.  Declines to review meds as they are heading out the door for appt with Dr Alberto Son 24 Hour Call    Do you have any ongoing symptoms?:  No  Do you have a copy of your discharge instructions?:  Yes  Do you have all of your prescriptions and are they filled?:  Yes  Have you been contacted by a [facility] [address]?:  No  Have you scheduled your follow up appointment?:  Yes  How are you going to get to your appointment?:  Car - family or friend to transport  Were you discharged with any Home Care or Post Acute Services:  Yes  Post Acute Services:  Home Health (Comment: Jacques)  Patient DME:  [place] [employer], Other, Wheelchair, Shower chair  Other Patient DME:  glucometer  Patient Home Equipment:  Oxygen, Nebulizer  Do you have support at home?:  Child  Do you feel like you have everything you need to keep you well at home?:  Yes  Are you an active caregiver in your home?:  No  Care Transitions Interventions         Follow Up  No future appointments.     Racheal Urbina RN

## 2019-07-08 ENCOUNTER — CARE COORDINATION (OUTPATIENT)
Dept: CASE MANAGEMENT | Age: 84
End: 2019-07-08

## 2019-07-10 ENCOUNTER — CARE COORDINATION (OUTPATIENT)
Dept: CASE MANAGEMENT | Age: 84
End: 2019-07-10

## 2019-07-19 ENCOUNTER — CARE COORDINATION (OUTPATIENT)
Dept: CASE MANAGEMENT | Age: 84
End: 2019-07-19

## 2019-07-20 ENCOUNTER — HOSPITAL ENCOUNTER (EMERGENCY)
Facility: CLINIC | Age: 84
Discharge: HOME | End: 2019-07-20
Attending: EMERGENCY MEDICINE
Payer: MEDICARE

## 2019-07-20 VITALS
OXYGEN SATURATION: 94 % | WEIGHT: 101 LBS | HEART RATE: 75 BPM | RESPIRATION RATE: 14 BRPM | TEMPERATURE: 97.4 F | HEIGHT: 55 IN | DIASTOLIC BLOOD PRESSURE: 62 MMHG | SYSTOLIC BLOOD PRESSURE: 117 MMHG | BODY MASS INDEX: 23.37 KG/M2

## 2019-07-20 DIAGNOSIS — S70.221A: Primary | ICD-10-CM

## 2019-07-20 PROCEDURE — 99283 EMERGENCY DEPT VISIT LOW MDM: CPT

## 2019-07-20 ASSESSMENT — ENCOUNTER SYMPTOMS
BLOOD IN STOOL: 0
COUGH: 0
ABDOMINAL PAIN: 0
VOMITING: 0
EYE PAIN: 0
DIARRHEA: 0
BACK PAIN: 0
SHORTNESS OF BREATH: 0
CONSTIPATION: 0
NAUSEA: 0

## 2019-07-30 ENCOUNTER — HOSPITAL ENCOUNTER (INPATIENT)
Age: 84
LOS: 6 days | Discharge: HOME HEALTH | DRG: 291 | End: 2019-08-05
Attending: EMERGENCY MEDICINE | Admitting: FAMILY MEDICINE
Payer: MEDICARE

## 2019-07-30 ENCOUNTER — APPOINTMENT (OUTPATIENT)
Dept: GENERAL RADIOLOGY | Age: 84
DRG: 291 | End: 2019-07-30
Payer: MEDICARE

## 2019-07-30 DIAGNOSIS — J44.1 COPD EXACERBATION (HCC): Primary | ICD-10-CM

## 2019-07-30 LAB
ABSOLUTE EOS #: 0 K/UL (ref 0–0.4)
ABSOLUTE IMMATURE GRANULOCYTE: 0 K/UL (ref 0–0.3)
ABSOLUTE LYMPH #: 1.61 K/UL (ref 1–4.8)
ABSOLUTE MONO #: 1.47 K/UL (ref 0.2–0.8)
ANION GAP SERPL CALCULATED.3IONS-SCNC: 14 MMOL/L (ref 9–17)
BASOPHILS # BLD: 0 %
BASOPHILS ABSOLUTE: 0 K/UL (ref 0–0.2)
BNP INTERPRETATION: ABNORMAL
BUN BLDV-MCNC: 52 MG/DL (ref 8–23)
BUN/CREAT BLD: 35 (ref 9–20)
CALCIUM SERPL-MCNC: 9 MG/DL (ref 8.6–10.4)
CHLORIDE BLD-SCNC: 88 MMOL/L (ref 98–107)
CO2: 27 MMOL/L (ref 20–31)
CREAT SERPL-MCNC: 1.48 MG/DL (ref 0.5–0.9)
DIFFERENTIAL TYPE: ABNORMAL
EOSINOPHILS RELATIVE PERCENT: 0 % (ref 1–4)
FIO2: 50
GFR AFRICAN AMERICAN: 40 ML/MIN
GFR NON-AFRICAN AMERICAN: 33 ML/MIN
GFR SERPL CREATININE-BSD FRML MDRD: ABNORMAL ML/MIN/{1.73_M2}
GFR SERPL CREATININE-BSD FRML MDRD: ABNORMAL ML/MIN/{1.73_M2}
GLUCOSE BLD-MCNC: 280 MG/DL (ref 70–99)
HCT VFR BLD CALC: 32.2 % (ref 36.3–47.1)
HEMOGLOBIN: 9.7 G/DL (ref 11.9–15.1)
IMMATURE GRANULOCYTES: 0 %
LYMPHOCYTES # BLD: 12 % (ref 24–44)
MCH RBC QN AUTO: 29.2 PG (ref 25.2–33.5)
MCHC RBC AUTO-ENTMCNC: 30.1 G/DL (ref 28.4–34.8)
MCV RBC AUTO: 97 FL (ref 82.6–102.9)
MONOCYTES # BLD: 11 % (ref 1–7)
MORPHOLOGY: ABNORMAL
MYOGLOBIN: 71 NG/ML (ref 25–58)
NEGATIVE BASE EXCESS, ART: ABNORMAL (ref 0–2)
NRBC AUTOMATED: 0.1 PER 100 WBC
O2 DEVICE/FLOW/%: ABNORMAL
PATIENT TEMP: 37
PDW BLD-RTO: 15.1 % (ref 11.8–14.4)
PLATELET # BLD: 311 K/UL (ref 138–453)
PLATELET ESTIMATE: ABNORMAL
PMV BLD AUTO: 10.1 FL (ref 8.1–13.5)
POC HCO3: 28.9 MMOL/L (ref 22–27)
POC O2 SATURATION: 86 %
POC PCO2 TEMP: ABNORMAL MM HG
POC PCO2: 55 MM HG (ref 32–45)
POC PH TEMP: ABNORMAL
POC PH: 7.33 (ref 7.35–7.45)
POC PO2 TEMP: ABNORMAL MM HG
POC PO2: 56 MM HG (ref 75–95)
POSITIVE BASE EXCESS, ART: 3 (ref 0–2)
POTASSIUM SERPL-SCNC: 5.9 MMOL/L (ref 3.7–5.3)
PRO-BNP: 5568 PG/ML
RBC # BLD: 3.32 M/UL (ref 3.95–5.11)
RBC # BLD: ABNORMAL 10*6/UL
SEG NEUTROPHILS: 77 % (ref 36–66)
SEGMENTED NEUTROPHILS ABSOLUTE COUNT: 10.32 K/UL (ref 1.8–7.7)
SODIUM BLD-SCNC: 129 MMOL/L (ref 135–144)
TCO2 (CALC), ART: 31 MMOL/L (ref 23–28)
TROPONIN INTERP: ABNORMAL
TROPONIN T: ABNORMAL NG/ML
TROPONIN, HIGH SENSITIVITY: 34 NG/L (ref 0–14)
WBC # BLD: 13.4 K/UL (ref 3.5–11.3)
WBC # BLD: ABNORMAL 10*3/UL

## 2019-07-30 PROCEDURE — 2580000003 HC RX 258: Performed by: NURSE PRACTITIONER

## 2019-07-30 PROCEDURE — 94640 AIRWAY INHALATION TREATMENT: CPT

## 2019-07-30 PROCEDURE — 71045 X-RAY EXAM CHEST 1 VIEW: CPT

## 2019-07-30 PROCEDURE — 84484 ASSAY OF TROPONIN QUANT: CPT

## 2019-07-30 PROCEDURE — 83880 ASSAY OF NATRIURETIC PEPTIDE: CPT

## 2019-07-30 PROCEDURE — 36415 COLL VENOUS BLD VENIPUNCTURE: CPT

## 2019-07-30 PROCEDURE — 6360000002 HC RX W HCPCS: Performed by: NURSE PRACTITIONER

## 2019-07-30 PROCEDURE — 99285 EMERGENCY DEPT VISIT HI MDM: CPT

## 2019-07-30 PROCEDURE — 2700000000 HC OXYGEN THERAPY PER DAY

## 2019-07-30 PROCEDURE — 82803 BLOOD GASES ANY COMBINATION: CPT

## 2019-07-30 PROCEDURE — 80048 BASIC METABOLIC PNL TOTAL CA: CPT

## 2019-07-30 PROCEDURE — 94761 N-INVAS EAR/PLS OXIMETRY MLT: CPT

## 2019-07-30 PROCEDURE — 36600 WITHDRAWAL OF ARTERIAL BLOOD: CPT

## 2019-07-30 PROCEDURE — 96365 THER/PROPH/DIAG IV INF INIT: CPT

## 2019-07-30 PROCEDURE — 85025 COMPLETE CBC W/AUTO DIFF WBC: CPT

## 2019-07-30 PROCEDURE — 2500000003 HC RX 250 WO HCPCS: Performed by: NURSE PRACTITIONER

## 2019-07-30 PROCEDURE — 96375 TX/PRO/DX INJ NEW DRUG ADDON: CPT

## 2019-07-30 PROCEDURE — 83874 ASSAY OF MYOGLOBIN: CPT

## 2019-07-30 PROCEDURE — 2000000000 HC ICU R&B

## 2019-07-30 PROCEDURE — 93005 ELECTROCARDIOGRAM TRACING: CPT | Performed by: NURSE PRACTITIONER

## 2019-07-30 PROCEDURE — 94660 CPAP INITIATION&MGMT: CPT

## 2019-07-30 PROCEDURE — 6370000000 HC RX 637 (ALT 250 FOR IP): Performed by: NURSE PRACTITIONER

## 2019-07-30 RX ORDER — FUROSEMIDE 10 MG/ML
40 INJECTION INTRAMUSCULAR; INTRAVENOUS ONCE
Status: COMPLETED | OUTPATIENT
Start: 2019-07-30 | End: 2019-07-31

## 2019-07-30 RX ORDER — MAGNESIUM SULFATE 1 G/100ML
1 INJECTION INTRAVENOUS
Status: COMPLETED | OUTPATIENT
Start: 2019-07-30 | End: 2019-07-30

## 2019-07-30 RX ORDER — DEXTROSE MONOHYDRATE 25 G/50ML
25 INJECTION, SOLUTION INTRAVENOUS ONCE
Status: COMPLETED | OUTPATIENT
Start: 2019-07-30 | End: 2019-07-30

## 2019-07-30 RX ORDER — METHYLPREDNISOLONE SODIUM SUCCINATE 125 MG/2ML
125 INJECTION, POWDER, LYOPHILIZED, FOR SOLUTION INTRAMUSCULAR; INTRAVENOUS ONCE
Status: COMPLETED | OUTPATIENT
Start: 2019-07-30 | End: 2019-07-30

## 2019-07-30 RX ORDER — IPRATROPIUM BROMIDE AND ALBUTEROL SULFATE 2.5; .5 MG/3ML; MG/3ML
1 SOLUTION RESPIRATORY (INHALATION)
Status: DISCONTINUED | OUTPATIENT
Start: 2019-07-31 | End: 2019-08-05 | Stop reason: HOSPADM

## 2019-07-30 RX ORDER — ALBUTEROL SULFATE 90 UG/1
2 AEROSOL, METERED RESPIRATORY (INHALATION)
Status: DISCONTINUED | OUTPATIENT
Start: 2019-07-30 | End: 2019-07-30

## 2019-07-30 RX ORDER — ALBUTEROL SULFATE 2.5 MG/3ML
2.5 SOLUTION RESPIRATORY (INHALATION) ONCE
Status: COMPLETED | OUTPATIENT
Start: 2019-07-30 | End: 2019-07-30

## 2019-07-30 RX ORDER — ALBUTEROL SULFATE 2.5 MG/3ML
5 SOLUTION RESPIRATORY (INHALATION)
Status: DISCONTINUED | OUTPATIENT
Start: 2019-07-30 | End: 2019-07-30

## 2019-07-30 RX ADMIN — MAGNESIUM SULFATE HEPTAHYDRATE 1 G: 1 INJECTION, SOLUTION INTRAVENOUS at 21:37

## 2019-07-30 RX ADMIN — Medication 50 MEQ: at 22:45

## 2019-07-30 RX ADMIN — CALCIUM GLUCONATE 1 G: 98 INJECTION, SOLUTION INTRAVENOUS at 22:45

## 2019-07-30 RX ADMIN — MAGNESIUM SULFATE HEPTAHYDRATE 1 G: 1 INJECTION, SOLUTION INTRAVENOUS at 22:40

## 2019-07-30 RX ADMIN — ALBUTEROL SULFATE 2.5 MG: 2.5 SOLUTION RESPIRATORY (INHALATION) at 21:13

## 2019-07-30 RX ADMIN — METHYLPREDNISOLONE SODIUM SUCCINATE 125 MG: 125 INJECTION, POWDER, FOR SOLUTION INTRAMUSCULAR; INTRAVENOUS at 21:17

## 2019-07-30 RX ADMIN — DEXTROSE 50 % IN WATER (D50W) INTRAVENOUS SYRINGE 25 G: at 22:42

## 2019-07-30 RX ADMIN — INSULIN HUMAN 10 UNITS: 100 INJECTION, SOLUTION PARENTERAL at 22:41

## 2019-07-30 ASSESSMENT — ENCOUNTER SYMPTOMS
WHEEZING: 0
COLOR CHANGE: 0
VOMITING: 0
ABDOMINAL PAIN: 0
SHORTNESS OF BREATH: 1
COUGH: 1
SORE THROAT: 0
RHINORRHEA: 0
SINUS PRESSURE: 0
CONSTIPATION: 0
NAUSEA: 0
DIARRHEA: 0

## 2019-07-31 LAB
ABSOLUTE EOS #: 0 K/UL (ref 0–0.4)
ABSOLUTE IMMATURE GRANULOCYTE: 0 K/UL (ref 0–0.3)
ABSOLUTE LYMPH #: 0.49 K/UL (ref 1–4.8)
ABSOLUTE MONO #: 0 K/UL (ref 0.2–0.8)
ANION GAP SERPL CALCULATED.3IONS-SCNC: 12 MMOL/L (ref 9–17)
BASOPHILS # BLD: 0 %
BASOPHILS ABSOLUTE: 0 K/UL (ref 0–0.2)
BUN BLDV-MCNC: 51 MG/DL (ref 8–23)
BUN/CREAT BLD: 39 (ref 9–20)
CALCIUM SERPL-MCNC: 8.9 MG/DL (ref 8.6–10.4)
CHLORIDE BLD-SCNC: 91 MMOL/L (ref 98–107)
CO2: 31 MMOL/L (ref 20–31)
CREAT SERPL-MCNC: 1.3 MG/DL (ref 0.5–0.9)
DIFFERENTIAL TYPE: ABNORMAL
EKG ATRIAL RATE: 150 BPM
EKG Q-T INTERVAL: 340 MS
EKG QRS DURATION: 82 MS
EKG QTC CALCULATION (BAZETT): 411 MS
EKG R AXIS: -6 DEGREES
EKG T AXIS: 119 DEGREES
EKG VENTRICULAR RATE: 88 BPM
EOSINOPHILS RELATIVE PERCENT: 0 % (ref 1–4)
FIO2: 50
GFR AFRICAN AMERICAN: 46 ML/MIN
GFR NON-AFRICAN AMERICAN: 38 ML/MIN
GFR SERPL CREATININE-BSD FRML MDRD: ABNORMAL ML/MIN/{1.73_M2}
GFR SERPL CREATININE-BSD FRML MDRD: ABNORMAL ML/MIN/{1.73_M2}
GLUCOSE BLD-MCNC: 241 MG/DL (ref 65–105)
GLUCOSE BLD-MCNC: 267 MG/DL (ref 70–99)
GLUCOSE BLD-MCNC: 288 MG/DL (ref 65–105)
GLUCOSE BLD-MCNC: 298 MG/DL (ref 65–105)
GLUCOSE BLD-MCNC: 300 MG/DL (ref 65–105)
GLUCOSE BLD-MCNC: 311 MG/DL (ref 65–105)
HCO3 VENOUS: 29.7 MMOL/L (ref 24–30)
HCT VFR BLD CALC: 29.6 % (ref 36.3–47.1)
HEMOGLOBIN: 9.1 G/DL (ref 11.9–15.1)
IMMATURE GRANULOCYTES: 0 %
LYMPHOCYTES # BLD: 7 % (ref 24–44)
MCH RBC QN AUTO: 29 PG (ref 25.2–33.5)
MCHC RBC AUTO-ENTMCNC: 30.7 G/DL (ref 28.4–34.8)
MCV RBC AUTO: 94.3 FL (ref 82.6–102.9)
MONOCYTES # BLD: 0 % (ref 1–7)
NEGATIVE BASE EXCESS, VEN: ABNORMAL (ref 0–2)
NRBC AUTOMATED: 0.3 PER 100 WBC
O2 DEVICE/FLOW/%: ABNORMAL
O2 SAT, VEN: 68 %
PATIENT TEMP: 37
PCO2, VEN: 48 MM HG (ref 39–55)
PDW BLD-RTO: 15.1 % (ref 11.8–14.4)
PH VENOUS: 7.4 (ref 7.32–7.42)
PLATELET # BLD: 260 K/UL (ref 138–453)
PLATELET ESTIMATE: ABNORMAL
PMV BLD AUTO: 10 FL (ref 8.1–13.5)
PO2, VEN: 36 MM HG (ref 30–50)
POC PCO2 TEMP: ABNORMAL MM HG
POC PH TEMP: ABNORMAL
POC PO2 TEMP: ABNORMAL MM HG
POSITIVE BASE EXCESS, VEN: 5 (ref 0–2)
POTASSIUM SERPL-SCNC: 5.1 MMOL/L (ref 3.7–5.3)
PROCALCITONIN: 0.19 NG/ML
RBC # BLD: 3.14 M/UL (ref 3.95–5.11)
RBC # BLD: ABNORMAL 10*6/UL
SEG NEUTROPHILS: 93 % (ref 36–66)
SEGMENTED NEUTROPHILS ABSOLUTE COUNT: 6.51 K/UL (ref 1.8–7.7)
SODIUM BLD-SCNC: 134 MMOL/L (ref 135–144)
TOTAL CO2, VENOUS: 31 MMOL/L (ref 25–31)
WBC # BLD: 7 K/UL (ref 3.5–11.3)
WBC # BLD: ABNORMAL 10*3/UL

## 2019-07-31 PROCEDURE — 2000000000 HC ICU R&B

## 2019-07-31 PROCEDURE — 82947 ASSAY GLUCOSE BLOOD QUANT: CPT

## 2019-07-31 PROCEDURE — 82803 BLOOD GASES ANY COMBINATION: CPT

## 2019-07-31 PROCEDURE — 94761 N-INVAS EAR/PLS OXIMETRY MLT: CPT

## 2019-07-31 PROCEDURE — 6370000000 HC RX 637 (ALT 250 FOR IP): Performed by: NURSE PRACTITIONER

## 2019-07-31 PROCEDURE — 36415 COLL VENOUS BLD VENIPUNCTURE: CPT

## 2019-07-31 PROCEDURE — 93010 ELECTROCARDIOGRAM REPORT: CPT | Performed by: INTERNAL MEDICINE

## 2019-07-31 PROCEDURE — 80048 BASIC METABOLIC PNL TOTAL CA: CPT

## 2019-07-31 PROCEDURE — 2580000003 HC RX 258: Performed by: FAMILY MEDICINE

## 2019-07-31 PROCEDURE — 94640 AIRWAY INHALATION TREATMENT: CPT

## 2019-07-31 PROCEDURE — 87086 URINE CULTURE/COLONY COUNT: CPT

## 2019-07-31 PROCEDURE — 6370000000 HC RX 637 (ALT 250 FOR IP): Performed by: INTERNAL MEDICINE

## 2019-07-31 PROCEDURE — 2700000000 HC OXYGEN THERAPY PER DAY

## 2019-07-31 PROCEDURE — 94660 CPAP INITIATION&MGMT: CPT

## 2019-07-31 PROCEDURE — 6360000002 HC RX W HCPCS: Performed by: FAMILY MEDICINE

## 2019-07-31 PROCEDURE — 2580000003 HC RX 258: Performed by: INTERNAL MEDICINE

## 2019-07-31 PROCEDURE — 6360000002 HC RX W HCPCS: Performed by: NURSE PRACTITIONER

## 2019-07-31 PROCEDURE — 6370000000 HC RX 637 (ALT 250 FOR IP): Performed by: FAMILY MEDICINE

## 2019-07-31 PROCEDURE — 6360000002 HC RX W HCPCS: Performed by: INTERNAL MEDICINE

## 2019-07-31 PROCEDURE — 85025 COMPLETE CBC W/AUTO DIFF WBC: CPT

## 2019-07-31 PROCEDURE — 84145 PROCALCITONIN (PCT): CPT

## 2019-07-31 RX ORDER — DEXTROSE MONOHYDRATE 50 MG/ML
100 INJECTION, SOLUTION INTRAVENOUS PRN
Status: DISCONTINUED | OUTPATIENT
Start: 2019-07-31 | End: 2019-08-05 | Stop reason: HOSPADM

## 2019-07-31 RX ORDER — ACETAMINOPHEN 325 MG/1
650 TABLET ORAL EVERY 4 HOURS PRN
Status: DISCONTINUED | OUTPATIENT
Start: 2019-07-31 | End: 2019-08-05 | Stop reason: HOSPADM

## 2019-07-31 RX ORDER — FUROSEMIDE 10 MG/ML
20 INJECTION INTRAMUSCULAR; INTRAVENOUS ONCE
Status: DISCONTINUED | OUTPATIENT
Start: 2019-07-31 | End: 2019-07-31

## 2019-07-31 RX ORDER — METHYLPREDNISOLONE SODIUM SUCCINATE 125 MG/2ML
60 INJECTION, POWDER, LYOPHILIZED, FOR SOLUTION INTRAMUSCULAR; INTRAVENOUS EVERY 6 HOURS
Status: DISCONTINUED | OUTPATIENT
Start: 2019-07-31 | End: 2019-08-03

## 2019-07-31 RX ORDER — DEXTROSE MONOHYDRATE 25 G/50ML
12.5 INJECTION, SOLUTION INTRAVENOUS PRN
Status: DISCONTINUED | OUTPATIENT
Start: 2019-07-31 | End: 2019-08-05 | Stop reason: HOSPADM

## 2019-07-31 RX ORDER — NICOTINE POLACRILEX 4 MG
15 LOZENGE BUCCAL PRN
Status: DISCONTINUED | OUTPATIENT
Start: 2019-07-31 | End: 2019-08-05 | Stop reason: HOSPADM

## 2019-07-31 RX ORDER — SODIUM CHLORIDE 0.9 % (FLUSH) 0.9 %
10 SYRINGE (ML) INJECTION EVERY 12 HOURS SCHEDULED
Status: DISCONTINUED | OUTPATIENT
Start: 2019-07-31 | End: 2019-08-05 | Stop reason: HOSPADM

## 2019-07-31 RX ORDER — FUROSEMIDE 10 MG/ML
20 INJECTION INTRAMUSCULAR; INTRAVENOUS 2 TIMES DAILY
Status: DISCONTINUED | OUTPATIENT
Start: 2019-08-01 | End: 2019-08-05

## 2019-07-31 RX ORDER — SODIUM CHLORIDE 0.9 % (FLUSH) 0.9 %
10 SYRINGE (ML) INJECTION PRN
Status: DISCONTINUED | OUTPATIENT
Start: 2019-07-31 | End: 2019-08-05 | Stop reason: HOSPADM

## 2019-07-31 RX ORDER — FUROSEMIDE 10 MG/ML
20 INJECTION INTRAMUSCULAR; INTRAVENOUS DAILY
Status: DISCONTINUED | OUTPATIENT
Start: 2019-07-31 | End: 2019-07-31

## 2019-07-31 RX ADMIN — FUROSEMIDE 40 MG: 10 INJECTION, SOLUTION INTRAMUSCULAR; INTRAVENOUS at 00:15

## 2019-07-31 RX ADMIN — METHYLPREDNISOLONE SODIUM SUCCINATE 60 MG: 125 INJECTION, POWDER, FOR SOLUTION INTRAMUSCULAR; INTRAVENOUS at 21:48

## 2019-07-31 RX ADMIN — Medication 2 PUFF: at 20:13

## 2019-07-31 RX ADMIN — INSULIN LISPRO 6 UNITS: 100 INJECTION, SOLUTION INTRAVENOUS; SUBCUTANEOUS at 12:47

## 2019-07-31 RX ADMIN — Medication 10 ML: at 16:05

## 2019-07-31 RX ADMIN — Medication 10 ML: at 08:42

## 2019-07-31 RX ADMIN — INSULIN LISPRO 4 UNITS: 100 INJECTION, SOLUTION INTRAVENOUS; SUBCUTANEOUS at 21:37

## 2019-07-31 RX ADMIN — Medication 10 ML: at 08:45

## 2019-07-31 RX ADMIN — ENOXAPARIN SODIUM 30 MG: 30 INJECTION SUBCUTANEOUS at 08:42

## 2019-07-31 RX ADMIN — METHYLPREDNISOLONE SODIUM SUCCINATE 60 MG: 125 INJECTION, POWDER, FOR SOLUTION INTRAMUSCULAR; INTRAVENOUS at 16:05

## 2019-07-31 RX ADMIN — INSULIN LISPRO 8 UNITS: 100 INJECTION, SOLUTION INTRAVENOUS; SUBCUTANEOUS at 18:02

## 2019-07-31 RX ADMIN — AZITHROMYCIN MONOHYDRATE 500 MG: 500 INJECTION, POWDER, LYOPHILIZED, FOR SOLUTION INTRAVENOUS at 16:07

## 2019-07-31 RX ADMIN — INSULIN LISPRO 4 UNITS: 100 INJECTION, SOLUTION INTRAVENOUS; SUBCUTANEOUS at 08:41

## 2019-07-31 RX ADMIN — FUROSEMIDE 20 MG: 10 INJECTION, SOLUTION INTRAMUSCULAR; INTRAVENOUS at 16:05

## 2019-07-31 RX ADMIN — IPRATROPIUM BROMIDE AND ALBUTEROL SULFATE 1 AMPULE: .5; 3 SOLUTION RESPIRATORY (INHALATION) at 08:02

## 2019-07-31 RX ADMIN — METOPROLOL TARTRATE 12.5 MG: 25 TABLET ORAL at 16:05

## 2019-07-31 RX ADMIN — IPRATROPIUM BROMIDE AND ALBUTEROL SULFATE 1 AMPULE: .5; 3 SOLUTION RESPIRATORY (INHALATION) at 15:36

## 2019-07-31 RX ADMIN — Medication 10 ML: at 21:48

## 2019-07-31 RX ADMIN — IPRATROPIUM BROMIDE AND ALBUTEROL SULFATE 1 AMPULE: .5; 3 SOLUTION RESPIRATORY (INHALATION) at 11:57

## 2019-07-31 RX ADMIN — IPRATROPIUM BROMIDE AND ALBUTEROL SULFATE 1 AMPULE: .5; 3 SOLUTION RESPIRATORY (INHALATION) at 20:12

## 2019-07-31 ASSESSMENT — PAIN SCALES - GENERAL
PAINLEVEL_OUTOF10: 0
PAINLEVEL_OUTOF10: 0

## 2019-07-31 NOTE — CARE COORDINATION
Patient/family seen: yes    BPCI-A 90-day Readmission. [place] [address] Sepsis. Qualifying admission: 6/15/19 - 19 Anchoring Location: [facility]. End Date: 19    Current discharge plan: pending    Collaboration completed with case management: CM not available. Called and left VM. Met with bedside nurse Arlene Lennox. She stated she has not heard of plans concerning discharge. She stated physician was in visiting pt, but she was unable to speak with him. Pt only nodded head to questions. Discharge plan is currently pending.

## 2019-07-31 NOTE — CONSULTS
Reason for Consult:  Acute kidney injury. Requesting Physician:  Sukh Altamirano MD    HISTORY OF PRESENT ILLNESS:    The patient is a 80 y.o. female who presents with worsening SOB and cough, reported significant weakness, no sputum. Her symptoms were progressing over few days, denied having fever, or chest pain  Has normal baseline Cr of 0.8, her Cr was 1.43 yesterday, improved to 1.3 this morning    Review Of Systems:   Constitutional: No fever, chills, lethargy, Has weakness, has significant decrease inappetite  HEENT:  No headache, nasal discharge or sore throat. Cardiac:  No chest pain, dyspnea, orthopnea or PND. Chest:              No cough, phlegm or wheezing. Abdomen:  No abdominal pain, nausea, vomiting or diarrhea. Neuro:             No gross focal weakness, numbness. No abnormal movements or seizure like activity. Skin:   No rashes or itching. :   No hematuria, pyuria, dysuria or flank pain. Extremities:  Has B/L LE swelling, no new  joint pains. Past Medical History:   Diagnosis Date    Arthritis     Atrial fibrillation ([address]     CAD (coronary artery disease)     CHF (congestive heart failure) (HCC)     [facility]     Diabetes mellitus ([address]     Femur fracture ([facility]     Hyperlipidemia     Hypertension     Moderate malnutrition ([address] 4/9/2019    Respiratory failure ([facility]        Past Surgical History:   Procedure Laterality Date    TOTAL HIP ARTHROPLASTY Left     UPPER GASTROINTESTINAL ENDOSCOPY  04/09/2019    Biopsy    UPPER GASTROINTESTINAL ENDOSCOPY N/A 4/9/2019    EGD BIOPSY performed by Jama Marks MD at [address] [place] [address]       Prior to Admission medications    Medication Sig Start Date End Date Taking?  Authorizing Provider   ipratropium-albuterol (DUONEB) 0.5-2.5 (3) MG/3ML SOLN nebulizer solution Inhale 3 mLs into the lungs 4 times daily 6/25/19  Yes Sobeida Rose MD   glucose (GLUTOSE) 40 % GEL Take 37.5 mLs Date    WBC 7.0 07/31/2019    HGB 9.1 (L) 07/31/2019    HCT 29.6 (L) 07/31/2019    MCV 94.3 07/31/2019     07/31/2019     BMP:    Lab Results   Component Value Date     (L) 07/31/2019     (L) 07/30/2019     06/25/2019    K 5.1 07/31/2019    K 5.9 (H) 07/30/2019    K 4.7 06/25/2019    CL 91 (L) 07/31/2019    CL 88 (L) 07/30/2019    CL 97 (L) 06/25/2019    CO2 31 07/31/2019    CO2 27 07/30/2019    CO2 29 06/25/2019    BUN 51 (H) 07/31/2019    BUN 52 (H) 07/30/2019    BUN 33 (H) 06/25/2019    CREATININE 1.30 (H) 07/31/2019    CREATININE 1.48 (H) 07/30/2019    CREATININE 0.85 06/25/2019    GLUCOSE 267 (H) 07/31/2019    GLUCOSE 280 (H) 07/30/2019    GLUCOSE 78 06/25/2019     CMP:   Lab Results   Component Value Date     07/31/2019    K 5.1 07/31/2019    CL 91 07/31/2019    CO2 31 07/31/2019    BUN 51 07/31/2019    CREATININE 1.30 07/31/2019    GLUCOSE 267 07/31/2019    GLUCOSE 117 09/02/2011    CALCIUM 8.9 07/31/2019    PROT 7.9 11/13/2018    LABALBU 4.2 11/13/2018    BILITOT 0.84 11/13/2018    ALKPHOS 108 11/13/2018    AST 32 11/13/2018    ALT 21 11/13/2018      Hepatic:   Lab Results   Component Value Date    AST 32 (H) 11/13/2018    AST 23 05/09/2018    AST 24 09/24/2017    ALT 21 11/13/2018    ALT 14 05/09/2018    ALT 24 09/24/2017    BILITOT 0.84 11/13/2018    BILITOT 0.51 05/09/2018    BILITOT 0.67 09/24/2017    ALKPHOS 108 (H) 11/13/2018    ALKPHOS 96 05/09/2018    ALKPHOS 81 09/24/2017     BNP:   Lab Results   Component Value Date     (H) 09/02/2011     Lipids:   Lab Results   Component Value Date    CHOL 104 06/16/2019    HDL 55 06/16/2019     INR:   Lab Results   Component Value Date    INR 1.4 06/18/2019    INR 1.2 04/04/2019    INR 1.2 11/13/2018     PTH: No results found for: PTH  Phosphorus:  No results found for: PHOS  Ionized Calcium: No results found for: IONCA  Magnesium:   Lab Results   Component Value Date    MG 2.2 06/20/2019     Albumin:   Lab Results Component Value Date    LABALBU 4.2 11/13/2018       Radiology:   1. Cardiomegaly and pulmonary edema with bilateral pleural effusions   compatible with CHF. 2. Bibasilar atelectasis/infiltrates.           Assessment:  1. Acute kidney injury, hemodynamic related in the setting of CHF excacerbation  2. Cardiomyopathy  3. Hyponatremia  4. Anemia    Plan:  1. Renal function showed mild improvement, continue diuresis, check urine na and Cr, renal diet   Avoid nephrotoxic drugs and IV contrast exposure., Lisinopril on hold  2. Increase lasix to 20 mg BID  3. Monitor electrolytes closely, serum Na improving  4. Check Iron studies       Thank you for the consultation. Please do not hesitate to contact us for any further questions/concerns. We will continue to follow along with you.        Electronically signed by Shara Cortez MD  on 7/31/2019 at 4:20 PM

## 2019-07-31 NOTE — PROGRESS NOTES
Your patient currently has an order for Lovenox 40 mg daily for DVT prophylaxis. Based upon their weight of 44.9 kg and CrCl of 16.1 mL/min. The dose will be Lovenox 30 mg daily. Thank you.

## 2019-07-31 NOTE — CONSULTS
History        Procedure Laterality Date    TOTAL HIP ARTHROPLASTY Left     UPPER GASTROINTESTINAL ENDOSCOPY  04/09/2019    Biopsy    UPPER GASTROINTESTINAL ENDOSCOPY N/A 4/9/2019    EGD BIOPSY performed by Elmer Waite MD at [facility]    Current Medications    sodium chloride flush  10 mL Intravenous 2 times per day    enoxaparin  30 mg Subcutaneous Daily    insulin lispro  0-12 Units Subcutaneous TID WC    insulin lispro  0-6 Units Subcutaneous Nightly    metoprolol tartrate  12.5 mg Oral BID    ipratropium-albuterol  1 ampule Inhalation Q4H WA     sodium chloride flush, acetaminophen, glucose, dextrose, glucagon (rDNA), dextrose, albuterol  IV Drips/Infusions   dextrose       Home Medications  Medications Prior to Admission: ipratropium-albuterol (DUONEB) 0.5-2.5 (3) MG/3ML SOLN nebulizer solution, Inhale 3 mLs into the lungs 4 times daily  glucose (GLUTOSE) 40 % GEL, Take 37.5 mLs by mouth as needed (Per glycemia)  insulin glargine (LANTUS) 100 UNIT/ML injection vial, Inject 4 Units into the skin nightly  insulin lispro (HUMALOG) 100 UNIT/ML injection vial, Inject 0-6 Units into the skin 3 times daily (with meals) If blood sugar less than  70 or greater than 350 call MD  Less than 70 give glucose tube or  tablets and call MD Blood sugar 72  To 170 no coverage 171 to 270 units insulin lispro subcu 271 up to 355 units insulin lispro subcu  lisinopril (PRINIVIL;ZESTRIL) 5 MG tablet, Take 1 tablet by mouth daily  metoprolol tartrate (LOPRESSOR) 25 MG tablet, Take 0.5 tablets by mouth 2 times daily  guaiFENesin (MUCINEX) 600 MG extended release tablet, Take 1 tablet by mouth 2 times daily  furosemide (LASIX) 40 MG tablet, Take 1 tablet by mouth daily  glimepiride (AMARYL) 2 MG tablet, Take 2 mg by mouth daily  budesonide-formoterol (SYMBICORT) 160-4.5 MCG/ACT AERO, Inhale 1 puff into the lungs 2 times daily  diltiazem (CARDIZEM CD) 120 MG extended release capsule, Take 120 mg by mouth daily  potassium chloride (MICRO-K) 10 MEQ extended release capsule, Take 2 capsules by mouth daily  pantoprazole (PROTONIX) 40 MG tablet, Take 1 tablet by mouth every morning (before breakfast)  brimonidine (ALPHAGAN) 0.2 % ophthalmic solution, Place 1 drop into both eyes 2 times daily  albuterol sulfate HFA (PROAIR HFA) 108 (90 Base) MCG/ACT inhaler, Inhale 2 puffs into the lungs every 6 hours as needed for Wheezing  dorzolamide-timolol (COSOPT) 22.3-6.8 MG/ML ophthalmic solution, Place 1 drop into both eyes 2 times daily  gabapentin (NEURONTIN) 100 MG capsule, Take 100 mg by mouth daily. montelukast (SINGULAIR) 10 MG tablet, Take 10 mg by mouth nightly  atorvastatin (LIPITOR) 20 MG tablet, Take 20 mg by mouth daily  apixaban (ELIQUIS) 2.5 MG TABS tablet, Take 2.5 mg by mouth 2 times daily   Cholecalciferol (VITAMIN D) 2000 units CAPS capsule, Take 2,000 Units by mouth 2 times daily   magnesium oxide (MAG-OX) 400 MG tablet, Take 400 mg by mouth 2 times daily    Allergies    Penicillins  Social History     Social History     Tobacco Use    Smoking status: Never Smoker    Smokeless tobacco: Never Used   Substance Use Topics    Alcohol use: No     Family History          Family history unknown: Yes     ROS - 11 systems   Detailed review of system was attempted, because of language barrier, I was unable to do a detailed review of system. Vitals     height is 4' 6\" (1.372 m) and weight is 99 lb (44.9 kg). Her infrared temperature is 97.4 °F (36.3 °C). Her blood pressure is 95/65 and her pulse is 107. Her respiration is 18 and oxygen saturation is 91%. Body mass index is 23.87 kg/m². I/O        Intake/Output Summary (Last 24 hours) at 7/31/2019 1501  Last data filed at 7/31/2019 0551  Gross per 24 hour   Intake 100 ml   Output 750 ml   Net -650 ml     I/O last 3 completed shifts:   In: 100 [IV Piggyback:100]  Out: 750 [Urine:750]   Patient Vitals for the past 96 hrs (Last 3 readings):   Weight   07/30/19 2110 99

## 2019-07-31 NOTE — PROGRESS NOTES
Transitions of Care Pharmacy Service   Medication Review    The patient's list of current home medications has been reviewed. Source(s) of information: Patient daughter/ Surescripts    Based on information provided by the above source(s), no changes to the patient's home medication list were necessary. Please review the ACTION REQUESTED section of this note below for any discrepancies on current hospital orders. PROVIDER ACTION REQUESTED  Discrepancies on current hospital orders that need to be addressed by a physician/nurse practitioner:    Medication Action Requested        none         Please feel free to call me with any questions about this encounter. Thank you.     Allie Orellana [address] [place] [address]   Transitions of Care Pharmacy Service  Phone:  [phone]  Fax: [phone]      Electronically signed by Allie Orellana [address] [place] [address] on 7/31/2019 at 5:47 PM       Medications Prior to Admission: ipratropium-albuterol (DUONEB) 0.5-2.5 (3) MG/3ML SOLN nebulizer solution, Inhale 3 mLs into the lungs 4 times daily  glucose (GLUTOSE) 40 % GEL, Take 37.5 mLs by mouth as needed (Per glycemia)  insulin glargine (LANTUS) 100 UNIT/ML injection vial, Inject 4 Units into the skin nightly  insulin lispro (HUMALOG) 100 UNIT/ML injection vial, Inject 0-6 Units into the skin 3 times daily (with meals) If blood sugar less than  70 or greater than 350 call MD  Less than 70 give glucose tube or  tablets and call MD Blood sugar 72  To 170 no coverage 171 to 270 units insulin lispro subcu 271 up to 355 units insulin lispro subcu  lisinopril (PRINIVIL;ZESTRIL) 5 MG tablet, Take 1 tablet by mouth daily  metoprolol tartrate (LOPRESSOR) 25 MG tablet, Take 0.5 tablets by mouth 2 times daily  guaiFENesin (MUCINEX) 600 MG extended release tablet, Take 1 tablet by mouth 2 times daily  furosemide (LASIX) 40 MG tablet, Take 1 tablet by mouth daily  glimepiride (AMARYL) 2 MG tablet, Take 2 mg by mouth daily  budesonide-formoterol (SYMBICORT) 160-4.5

## 2019-07-31 NOTE — H&P
Joseph Calero MD, FAAFP  History and Physical    Patient:  Moody Julio  MRN: [id number]    CHIEF COMPLAINT:  ' She has been very lethargic '    History Obtained From:  patient, non-family caregiver - ER physician, nursing, EMR  PCP: Tran Mccartney MD    HISTORY OF PRESENT ILLNESS:   The patient is a 80 y.o. female who presents with acute respiratory distress. Apparently pt is dependant on home O2 and uses upto 6L. Per family her spO2 remains in the high [de-identified]. Per report pt had been more lethargic and progressive more dyspneic for the last 2-3 days. Pt is not communicating much presently. She is not orthopneic and is laying flat in bed. Pt opens eyes spontaneously but does not respond to any questions    She had not been eating much though is not reported to have any abd pain, vomiting, rash or joint swelling. Has not reported any CP    Difficult to ascertain ROS      Past Medical History:        Diagnosis Date    Arthritis     Atrial fibrillation ([address]     CAD (coronary artery disease)     CHF (congestive heart failure) (HCC)     COPD (chronic obstructive pulmonary disease) ([address]     Diabetes mellitus ([address]     Femur fracture ([address]     Hyperlipidemia     Hypertension     Moderate malnutrition ([address] 4/9/2019    Respiratory failure ([address]        Past Surgical History:        Procedure Laterality Date    TOTAL HIP ARTHROPLASTY Left     UPPER GASTROINTESTINAL ENDOSCOPY  04/09/2019    Biopsy    UPPER GASTROINTESTINAL ENDOSCOPY N/A 4/9/2019    EGD BIOPSY performed by Avani Ralph MD at [facility]       Medications Prior to Admission:    Prior to Admission medications    Medication Sig Start Date End Date Taking?  Authorizing Provider   ipratropium-albuterol (DUONEB) 0.5-2.5 (3) MG/3ML SOLN nebulizer solution Inhale 3 mLs into the lungs 4 times daily 6/25/19  Yes Ulysses Brenner, MD   glucose (GLUTOSE) 40 % GEL Take 37.5 mLs by mouth as needed (Per glycemia) 6/25/19  Yes Ulysses Brenner, MD   insulin glargine MD   gabapentin (NEURONTIN) 100 MG capsule Take 100 mg by mouth daily. Yes Historical Provider, MD   montelukast (SINGULAIR) 10 MG tablet Take 10 mg by mouth nightly   Yes Historical Provider, MD   atorvastatin (LIPITOR) 20 MG tablet Take 20 mg by mouth daily   Yes Historical Provider, MD   apixaban (ELIQUIS) 2.5 MG TABS tablet Take 2.5 mg by mouth 2 times daily    Yes Historical Provider, MD   Cholecalciferol (VITAMIN D) 2000 units CAPS capsule Take 2,000 Units by mouth 2 times daily    Yes Historical Provider, MD   magnesium oxide (MAG-OX) 400 MG tablet Take 400 mg by mouth 2 times daily   Yes Historical Provider, MD       Allergies:  Penicillins    Social History:   TOBACCO:   reports that she has never smoked. She has never used smokeless tobacco.  ETOH:   reports that she does not drink alcohol. OCCUPATION:      Family History:       Family history unknown: Yes       REVIEW OF SYSTEMS:  Negative except for as aobve. Physical Exam:    Vitals: BP (!) 107/59   Pulse 81   Temp 97.7 °F (36.5 °C) (Infrared)   Resp 17   Ht 4' 6\" (1.372 m)   Wt 99 lb (44.9 kg)   SpO2 97%   BMI 23.87 kg/m²   General appearance: alert, appears stated age and cooperative  Skin: Skin color, texture, turgor normal. No rashes or lesions  HEENT: Head: Normocephalic, no lesions, without obvious abnormality.   High flow O2 w mask  Neck: no adenopathy, no carotid bruit, no JVD, supple, symmetrical, trachea midline and thyroid not enlarged, symmetric, no tenderness/mass/nodules  Lungs: clear to auscultation bilaterally, diminished breath sounds bilaterally and rales LLL, RLL and RUL  Heart: regular rate and rhythm, S1, S2 normal, no S3 or S4 and no rub  Abdomen: soft, non-tender; bowel sounds normal; no masses,  no organomegaly  Extremities: extremities normal, atraumatic, no cyanosis or edema  Neurologic: Mental status: Alert, oriented, thought content appropriate    CBC:   Recent Labs     07/30/19 2127 07/31/19  0533   WBC 13.4* O2 Device/Flow/% NOT REPORTED    FIO2 50.0    Comment: %       POC pH Temp NOT REPORTED    POC pCO2 Temp NOT REPORTED mm Hg    POC pO2 Temp NOT REPORTED mm Hg   XR CHEST PORTABLE [[id number]] Collected: 07/30/19 2122   Updated: 07/30/19 2139    Narrative:     EXAMINATION:  ONE XRAY VIEW OF THE CHEST    7/30/2019 9:20 pm    COMPARISON:  06/24/2019    HISTORY:  ORDERING SYSTEM PROVIDED HISTORY: Chest Pain  TECHNOLOGIST PROVIDED HISTORY:  Chest Pain  Reason for Exam: chest pain, SOB  Acuity: Acute  Type of Exam: Initial    FINDINGS:  Heart size is enlarged.  Small to moderate size bilateral pleural effusions. Bibasilar opacities. David Andres is pulmonary edema.  No pneumothorax.  The bones  are osteopenic. Impression:     1. Cardiomegaly and pulmonary edema with bilateral pleural effusions  compatible with CHF. 2. Bibasilar atelectasis/infiltrates. Assessment and Plan   1. Ac CHF  2. Ac on Ch systolic and diastolic CHF  3. IV diuresis  4. Is/ Os  5. Daily wts  6. Low salt diet  7. Fluid restriction   8. Pulm HTN  9. A fib  10. DM  11. COPD  15. FREDDY  13. Hyperkalemia  14. Dextrose/ Insulin  15. DuoNeb  16. O2  17. Hold ACE  18. Consulted Cardiology from ER  19. Consult Pulm  20. Consult Nephrology  21. Anticoag  22. PPI  23. IS  24.  Cont very close monitoring    Patient Active Problem List   Diagnosis Code    Pneumonia J18.9    Sepsis ([address] A41.9    Moderate malnutrition ([address] E44.0    Pulmonary HTN ([address] I27.20    Non-rheumatic mitral regurgitation I34.0    Acute on chronic combined systolic and diastolic congestive heart failure (HCC) I50.43    Chronic atrial fibrillation (HCC) I48.2    Type 2 diabetes mellitus with hyperglycemia (HCC) E11.65    Acute bronchitis J20.9    Acute congestive heart failure (HCC) I50.9    Hypotensive episode I95.9    Acute respiratory failure (HCC) J96.00    Other dysphagia R13.19    Unintentional weight loss R63.4    Moderate malnutrition (HCC) E44.0    CHF (congestive heart failure), NYHA class I, acute on chronic, combined ([facility]) I50.43    COPD exacerbation ([facility]) J44.1       Electronically signed by Neel Hernandez MD on 7/31/2019 at 6:51 AM

## 2019-07-31 NOTE — ED PROVIDER NOTES
EGD BIOPSY performed by Kalin Taylor MD at [place] [address]           Family history unknown: Yes     No family status information on file. SOCIAL HISTORY      reports that she has never smoked. She has never used smokeless tobacco. She reports that she does not drink alcohol or use drugs. REVIEW OF SYSTEMS    (2-9 systems for level 4, 10 or more for level 5)     Review of Systems   Constitutional: Negative for chills, fever and unexpected weight change. HENT: Negative for congestion, rhinorrhea, sinus pressure and sore throat. Respiratory: Positive for cough and shortness of breath. Negative for wheezing. Cardiovascular: Negative for chest pain and palpitations. Gastrointestinal: Negative for abdominal pain, constipation, diarrhea, nausea and vomiting. Genitourinary: Negative for dysuria and hematuria. Musculoskeletal: Negative for arthralgias and myalgias. Skin: Negative for color change and rash. Neurological: Negative for dizziness, weakness and headaches. Hematological: Negative for adenopathy. Except as noted above the remainder of the review of systems was reviewed and negative. PHYSICAL EXAM    (up to 7 for level 4, 8 or more for level 5)     ED Triage Vitals   BP Temp Temp Source Pulse Resp SpO2 Height Weight   07/30/19 2110 07/30/19 2110 07/30/19 2110 07/30/19 2110 07/30/19 2110 07/30/19 2107 07/30/19 2110 07/30/19 2110   (!) 142/125 98.2 °F (36.8 °C) Oral 86 17 96 % 4' 6\" (1.372 m) 99 lb (44.9 kg)       Physical Exam   Constitutional: She is oriented to person, place, and time. She appears well-developed and well-nourished. HENT:   Head: Normocephalic and atraumatic. Mouth/Throat: Oropharynx is clear and moist.   Eyes: Pupils are equal, round, and reactive to light. Conjunctivae are normal.   Neck: Normal range of motion. Neck supple. Cardiovascular: Normal rate and regular rhythm.    Pulmonary/Chest: Effort normal and breath sounds normal. No

## 2019-07-31 NOTE — PLAN OF CARE
Continue to monitor skin integrity and IV sites. Uses call light appropriately, fall precautions in place will continue to monitor.

## 2019-07-31 NOTE — CONSULTS
Section of Cardiology  CARDIOLOGY CONSULT NOTE      Date of Admission: 7/30/2019  9:05 PM    Reason for Consult: CHF    Referring Physician: Dr. Clary De La Cruz    PCP: Aida Gallegos MD      CHIEF COMPLAINT     History of Present Illness: Alcides Jones is a 80 y.o. female who is well known to Dr. Yaya Soria' practice for chronic systolic heart failure, chronic afib, chronic anticoagulation, CAD and hypertension. Patient does not speak [place] and history mainly obtained from daughter at bedside. Patient was recently discharged from hospital in June for CHF and COPD exacerbations. She did follow up with Dr. Yaya Soria in office 7/9/19 and has been doing well. Daughter notes she was eating dinner and suddenly had SOB, as well as hematuria was noted yesterday and daughter was concerned. EMS was called and patient was transported to ER. High sensitivity troponin was mildly elevated at 34 and EKG shows no evidence of AMI. Pro-BNP was elevated and CXR showed bilateral pleural effusions, hence the cardiology consultation for CHF. Patient was admitted to ICU and was on BIPAP therapy overnight. She received x1 dose IV lasix yesterday and urinated about 650mL. She denies any chest pain, palpitations, dizziness or syncope. Last echocardiogram 4/2019 showed EF 40% with anterior, anteroseptal, and anterolateral wall hypokinesis with mild to moderate MR, moderate to severe TR and PH.     Past Medical History:   Past Medical History:   Diagnosis Date    Arthritis     Atrial fibrillation ([address]     CAD (coronary artery disease)     CHF (congestive heart failure) (HCC)     COPD (chronic obstructive pulmonary disease) ([address]     Diabetes mellitus ([address]     Femur fracture ([address]     Hyperlipidemia     Hypertension     Moderate malnutrition ([address] 4/9/2019    Respiratory failure ([address]        Past Surgical History:   Past Surgical History:   Procedure Laterality Date    TOTAL HIP ARTHROPLASTY Left     UPPER GASTROINTESTINAL ENDOSCOPY

## 2019-08-01 ENCOUNTER — APPOINTMENT (OUTPATIENT)
Dept: GENERAL RADIOLOGY | Age: 84
DRG: 291 | End: 2019-08-01
Payer: MEDICARE

## 2019-08-01 LAB
ABSOLUTE EOS #: 0 K/UL (ref 0–0.4)
ABSOLUTE IMMATURE GRANULOCYTE: 0 K/UL (ref 0–0.3)
ABSOLUTE LYMPH #: 0.55 K/UL (ref 1–4.8)
ABSOLUTE MONO #: 0.36 K/UL (ref 0.2–0.8)
ALBUMIN SERPL-MCNC: 3.4 G/DL (ref 3.5–5.2)
ANION GAP SERPL CALCULATED.3IONS-SCNC: 14 MMOL/L (ref 9–17)
BASOPHILS # BLD: 0 %
BASOPHILS ABSOLUTE: 0 K/UL (ref 0–0.2)
BUN BLDV-MCNC: 51 MG/DL (ref 8–23)
BUN/CREAT BLD: 42 (ref 9–20)
CALCIUM SERPL-MCNC: 8.7 MG/DL (ref 8.6–10.4)
CHLORIDE BLD-SCNC: 92 MMOL/L (ref 98–107)
CO2: 28 MMOL/L (ref 20–31)
CREAT SERPL-MCNC: 1.21 MG/DL (ref 0.5–0.9)
CULTURE: NO GROWTH
DIFFERENTIAL TYPE: ABNORMAL
EOSINOPHILS RELATIVE PERCENT: 0 % (ref 1–4)
FERRITIN: 158 UG/L (ref 13–150)
GFR AFRICAN AMERICAN: 50 ML/MIN
GFR NON-AFRICAN AMERICAN: 41 ML/MIN
GFR SERPL CREATININE-BSD FRML MDRD: ABNORMAL ML/MIN/{1.73_M2}
GFR SERPL CREATININE-BSD FRML MDRD: ABNORMAL ML/MIN/{1.73_M2}
GLUCOSE BLD-MCNC: 236 MG/DL (ref 65–105)
GLUCOSE BLD-MCNC: 237 MG/DL (ref 70–99)
GLUCOSE BLD-MCNC: 299 MG/DL (ref 65–105)
GLUCOSE BLD-MCNC: 318 MG/DL (ref 65–105)
GLUCOSE BLD-MCNC: 337 MG/DL (ref 65–105)
HCT VFR BLD CALC: 29.8 % (ref 36.3–47.1)
HEMOGLOBIN: 9.2 G/DL (ref 11.9–15.1)
IMMATURE GRANULOCYTES: 0 %
IRON SATURATION: 4 % (ref 20–55)
IRON: 14 UG/DL (ref 37–145)
LYMPHOCYTES # BLD: 6 % (ref 24–44)
Lab: NORMAL
MCH RBC QN AUTO: 28.9 PG (ref 25.2–33.5)
MCHC RBC AUTO-ENTMCNC: 30.9 G/DL (ref 28.4–34.8)
MCV RBC AUTO: 93.7 FL (ref 82.6–102.9)
MONOCYTES # BLD: 4 % (ref 1–7)
MORPHOLOGY: ABNORMAL
MORPHOLOGY: ABNORMAL
NRBC AUTOMATED: 0.5 PER 100 WBC
PDW BLD-RTO: 15.1 % (ref 11.8–14.4)
PLATELET # BLD: 281 K/UL (ref 138–453)
PLATELET ESTIMATE: ABNORMAL
PMV BLD AUTO: 10.2 FL (ref 8.1–13.5)
POTASSIUM SERPL-SCNC: 4.7 MMOL/L (ref 3.7–5.3)
RBC # BLD: 3.18 M/UL (ref 3.95–5.11)
RBC # BLD: ABNORMAL 10*6/UL
SEG NEUTROPHILS: 90 % (ref 36–66)
SEGMENTED NEUTROPHILS ABSOLUTE COUNT: 8.19 K/UL (ref 1.8–7.7)
SODIUM BLD-SCNC: 134 MMOL/L (ref 135–144)
SPECIMEN DESCRIPTION: NORMAL
TOTAL IRON BINDING CAPACITY: 320 UG/DL (ref 250–450)
UNSATURATED IRON BINDING CAPACITY: 306 UG/DL (ref 112–347)
WBC # BLD: 9.1 K/UL (ref 3.5–11.3)
WBC # BLD: ABNORMAL 10*3/UL

## 2019-08-01 PROCEDURE — 82947 ASSAY GLUCOSE BLOOD QUANT: CPT

## 2019-08-01 PROCEDURE — 80048 BASIC METABOLIC PNL TOTAL CA: CPT

## 2019-08-01 PROCEDURE — 94640 AIRWAY INHALATION TREATMENT: CPT

## 2019-08-01 PROCEDURE — 6370000000 HC RX 637 (ALT 250 FOR IP): Performed by: FAMILY MEDICINE

## 2019-08-01 PROCEDURE — 6360000002 HC RX W HCPCS: Performed by: INTERNAL MEDICINE

## 2019-08-01 PROCEDURE — 2580000003 HC RX 258: Performed by: INTERNAL MEDICINE

## 2019-08-01 PROCEDURE — 2580000003 HC RX 258: Performed by: FAMILY MEDICINE

## 2019-08-01 PROCEDURE — 83540 ASSAY OF IRON: CPT

## 2019-08-01 PROCEDURE — 6370000000 HC RX 637 (ALT 250 FOR IP): Performed by: INTERNAL MEDICINE

## 2019-08-01 PROCEDURE — 6360000002 HC RX W HCPCS: Performed by: FAMILY MEDICINE

## 2019-08-01 PROCEDURE — 6370000000 HC RX 637 (ALT 250 FOR IP): Performed by: NURSE PRACTITIONER

## 2019-08-01 PROCEDURE — 83550 IRON BINDING TEST: CPT

## 2019-08-01 PROCEDURE — 85025 COMPLETE CBC W/AUTO DIFF WBC: CPT

## 2019-08-01 PROCEDURE — 71045 X-RAY EXAM CHEST 1 VIEW: CPT

## 2019-08-01 PROCEDURE — 94761 N-INVAS EAR/PLS OXIMETRY MLT: CPT

## 2019-08-01 PROCEDURE — 2060000000 HC ICU INTERMEDIATE R&B

## 2019-08-01 PROCEDURE — 36415 COLL VENOUS BLD VENIPUNCTURE: CPT

## 2019-08-01 PROCEDURE — 2700000000 HC OXYGEN THERAPY PER DAY

## 2019-08-01 PROCEDURE — 82040 ASSAY OF SERUM ALBUMIN: CPT

## 2019-08-01 PROCEDURE — 82728 ASSAY OF FERRITIN: CPT

## 2019-08-01 RX ORDER — GABAPENTIN 100 MG/1
100 CAPSULE ORAL DAILY
Status: DISCONTINUED | OUTPATIENT
Start: 2019-08-02 | End: 2019-08-05 | Stop reason: HOSPADM

## 2019-08-01 RX ORDER — INSULIN GLARGINE 100 [IU]/ML
4 INJECTION, SOLUTION SUBCUTANEOUS NIGHTLY
Status: DISCONTINUED | OUTPATIENT
Start: 2019-08-01 | End: 2019-08-05 | Stop reason: HOSPADM

## 2019-08-01 RX ORDER — CARVEDILOL 3.12 MG/1
3.12 TABLET ORAL 2 TIMES DAILY WITH MEALS
Status: DISCONTINUED | OUTPATIENT
Start: 2019-08-01 | End: 2019-08-02

## 2019-08-01 RX ORDER — ATORVASTATIN CALCIUM 20 MG/1
20 TABLET, FILM COATED ORAL DAILY
Status: DISCONTINUED | OUTPATIENT
Start: 2019-08-01 | End: 2019-08-05 | Stop reason: HOSPADM

## 2019-08-01 RX ORDER — BRIMONIDINE TARTRATE 2 MG/ML
1 SOLUTION/ DROPS OPHTHALMIC 2 TIMES DAILY
Status: DISCONTINUED | OUTPATIENT
Start: 2019-08-01 | End: 2019-08-05 | Stop reason: HOSPADM

## 2019-08-01 RX ORDER — DORZOLAMIDE HCL 20 MG/ML
1 SOLUTION/ DROPS OPHTHALMIC 2 TIMES DAILY
Status: DISCONTINUED | OUTPATIENT
Start: 2019-08-01 | End: 2019-08-05 | Stop reason: HOSPADM

## 2019-08-01 RX ORDER — TIMOLOL MALEATE 5 MG/ML
1 SOLUTION/ DROPS OPHTHALMIC 2 TIMES DAILY
Status: DISCONTINUED | OUTPATIENT
Start: 2019-08-01 | End: 2019-08-05 | Stop reason: HOSPADM

## 2019-08-01 RX ORDER — MONTELUKAST SODIUM 10 MG/1
10 TABLET ORAL NIGHTLY
Status: DISCONTINUED | OUTPATIENT
Start: 2019-08-01 | End: 2019-08-05 | Stop reason: HOSPADM

## 2019-08-01 RX ORDER — DORZOLAMIDE HYDROCHLORIDE AND TIMOLOL MALEATE 20; 5 MG/ML; MG/ML
1 SOLUTION/ DROPS OPHTHALMIC 2 TIMES DAILY
Status: DISCONTINUED | OUTPATIENT
Start: 2019-08-01 | End: 2019-08-01 | Stop reason: CLARIF

## 2019-08-01 RX ADMIN — METHYLPREDNISOLONE SODIUM SUCCINATE 60 MG: 125 INJECTION, POWDER, FOR SOLUTION INTRAMUSCULAR; INTRAVENOUS at 16:36

## 2019-08-01 RX ADMIN — TIMOLOL MALEATE 1 DROP: 5 SOLUTION OPHTHALMIC at 22:03

## 2019-08-01 RX ADMIN — IPRATROPIUM BROMIDE AND ALBUTEROL SULFATE 1 AMPULE: .5; 3 SOLUTION RESPIRATORY (INHALATION) at 14:50

## 2019-08-01 RX ADMIN — FUROSEMIDE 20 MG: 10 INJECTION, SOLUTION INTRAMUSCULAR; INTRAVENOUS at 18:26

## 2019-08-01 RX ADMIN — IPRATROPIUM BROMIDE AND ALBUTEROL SULFATE 1 AMPULE: .5; 3 SOLUTION RESPIRATORY (INHALATION) at 20:18

## 2019-08-01 RX ADMIN — METHYLPREDNISOLONE SODIUM SUCCINATE 60 MG: 125 INJECTION, POWDER, FOR SOLUTION INTRAMUSCULAR; INTRAVENOUS at 09:37

## 2019-08-01 RX ADMIN — METOPROLOL TARTRATE 12.5 MG: 25 TABLET ORAL at 08:04

## 2019-08-01 RX ADMIN — ATORVASTATIN CALCIUM 20 MG: 20 TABLET, FILM COATED ORAL at 21:59

## 2019-08-01 RX ADMIN — MONTELUKAST 10 MG: 10 TABLET, FILM COATED ORAL at 21:59

## 2019-08-01 RX ADMIN — IPRATROPIUM BROMIDE AND ALBUTEROL SULFATE 1 AMPULE: .5; 3 SOLUTION RESPIRATORY (INHALATION) at 08:12

## 2019-08-01 RX ADMIN — Medication 10 ML: at 12:03

## 2019-08-01 RX ADMIN — Medication 10 ML: at 18:26

## 2019-08-01 RX ADMIN — Medication 10 ML: at 08:03

## 2019-08-01 RX ADMIN — IRON SUCROSE 200 MG: 20 INJECTION, SOLUTION INTRAVENOUS at 12:03

## 2019-08-01 RX ADMIN — METHYLPREDNISOLONE SODIUM SUCCINATE 60 MG: 125 INJECTION, POWDER, FOR SOLUTION INTRAMUSCULAR; INTRAVENOUS at 04:23

## 2019-08-01 RX ADMIN — INSULIN LISPRO 4 UNITS: 100 INJECTION, SOLUTION INTRAVENOUS; SUBCUTANEOUS at 22:15

## 2019-08-01 RX ADMIN — INSULIN LISPRO 6 UNITS: 100 INJECTION, SOLUTION INTRAVENOUS; SUBCUTANEOUS at 12:09

## 2019-08-01 RX ADMIN — ENOXAPARIN SODIUM 30 MG: 30 INJECTION SUBCUTANEOUS at 08:03

## 2019-08-01 RX ADMIN — Medication 2 PUFF: at 08:12

## 2019-08-01 RX ADMIN — Medication 10 ML: at 22:00

## 2019-08-01 RX ADMIN — INSULIN LISPRO 8 UNITS: 100 INJECTION, SOLUTION INTRAVENOUS; SUBCUTANEOUS at 18:04

## 2019-08-01 RX ADMIN — INSULIN GLARGINE 4 UNITS: 100 INJECTION, SOLUTION SUBCUTANEOUS at 22:13

## 2019-08-01 RX ADMIN — CARVEDILOL 3.12 MG: 3.12 TABLET, FILM COATED ORAL at 16:37

## 2019-08-01 RX ADMIN — IPRATROPIUM BROMIDE AND ALBUTEROL SULFATE 1 AMPULE: .5; 3 SOLUTION RESPIRATORY (INHALATION) at 11:01

## 2019-08-01 RX ADMIN — AZITHROMYCIN MONOHYDRATE 500 MG: 500 INJECTION, POWDER, LYOPHILIZED, FOR SOLUTION INTRAVENOUS at 16:35

## 2019-08-01 RX ADMIN — BRIMONIDINE TARTRATE 1 DROP: 2 SOLUTION OPHTHALMIC at 22:03

## 2019-08-01 RX ADMIN — Medication 2 PUFF: at 20:18

## 2019-08-01 RX ADMIN — INSULIN LISPRO 4 UNITS: 100 INJECTION, SOLUTION INTRAVENOUS; SUBCUTANEOUS at 08:04

## 2019-08-01 RX ADMIN — DORZOLAMIDE HYDROCHLORIDE 1 DROP: 20 SOLUTION/ DROPS OPHTHALMIC at 22:03

## 2019-08-01 RX ADMIN — FUROSEMIDE 20 MG: 10 INJECTION, SOLUTION INTRAMUSCULAR; INTRAVENOUS at 04:25

## 2019-08-01 RX ADMIN — Medication 10 ML: at 09:37

## 2019-08-01 RX ADMIN — METHYLPREDNISOLONE SODIUM SUCCINATE 60 MG: 125 INJECTION, POWDER, FOR SOLUTION INTRAMUSCULAR; INTRAVENOUS at 22:01

## 2019-08-01 ASSESSMENT — PAIN SCALES - GENERAL
PAINLEVEL_OUTOF10: 0
PAINLEVEL_OUTOF10: 0

## 2019-08-01 NOTE — PROGRESS NOTES
daily   Yes Historical Provider, MD   budesonide-formoterol (SYMBICORT) 160-4.5 MCG/ACT AERO Inhale 1 puff into the lungs 2 times daily   Yes Historical Provider, MD   diltiazem (CARDIZEM CD) 120 MG extended release capsule Take 120 mg by mouth daily   Yes Historical Provider, MD   potassium chloride (MICRO-K) 10 MEQ extended release capsule Take 2 capsules by mouth daily 4/9/19  Yes Quincy Goins MD   pantoprazole (PROTONIX) 40 MG tablet Take 1 tablet by mouth every morning (before breakfast) 4/10/19  Yes Quincy Goins MD   brimonidine (ALPHAGAN) 0.2 % ophthalmic solution Place 1 drop into both eyes 2 times daily   Yes Historical Provider, MD   albuterol sulfate HFA (PROAIR HFA) 108 (90 Base) MCG/ACT inhaler Inhale 2 puffs into the lungs every 6 hours as needed for Wheezing 9/24/17  Yes Quincy Goins MD   dorzolamide-timolol (COSOPT) 22.3-6.8 MG/ML ophthalmic solution Place 1 drop into both eyes 2 times daily 8/25/17  Yes Historical Provider, MD   gabapentin (NEURONTIN) 100 MG capsule Take 100 mg by mouth daily.     Yes Historical Provider, MD   montelukast (SINGULAIR) 10 MG tablet Take 10 mg by mouth nightly   Yes Historical Provider, MD   atorvastatin (LIPITOR) 20 MG tablet Take 20 mg by mouth daily   Yes Historical Provider, MD   apixaban (ELIQUIS) 2.5 MG TABS tablet Take 2.5 mg by mouth 2 times daily    Yes Historical Provider, MD   Cholecalciferol (VITAMIN D) 2000 units CAPS capsule Take 2,000 Units by mouth 2 times daily    Yes Historical Provider, MD   magnesium oxide (MAG-OX) 400 MG tablet Take 400 mg by mouth 2 times daily   Yes Historical Provider, MD       Scheduled Meds:   sodium chloride flush  10 mL Intravenous 2 times per day    enoxaparin  30 mg Subcutaneous Daily    insulin lispro  0-12 Units Subcutaneous TID WC    insulin lispro  0-6 Units Subcutaneous Nightly    metoprolol tartrate  12.5 mg Oral BID    methylPREDNISolone  60 mg Intravenous Q6H    mometasone-formoterol  2 puff Inhalation BID    azithromycin  500 mg Intravenous Q24H    furosemide  20 mg Intravenous BID    ipratropium-albuterol  1 ampule Inhalation Q4H WA     Continuous Infusions:   dextrose       PRN Meds:sodium chloride flush, acetaminophen, glucose, dextrose, glucagon (rDNA), dextrose, albuterol       Physical Exam:  Vitals:    08/01/19 0500 08/01/19 0600 08/01/19 0700 08/01/19 0813   BP: 117/67 (!) 98/48 122/60    Pulse: 94 88 97    Resp: 12 12 13 14   Temp:       TempSrc:       SpO2: 91% 94% 92% 93%   Weight:       Height:         I/O last 3 completed shifts: In: 200 [P.O.:450; I.V.:93; IV Piggyback:243]  Out: 975 [Urine:975]    General:  Awake, alert, not in distress. Appears to be stated age. HEENT: Atraumatic, normocephalic. Anicteric sclera. Pink and moist oral mucosa. Neck supple. No JVD. Chest: Bilateral air entry, clear to auscultation, no wheezing, rhonchi or rales. Cardiovascular: RRR, S1S2, no murmur, rub or gallop. B/L lower extremity edema. Abdomen: Soft, non tender to palpation. Musculoskeletal: No cyanosis or clubbing. Integumentary: Pink, warm and dry. Free from rash or lesions. CNS: Oriented to person, place and time. Speech clear. Face symmetrical. No tremor.    Psyche: answering questions appropriately, no depression      Data:  CBC:   Lab Results   Component Value Date    WBC 9.1 08/01/2019    HGB 9.2 (L) 08/01/2019    HCT 29.8 (L) 08/01/2019    MCV 93.7 08/01/2019     08/01/2019     BMP:    Lab Results   Component Value Date     (L) 08/01/2019     (L) 07/31/2019     (L) 07/30/2019    K 4.7 08/01/2019    K 5.1 07/31/2019    K 5.9 (H) 07/30/2019    CL 92 (L) 08/01/2019    CL 91 (L) 07/31/2019    CL 88 (L) 07/30/2019    CO2 28 08/01/2019    CO2 31 07/31/2019    CO2 27 07/30/2019    BUN 51 (H) 08/01/2019    BUN 51 (H) 07/31/2019    BUN 52 (H) 07/30/2019    CREATININE 1.21 (H) 08/01/2019    CREATININE 1.30 (H) 07/31/2019    CREATININE 1.48 (H) 07/30/2019    GLUCOSE

## 2019-08-01 NOTE — PLAN OF CARE
Continue to monitor skin integrity and IV sites. Uses call light appropriately, fall precautions in place will continue to monitor. Pt received from [facility] escorted by or staff. Report received pt on monitors, Dr. Taylor aware of vs. No stat orders received. poc reviewed safty measures implemented side rails padded with pillows and blankets. r ue dsg cd&i elevated on pillows.     Pt became aggitated shortly after arrival orders for anxiety medications rx'd Dr. Taylor verbalized ok for pt ot have rx'd meds. Pt med with 1 mg versed with good results.

## 2019-08-01 NOTE — PROGRESS NOTES
1450      I/O (24Hr): Intake/Output Summary (Last 24 hours) at 8/1/2019 1610  Last data filed at 8/1/2019 1000  Gross per 24 hour   Intake 586 ml   Output 975 ml   Net -389 ml       Data:           Labs:    Hematology:  Recent Labs     07/30/19 2127 07/31/19 0533 08/01/19  0533   WBC 13.4* 7.0 9.1   RBC 3.32* 3.14* 3.18*   HGB 9.7* 9.1* 9.2*   HCT 32.2* 29.6* 29.8*   MCV 97.0 94.3 93.7   MCH 29.2 29.0 28.9   MCHC 30.1 30.7 30.9   RDW 15.1* 15.1* 15.1*    260 281   MPV 10.1 10.0 10.2     Chemistry:  Recent Labs     07/30/19 2127 07/31/19 0533 08/01/19  0533   * 134* 134*   K 5.9* 5.1 4.7   CL 88* 91* 92*   CO2 27 31 28   GLUCOSE 280* 267* 237*   BUN 52* 51* 51*   CREATININE 1.48* 1.30* 1.21*   ANIONGAP 14 12 14   LABGLOM 33* 38* 41*   GFRAA 40* 46* 50*   CALCIUM 9.0 8.9 8.7   PROBNP 5,568*  --   --    TROPHS 34*  --   --    MYOGLOBIN 71*  --   --      Recent Labs     07/31/19  0835 07/31/19  1146 07/31/19  1651 07/31/19  2109 08/01/19  0533 08/01/19  0724 08/01/19  1116   LABALBU  --   --   --   --  3.4*  --   --    POCGLU 241* 298* 300* 311*  --  236* 299*       Lab Results   Component Value Date/Time    SPECIAL NOT REPORTED 07/31/2019 12:15 AM     Lab Results   Component Value Date/Time    CULTURE NO GROWTH 07/31/2019 12:15 AM       Lab Results   Component Value Date    POCPH 7.33 07/30/2019    POCPCO2 55 07/30/2019    POCPO2 56 07/30/2019    POCHCO3 28.9 07/30/2019    NBEA NOT REPORTED 07/30/2019    PBEA 3 07/30/2019    XDQ9UMR 31 07/30/2019    PBQU3VIT 86 07/30/2019    FIO2 50.0 07/31/2019       Radiology:    Xr Chest Portable    Result Date: 8/1/2019  Partial clearing of bilateral lung opacities. Xr Chest Portable    Result Date: 7/30/2019  1. Cardiomegaly and pulmonary edema with bilateral pleural effusions compatible with CHF. 2. Bibasilar atelectasis/infiltrates.          All radiological studies reviewed  Code Status:  Full Code        Electronically signed by Osiel Lara MD on

## 2019-08-01 NOTE — DISCHARGE INSTR - COC
Respiratory Treatments: see mar   Oxygen Therapy:  is on oxygen at 6 L/min per nasal cannula. Ventilator:    - trilogy at home     Rehab Therapies: Physical Therapy and Occupational Therapy  Weight Bearing Status/Restrictions: No weight bearing restirctions  Other Medical Equipment (for information only, NOT a DME order):  wheelchair  Other Treatments: skilled nursing assessment, medication teaching and compliance. Patient's personal belongings (please select all that are sent with patient):  Jamin    RN SIGNATURE:  {Esignature:[id number]}    CASE MANAGEMENT/SOCIAL WORK SECTION    Inpatient Status Date: 7/30    Readmission Risk Assessment Score:  Readmission Risk              Risk of Unplanned Readmission:        32           Discharging to Facility/ Agency   · Name: [facility]  · Address:  · Phone:  [phone]  · Fax:  [phone]    Dialysis Facility (if applicable)   · Name:  · Address:  · Dialysis Schedule:  · Phone:  · Fax:    / signature: Electronically signed by Enrique Oscar RN on 8/1/19 at 4:44 PM  Electronically signed by Bernadette Damon RN on 8/5/2019 at 1:59 PM      PHYSICIAN SECTION    Prognosis: {Prognosis:[id number]}    Condition at Discharge: Leonard Pearson Patient Condition:[id number]}    Rehab Potential (if transferring to Rehab): Fair    Recommended Labs or Other Treatments After Discharge: CBC, BMP in 3 days    Physician Certification: I certify the above information and transfer of Antonino Buchanan  is necessary for the continuing treatment of the diagnosis listed and that she requires Home Care for greater 30 days.      Update Admission H&P: No change in H&P    PHYSICIAN SIGNATURE:  Electronically signed by Timoteo De MD on 8/5/19 at 3:24 PM

## 2019-08-01 NOTE — PROGRESS NOTES
irregular rate and rhythm, normal S2, no S3 or S4, and no rub noted, but 2/6 systolic murmur at apex  ABDOMEN: Soft, nontender, nondistended. Bowel sounds present. No masses or tenderness. No bruit. SKIN: Warm and dry. EXTREMITIES: No bilateral lower extremity edema. Motor movement grossly intact. No cyanosis or clubbing.       DIAGNOSTICS     Studies:    Telemetry: afib, 90-100bpm     EKG: afib with PVC's, nonspecific T wave abn.     Echocardiogram (4/2019): EF 40%, multiple wall motion abn, mild to moderate MR, moderate to severe TR, moderate to severe PH      Laboratory testing:  Lab Results   Component Value Date     (L) 08/01/2019     (L) 07/31/2019     (L) 07/30/2019    K 4.7 08/01/2019    K 5.1 07/31/2019    K 5.9 (H) 07/30/2019    CL 92 (L) 08/01/2019    CL 91 (L) 07/31/2019    CL 88 (L) 07/30/2019    CO2 28 08/01/2019    CO2 31 07/31/2019    CO2 27 07/30/2019    BUN 51 (H) 08/01/2019    BUN 51 (H) 07/31/2019    BUN 52 (H) 07/30/2019    CREATININE 1.21 (H) 08/01/2019    CREATININE 1.30 (H) 07/31/2019    CREATININE 1.48 (H) 07/30/2019    CALCIUM 8.7 08/01/2019    CALCIUM 8.9 07/31/2019    CALCIUM 9.0 07/30/2019    LABALBU 3.4 (L) 08/01/2019    LABALBU 4.2 11/13/2018    LABALBU 4.3 05/09/2018    PROT 7.9 11/13/2018    PROT 7.5 05/09/2018    PROT 6.4 09/24/2017    BILITOT 0.84 11/13/2018    BILITOT 0.51 05/09/2018    BILITOT 0.67 09/24/2017    ALKPHOS 108 (H) 11/13/2018    ALKPHOS 96 05/09/2018    ALKPHOS 81 09/24/2017    ALT 21 11/13/2018    ALT 14 05/09/2018    ALT 24 09/24/2017    AST 32 (H) 11/13/2018    AST 23 05/09/2018    AST 24 09/24/2017    GLUCOSE 237 (H) 08/01/2019    GLUCOSE 267 (H) 07/31/2019    GLUCOSE 280 (H) 07/30/2019     Lab Results   Component Value Date    WBC 9.1 08/01/2019    WBC 7.0 07/31/2019    WBC 13.4 (H) 07/30/2019    HGB 9.2 (L) 08/01/2019    HGB 9.1 (L) 07/31/2019    HGB 9.7 (L) 07/30/2019    HCT 29.8 (L) 08/01/2019    HCT 29.6 (L) 07/31/2019    HCT 32.2 (L) 07/30/2019     08/01/2019     07/31/2019     07/30/2019     Lab Results   Component Value Date    MG 2.2 06/20/2019    MG 2.3 06/19/2019    MG 1.9 06/18/2019     Lab Results   Component Value Date    CKTOTAL 48 04/04/2019    CKTOTAL 71 09/18/2017    CKMB 2.2 04/04/2019    CKMB 3.1 09/18/2017    TROPONINT NOT REPORTED 07/30/2019    TROPONINT NOT REPORTED 06/15/2019    TROPONINT NOT REPORTED 06/15/2019     Lab Results   Component Value Date    PROBNP 5,568 (H) 07/30/2019    PROBNP 4,942 (H) 06/21/2019    PROBNP 21,305 (H) 06/19/2019     Lab Results   Component Value Date    DDIMER 0.51 09/18/2017     Lab Results   Component Value Date    CHOL 104 06/16/2019    HDL 55 06/16/2019    TRIG 75 06/16/2019     Lab Results   Component Value Date    INR 1.4 06/18/2019    INR 1.2 04/04/2019    INR 1.2 11/13/2018    APTT 29.3 06/18/2019    APTT 29.9 04/04/2019    APTT 32.2 (H) 09/18/2017    TSH 1.38 06/15/2019    TSH 1.06 05/09/2018    TSH 0.38 09/18/2017           Patient Active Problem List   Diagnosis    Pneumonia    Sepsis ([address] [address]    Moderate malnutrition ([address] [address]    Pulmonary HTN ([address]    Non-rheumatic mitral regurgitation    Acute on chronic combined systolic and diastolic congestive heart failure (HCC)    Chronic atrial fibrillation (HCC)    Type 2 diabetes mellitus with hyperglycemia (HCC)    Acute bronchitis    Acute congestive heart failure (HCC)    Hypotensive episode    Acute respiratory failure (HCC)    Other dysphagia    Unintentional weight loss    Moderate malnutrition (HCC)    CHF (congestive heart failure), NYHA class I, acute on chronic, combined ([address] [address]    COPD exacerbation (HCC)         ASSESSMENT and PLAN     · Acute on chronic combined systolic and diastolic heart failure- improving slowly; diuresis managed per nephrology  · Acute on chronic respiratory failure on 6L O2 24/7; managed by others  · COPD exacerbation; managed by others  · Cardiomyopathy, with EF 40% on echo

## 2019-08-02 ENCOUNTER — APPOINTMENT (OUTPATIENT)
Dept: GENERAL RADIOLOGY | Age: 84
DRG: 291 | End: 2019-08-02
Payer: MEDICARE

## 2019-08-02 LAB
ABSOLUTE EOS #: <0.03 K/UL (ref 0–0.44)
ABSOLUTE IMMATURE GRANULOCYTE: 0.07 K/UL (ref 0–0.3)
ABSOLUTE LYMPH #: 0.5 K/UL (ref 1.1–3.7)
ABSOLUTE MONO #: 0.52 K/UL (ref 0.1–1.2)
ANION GAP SERPL CALCULATED.3IONS-SCNC: 15 MMOL/L (ref 9–17)
BASOPHILS # BLD: 0 % (ref 0–2)
BASOPHILS ABSOLUTE: <0.03 K/UL (ref 0–0.2)
BUN BLDV-MCNC: 47 MG/DL (ref 8–23)
BUN/CREAT BLD: 51 (ref 9–20)
CALCIUM SERPL-MCNC: 8.2 MG/DL (ref 8.6–10.4)
CHLORIDE BLD-SCNC: 95 MMOL/L (ref 98–107)
CO2: 27 MMOL/L (ref 20–31)
CREAT SERPL-MCNC: 0.93 MG/DL (ref 0.5–0.9)
DIFFERENTIAL TYPE: ABNORMAL
EOSINOPHILS RELATIVE PERCENT: 0 % (ref 1–4)
GFR AFRICAN AMERICAN: >60 ML/MIN
GFR NON-AFRICAN AMERICAN: 56 ML/MIN
GFR SERPL CREATININE-BSD FRML MDRD: ABNORMAL ML/MIN/{1.73_M2}
GFR SERPL CREATININE-BSD FRML MDRD: ABNORMAL ML/MIN/{1.73_M2}
GLUCOSE BLD-MCNC: 204 MG/DL (ref 65–105)
GLUCOSE BLD-MCNC: 222 MG/DL (ref 65–105)
GLUCOSE BLD-MCNC: 231 MG/DL (ref 70–99)
GLUCOSE BLD-MCNC: 252 MG/DL (ref 65–105)
GLUCOSE BLD-MCNC: 317 MG/DL (ref 65–105)
HCT VFR BLD CALC: 34.1 % (ref 36.3–47.1)
HEMOGLOBIN: 10.3 G/DL (ref 11.9–15.1)
IMMATURE GRANULOCYTES: 1 %
LYMPHOCYTES # BLD: 5 % (ref 24–43)
MCH RBC QN AUTO: 28.9 PG (ref 25.2–33.5)
MCHC RBC AUTO-ENTMCNC: 30.2 G/DL (ref 28.4–34.8)
MCV RBC AUTO: 95.5 FL (ref 82.6–102.9)
MONOCYTES # BLD: 5 % (ref 3–12)
NRBC AUTOMATED: 1.5 PER 100 WBC
PDW BLD-RTO: 15.4 % (ref 11.8–14.4)
PLATELET # BLD: 287 K/UL (ref 138–453)
PLATELET ESTIMATE: ABNORMAL
PMV BLD AUTO: 10.5 FL (ref 8.1–13.5)
POTASSIUM SERPL-SCNC: 4 MMOL/L (ref 3.7–5.3)
RBC # BLD: 3.57 M/UL (ref 3.95–5.11)
RBC # BLD: ABNORMAL 10*6/UL
SEG NEUTROPHILS: 90 % (ref 36–65)
SEGMENTED NEUTROPHILS ABSOLUTE COUNT: 9.53 K/UL (ref 1.5–8.1)
SODIUM BLD-SCNC: 137 MMOL/L (ref 135–144)
WBC # BLD: 10.6 K/UL (ref 3.5–11.3)
WBC # BLD: ABNORMAL 10*3/UL

## 2019-08-02 PROCEDURE — 6370000000 HC RX 637 (ALT 250 FOR IP): Performed by: NURSE PRACTITIONER

## 2019-08-02 PROCEDURE — 71045 X-RAY EXAM CHEST 1 VIEW: CPT

## 2019-08-02 PROCEDURE — 6360000002 HC RX W HCPCS: Performed by: FAMILY MEDICINE

## 2019-08-02 PROCEDURE — 6370000000 HC RX 637 (ALT 250 FOR IP): Performed by: FAMILY MEDICINE

## 2019-08-02 PROCEDURE — 2580000003 HC RX 258: Performed by: FAMILY MEDICINE

## 2019-08-02 PROCEDURE — 80048 BASIC METABOLIC PNL TOTAL CA: CPT

## 2019-08-02 PROCEDURE — 2060000000 HC ICU INTERMEDIATE R&B

## 2019-08-02 PROCEDURE — 82947 ASSAY GLUCOSE BLOOD QUANT: CPT

## 2019-08-02 PROCEDURE — 36415 COLL VENOUS BLD VENIPUNCTURE: CPT

## 2019-08-02 PROCEDURE — 85025 COMPLETE CBC W/AUTO DIFF WBC: CPT

## 2019-08-02 PROCEDURE — 94640 AIRWAY INHALATION TREATMENT: CPT

## 2019-08-02 PROCEDURE — 94761 N-INVAS EAR/PLS OXIMETRY MLT: CPT

## 2019-08-02 PROCEDURE — 2700000000 HC OXYGEN THERAPY PER DAY

## 2019-08-02 RX ORDER — CARVEDILOL 6.25 MG/1
6.25 TABLET ORAL 2 TIMES DAILY WITH MEALS
Status: DISCONTINUED | OUTPATIENT
Start: 2019-08-02 | End: 2019-08-05 | Stop reason: HOSPADM

## 2019-08-02 RX ADMIN — IPRATROPIUM BROMIDE AND ALBUTEROL SULFATE 1 AMPULE: .5; 3 SOLUTION RESPIRATORY (INHALATION) at 07:51

## 2019-08-02 RX ADMIN — INSULIN GLARGINE 4 UNITS: 100 INJECTION, SOLUTION SUBCUTANEOUS at 22:04

## 2019-08-02 RX ADMIN — CARVEDILOL 6.25 MG: 6.25 TABLET, FILM COATED ORAL at 18:14

## 2019-08-02 RX ADMIN — Medication 2 PUFF: at 19:20

## 2019-08-02 RX ADMIN — GABAPENTIN 100 MG: 100 CAPSULE ORAL at 08:58

## 2019-08-02 RX ADMIN — Medication 10 ML: at 08:58

## 2019-08-02 RX ADMIN — IPRATROPIUM BROMIDE AND ALBUTEROL SULFATE 1 AMPULE: .5; 3 SOLUTION RESPIRATORY (INHALATION) at 11:28

## 2019-08-02 RX ADMIN — INSULIN LISPRO 3 UNITS: 100 INJECTION, SOLUTION INTRAVENOUS; SUBCUTANEOUS at 22:04

## 2019-08-02 RX ADMIN — APIXABAN 2.5 MG: 2.5 TABLET, FILM COATED ORAL at 21:18

## 2019-08-02 RX ADMIN — FUROSEMIDE 20 MG: 10 INJECTION, SOLUTION INTRAMUSCULAR; INTRAVENOUS at 18:14

## 2019-08-02 RX ADMIN — AZITHROMYCIN MONOHYDRATE 500 MG: 500 INJECTION, POWDER, LYOPHILIZED, FOR SOLUTION INTRAVENOUS at 18:14

## 2019-08-02 RX ADMIN — ENOXAPARIN SODIUM 30 MG: 30 INJECTION SUBCUTANEOUS at 08:57

## 2019-08-02 RX ADMIN — Medication 2 PUFF: at 07:51

## 2019-08-02 RX ADMIN — INSULIN LISPRO 8 UNITS: 100 INJECTION, SOLUTION INTRAVENOUS; SUBCUTANEOUS at 08:58

## 2019-08-02 RX ADMIN — TIMOLOL MALEATE 1 DROP: 5 SOLUTION OPHTHALMIC at 08:57

## 2019-08-02 RX ADMIN — DORZOLAMIDE HYDROCHLORIDE 1 DROP: 20 SOLUTION/ DROPS OPHTHALMIC at 21:26

## 2019-08-02 RX ADMIN — CARVEDILOL 3.12 MG: 3.12 TABLET, FILM COATED ORAL at 08:57

## 2019-08-02 RX ADMIN — IPRATROPIUM BROMIDE AND ALBUTEROL SULFATE 1 AMPULE: .5; 3 SOLUTION RESPIRATORY (INHALATION) at 16:06

## 2019-08-02 RX ADMIN — DORZOLAMIDE HYDROCHLORIDE 1 DROP: 20 SOLUTION/ DROPS OPHTHALMIC at 08:57

## 2019-08-02 RX ADMIN — METHYLPREDNISOLONE SODIUM SUCCINATE 60 MG: 125 INJECTION, POWDER, FOR SOLUTION INTRAMUSCULAR; INTRAVENOUS at 23:24

## 2019-08-02 RX ADMIN — INSULIN LISPRO 4 UNITS: 100 INJECTION, SOLUTION INTRAVENOUS; SUBCUTANEOUS at 18:14

## 2019-08-02 RX ADMIN — ATORVASTATIN CALCIUM 20 MG: 20 TABLET, FILM COATED ORAL at 21:19

## 2019-08-02 RX ADMIN — BRIMONIDINE TARTRATE 1 DROP: 2 SOLUTION OPHTHALMIC at 08:57

## 2019-08-02 RX ADMIN — FUROSEMIDE 20 MG: 10 INJECTION, SOLUTION INTRAMUSCULAR; INTRAVENOUS at 08:57

## 2019-08-02 RX ADMIN — METHYLPREDNISOLONE SODIUM SUCCINATE 60 MG: 125 INJECTION, POWDER, FOR SOLUTION INTRAMUSCULAR; INTRAVENOUS at 05:07

## 2019-08-02 RX ADMIN — Medication 10 ML: at 21:20

## 2019-08-02 RX ADMIN — METHYLPREDNISOLONE SODIUM SUCCINATE 60 MG: 125 INJECTION, POWDER, FOR SOLUTION INTRAMUSCULAR; INTRAVENOUS at 09:22

## 2019-08-02 RX ADMIN — INSULIN LISPRO 4 UNITS: 100 INJECTION, SOLUTION INTRAVENOUS; SUBCUTANEOUS at 12:08

## 2019-08-02 RX ADMIN — METHYLPREDNISOLONE SODIUM SUCCINATE 60 MG: 125 INJECTION, POWDER, FOR SOLUTION INTRAMUSCULAR; INTRAVENOUS at 18:14

## 2019-08-02 RX ADMIN — MONTELUKAST 10 MG: 10 TABLET, FILM COATED ORAL at 21:18

## 2019-08-02 RX ADMIN — IPRATROPIUM BROMIDE AND ALBUTEROL SULFATE 1 AMPULE: .5; 3 SOLUTION RESPIRATORY (INHALATION) at 19:20

## 2019-08-02 RX ADMIN — TIMOLOL MALEATE 1 DROP: 5 SOLUTION OPHTHALMIC at 21:26

## 2019-08-02 RX ADMIN — BRIMONIDINE TARTRATE 1 DROP: 2 SOLUTION OPHTHALMIC at 21:26

## 2019-08-02 ASSESSMENT — PAIN SCALES - GENERAL
PAINLEVEL_OUTOF10: 0

## 2019-08-02 NOTE — PROGRESS NOTES
Pulmonary Critical Care Progress Note  Inga Estrada MD     Patient seen for the follow up of acute on chronic hypoxic respiratory failure, acute exacerbation of COPD, bilateral pulmonary edema/effusion/decompensated CHF, secondary pulmonary hypertension    Subjective:  She denies chest pain or cough. Shortness of breath is about the same as yesterday. She had an uneventful night. She remains on high flow oxygen by nasal cannula at 40 L and 45%. Examination:  Vitals: /60   Pulse 109   Temp 97.5 °F (36.4 °C) (Axillary)   Resp 16   Ht 4' 6\" (1.372 m)   Wt 102 lb (46.3 kg)   SpO2 93%   BMI 24.59 kg/m²   General appearance:  alert and cooperative with exam, family at bedside   Neck: No JVD  Lungs: Bibasilar crackles, moderate air exchange, occasional wheeze.   Heart: regular rate and rhythm, S1, S2 normal, no gallop  Abdomen: Soft, non tender, + BS  Extremities: no cyanosis or clubbing. + edema    LABs:  CBC:   Recent Labs     08/01/19  0533 08/02/19  0503   WBC 9.1 10.6   HGB 9.2* 10.3*   HCT 29.8* 34.1*    287     BMP:   Recent Labs     08/01/19  0533 08/02/19  0503   * 137   K 4.7 4.0   CO2 28 27   BUN 51* 47*   CREATININE 1.21* 0.93*   LABGLOM 41* 56*   GLUCOSE 237* 231*     LIVER PROFILE:  Recent Labs     08/01/19  0533   LABALBU 3.4*     ABG:  Lab Results   Component Value Date    DAC8NLW 31 07/30/2019    FIO2 50.0 07/31/2019       Lab Results   Component Value Date    POCPH 7.33 07/30/2019    POCPCO2 55 07/30/2019    POCPO2 56 07/30/2019    POCHCO3 28.9 07/30/2019    NBEA NOT REPORTED 07/30/2019    PBEA 3 07/30/2019    CRS6GHF 31 07/30/2019    QLOZ1FFA 86 07/30/2019    FIO2 50.0 07/31/2019     Radiology:  8/2/2019      Impression:  · Acute on chronic hypoxic respiratory failure  · Acute exacerbation of COPD  · Bilateral pulmonary edema/pleural effusion/decompensated CHF  · Moderate to severe secondary pulmonary hypertensionn (RVSP 64 mmHg)  · Moderate TR/moderate MR/severe dilated

## 2019-08-02 NOTE — PROGRESS NOTES
Pt transferred to room 1010. Pt vitals stable. Pt resting and in no distress.  Family is with patient

## 2019-08-02 NOTE — PROGRESS NOTES
Physical Therapy  DATE: 2019    NAME: Alcides Jones  MRN: [id number]   : 1923    Patient not seen this date for Physical Therapy due to:  [] Blood transfusion in progress  [x] Cancel by RN: respiratory status   [] Hemodialysis  []  Refusal by Patient   [] Spine Precautions   [] Strict Bedrest  [] Surgery  [] Testing      [] Other        [] PT being discontinued at this time. Patient independent. No further needs. [] PT being discontinued at this time as the patient has been transferred to hospice care. No further needs.     Jessica Traylor, PT

## 2019-08-02 NOTE — PLAN OF CARE
Problem: Respiratory:  Goal: Ability to maintain adequate ventilation will improve  Description  Ability to maintain adequate ventilation will improve  Outcome: Ongoing   Patient SOB with minimal exertion. High flow oxygen in place 45% 40L = 92%. Pulmonary following. Respiratory treatments on board. IV steroids and lasix given this a.m. Lung sounds wheezes. Continuous pulse ox in place. Will monitor.

## 2019-08-02 NOTE — FLOWSHEET NOTE
Family notified of patient being transferred to progressive bed 1010. Family talked on the phone to patient to communicate move.

## 2019-08-02 NOTE — PROGRESS NOTES
Section of Cardiology  CARDIOLOGY PROGRESS NOTE          Date:  8/2/2019  Patient: Carli Gao  Admission:  7/30/2019  9:05 PM                           LOS: 3 days   Admit DX: COPD exacerbation ([facility] [J44.1]  Age:  80 y. o., 12/31/1923          REASON OF EVALUATION   CHF      SUBJECTIVE     The patient was seen and examined. Notes and labs reviewed. There were not complications over night. Patient remains on O2. She does not speak [place] and no family at bedside. Unable to obtain ROS. MEDICATONS     Current Inpatient Medications:   iron sucrose  200 mg Intravenous Every Other Day    carvedilol  3.125 mg Oral BID WC    atorvastatin  20 mg Oral Daily    brimonidine  1 drop Both Eyes BID    montelukast  10 mg Oral Nightly    insulin glargine  4 Units Subcutaneous Nightly    gabapentin  100 mg Oral Daily    dorzolamide  1 drop Both Eyes BID    And    timolol  1 drop Both Eyes BID    sodium chloride flush  10 mL Intravenous 2 times per day    enoxaparin  30 mg Subcutaneous Daily    insulin lispro  0-12 Units Subcutaneous TID WC    insulin lispro  0-6 Units Subcutaneous Nightly    methylPREDNISolone  60 mg Intravenous Q6H    mometasone-formoterol  2 puff Inhalation BID    azithromycin  500 mg Intravenous Q24H    furosemide  20 mg Intravenous BID    ipratropium-albuterol  1 ampule Inhalation Q4H WA       IV Infusions (if any):   dextrose         OBJECTIVE     Vitals:    Vitals:    08/02/19 0751 08/02/19 0824 08/02/19 0845 08/02/19 1128   BP:  122/60     Pulse:  109     Resp: 16 16     Temp:  97.5 °F (36.4 °C)     TempSrc:  Axillary     SpO2: 92% (!) 88% 93% 95%   Weight:       Height:             Intake/Output Summary (Last 24 hours) at 8/2/2019 1200  Last data filed at 8/2/2019 0606  Gross per 24 hour   Intake 862 ml   Output 2650 ml   Net -1788 ml       Exam:  CONSTITUTIONAL:  awake, alert, cooperative, no apparent distress, and appears stated age.   HEENT: Normal jugular venous pulsations, no carotid bruits. Head is atraumatic, normocephalic. Eyes and oral mucosa are normal.  LUNGS: Good respiratory effort On auscultation: diminished in bases bilaterally  CARDIOVASCULAR:  Normal apical impulse, irregular rate and rhythm, normal S2, no S3 or S4, and no rub noted, but 2/6 systolic murmur at apex  ABDOMEN: Soft, nontender, nondistended. Bowel sounds present. No masses or tenderness. No bruit. SKIN: Warm and dry. EXTREMITIES: No bilateral lower extremity edema. Motor movement grossly intact. No cyanosis or clubbing.       DIAGNOSTICS     Studies:    Telemetry: afib, 90-100bpm     EKG: afib with PVC's, nonspecific T wave abn.     Echocardiogram (4/2019): EF 40%, multiple wall motion abn, mild to moderate MR, moderate to severe TR, moderate to severe PH      Laboratory testing:  Lab Results   Component Value Date     08/02/2019     (L) 08/01/2019     (L) 07/31/2019    K 4.0 08/02/2019    K 4.7 08/01/2019    K 5.1 07/31/2019    CL 95 (L) 08/02/2019    CL 92 (L) 08/01/2019    CL 91 (L) 07/31/2019    CO2 27 08/02/2019    CO2 28 08/01/2019    CO2 31 07/31/2019    BUN 47 (H) 08/02/2019    BUN 51 (H) 08/01/2019    BUN 51 (H) 07/31/2019    CREATININE 0.93 (H) 08/02/2019    CREATININE 1.21 (H) 08/01/2019    CREATININE 1.30 (H) 07/31/2019    CALCIUM 8.2 (L) 08/02/2019    CALCIUM 8.7 08/01/2019    CALCIUM 8.9 07/31/2019    LABALBU 3.4 (L) 08/01/2019    LABALBU 4.2 11/13/2018    LABALBU 4.3 05/09/2018    PROT 7.9 11/13/2018    PROT 7.5 05/09/2018    PROT 6.4 09/24/2017    BILITOT 0.84 11/13/2018    BILITOT 0.51 05/09/2018    BILITOT 0.67 09/24/2017    ALKPHOS 108 (H) 11/13/2018    ALKPHOS 96 05/09/2018    ALKPHOS 81 09/24/2017    ALT 21 11/13/2018    ALT 14 05/09/2018    ALT 24 09/24/2017    AST 32 (H) 11/13/2018    AST 23 05/09/2018    AST 24 09/24/2017    GLUCOSE 231 (H) 08/02/2019    GLUCOSE 237 (H) 08/01/2019    GLUCOSE 267 (H) 07/31/2019     Lab Results   Component Value Date    WBC 10.6

## 2019-08-02 NOTE — PROGRESS NOTES
mouth daily   Yes Historical Provider, MD   budesonide-formoterol (SYMBICORT) 160-4.5 MCG/ACT AERO Inhale 1 puff into the lungs 2 times daily   Yes Historical Provider, MD   diltiazem (CARDIZEM CD) 120 MG extended release capsule Take 120 mg by mouth daily   Yes Historical Provider, MD   potassium chloride (MICRO-K) 10 MEQ extended release capsule Take 2 capsules by mouth daily 4/9/19  Yes Ivy oRach MD   pantoprazole (PROTONIX) 40 MG tablet Take 1 tablet by mouth every morning (before breakfast) 4/10/19  Yes Ivy Roach MD   brimonidine (ALPHAGAN) 0.2 % ophthalmic solution Place 1 drop into both eyes 2 times daily   Yes Historical Provider, MD   albuterol sulfate HFA (PROAIR HFA) 108 (90 Base) MCG/ACT inhaler Inhale 2 puffs into the lungs every 6 hours as needed for Wheezing 9/24/17  Yes Ivy Roach MD   dorzolamide-timolol (COSOPT) 22.3-6.8 MG/ML ophthalmic solution Place 1 drop into both eyes 2 times daily 8/25/17  Yes Historical Provider, MD   gabapentin (NEURONTIN) 100 MG capsule Take 100 mg by mouth daily.     Yes Historical Provider, MD   montelukast (SINGULAIR) 10 MG tablet Take 10 mg by mouth nightly   Yes Historical Provider, MD   atorvastatin (LIPITOR) 20 MG tablet Take 20 mg by mouth daily   Yes Historical Provider, MD   apixaban (ELIQUIS) 2.5 MG TABS tablet Take 2.5 mg by mouth 2 times daily    Yes Historical Provider, MD   Cholecalciferol (VITAMIN D) 2000 units CAPS capsule Take 2,000 Units by mouth 2 times daily    Yes Historical Provider, MD   magnesium oxide (MAG-OX) 400 MG tablet Take 400 mg by mouth 2 times daily   Yes Historical Provider, MD       Scheduled Meds:   iron sucrose  200 mg Intravenous Every Other Day    carvedilol  3.125 mg Oral BID WC    atorvastatin  20 mg Oral Daily    brimonidine  1 drop Both Eyes BID    montelukast  10 mg Oral Nightly    insulin glargine  4 Units Subcutaneous Nightly    gabapentin  100 mg Oral Daily    dorzolamide  1 drop Both Eyes BID Protein calorie malnutrition. Plan:  1. Continue Lasix 20 mg iv BID. 2. Avoid nephrotoxic drugs and IV contrast exposure., Lisinopril on hold. 3. Monitor electrolytes closely. 4. Has low  Iron stores, started on Venofer replacement course. 5. Started on Protein supplement. Please do not hesitate to contact us for any further questions/concerns. We will continue to follow along with you.        Electronically signed by Kelly Gold MD  on 8/2/2019 at 11:14 AM

## 2019-08-03 ENCOUNTER — APPOINTMENT (OUTPATIENT)
Dept: GENERAL RADIOLOGY | Age: 84
DRG: 291 | End: 2019-08-03
Payer: MEDICARE

## 2019-08-03 LAB
ABSOLUTE EOS #: <0.03 K/UL (ref 0–0.44)
ABSOLUTE IMMATURE GRANULOCYTE: 0.07 K/UL (ref 0–0.3)
ABSOLUTE LYMPH #: 0.42 K/UL (ref 1.1–3.7)
ABSOLUTE MONO #: 0.53 K/UL (ref 0.1–1.2)
ANION GAP SERPL CALCULATED.3IONS-SCNC: 11 MMOL/L (ref 9–17)
BASOPHILS # BLD: 0 % (ref 0–2)
BASOPHILS ABSOLUTE: <0.03 K/UL (ref 0–0.2)
BUN BLDV-MCNC: 42 MG/DL (ref 8–23)
BUN/CREAT BLD: 47 (ref 9–20)
CALCIUM SERPL-MCNC: 8 MG/DL (ref 8.6–10.4)
CHLORIDE BLD-SCNC: 93 MMOL/L (ref 98–107)
CO2: 32 MMOL/L (ref 20–31)
CREAT SERPL-MCNC: 0.89 MG/DL (ref 0.5–0.9)
DIFFERENTIAL TYPE: ABNORMAL
EOSINOPHILS RELATIVE PERCENT: 0 % (ref 1–4)
GFR AFRICAN AMERICAN: >60 ML/MIN
GFR NON-AFRICAN AMERICAN: 59 ML/MIN
GFR SERPL CREATININE-BSD FRML MDRD: ABNORMAL ML/MIN/{1.73_M2}
GFR SERPL CREATININE-BSD FRML MDRD: ABNORMAL ML/MIN/{1.73_M2}
GLUCOSE BLD-MCNC: 169 MG/DL (ref 65–105)
GLUCOSE BLD-MCNC: 261 MG/DL (ref 65–105)
GLUCOSE BLD-MCNC: 318 MG/DL (ref 65–105)
GLUCOSE BLD-MCNC: 358 MG/DL (ref 65–105)
GLUCOSE BLD-MCNC: 372 MG/DL (ref 70–99)
HCT VFR BLD CALC: 34.4 % (ref 36.3–47.1)
HEMOGLOBIN: 10.4 G/DL (ref 11.9–15.1)
IMMATURE GRANULOCYTES: 1 %
LYMPHOCYTES # BLD: 5 % (ref 24–43)
MAGNESIUM: 1.8 MG/DL (ref 1.6–2.6)
MCH RBC QN AUTO: 28.3 PG (ref 25.2–33.5)
MCHC RBC AUTO-ENTMCNC: 30.2 G/DL (ref 28.4–34.8)
MCV RBC AUTO: 93.5 FL (ref 82.6–102.9)
MONOCYTES # BLD: 6 % (ref 3–12)
NRBC AUTOMATED: 1.3 PER 100 WBC
PDW BLD-RTO: 15.7 % (ref 11.8–14.4)
PHOSPHORUS: 2.4 MG/DL (ref 2.6–4.5)
PLATELET # BLD: 293 K/UL (ref 138–453)
PLATELET ESTIMATE: ABNORMAL
PMV BLD AUTO: 10.3 FL (ref 8.1–13.5)
POTASSIUM SERPL-SCNC: 3 MMOL/L (ref 3.7–5.3)
RBC # BLD: 3.68 M/UL (ref 3.95–5.11)
RBC # BLD: ABNORMAL 10*6/UL
SEG NEUTROPHILS: 89 % (ref 36–65)
SEGMENTED NEUTROPHILS ABSOLUTE COUNT: 8.3 K/UL (ref 1.5–8.1)
SODIUM BLD-SCNC: 136 MMOL/L (ref 135–144)
VITAMIN D 25-HYDROXY: 79.8 NG/ML (ref 30–100)
WBC # BLD: 9.3 K/UL (ref 3.5–11.3)
WBC # BLD: ABNORMAL 10*3/UL

## 2019-08-03 PROCEDURE — 6370000000 HC RX 637 (ALT 250 FOR IP): Performed by: FAMILY MEDICINE

## 2019-08-03 PROCEDURE — 71045 X-RAY EXAM CHEST 1 VIEW: CPT

## 2019-08-03 PROCEDURE — 6360000002 HC RX W HCPCS: Performed by: FAMILY MEDICINE

## 2019-08-03 PROCEDURE — 82947 ASSAY GLUCOSE BLOOD QUANT: CPT

## 2019-08-03 PROCEDURE — 97530 THERAPEUTIC ACTIVITIES: CPT

## 2019-08-03 PROCEDURE — 97163 PT EVAL HIGH COMPLEX 45 MIN: CPT

## 2019-08-03 PROCEDURE — 97535 SELF CARE MNGMENT TRAINING: CPT

## 2019-08-03 PROCEDURE — 6370000000 HC RX 637 (ALT 250 FOR IP): Performed by: INTERNAL MEDICINE

## 2019-08-03 PROCEDURE — 2060000000 HC ICU INTERMEDIATE R&B

## 2019-08-03 PROCEDURE — 80048 BASIC METABOLIC PNL TOTAL CA: CPT

## 2019-08-03 PROCEDURE — 2700000000 HC OXYGEN THERAPY PER DAY

## 2019-08-03 PROCEDURE — 84100 ASSAY OF PHOSPHORUS: CPT

## 2019-08-03 PROCEDURE — 85025 COMPLETE CBC W/AUTO DIFF WBC: CPT

## 2019-08-03 PROCEDURE — 2580000003 HC RX 258: Performed by: FAMILY MEDICINE

## 2019-08-03 PROCEDURE — 36415 COLL VENOUS BLD VENIPUNCTURE: CPT

## 2019-08-03 PROCEDURE — 82306 VITAMIN D 25 HYDROXY: CPT

## 2019-08-03 PROCEDURE — 6370000000 HC RX 637 (ALT 250 FOR IP): Performed by: NURSE PRACTITIONER

## 2019-08-03 PROCEDURE — 83735 ASSAY OF MAGNESIUM: CPT

## 2019-08-03 PROCEDURE — 97167 OT EVAL HIGH COMPLEX 60 MIN: CPT

## 2019-08-03 PROCEDURE — 6360000002 HC RX W HCPCS: Performed by: INTERNAL MEDICINE

## 2019-08-03 PROCEDURE — 94761 N-INVAS EAR/PLS OXIMETRY MLT: CPT

## 2019-08-03 PROCEDURE — 94640 AIRWAY INHALATION TREATMENT: CPT

## 2019-08-03 RX ORDER — POTASSIUM CHLORIDE 20 MEQ/1
40 TABLET, EXTENDED RELEASE ORAL ONCE
Status: COMPLETED | OUTPATIENT
Start: 2019-08-03 | End: 2019-08-03

## 2019-08-03 RX ORDER — METHYLPREDNISOLONE SODIUM SUCCINATE 40 MG/ML
40 INJECTION, POWDER, LYOPHILIZED, FOR SOLUTION INTRAMUSCULAR; INTRAVENOUS EVERY 8 HOURS
Status: DISCONTINUED | OUTPATIENT
Start: 2019-08-03 | End: 2019-08-04

## 2019-08-03 RX ADMIN — Medication 2 PUFF: at 19:29

## 2019-08-03 RX ADMIN — APIXABAN 2.5 MG: 2.5 TABLET, FILM COATED ORAL at 08:51

## 2019-08-03 RX ADMIN — Medication 2 PUFF: at 07:31

## 2019-08-03 RX ADMIN — GABAPENTIN 100 MG: 100 CAPSULE ORAL at 08:51

## 2019-08-03 RX ADMIN — MONTELUKAST 10 MG: 10 TABLET, FILM COATED ORAL at 21:36

## 2019-08-03 RX ADMIN — INSULIN LISPRO 5 UNITS: 100 INJECTION, SOLUTION INTRAVENOUS; SUBCUTANEOUS at 12:02

## 2019-08-03 RX ADMIN — METHYLPREDNISOLONE SODIUM SUCCINATE 60 MG: 125 INJECTION, POWDER, FOR SOLUTION INTRAMUSCULAR; INTRAVENOUS at 06:47

## 2019-08-03 RX ADMIN — CARVEDILOL 6.25 MG: 6.25 TABLET, FILM COATED ORAL at 08:51

## 2019-08-03 RX ADMIN — IPRATROPIUM BROMIDE AND ALBUTEROL SULFATE 1 AMPULE: .5; 3 SOLUTION RESPIRATORY (INHALATION) at 14:47

## 2019-08-03 RX ADMIN — INSULIN GLARGINE 4 UNITS: 100 INJECTION, SOLUTION SUBCUTANEOUS at 21:38

## 2019-08-03 RX ADMIN — Medication 10 ML: at 09:04

## 2019-08-03 RX ADMIN — POTASSIUM & SODIUM PHOSPHATES POWDER PACK 280-160-250 MG 250 MG: 280-160-250 PACK at 09:06

## 2019-08-03 RX ADMIN — IPRATROPIUM BROMIDE AND ALBUTEROL SULFATE 1 AMPULE: .5; 3 SOLUTION RESPIRATORY (INHALATION) at 10:56

## 2019-08-03 RX ADMIN — BRIMONIDINE TARTRATE 1 DROP: 2 SOLUTION OPHTHALMIC at 21:57

## 2019-08-03 RX ADMIN — METHYLPREDNISOLONE SODIUM SUCCINATE 40 MG: 40 INJECTION, POWDER, FOR SOLUTION INTRAMUSCULAR; INTRAVENOUS at 17:19

## 2019-08-03 RX ADMIN — FUROSEMIDE 20 MG: 10 INJECTION, SOLUTION INTRAMUSCULAR; INTRAVENOUS at 17:19

## 2019-08-03 RX ADMIN — IPRATROPIUM BROMIDE AND ALBUTEROL SULFATE 1 AMPULE: .5; 3 SOLUTION RESPIRATORY (INHALATION) at 07:29

## 2019-08-03 RX ADMIN — METHYLPREDNISOLONE SODIUM SUCCINATE 60 MG: 125 INJECTION, POWDER, FOR SOLUTION INTRAMUSCULAR; INTRAVENOUS at 08:52

## 2019-08-03 RX ADMIN — IRON SUCROSE 200 MG: 20 INJECTION, SOLUTION INTRAVENOUS at 07:42

## 2019-08-03 RX ADMIN — APIXABAN 2.5 MG: 2.5 TABLET, FILM COATED ORAL at 21:35

## 2019-08-03 RX ADMIN — TIMOLOL MALEATE 1 DROP: 5 SOLUTION OPHTHALMIC at 08:53

## 2019-08-03 RX ADMIN — INSULIN LISPRO 8 UNITS: 100 INJECTION, SOLUTION INTRAVENOUS; SUBCUTANEOUS at 12:01

## 2019-08-03 RX ADMIN — ACETAMINOPHEN 650 MG: 325 TABLET ORAL at 22:12

## 2019-08-03 RX ADMIN — IPRATROPIUM BROMIDE AND ALBUTEROL SULFATE 1 AMPULE: .5; 3 SOLUTION RESPIRATORY (INHALATION) at 19:29

## 2019-08-03 RX ADMIN — DORZOLAMIDE HYDROCHLORIDE 1 DROP: 20 SOLUTION/ DROPS OPHTHALMIC at 08:53

## 2019-08-03 RX ADMIN — AZITHROMYCIN MONOHYDRATE 500 MG: 500 INJECTION, POWDER, LYOPHILIZED, FOR SOLUTION INTRAVENOUS at 17:35

## 2019-08-03 RX ADMIN — INSULIN LISPRO 10 UNITS: 100 INJECTION, SOLUTION INTRAVENOUS; SUBCUTANEOUS at 09:04

## 2019-08-03 RX ADMIN — TIMOLOL MALEATE 1 DROP: 5 SOLUTION OPHTHALMIC at 21:57

## 2019-08-03 RX ADMIN — POTASSIUM CHLORIDE 40 MEQ: 20 TABLET, EXTENDED RELEASE ORAL at 08:51

## 2019-08-03 RX ADMIN — POTASSIUM & SODIUM PHOSPHATES POWDER PACK 280-160-250 MG 250 MG: 280-160-250 PACK at 21:54

## 2019-08-03 RX ADMIN — CARVEDILOL 6.25 MG: 6.25 TABLET, FILM COATED ORAL at 17:19

## 2019-08-03 RX ADMIN — ATORVASTATIN CALCIUM 20 MG: 20 TABLET, FILM COATED ORAL at 21:35

## 2019-08-03 RX ADMIN — FUROSEMIDE 20 MG: 10 INJECTION, SOLUTION INTRAMUSCULAR; INTRAVENOUS at 08:53

## 2019-08-03 RX ADMIN — INSULIN LISPRO 5 UNITS: 100 INJECTION, SOLUTION INTRAVENOUS; SUBCUTANEOUS at 17:19

## 2019-08-03 RX ADMIN — BRIMONIDINE TARTRATE 1 DROP: 2 SOLUTION OPHTHALMIC at 08:53

## 2019-08-03 RX ADMIN — INSULIN LISPRO 2 UNITS: 100 INJECTION, SOLUTION INTRAVENOUS; SUBCUTANEOUS at 17:24

## 2019-08-03 RX ADMIN — DORZOLAMIDE HYDROCHLORIDE 1 DROP: 20 SOLUTION/ DROPS OPHTHALMIC at 21:57

## 2019-08-03 RX ADMIN — INSULIN LISPRO 3 UNITS: 100 INJECTION, SOLUTION INTRAVENOUS; SUBCUTANEOUS at 21:38

## 2019-08-03 ASSESSMENT — PAIN SCALES - GENERAL
PAINLEVEL_OUTOF10: 0
PAINLEVEL_OUTOF10: 4
PAINLEVEL_OUTOF10: 0

## 2019-08-03 NOTE — PLAN OF CARE
Problem: Falls - Risk of:  Goal: Will remain free from falls  Description  Will remain free from falls  8/3/2019 1452 by Rossana Byrd RN  Outcome: Ongoing   Patient is a high fall risk per falls risk assessment. Remains free from falls and injuries, call light at reach and utilized appropriately. Bed in lowest position with wheels locked and alarm armed. Personal items that are used frequently are within reach.  Up to chair with assist, no attempts to get out of bed unassisted noted or reported, falling star program and hourly rounding implemented, will continue to monitor and educate as needed     Problem: Respiratory:  Goal: Ability to maintain adequate ventilation will improve  Description  Ability to maintain adequate ventilation will improve  8/3/2019 1452 by Rossana Byrd RN  Outcome: Ongoing

## 2019-08-03 NOTE — PROGRESS NOTES
Occupational Therapy   Occupational Therapy Initial Assessment  Date: 8/3/2019   Patient Name: Cami Vega  MRN: [id number]     : 1923    Date of Service: 8/3/2019    Discharge Recommendations:  [address] RN reports patient is medically stable for therapy treatment this date. Chart reviewed prior to treatment and patient is agreeable for therapy. All lines intact and patient positioned comfortably at end of treatment. All patient needs addressed prior to ending therapy session. Cami Vega is a 80 y.o. female who presents to the emergency department by EMS for evaluation of shortness of breath. The patient has a history of COPD and CHF. She is up to 6 L nasal cannula at home. Daughter states that her oxygen level normally runs in the high 80s. She states that she has been short of breath for the last couple of days. She is also been very lethargic today and has had decrease in oral intake. She has not run a fever had any chills. Not expressing any nausea, vomiting, or diarrhea. Assessment   Performance deficits / Impairments: Decreased functional mobility ; Decreased strength;Decreased endurance;Decreased ADL status  Assessment: Skilled OT needed to address functional deficits and increase activity tolerance to improve safety/indep in the home  Prognosis: Fair  Decision Making: High Complexity  OT Education: OT Role;Plan of Care;Transfer Training  Patient Education: fall prevention, importance of being up  REQUIRES OT FOLLOW UP: Yes  Activity Tolerance  Activity Tolerance: Treatment limited secondary to medical complications (free text)  Activity Tolerance: Pt motivated, limited by dropping SPO2 with activity  Safety Devices  Safety Devices in place: Yes  Type of devices: Nurse notified; Left in chair;Chair alarm in place;Call light within reach;Gait belt           Patient Diagnosis(es): The encounter diagnosis was COPD exacerbation ([facility] [address]      has a past

## 2019-08-03 NOTE — PROGRESS NOTES
Physical Therapy    Facility/Department: [facility] PROGRESSIVE CARE  Initial Assessment    NAME: Adenike Castle  : 1923  MRN: [id number]    Date of Service: 8/3/2019    Discharge Recommendations:  Home with Home health PT        Assessment   Body structures, Functions, Activity limitations: Decreased functional mobility ; Decreased strength;Decreased endurance;Decreased ROM; Decreased balance  Assessment: Pt. and her dtr/caregiver seem to have a good system in place at home. Will recommend pt. to d/c home with her dtr. and home PT. Specific instructions for Next Treatment: advance gait distance when off of high flow  Prognosis: Good  Decision Making: High Complexity  PT Education: Goals; Family Education;PT Role;General Safety  REQUIRES PT FOLLOW UP: Yes  Activity Tolerance  Activity Tolerance: Patient limited by fatigue       Patient Diagnosis(es): The encounter diagnosis was COPD exacerbation ([address] has a past medical history of Arthritis, Atrial fibrillation ([address] CAD (coronary artery disease), CHF (congestive heart failure) ([address] COPD (chronic obstructive pulmonary disease) ([address] Diabetes mellitus ([address] Femur fracture ([address] Hyperlipidemia, Hypertension, Moderate malnutrition ([address] and Respiratory failure ([address] has a past surgical history that includes Total hip arthroplasty (Left); Upper gastrointestinal endoscopy (2019); and Upper gastrointestinal endoscopy (N/A, 2019).     Restrictions  Restrictions/Precautions  Restrictions/Precautions: General Precautions  Vision/Hearing  Vision: Impaired  Vision Exceptions: (states she cannot see out of her glasses so does not wear)  Hearing: Exceptions to [facility]  Hearing Exceptions: Hard of hearing/hearing concerns     Subjective  General  Chart Reviewed: Yes  Patient assessed for rehabilitation services?: Yes  Family / Caregiver Present: Yes(dtr.)  Follows Commands: Within Functional Limits  General Comment  Comments: speaks Georgian; understands some Patient goals : get stronger       Therapy Time   Individual Concurrent Group Co-treatment   Time In 1033         Time Out 1122(10 min. chart review)         Minutes 49             co-tx with OT due to medical instability  Treatment time: 38 min.     Alina Nazario, PT

## 2019-08-03 NOTE — PROGRESS NOTES
WBC 9.3 08/03/2019    WBC 10.6 08/02/2019    WBC 9.1 08/01/2019    HGB 10.4 (L) 08/03/2019    HGB 10.3 (L) 08/02/2019    HGB 9.2 (L) 08/01/2019    HCT 34.4 (L) 08/03/2019    HCT 34.1 (L) 08/02/2019    HCT 29.8 (L) 08/01/2019     08/03/2019     08/02/2019     08/01/2019     Lab Results   Component Value Date    MG 1.8 08/03/2019    MG 2.2 06/20/2019    MG 2.3 06/19/2019     Lab Results   Component Value Date    CKTOTAL 48 04/04/2019    CKTOTAL 71 09/18/2017    CKMB 2.2 04/04/2019    CKMB 3.1 09/18/2017    TROPONINT NOT REPORTED 07/30/2019    TROPONINT NOT REPORTED 06/15/2019    TROPONINT NOT REPORTED 06/15/2019     Lab Results   Component Value Date    PROBNP 5,568 (H) 07/30/2019    PROBNP 4,942 (H) 06/21/2019    PROBNP 21,305 (H) 06/19/2019     Lab Results   Component Value Date    DDIMER 0.51 09/18/2017     Lab Results   Component Value Date    CHOL 104 06/16/2019    HDL 55 06/16/2019    TRIG 75 06/16/2019     Lab Results   Component Value Date    INR 1.4 06/18/2019    INR 1.2 04/04/2019    INR 1.2 11/13/2018    APTT 29.3 06/18/2019    APTT 29.9 04/04/2019    APTT 32.2 (H) 09/18/2017    TSH 1.38 06/15/2019    TSH 1.06 05/09/2018    TSH 0.38 09/18/2017           Patient Active Problem List   Diagnosis    Pneumonia    Sepsis ([facility] [address]    Moderate malnutrition ([facility] [address]    Pulmonary HTN ([facility] [address]    Non-rheumatic mitral regurgitation    Acute on chronic combined systolic and diastolic congestive heart failure (HCC)    Chronic atrial fibrillation (HCC)    Type 2 diabetes mellitus with hyperglycemia (HCC)    Acute bronchitis    Acute congestive heart failure (HCC)    Hypotensive episode    Acute respiratory failure (HCC)    Other dysphagia    Unintentional weight loss    Moderate malnutrition (HCC)    CHF (congestive heart failure), NYHA class I, acute on chronic, combined ([facility] [address]    COPD exacerbation (HCC)         ASSESSMENT and PLAN     · Acute on chronic combined systolic and diastolic

## 2019-08-04 LAB
ABSOLUTE EOS #: 0 K/UL (ref 0–0.4)
ABSOLUTE IMMATURE GRANULOCYTE: 0.14 K/UL (ref 0–0.3)
ABSOLUTE LYMPH #: 0.55 K/UL (ref 1–4.8)
ABSOLUTE MONO #: 0.82 K/UL (ref 0.2–0.8)
ANION GAP SERPL CALCULATED.3IONS-SCNC: 14 MMOL/L (ref 9–17)
BASOPHILS # BLD: 0 %
BASOPHILS ABSOLUTE: 0 K/UL (ref 0–0.2)
BUN BLDV-MCNC: 31 MG/DL (ref 8–23)
BUN/CREAT BLD: 53 (ref 9–20)
CALCIUM SERPL-MCNC: 8.2 MG/DL (ref 8.6–10.4)
CHLORIDE BLD-SCNC: 94 MMOL/L (ref 98–107)
CO2: 30 MMOL/L (ref 20–31)
CREAT SERPL-MCNC: 0.58 MG/DL (ref 0.5–0.9)
DIFFERENTIAL TYPE: ABNORMAL
EOSINOPHILS RELATIVE PERCENT: 0 % (ref 1–4)
GFR AFRICAN AMERICAN: >60 ML/MIN
GFR NON-AFRICAN AMERICAN: >60 ML/MIN
GFR SERPL CREATININE-BSD FRML MDRD: ABNORMAL ML/MIN/{1.73_M2}
GFR SERPL CREATININE-BSD FRML MDRD: ABNORMAL ML/MIN/{1.73_M2}
GLUCOSE BLD-MCNC: 119 MG/DL (ref 70–99)
GLUCOSE BLD-MCNC: 140 MG/DL (ref 65–105)
GLUCOSE BLD-MCNC: 196 MG/DL (ref 65–105)
GLUCOSE BLD-MCNC: 207 MG/DL (ref 65–105)
GLUCOSE BLD-MCNC: 225 MG/DL (ref 65–105)
HCT VFR BLD CALC: 33.6 % (ref 36.3–47.1)
HEMOGLOBIN: 10.3 G/DL (ref 11.9–15.1)
IMMATURE GRANULOCYTES: 1 %
LYMPHOCYTES # BLD: 4 % (ref 24–44)
MAGNESIUM: 1.7 MG/DL (ref 1.6–2.6)
MCH RBC QN AUTO: 28.8 PG (ref 25.2–33.5)
MCHC RBC AUTO-ENTMCNC: 30.7 G/DL (ref 28.4–34.8)
MCV RBC AUTO: 93.9 FL (ref 82.6–102.9)
MONOCYTES # BLD: 6 % (ref 1–7)
NRBC AUTOMATED: 0.7 PER 100 WBC
PDW BLD-RTO: 15.6 % (ref 11.8–14.4)
PHOSPHORUS: 3 MG/DL (ref 2.6–4.5)
PLATELET # BLD: 259 K/UL (ref 138–453)
PLATELET ESTIMATE: ABNORMAL
PMV BLD AUTO: 10.2 FL (ref 8.1–13.5)
POTASSIUM SERPL-SCNC: 3.7 MMOL/L (ref 3.7–5.3)
RBC # BLD: 3.58 M/UL (ref 3.95–5.11)
RBC # BLD: ABNORMAL 10*6/UL
SEG NEUTROPHILS: 89 % (ref 36–66)
SEGMENTED NEUTROPHILS ABSOLUTE COUNT: 12.19 K/UL (ref 1.8–7.7)
SODIUM BLD-SCNC: 138 MMOL/L (ref 135–144)
WBC # BLD: 13.7 K/UL (ref 3.5–11.3)
WBC # BLD: ABNORMAL 10*3/UL

## 2019-08-04 PROCEDURE — 2700000000 HC OXYGEN THERAPY PER DAY

## 2019-08-04 PROCEDURE — 85025 COMPLETE CBC W/AUTO DIFF WBC: CPT

## 2019-08-04 PROCEDURE — 6370000000 HC RX 637 (ALT 250 FOR IP): Performed by: FAMILY MEDICINE

## 2019-08-04 PROCEDURE — 94761 N-INVAS EAR/PLS OXIMETRY MLT: CPT

## 2019-08-04 PROCEDURE — 83735 ASSAY OF MAGNESIUM: CPT

## 2019-08-04 PROCEDURE — 6360000002 HC RX W HCPCS: Performed by: FAMILY MEDICINE

## 2019-08-04 PROCEDURE — 2580000003 HC RX 258: Performed by: FAMILY MEDICINE

## 2019-08-04 PROCEDURE — 36415 COLL VENOUS BLD VENIPUNCTURE: CPT

## 2019-08-04 PROCEDURE — 6360000002 HC RX W HCPCS: Performed by: INTERNAL MEDICINE

## 2019-08-04 PROCEDURE — 2060000000 HC ICU INTERMEDIATE R&B

## 2019-08-04 PROCEDURE — 82947 ASSAY GLUCOSE BLOOD QUANT: CPT

## 2019-08-04 PROCEDURE — 94640 AIRWAY INHALATION TREATMENT: CPT

## 2019-08-04 PROCEDURE — 6370000000 HC RX 637 (ALT 250 FOR IP): Performed by: NURSE PRACTITIONER

## 2019-08-04 PROCEDURE — 6370000000 HC RX 637 (ALT 250 FOR IP): Performed by: INTERNAL MEDICINE

## 2019-08-04 PROCEDURE — 80048 BASIC METABOLIC PNL TOTAL CA: CPT

## 2019-08-04 PROCEDURE — 84100 ASSAY OF PHOSPHORUS: CPT

## 2019-08-04 RX ORDER — METHYLPREDNISOLONE SODIUM SUCCINATE 40 MG/ML
30 INJECTION, POWDER, LYOPHILIZED, FOR SOLUTION INTRAMUSCULAR; INTRAVENOUS EVERY 8 HOURS
Status: DISCONTINUED | OUTPATIENT
Start: 2019-08-04 | End: 2019-08-05

## 2019-08-04 RX ORDER — DILTIAZEM HYDROCHLORIDE 180 MG/1
180 CAPSULE, COATED, EXTENDED RELEASE ORAL DAILY
Status: DISCONTINUED | OUTPATIENT
Start: 2019-08-04 | End: 2019-08-05 | Stop reason: HOSPADM

## 2019-08-04 RX ORDER — POTASSIUM CHLORIDE 20 MEQ/1
10 TABLET, EXTENDED RELEASE ORAL DAILY
Status: DISCONTINUED | OUTPATIENT
Start: 2019-08-04 | End: 2019-08-05 | Stop reason: HOSPADM

## 2019-08-04 RX ADMIN — INSULIN GLARGINE 4 UNITS: 100 INJECTION, SOLUTION SUBCUTANEOUS at 21:41

## 2019-08-04 RX ADMIN — BRIMONIDINE TARTRATE 1 DROP: 2 SOLUTION OPHTHALMIC at 08:35

## 2019-08-04 RX ADMIN — POTASSIUM & SODIUM PHOSPHATES POWDER PACK 280-160-250 MG 250 MG: 280-160-250 PACK at 21:40

## 2019-08-04 RX ADMIN — FUROSEMIDE 20 MG: 10 INJECTION, SOLUTION INTRAMUSCULAR; INTRAVENOUS at 17:15

## 2019-08-04 RX ADMIN — Medication 2 PUFF: at 20:39

## 2019-08-04 RX ADMIN — INSULIN LISPRO 2 UNITS: 100 INJECTION, SOLUTION INTRAVENOUS; SUBCUTANEOUS at 08:46

## 2019-08-04 RX ADMIN — IPRATROPIUM BROMIDE AND ALBUTEROL SULFATE 1 AMPULE: .5; 3 SOLUTION RESPIRATORY (INHALATION) at 14:11

## 2019-08-04 RX ADMIN — APIXABAN 2.5 MG: 2.5 TABLET, FILM COATED ORAL at 21:40

## 2019-08-04 RX ADMIN — IPRATROPIUM BROMIDE AND ALBUTEROL SULFATE 1 AMPULE: .5; 3 SOLUTION RESPIRATORY (INHALATION) at 20:39

## 2019-08-04 RX ADMIN — APIXABAN 2.5 MG: 2.5 TABLET, FILM COATED ORAL at 08:49

## 2019-08-04 RX ADMIN — ATORVASTATIN CALCIUM 20 MG: 20 TABLET, FILM COATED ORAL at 21:40

## 2019-08-04 RX ADMIN — Medication 10 ML: at 08:39

## 2019-08-04 RX ADMIN — IPRATROPIUM BROMIDE AND ALBUTEROL SULFATE 1 AMPULE: .5; 3 SOLUTION RESPIRATORY (INHALATION) at 06:20

## 2019-08-04 RX ADMIN — INSULIN LISPRO 5 UNITS: 100 INJECTION, SOLUTION INTRAVENOUS; SUBCUTANEOUS at 17:20

## 2019-08-04 RX ADMIN — INSULIN LISPRO 4 UNITS: 100 INJECTION, SOLUTION INTRAVENOUS; SUBCUTANEOUS at 17:23

## 2019-08-04 RX ADMIN — INSULIN LISPRO 2 UNITS: 100 INJECTION, SOLUTION INTRAVENOUS; SUBCUTANEOUS at 12:37

## 2019-08-04 RX ADMIN — MONTELUKAST 10 MG: 10 TABLET, FILM COATED ORAL at 21:39

## 2019-08-04 RX ADMIN — DILTIAZEM HYDROCHLORIDE 180 MG: 180 CAPSULE, COATED, EXTENDED RELEASE ORAL at 15:12

## 2019-08-04 RX ADMIN — POTASSIUM CHLORIDE 10 MEQ: 20 TABLET, EXTENDED RELEASE ORAL at 12:36

## 2019-08-04 RX ADMIN — FUROSEMIDE 20 MG: 10 INJECTION, SOLUTION INTRAMUSCULAR; INTRAVENOUS at 08:36

## 2019-08-04 RX ADMIN — AZITHROMYCIN MONOHYDRATE 500 MG: 500 INJECTION, POWDER, LYOPHILIZED, FOR SOLUTION INTRAVENOUS at 15:12

## 2019-08-04 RX ADMIN — CARVEDILOL 6.25 MG: 6.25 TABLET, FILM COATED ORAL at 17:16

## 2019-08-04 RX ADMIN — METHYLPREDNISOLONE SODIUM SUCCINATE 40 MG: 40 INJECTION, POWDER, FOR SOLUTION INTRAMUSCULAR; INTRAVENOUS at 08:37

## 2019-08-04 RX ADMIN — INSULIN LISPRO 5 UNITS: 100 INJECTION, SOLUTION INTRAVENOUS; SUBCUTANEOUS at 08:44

## 2019-08-04 RX ADMIN — TIMOLOL MALEATE 1 DROP: 5 SOLUTION OPHTHALMIC at 08:33

## 2019-08-04 RX ADMIN — INSULIN LISPRO 2 UNITS: 100 INJECTION, SOLUTION INTRAVENOUS; SUBCUTANEOUS at 21:40

## 2019-08-04 RX ADMIN — METHYLPREDNISOLONE SODIUM SUCCINATE 40 MG: 40 INJECTION, POWDER, FOR SOLUTION INTRAMUSCULAR; INTRAVENOUS at 02:12

## 2019-08-04 RX ADMIN — Medication 2 PUFF: at 06:20

## 2019-08-04 RX ADMIN — ACETAMINOPHEN 650 MG: 325 TABLET ORAL at 21:40

## 2019-08-04 RX ADMIN — POTASSIUM & SODIUM PHOSPHATES POWDER PACK 280-160-250 MG 250 MG: 280-160-250 PACK at 08:38

## 2019-08-04 RX ADMIN — BRIMONIDINE TARTRATE 1 DROP: 2 SOLUTION OPHTHALMIC at 21:39

## 2019-08-04 RX ADMIN — DORZOLAMIDE HYDROCHLORIDE 1 DROP: 20 SOLUTION/ DROPS OPHTHALMIC at 21:39

## 2019-08-04 RX ADMIN — DORZOLAMIDE HYDROCHLORIDE 1 DROP: 20 SOLUTION/ DROPS OPHTHALMIC at 08:34

## 2019-08-04 RX ADMIN — GABAPENTIN 100 MG: 100 CAPSULE ORAL at 08:39

## 2019-08-04 RX ADMIN — INSULIN LISPRO 5 UNITS: 100 INJECTION, SOLUTION INTRAVENOUS; SUBCUTANEOUS at 12:36

## 2019-08-04 RX ADMIN — CARVEDILOL 6.25 MG: 6.25 TABLET, FILM COATED ORAL at 08:38

## 2019-08-04 RX ADMIN — IPRATROPIUM BROMIDE AND ALBUTEROL SULFATE 1 AMPULE: .5; 3 SOLUTION RESPIRATORY (INHALATION) at 09:48

## 2019-08-04 RX ADMIN — TIMOLOL MALEATE 1 DROP: 5 SOLUTION OPHTHALMIC at 21:39

## 2019-08-04 RX ADMIN — METHYLPREDNISOLONE SODIUM SUCCINATE 30 MG: 40 INJECTION, POWDER, FOR SOLUTION INTRAMUSCULAR; INTRAVENOUS at 17:16

## 2019-08-04 ASSESSMENT — PAIN SCALES - GENERAL
PAINLEVEL_OUTOF10: 0
PAINLEVEL_OUTOF10: 4
PAINLEVEL_OUTOF10: 0

## 2019-08-04 NOTE — PROGRESS NOTES
Both Eyes BID    montelukast  10 mg Oral Nightly    insulin glargine  4 Units Subcutaneous Nightly    gabapentin  100 mg Oral Daily    dorzolamide  1 drop Both Eyes BID    And    timolol  1 drop Both Eyes BID    sodium chloride flush  10 mL Intravenous 2 times per day    insulin lispro  0-12 Units Subcutaneous TID WC    insulin lispro  0-6 Units Subcutaneous Nightly    mometasone-formoterol  2 puff Inhalation BID    azithromycin  500 mg Intravenous Q24H    furosemide  20 mg Intravenous BID    ipratropium-albuterol  1 ampule Inhalation Q4H WA     Continuous Infusions:   dextrose       PRN Meds:sodium chloride flush, acetaminophen, glucose, dextrose, glucagon (rDNA), dextrose       Physical Exam:  Vitals:    08/04/19 0211 08/04/19 0430 08/04/19 0620 08/04/19 0803   BP:  (!) 101/57  114/63   Pulse:  98  121   Resp:  20  20   Temp:    97.7 °F (36.5 °C)   TempSrc:    Oral   SpO2: 96% 94% 93% 93%   Weight:       Height:         I/O last 3 completed shifts: In: 544 [P.O.:120; I.V.:686]  Out: 2850 [Urine:2850]    General:  Awake, alert, not in distress. Appears to be stated age. HEENT: Atraumatic, normocephalic. Anicteric sclera. Pink and moist oral mucosa. Neck supple. No JVD. Chest: Bilateral air entry, clear to auscultation, no wheezing, rhonchi or rales. Cardiovascular: RRR, S1S2, no murmur, rub or gallop. B/L lower extremity edema. Abdomen: Soft, non tender to palpation. Musculoskeletal: No cyanosis or clubbing. Integumentary: Pink, warm and dry. Free from rash or lesions. CNS: Oriented to person, place and time. Speech clear. Face symmetrical. No tremor.    Psyche: answering questions appropriately, no depression    Data:  CBC:   Lab Results   Component Value Date    WBC 13.7 (H) 08/04/2019    HGB 10.3 (L) 08/04/2019    HCT 33.6 (L) 08/04/2019    MCV 93.9 08/04/2019     08/04/2019     BMP:    Lab Results   Component Value Date     08/04/2019     08/03/2019

## 2019-08-04 NOTE — PROGRESS NOTES
mg, Intravenous, Q8H, Ruma Mcdowell MD, 30 mg at 08/04/19 1716    potassium & sodium phosphates (PHOS-NAK) 280-160-250 MG packet 250 mg, 1 packet, Oral, BID, Teresa Rosas MD, 250 mg at 08/04/19 0838    insulin lispro (HUMALOG) injection vial 5 Units, 5 Units, Subcutaneous, TID , Jessi Looney MD, 5 Units at 08/04/19 1720    carvedilol (COREG) tablet 6.25 mg, 6.25 mg, Oral, BID , India Levine, APRN - CNP, 6.25 mg at 08/04/19 1716    apixaban (ELIQUIS) tablet 2.5 mg, 2.5 mg, Oral, BID, Maya Nagel MD, 2.5 mg at 08/04/19 0849    iron sucrose (VENOFER) 200 mg in sodium chloride 0.9 % 100 mL IVPB, 200 mg, Intravenous, Every Other Day, Maya Nagel MD, Stopped at 08/03/19 0842    atorvastatin (LIPITOR) tablet 20 mg, 20 mg, Oral, Daily, Maya Nagel MD, 20 mg at 08/03/19 2135    brimonidine (ALPHAGAN) 0.2 % ophthalmic solution 1 drop, 1 drop, Both Eyes, BID, Maya Nagel MD, 1 drop at 08/04/19 0835    montelukast (SINGULAIR) tablet 10 mg, 10 mg, Oral, Nightly, Maya Nagel MD, 10 mg at 08/03/19 2136    insulin glargine (LANTUS) injection vial 4 Units, 4 Units, Subcutaneous, Nightly, Maya Nagel MD, 4 Units at 08/03/19 2138    gabapentin (NEURONTIN) capsule 100 mg, 100 mg, Oral, Daily, Maya Nagel MD, 100 mg at 08/04/19 0839    dorzolamide (TRUSOPT) 2 % ophthalmic solution 1 drop, 1 drop, Both Eyes, BID, 1 drop at 08/04/19 0834 **AND** timolol (TIMOPTIC) 0.5 % ophthalmic solution 1 drop, 1 drop, Both Eyes, BID, Maya Nagel MD, 1 drop at 08/04/19 0833    sodium chloride flush 0.9 % injection 10 mL, 10 mL, Intravenous, 2 times per day, Maya Nagel MD, 10 mL at 08/04/19 0839    sodium chloride flush 0.9 % injection 10 mL, 10 mL, Intravenous, PRN, Sujatha Looney MD, 10 mL at 08/01/19 1826    acetaminophen (TYLENOL) tablet 650 mg, 650 mg, Oral, Q4H PRN, Maya Nagel MD, 650 mg at 08/03/19 2212    insulin lispro (HUMALOG) injection vial 0-12 Units, 0-12 Units, Subcutaneous, TID WC, Roseanna Linares MD, 4 Units at 08/04/19 1723    insulin lispro (HUMALOG) injection vial 0-6 Units, 0-6 Units, Subcutaneous, Nightly, Roseanna Linares MD, 3 Units at 08/03/19 2138    glucose (GLUTOSE) 40 % oral gel 15 g, 15 g, Oral, PRN, Jessi Looney MD    dextrose 50 % IV solution, 12.5 g, Intravenous, PRN, Jessi Looney MD    glucagon (rDNA) injection 1 mg, 1 mg, Intramuscular, PRN, Jessi Looney MD    dextrose 5 % solution, 100 mL/hr, Intravenous, PRN, Jessi Looney MD    mometasone-formoterol (DULERA) 200-5 MCG/ACT inhaler 2 puff, 2 puff, Inhalation, BID, 2 puff at 08/04/19 0620 **AND** MDI Treatment, , , BID, Jessi Looney MD    furosemide (LASIX) injection 20 mg, 20 mg, Intravenous, BID, Jessi Looney MD, 20 mg at 08/04/19 1715    ipratropium-albuterol (DUONEB) nebulizer solution 1 ampule, 1 ampule, Inhalation, Q4H WA, Roseanna Linares MD, 1 ampule at 08/04/19 1411      I/O (24Hr):     Intake/Output Summary (Last 24 hours) at 8/4/2019 1816  Last data filed at 8/4/2019 1031  Gross per 24 hour   Intake 806 ml   Output 2650 ml   Net -1844 ml       Data:           Labs:    Hematology:  Recent Labs     08/02/19  0503 08/03/19  0609 08/04/19  0606   WBC 10.6 9.3 13.7*   RBC 3.57* 3.68* 3.58*   HGB 10.3* 10.4* 10.3*   HCT 34.1* 34.4* 33.6*   MCV 95.5 93.5 93.9   MCH 28.9 28.3 28.8   MCHC 30.2 30.2 30.7   RDW 15.4* 15.7* 15.6*    293 259   MPV 10.5 10.3 10.2     Chemistry:  Recent Labs     08/02/19  0503 08/03/19  0609 08/04/19  0606    136 138   K 4.0 3.0* 3.7   CL 95* 93* 94*   CO2 27 32* 30   GLUCOSE 231* 372* 119*   BUN 47* 42* 31*   CREATININE 0.93* 0.89 0.58   MG  --  1.8 1.7   ANIONGAP 15 11 14   LABGLOM 56* 59* >60   GFRAA >60 >60 >60   CALCIUM 8.2* 8.0* 8.2*   PHOS  --  2.4* 3.0     Recent Labs     08/03/19  0740 08/03/19  1122 08/03/19  1707 08/03/19  1934/19  0717 08/04/19  1114   POCGLU 358* 318* 169* 261* 140*

## 2019-08-04 NOTE — PLAN OF CARE
Problem: Falls - Risk of:  Goal: Will remain free from falls  Description  Will remain free from falls  Outcome: Ongoing   Patient is a high fall risk per falls risk assessment. Remains free from falls and injuries, call light at reach and utilized appropriately, family with patient majority of time, bed in lowest position with wheels locked and alarm armed. Personal items that are used frequently are within reach.  Up to chair with assist, no attempts to get out of bed unassisted noted or reported, falling star program and hourly rounding implemented, will continue to monitor and educate as needed

## 2019-08-05 ENCOUNTER — APPOINTMENT (OUTPATIENT)
Dept: GENERAL RADIOLOGY | Age: 84
DRG: 291 | End: 2019-08-05
Payer: MEDICARE

## 2019-08-05 VITALS
HEIGHT: 55 IN | WEIGHT: 102 LBS | OXYGEN SATURATION: 77 % | RESPIRATION RATE: 18 BRPM | DIASTOLIC BLOOD PRESSURE: 56 MMHG | SYSTOLIC BLOOD PRESSURE: 116 MMHG | TEMPERATURE: 98.7 F | HEART RATE: 93 BPM | BODY MASS INDEX: 23.61 KG/M2

## 2019-08-05 LAB
ABSOLUTE EOS #: 0 K/UL (ref 0–0.4)
ABSOLUTE IMMATURE GRANULOCYTE: 0.14 K/UL (ref 0–0.3)
ABSOLUTE LYMPH #: 0.54 K/UL (ref 1–4.8)
ABSOLUTE MONO #: 0.82 K/UL (ref 0.2–0.8)
ANION GAP SERPL CALCULATED.3IONS-SCNC: 15 MMOL/L (ref 9–17)
BASOPHILS # BLD: 0 %
BASOPHILS ABSOLUTE: 0 K/UL (ref 0–0.2)
BUN BLDV-MCNC: 37 MG/DL (ref 8–23)
BUN/CREAT BLD: 44 (ref 9–20)
CALCIUM SERPL-MCNC: 8.8 MG/DL (ref 8.6–10.4)
CHLORIDE BLD-SCNC: 94 MMOL/L (ref 98–107)
CO2: 27 MMOL/L (ref 20–31)
CREAT SERPL-MCNC: 0.84 MG/DL (ref 0.5–0.9)
DIFFERENTIAL TYPE: ABNORMAL
EOSINOPHILS RELATIVE PERCENT: 0 % (ref 1–4)
GFR AFRICAN AMERICAN: >60 ML/MIN
GFR NON-AFRICAN AMERICAN: >60 ML/MIN
GFR SERPL CREATININE-BSD FRML MDRD: ABNORMAL ML/MIN/{1.73_M2}
GFR SERPL CREATININE-BSD FRML MDRD: ABNORMAL ML/MIN/{1.73_M2}
GLUCOSE BLD-MCNC: 193 MG/DL (ref 70–99)
GLUCOSE BLD-MCNC: 203 MG/DL (ref 65–105)
GLUCOSE BLD-MCNC: 252 MG/DL (ref 65–105)
GLUCOSE BLD-MCNC: 261 MG/DL (ref 65–105)
HCT VFR BLD CALC: 36.9 % (ref 36.3–47.1)
HEMOGLOBIN: 11.3 G/DL (ref 11.9–15.1)
IMMATURE GRANULOCYTES: 1 %
LYMPHOCYTES # BLD: 4 % (ref 24–44)
MAGNESIUM: 1.7 MG/DL (ref 1.6–2.6)
MCH RBC QN AUTO: 28.9 PG (ref 25.2–33.5)
MCHC RBC AUTO-ENTMCNC: 30.6 G/DL (ref 28.4–34.8)
MCV RBC AUTO: 94.4 FL (ref 82.6–102.9)
MONOCYTES # BLD: 6 % (ref 1–7)
NRBC AUTOMATED: 0.5 PER 100 WBC
PDW BLD-RTO: 16.1 % (ref 11.8–14.4)
PHOSPHORUS: 5.1 MG/DL (ref 2.6–4.5)
PLATELET # BLD: 278 K/UL (ref 138–453)
PLATELET ESTIMATE: ABNORMAL
PMV BLD AUTO: 10.7 FL (ref 8.1–13.5)
POTASSIUM SERPL-SCNC: 4.6 MMOL/L (ref 3.7–5.3)
RBC # BLD: 3.91 M/UL (ref 3.95–5.11)
RBC # BLD: ABNORMAL 10*6/UL
SEG NEUTROPHILS: 89 % (ref 36–66)
SEGMENTED NEUTROPHILS ABSOLUTE COUNT: 12.1 K/UL (ref 1.8–7.7)
SODIUM BLD-SCNC: 136 MMOL/L (ref 135–144)
WBC # BLD: 13.6 K/UL (ref 3.5–11.3)
WBC # BLD: ABNORMAL 10*3/UL

## 2019-08-05 PROCEDURE — 36415 COLL VENOUS BLD VENIPUNCTURE: CPT

## 2019-08-05 PROCEDURE — 80048 BASIC METABOLIC PNL TOTAL CA: CPT

## 2019-08-05 PROCEDURE — 94761 N-INVAS EAR/PLS OXIMETRY MLT: CPT

## 2019-08-05 PROCEDURE — 6370000000 HC RX 637 (ALT 250 FOR IP): Performed by: FAMILY MEDICINE

## 2019-08-05 PROCEDURE — 83735 ASSAY OF MAGNESIUM: CPT

## 2019-08-05 PROCEDURE — 71045 X-RAY EXAM CHEST 1 VIEW: CPT

## 2019-08-05 PROCEDURE — 6370000000 HC RX 637 (ALT 250 FOR IP): Performed by: INTERNAL MEDICINE

## 2019-08-05 PROCEDURE — 2580000003 HC RX 258: Performed by: FAMILY MEDICINE

## 2019-08-05 PROCEDURE — 82947 ASSAY GLUCOSE BLOOD QUANT: CPT

## 2019-08-05 PROCEDURE — 6360000002 HC RX W HCPCS: Performed by: INTERNAL MEDICINE

## 2019-08-05 PROCEDURE — 97530 THERAPEUTIC ACTIVITIES: CPT

## 2019-08-05 PROCEDURE — 2700000000 HC OXYGEN THERAPY PER DAY

## 2019-08-05 PROCEDURE — 84100 ASSAY OF PHOSPHORUS: CPT

## 2019-08-05 PROCEDURE — 6370000000 HC RX 637 (ALT 250 FOR IP): Performed by: NURSE PRACTITIONER

## 2019-08-05 PROCEDURE — 85025 COMPLETE CBC W/AUTO DIFF WBC: CPT

## 2019-08-05 PROCEDURE — 6360000002 HC RX W HCPCS: Performed by: FAMILY MEDICINE

## 2019-08-05 PROCEDURE — 94640 AIRWAY INHALATION TREATMENT: CPT

## 2019-08-05 RX ORDER — METHYLPREDNISOLONE SODIUM SUCCINATE 40 MG/ML
30 INJECTION, POWDER, LYOPHILIZED, FOR SOLUTION INTRAMUSCULAR; INTRAVENOUS EVERY 12 HOURS
Status: DISCONTINUED | OUTPATIENT
Start: 2019-08-05 | End: 2019-08-05

## 2019-08-05 RX ORDER — FUROSEMIDE 20 MG/1
20 TABLET ORAL EVERY EVENING
Status: DISCONTINUED | OUTPATIENT
Start: 2019-08-05 | End: 2019-08-05 | Stop reason: HOSPADM

## 2019-08-05 RX ORDER — CARVEDILOL 6.25 MG/1
6.25 TABLET ORAL 2 TIMES DAILY WITH MEALS
Qty: 60 TABLET | Refills: 3 | Status: SHIPPED | OUTPATIENT
Start: 2019-08-05

## 2019-08-05 RX ORDER — FUROSEMIDE 40 MG/1
40 TABLET ORAL EVERY MORNING
Status: DISCONTINUED | OUTPATIENT
Start: 2019-08-05 | End: 2019-08-05 | Stop reason: HOSPADM

## 2019-08-05 RX ORDER — FUROSEMIDE 40 MG/1
40 TABLET ORAL EVERY MORNING
Qty: 60 TABLET | Refills: 3 | Status: SHIPPED | OUTPATIENT
Start: 2019-08-05

## 2019-08-05 RX ORDER — PREDNISONE 20 MG/1
20 TABLET ORAL DAILY
Status: DISCONTINUED | OUTPATIENT
Start: 2019-08-05 | End: 2019-08-05 | Stop reason: HOSPADM

## 2019-08-05 RX ORDER — FUROSEMIDE 20 MG/1
20 TABLET ORAL EVERY EVENING
Qty: 60 TABLET | Refills: 3 | Status: SHIPPED | OUTPATIENT
Start: 2019-08-05

## 2019-08-05 RX ORDER — PREDNISONE 10 MG/1
TABLET ORAL
Qty: 30 TABLET | Refills: 0 | Status: SHIPPED | OUTPATIENT
Start: 2019-08-05

## 2019-08-05 RX ORDER — DILTIAZEM HYDROCHLORIDE 180 MG/1
180 CAPSULE, COATED, EXTENDED RELEASE ORAL DAILY
Qty: 30 CAPSULE | Refills: 3 | Status: SHIPPED | OUTPATIENT
Start: 2019-08-06

## 2019-08-05 RX ADMIN — PREDNISONE 20 MG: 20 TABLET ORAL at 15:05

## 2019-08-05 RX ADMIN — MAGNESIUM OXIDE TAB 400 MG (241.3 MG ELEMENTAL MG) 800 MG: 400 (241.3 MG) TAB at 15:06

## 2019-08-05 RX ADMIN — INSULIN LISPRO 5 UNITS: 100 INJECTION, SOLUTION INTRAVENOUS; SUBCUTANEOUS at 09:30

## 2019-08-05 RX ADMIN — INSULIN LISPRO 5 UNITS: 100 INJECTION, SOLUTION INTRAVENOUS; SUBCUTANEOUS at 13:26

## 2019-08-05 RX ADMIN — INSULIN LISPRO 4 UNITS: 100 INJECTION, SOLUTION INTRAVENOUS; SUBCUTANEOUS at 09:25

## 2019-08-05 RX ADMIN — IRON SUCROSE 200 MG: 20 INJECTION, SOLUTION INTRAVENOUS at 09:24

## 2019-08-05 RX ADMIN — POTASSIUM CHLORIDE 10 MEQ: 20 TABLET, EXTENDED RELEASE ORAL at 09:24

## 2019-08-05 RX ADMIN — IPRATROPIUM BROMIDE AND ALBUTEROL SULFATE 1 AMPULE: .5; 3 SOLUTION RESPIRATORY (INHALATION) at 11:03

## 2019-08-05 RX ADMIN — IPRATROPIUM BROMIDE AND ALBUTEROL SULFATE 1 AMPULE: .5; 3 SOLUTION RESPIRATORY (INHALATION) at 07:33

## 2019-08-05 RX ADMIN — INSULIN LISPRO 5 UNITS: 100 INJECTION, SOLUTION INTRAVENOUS; SUBCUTANEOUS at 17:24

## 2019-08-05 RX ADMIN — CARVEDILOL 6.25 MG: 6.25 TABLET, FILM COATED ORAL at 17:22

## 2019-08-05 RX ADMIN — FUROSEMIDE 20 MG: 20 TABLET ORAL at 17:22

## 2019-08-05 RX ADMIN — TIMOLOL MALEATE 1 DROP: 5 SOLUTION OPHTHALMIC at 09:24

## 2019-08-05 RX ADMIN — FUROSEMIDE 20 MG: 10 INJECTION, SOLUTION INTRAMUSCULAR; INTRAVENOUS at 09:24

## 2019-08-05 RX ADMIN — APIXABAN 2.5 MG: 2.5 TABLET, FILM COATED ORAL at 09:24

## 2019-08-05 RX ADMIN — GABAPENTIN 100 MG: 100 CAPSULE ORAL at 09:24

## 2019-08-05 RX ADMIN — DILTIAZEM HYDROCHLORIDE 180 MG: 180 CAPSULE, COATED, EXTENDED RELEASE ORAL at 09:24

## 2019-08-05 RX ADMIN — Medication 2 PUFF: at 07:33

## 2019-08-05 RX ADMIN — CARVEDILOL 6.25 MG: 6.25 TABLET, FILM COATED ORAL at 09:24

## 2019-08-05 RX ADMIN — BRIMONIDINE TARTRATE 1 DROP: 2 SOLUTION OPHTHALMIC at 09:24

## 2019-08-05 RX ADMIN — INSULIN LISPRO 4 UNITS: 100 INJECTION, SOLUTION INTRAVENOUS; SUBCUTANEOUS at 13:27

## 2019-08-05 RX ADMIN — INSULIN LISPRO 4 UNITS: 100 INJECTION, SOLUTION INTRAVENOUS; SUBCUTANEOUS at 17:22

## 2019-08-05 RX ADMIN — METHYLPREDNISOLONE SODIUM SUCCINATE 30 MG: 40 INJECTION, POWDER, FOR SOLUTION INTRAMUSCULAR; INTRAVENOUS at 00:49

## 2019-08-05 RX ADMIN — DORZOLAMIDE HYDROCHLORIDE 1 DROP: 20 SOLUTION/ DROPS OPHTHALMIC at 09:24

## 2019-08-05 RX ADMIN — IPRATROPIUM BROMIDE AND ALBUTEROL SULFATE 1 AMPULE: .5; 3 SOLUTION RESPIRATORY (INHALATION) at 16:23

## 2019-08-05 RX ADMIN — POTASSIUM & SODIUM PHOSPHATES POWDER PACK 280-160-250 MG 250 MG: 280-160-250 PACK at 09:24

## 2019-08-05 ASSESSMENT — PAIN SCALES - GENERAL
PAINLEVEL_OUTOF10: 0

## 2019-08-05 NOTE — PROGRESS NOTES
Reason for Consult:  Acute kidney injury. Requesting Physician:  Francisca Jackson MD    Interval History:  Cr showed mild increased to 0.8  Reasonable UOP  Lost IV access  No new complaint    HISTORY OF PRESENT ILLNESS:    The patient is a 80 y.o. female who presents with worsening SOB and cough, reported significant weakness, no sputum. Her symptoms were progressing over few days, denied having fever, or chest pain  Has normal baseline Cr of 0.8, her Cr was 1.43 yesterday, improved to 1.3 this morning    Review Of Systems:   SOB improving  Continued to have weakness     Prior to Admission medications    Medication Sig Start Date End Date Taking?  Authorizing Provider   ipratropium-albuterol (DUONEB) 0.5-2.5 (3) MG/3ML SOLN nebulizer solution Inhale 3 mLs into the lungs 4 times daily 6/25/19  Yes Kymberly Campuzano MD   glucose (GLUTOSE) 40 % GEL Take 37.5 mLs by mouth as needed (Per glycemia) 6/25/19  Yes Kymberly Campuzano MD   insulin glargine (LANTUS) 100 UNIT/ML injection vial Inject 4 Units into the skin nightly 6/25/19  Yes Kymberly Campuzano MD   insulin lispro (HUMALOG) 100 UNIT/ML injection vial Inject 0-6 Units into the skin 3 times daily (with meals) If blood sugar less than  70 or greater than 350 call MD   Less than 70 give glucose tube or  tablets and call MD  Blood sugar 72  To 170 no coverage  171 to 270 units insulin lispro subcu  271 up to 355 units insulin lispro subcu 6/25/19  Yes Kymberly Campuzano MD   lisinopril (PRINIVIL;ZESTRIL) 5 MG tablet Take 1 tablet by mouth daily 6/26/19  Yes Kymberly Campuzano MD   metoprolol tartrate (LOPRESSOR) 25 MG tablet Take 0.5 tablets by mouth 2 times daily 6/25/19  Yes Kymberly Campuzano MD   guaiFENesin (MUCINEX) 600 MG extended release tablet Take 1 tablet by mouth 2 times daily 6/25/19  Yes Kymberly Campuzano MD   furosemide (LASIX) 40 MG tablet Take 1 tablet by mouth daily 6/26/19  Yes Kymberly Campuzano MD   glimepiride (AMARYL) 2 MG tablet Take 2 mg by mouth daily Value Date     08/05/2019     08/04/2019     08/03/2019    K 4.6 08/05/2019    K 3.7 08/04/2019    K 3.0 (L) 08/03/2019    CL 94 (L) 08/05/2019    CL 94 (L) 08/04/2019    CL 93 (L) 08/03/2019    CO2 27 08/05/2019    CO2 30 08/04/2019    CO2 32 (H) 08/03/2019    BUN 37 (H) 08/05/2019    BUN 31 (H) 08/04/2019    BUN 42 (H) 08/03/2019    CREATININE 0.84 08/05/2019    CREATININE 0.58 08/04/2019    CREATININE 0.89 08/03/2019    GLUCOSE 193 (H) 08/05/2019    GLUCOSE 119 (H) 08/04/2019    GLUCOSE 372 (H) 08/03/2019     CMP:   Lab Results   Component Value Date     08/05/2019    K 4.6 08/05/2019    CL 94 08/05/2019    CO2 27 08/05/2019    BUN 37 08/05/2019    CREATININE 0.84 08/05/2019    GLUCOSE 193 08/05/2019    GLUCOSE 117 09/02/2011    CALCIUM 8.8 08/05/2019    PROT 7.9 11/13/2018    LABALBU 3.4 08/01/2019    BILITOT 0.84 11/13/2018    ALKPHOS 108 11/13/2018    AST 32 11/13/2018    ALT 21 11/13/2018      Hepatic:   Lab Results   Component Value Date    AST 32 (H) 11/13/2018    AST 23 05/09/2018    AST 24 09/24/2017    ALT 21 11/13/2018    ALT 14 05/09/2018    ALT 24 09/24/2017    BILITOT 0.84 11/13/2018    BILITOT 0.51 05/09/2018    BILITOT 0.67 09/24/2017    ALKPHOS 108 (H) 11/13/2018    ALKPHOS 96 05/09/2018    ALKPHOS 81 09/24/2017     BNP:   Lab Results   Component Value Date     (H) 09/02/2011     Lipids:   Lab Results   Component Value Date    CHOL 104 06/16/2019    HDL 55 06/16/2019     INR:   Lab Results   Component Value Date    INR 1.4 06/18/2019    INR 1.2 04/04/2019    INR 1.2 11/13/2018     PTH: No results found for: PTH  Phosphorus:    Lab Results   Component Value Date    PHOS 5.1 08/05/2019     Ionized Calcium: No results found for: IONCA  Magnesium:   Lab Results   Component Value Date    MG 1.7 08/05/2019     Albumin:   Lab Results   Component Value Date    LABALBU 3.4 08/01/2019       Radiology:   Reviewed. Assessment:  1.  Acute kidney injury, hemodynamic related in the setting of CHF exacerbation. Renal function continues to improve. 2. CHF with cardiomyopathy, EF 40%. 3. Hyponatremia, hypokalemia and hypophosphatemia. 4. Anemia. 5. Protein calorie malnutrition. 6. Metabolic alkalosis. 7. Hypomagnesemia    Plan:  Change Lasix to 40 mg p.o. every morning and 20 mg every afternoon  Avoid resuming lisinopril secondary to hyperkalemia  Discontinue Juares catheter  Okay for discharge from nephrology perspective  Avoid vitamin D, her level is elevated. Place magnesium 800 mg magnesium oxide x1  Recommend starting oral iron replacement  Continue protein supplement. Avoid nephrotoxic drugs and IV contrast exposure. Please do not hesitate to contact us for any further questions/concerns.     Electronically signed by Zen Lopez MD  on 8/5/2019 at 1:57 PM

## 2019-08-05 NOTE — PROGRESS NOTES
Orientation  Orientation  Overall Orientation Status: Within Functional Limits  Cognition      Objective      Transfers  Sit to Stand: Minimal Assistance  Stand to sit: Minimal Assistance  Comment: Pt up in chair upon arrival. All transfers with SW  Ambulation  Ambulation?: Yes  Ambulation 1  Surface: level tile  Device: Standard Walker  Other Apparatus: O2  Assistance: Minimal assistance(Line management )  Quality of Gait: slow cadance, forward posture, decreased step length   Gait Deviations: Increased DALTON; Decreased step length; Slow Ileana  Distance: 15ft   Comments: Pt dropping into low 80s during ambulation and required 1 min for oxygen saturation to return to 90s. Pt daughter states that distance ambulated would be functional for home. Balance  Posture: Good  Sitting - Static: Good  Sitting - Dynamic: Fair;+  Standing - Static: Fair  Standing - Dynamic: Fair  Comments: Standing balance with BUE support  Exercises  Comments: BLE ther ex X10 to improve circulation, strength, ROM, activity tolerance, and functional mobility. Pt required rest breaks in between each set. G-Code     OutComes Score    AM-PAC Score  AM-PAC Inpatient Mobility Raw Score : 14 (08/05/19 1312)  -PAC Inpatient T-Scale Score : 38.1 (08/05/19 1312)  Mobility Inpatient CMS 0-100% Score: 61.29 (08/05/19 1312)  Mobility Inpatient CMS G-Code Modifier : CL (08/05/19 1312)          Goals  Short term goals  Time Frame for Short term goals: 12 visits:  Short term goal 1: Pt. to be indep with bed mob. as she was at home  Short term goal 2: Pt. to be CGA for transfers  Short term goal 3: Pt. to be CGA for gait with RW, 50ft. (household distance)  Short term goal 4: Pt. to tolerate 25+ min.  of PT for ther ex/ gait/ balance training  Patient Goals   Patient goals : get stronger    Plan    Plan  Times per week: 1-2x/day; 5-6days/wk  Specific instructions for Next Treatment: advance gait distance when off of high flow  Current Treatment Recommendations: Strengthening, Functional Mobility Training, ROM, Transfer Training, Balance Training, Endurance Training, Patient/Caregiver Education & Training, Safety Education & Training, Gait Training  Safety Devices  Type of devices:  All fall risk precautions in place, Gait belt, Patient at risk for falls, Call light within reach, Left in chair, Nurse notified     Therapy Time   Individual Concurrent Group Co-treatment   Time In [address]         Time Out 1257         Minutes [address] 1938, Student Physical Therapist Assistant

## 2019-08-05 NOTE — PROGRESS NOTES
Oral Daily    magnesium oxide  800 mg Oral Once    furosemide  40 mg Oral QAM    furosemide  20 mg Oral QPM    potassium chloride  10 mEq Oral Daily    diltiazem  180 mg Oral Daily    potassium & sodium phosphates  1 packet Oral BID    insulin lispro  5 Units Subcutaneous TID WC    carvedilol  6.25 mg Oral BID WC    apixaban  2.5 mg Oral BID    iron sucrose  200 mg Intravenous Every Other Day    atorvastatin  20 mg Oral Daily    brimonidine  1 drop Both Eyes BID    montelukast  10 mg Oral Nightly    insulin glargine  4 Units Subcutaneous Nightly    gabapentin  100 mg Oral Daily    dorzolamide  1 drop Both Eyes BID    And    timolol  1 drop Both Eyes BID    sodium chloride flush  10 mL Intravenous 2 times per day    insulin lispro  0-12 Units Subcutaneous TID WC    insulin lispro  0-6 Units Subcutaneous Nightly    mometasone-formoterol  2 puff Inhalation BID    ipratropium-albuterol  1 ampule Inhalation Q4H WA     Continuous Infusions:    dextrose         Assessment:    · Acute on chronic combined systolic and diastolic heart failure-diuresis managed per nephrology  · Acute on chronic respiratory failure on 6L O2 24/7; managed by others  · COPD exacerbation; managed by others  · Cardiomyopathy, with EF 40% on echo 4/2019; continue conservative management  · Anterior/anteroseptal and anterolateral wall hypokinesis suggestive of CAD on echo 4/2019; currently without angina  · Moderate to severe tricuspid regurgitation   · Moderate to severe pulmonary hypertension   · Mild to moderate mitral regurgitation  · Persistent atrial fibrillation with HR 90-100bpm  · H/O chronic anticoagulation with Eliquis  · Acute kidney injury with hyperkalemia; managed by others    Plan:    Continue coreg 6.25mg bid, Eliquis 2.5mg bid, Cardizem 180mg, Lasix per nephrology  Continue supportive care  DC planning from CV standpoint  Discussed with RN  Will discuss with Dr Hayde Wolf      Electronically signed Wanda Astudillo

## 2019-08-05 NOTE — DISCHARGE SUMMARY
Portable    Result Date: 2019  Stable effusions with bilateral airspace opacities in the lung bases. Xr Chest Portable    Result Date: 8/3/2019  Minimally improved aeration of the lungs. Xr Chest Portable    Result Date: 2019  Increased interstitial markings and worsening perihilar opacities consistent with pulmonary edema. Continue bilateral effusions and bibasilar opacities. Xr Chest Portable    Result Date: 2019  Partial clearing of bilateral lung opacities. Xr Chest Portable    Result Date: 2019  1. Cardiomegaly and pulmonary edema with bilateral pleural effusions compatible with CHF. 2. Bibasilar atelectasis/infiltrates.          All radiological studies reviewed      Reviews of Symptoms:    A 10 point system is reviewed and  negative except described in hospital course    Physical Exam:    Vitals:  BP (!) 116/56   Pulse 93   Temp 98.7 °F (37.1 °C) (Oral)   Resp 18   Ht 4' 6\" (1.372 m)   Wt 102 lb (46.3 kg)   SpO2 (!) 77%   BMI 24.59 kg/m²   Temp (24hrs), Av °F (36.7 °C), Min:97.8 °F (36.6 °C), Max:98.7 °F (37.1 °C)      General appearance - alert, well appearing, and in no acute distress  Mental status - oriented to person, place, and time with normal affect  Head - normocephalic and atraumatic  Eyes - pupils equal and reactive, extraocular eye movements intact, conjunctiva clear  Ears - hearing appears to be intact  Nose - no drainage noted  Mouth - mucous membranes moist  Neck - supple, no carotid bruits, thyroid not palpable  Chest - clear to auscultation, normal effort  Heart - normal rate, regular rhythm, no murmur  Abdomen - soft, nontender, nondistended, bowel sounds present all four quadrants, no masses, hepatomegaly or splenomegaly  Neurological - normal speech, no focal findings or movement disorder noted, cranial nerves II through XII grossly intact  Extremities - peripheral pulses palpable, no pedal edema or calf pain with palpation  Skin - no gross lesions, rashes, or induration noted      Consults:  IP CONSULT TO INTERNAL MEDICINE  IP CONSULT TO NEPHROLOGY  IP CONSULT TO CARDIOLOGY  IP CONSULT TO PULMONOLOGY    Disposition: [address]    Discharged Condition: Stable    Follow Up: Leslie Bartlett, [address] [place] [address]  [phone]    On 8/12/2019  hospital follow up appt at [address] [facility], [address], [address], [address] [facility] Mathias Moritz [address] [phone]      they will provide your home health care     [address] [facility]  [address] [facility] [address] [place] [address]  [phone]      sent script for hospital bed to them to see if can obtain thru insurance    Enrique Marrufo MD  [address] [facility]  [phone]    Schedule an appointment as soon as possible for a visit in 2 weeks  follow up      Lab Frequency Next Occurrence   HHN Treatment     BIPAP     Up with assistance     HYPOGLYCEMIA TREATMENT: blood glucose less than 50 mg/dL and patient  ALERT and TOLERATING PO     HYPOGLYCEMIA TREATMENT: blood glucose less than 70 mg/dL and patient ALERT and TOLERATING PO     HYPOGLYCEMIA TREATMENT: blood glucose less than 70 mg/dL and patient NOT ALERT or NPO     POCT Glucose     POCT Glucose     MDI Treatment     Basic Metabolic Panel     Magnesium     Phosphorus     Heated/ Humidified High Flow Nasal Cannula           Diet: Dietary Nutrition Supplements: Standard High Calorie Oral Supplement  DIET CARB CONTROL; Daily Fluid Restriction: 1000 ml    Discharge Medications:    Terrie Augustin   Home Medication Instructions JESUS:[id number]    Printed on:08/05/19 1525   Medication Information                      albuterol sulfate HFA (PROAIR HFA) 108 (90 Base) MCG/ACT inhaler  Inhale 2 puffs into the lungs every 6 hours as needed for Wheezing             apixaban (ELIQUIS) 2.5 MG TABS tablet  Take 2.5 mg by mouth 2 times daily              atorvastatin (LIPITOR) 20 MG tablet  Take 20 mg by

## 2019-08-06 ENCOUNTER — CARE COORDINATION (OUTPATIENT)
Dept: CASE MANAGEMENT | Age: 84
End: 2019-08-06

## 2019-08-10 ENCOUNTER — HOSPITAL ENCOUNTER (EMERGENCY)
Age: 84
Discharge: HOME | End: 2019-08-10
Attending: EMERGENCY MEDICINE
Payer: MEDICARE

## 2019-08-10 ENCOUNTER — APPOINTMENT (OUTPATIENT)
Dept: GENERAL RADIOLOGY | Age: 84
End: 2019-08-10
Payer: MEDICARE

## 2019-08-10 ENCOUNTER — TELEPHONE (OUTPATIENT)
Dept: OTHER | Facility: CLINIC | Age: 84
End: 2019-08-10

## 2019-08-10 VITALS
BODY MASS INDEX: 23.61 KG/M2 | TEMPERATURE: 98 F | OXYGEN SATURATION: 99 % | SYSTOLIC BLOOD PRESSURE: 94 MMHG | RESPIRATION RATE: 24 BRPM | DIASTOLIC BLOOD PRESSURE: 57 MMHG | HEIGHT: 55 IN | HEART RATE: 83 BPM | WEIGHT: 102 LBS

## 2019-08-10 DIAGNOSIS — R73.9 STEROID-INDUCED HYPERGLYCEMIA: Primary | ICD-10-CM

## 2019-08-10 DIAGNOSIS — T38.0X5A STEROID-INDUCED HYPERGLYCEMIA: Primary | ICD-10-CM

## 2019-08-10 LAB
ABSOLUTE EOS #: 0 K/UL (ref 0–0.44)
ABSOLUTE IMMATURE GRANULOCYTE: 0.19 K/UL (ref 0–0.3)
ABSOLUTE LYMPH #: 0.75 K/UL (ref 1.1–3.7)
ABSOLUTE MONO #: 1.31 K/UL (ref 0.1–1.2)
ANION GAP SERPL CALCULATED.3IONS-SCNC: 14 MMOL/L (ref 9–17)
BASOPHILS # BLD: 0 % (ref 0–2)
BASOPHILS ABSOLUTE: 0 K/UL (ref 0–0.2)
BETA-HYDROXYBUTYRATE: 0.09 MMOL/L (ref 0.02–0.27)
BUN BLDV-MCNC: 46 MG/DL (ref 8–23)
BUN/CREAT BLD: 53 (ref 9–20)
CALCIUM SERPL-MCNC: 8.8 MG/DL (ref 8.6–10.4)
CHLORIDE BLD-SCNC: 86 MMOL/L (ref 98–107)
CHP ED QC CHECK: NORMAL
CO2: 30 MMOL/L (ref 20–31)
CREAT SERPL-MCNC: 0.87 MG/DL (ref 0.5–0.9)
DIFFERENTIAL TYPE: ABNORMAL
EOSINOPHILS RELATIVE PERCENT: 0 % (ref 1–4)
GFR AFRICAN AMERICAN: >60 ML/MIN
GFR NON-AFRICAN AMERICAN: >60 ML/MIN
GFR SERPL CREATININE-BSD FRML MDRD: ABNORMAL ML/MIN/{1.73_M2}
GFR SERPL CREATININE-BSD FRML MDRD: ABNORMAL ML/MIN/{1.73_M2}
GLUCOSE BLD-MCNC: 418 MG/DL
GLUCOSE BLD-MCNC: 418 MG/DL (ref 65–105)
GLUCOSE BLD-MCNC: 512 MG/DL (ref 65–105)
GLUCOSE BLD-MCNC: 530 MG/DL (ref 70–99)
HCT VFR BLD CALC: 37.2 % (ref 36.3–47.1)
HEMOGLOBIN: 11.6 G/DL (ref 11.9–15.1)
IMMATURE GRANULOCYTES: 1 %
LYMPHOCYTES # BLD: 4 % (ref 24–43)
MCH RBC QN AUTO: 29.7 PG (ref 25.2–33.5)
MCHC RBC AUTO-ENTMCNC: 31.2 G/DL (ref 28.4–34.8)
MCV RBC AUTO: 95.1 FL (ref 82.6–102.9)
MONOCYTES # BLD: 7 % (ref 3–12)
NRBC AUTOMATED: 0 PER 100 WBC
PDW BLD-RTO: 18.9 % (ref 11.8–14.4)
PLATELET # BLD: 233 K/UL (ref 138–453)
PLATELET ESTIMATE: ABNORMAL
PMV BLD AUTO: 10.8 FL (ref 8.1–13.5)
POTASSIUM SERPL-SCNC: 4.5 MMOL/L (ref 3.7–5.3)
RBC # BLD: 3.91 M/UL (ref 3.95–5.11)
RBC # BLD: ABNORMAL 10*6/UL
SEG NEUTROPHILS: 88 % (ref 36–65)
SEGMENTED NEUTROPHILS ABSOLUTE COUNT: 16.45 K/UL (ref 1.5–8.1)
SODIUM BLD-SCNC: 130 MMOL/L (ref 135–144)
WBC # BLD: 18.7 K/UL (ref 3.5–11.3)
WBC # BLD: ABNORMAL 10*3/UL

## 2019-08-10 PROCEDURE — 6370000000 HC RX 637 (ALT 250 FOR IP): Performed by: EMERGENCY MEDICINE

## 2019-08-10 PROCEDURE — 82947 ASSAY GLUCOSE BLOOD QUANT: CPT

## 2019-08-10 PROCEDURE — 71045 X-RAY EXAM CHEST 1 VIEW: CPT

## 2019-08-10 PROCEDURE — 93005 ELECTROCARDIOGRAM TRACING: CPT | Performed by: EMERGENCY MEDICINE

## 2019-08-10 PROCEDURE — 99285 EMERGENCY DEPT VISIT HI MDM: CPT

## 2019-08-10 PROCEDURE — 80048 BASIC METABOLIC PNL TOTAL CA: CPT

## 2019-08-10 PROCEDURE — 81003 URINALYSIS AUTO W/O SCOPE: CPT

## 2019-08-10 PROCEDURE — 96374 THER/PROPH/DIAG INJ IV PUSH: CPT

## 2019-08-10 PROCEDURE — 85025 COMPLETE CBC W/AUTO DIFF WBC: CPT

## 2019-08-10 PROCEDURE — 82010 KETONE BODYS QUAN: CPT

## 2019-08-10 RX ORDER — ACETAMINOPHEN AND CODEINE PHOSPHATE 300; 60 MG/1; MG/1
1 TABLET ORAL DAILY
COMMUNITY

## 2019-08-10 RX ADMIN — INSULIN HUMAN 10 UNITS: 100 INJECTION, SOLUTION PARENTERAL at 15:59

## 2019-08-10 ASSESSMENT — ENCOUNTER SYMPTOMS
SHORTNESS OF BREATH: 0
CONSTIPATION: 0
DIARRHEA: 0
FACIAL SWELLING: 0
COUGH: 0
ABDOMINAL PAIN: 0
VOMITING: 0
EYE DISCHARGE: 0
EYE REDNESS: 0
COLOR CHANGE: 0

## 2019-08-10 NOTE — ED PROVIDER NOTES
reports that she has never smoked. She has never used smokeless tobacco. She reports that she does not drink alcohol or use drugs. REVIEW OF SYSTEMS    (2-9 systems for level 4, 10 or more for level 5)     Review of Systems   Constitutional: Positive for fatigue. Negative for chills and fever. HENT: Negative for congestion, ear discharge and facial swelling. Eyes: Negative for discharge and redness. Respiratory: Negative for cough and shortness of breath. Cardiovascular: Negative for chest pain. Gastrointestinal: Negative for abdominal pain, constipation, diarrhea and vomiting. Genitourinary: Negative for dysuria and hematuria. Musculoskeletal: Negative for arthralgias. Skin: Negative for color change and rash. Neurological: Positive for weakness. Negative for syncope, numbness and headaches. Hematological: Negative for adenopathy. Psychiatric/Behavioral: Negative for confusion. The patient is not nervous/anxious. Except as noted above the remainder of the review of systems was reviewed and negative. PHYSICAL EXAM    (up to 7 for level 4, 8 or more for level 5)     Vitals:    08/10/19 1613 08/10/19 1628 08/10/19 1643 08/10/19 1714   BP: (!) 98/53 (!) 99/52 (!) 94/57    Pulse: 84 82 83    Resp: 15 20 24    Temp:       SpO2: 100% 100% 99%    Weight:       Height:    4' 6\" (1.372 m)       Physical Exam   Constitutional: No distress. She appears frail. HENT:   Head: Normocephalic and atraumatic. Eyes: Right eye exhibits no discharge. Left eye exhibits no discharge. No scleral icterus. Neck: Neck supple. Cardiovascular: Normal rate and regular rhythm. Pulmonary/Chest: Effort normal and breath sounds normal. No stridor. No respiratory distress. She has no wheezes. She has no rales. Abdominal: Soft. She exhibits no distension. There is no tenderness. Musculoskeletal: Normal range of motion. Lymphadenopathy:     She has no cervical adenopathy.    Neurological: She is

## 2019-08-10 NOTE — PROGRESS NOTES
(LIPITOR) 20 MG tablet Take 20 mg by mouth daily    Historical Provider, MD   apixaban (ELIQUIS) 2.5 MG TABS tablet Take 2.5 mg by mouth 2 times daily     Historical Provider, MD   Cholecalciferol (VITAMIN D) 2000 units CAPS capsule Take 2,000 Units by mouth 2 times daily     Historical Provider, MD   magnesium oxide (MAG-OX) 400 MG tablet Take 400 mg by mouth 2 times daily    Historical Provider, MD

## 2019-08-10 NOTE — TELEPHONE ENCOUNTER
Writer contacted Morgan Patel, ED provider to inform of 30 day readmission risk. ED provider informed writer admit or discharge has not been determined. Continue to follow-up.

## 2019-08-12 ENCOUNTER — TELEPHONE (OUTPATIENT)
Dept: OTHER | Facility: CLINIC | Age: 84
End: 2019-08-12

## 2019-08-12 LAB
EKG ATRIAL RATE: 250 BPM
EKG Q-T INTERVAL: 358 MS
EKG QRS DURATION: 78 MS
EKG QTC CALCULATION (BAZETT): 410 MS
EKG R AXIS: 51 DEGREES
EKG T AXIS: 73 DEGREES
EKG VENTRICULAR RATE: 79 BPM

## 2019-08-13 ENCOUNTER — CARE COORDINATION (OUTPATIENT)
Dept: CASE MANAGEMENT | Age: 84
End: 2019-08-13

## 2019-08-14 ENCOUNTER — CARE COORDINATION (OUTPATIENT)
Dept: CASE MANAGEMENT | Age: 84
End: 2019-08-14

## 2019-08-22 ENCOUNTER — CARE COORDINATION (OUTPATIENT)
Dept: CASE MANAGEMENT | Age: 84
End: 2019-08-22

## 2019-08-23 ENCOUNTER — CARE COORDINATION (OUTPATIENT)
Dept: CASE MANAGEMENT | Age: 84
End: 2019-08-23

## 2019-08-30 ENCOUNTER — CARE COORDINATION (OUTPATIENT)
Dept: CASE MANAGEMENT | Age: 84
End: 2019-08-30

## 2020-01-01 NOTE — CONSULTS
[address] [facility] [address]                [address] [facility], [address] [facility]                                  CONSULTATION    PATIENT NAME: Kevin Rain                     :        1923  MED REC NO:   [id number]                             ROOM:       1005  ACCOUNT NO:   [de-identified]                           ADMIT DATE: 2019  PROVIDER:     Hiren Winchester    CONSULT DATE:  2019    REASON FOR CARDIOLOGY CONSULTATION:  Atrial fibrillation. CHIEF COMPLAINT:  Shortness of breath. HISTORY OF PRESENT ILLNESS:  The patient is a 80-year-old lady, who does  not speak in [place], was brought to the hospital with chief complaint  of shortness of breath and orthopnea. According to the chart history,  it is reported that the patient has had orthopnea and dyspnea over the  last one to two days in duration, described as 7/10 in intensity, worse  with activity, better with rest.  In view of her symptoms, she was  brought into the hospital.  She also noticed lower extremity edema. Cardiology Service was consulted in view of symptoms suggestive of  congestive heart failure and atrial fibrillation that was present. PAST MEDICAL HISTORY:  1. Atrial fibrillation. 2.  Coronary artery disease. 3.  Congestive heart failure. 4.  COPD. 5.  Diabetes mellitus. 6.  Dyslipidemia. 7.  Hypertension. 8.  Arthritis. PAST SURGICAL HISTORY:  Positive for total hip arthroplasty of the left  hip. ALLERGIES:  PENICILLIN CLASS DRUGS. SOCIAL HISTORY:  Negative for current smoking or drinking alcohol. Apparently, she never smoked. FAMILY HISTORY:  Unknown by the patient. REVIEW OF SYSTEMS:  A 15-point system review was performed and pertinent  findings are as noted. CONSTITUTIONAL:  No fever, chills, or rigors. EYES:  No blurred vision or double vision. ENT:  No ear discharge,  nasal discharge, or sore throat.   RESPIRATORY:  Positive for shortness  of breath on exertion. CARDIOVASCULAR:  Positive for shortness of  breath on exertion. GENITOURINARY:  Negative. INTEGUMENT:  Negative. HEMATOLOGIC:  Negative. MUSCULOSKELETAL:  Negative. NEUROLOGIC:   Negative. PSYCHIATRIC:  Negative. ENDOCRINE:  Negative. ALLERGY/IMMUNOLOGY:  Negative. PHYSICAL EXAMINATION:  GENERAL APPEARANCE:  Currently demonstrates the patient is comfortable  at bedrest.  Denies any symptoms of chest pain or shortness of breath. The patient is in on acute distress. VITAL SIGNS:  Blood pressure of 118/70, pulse rate of 90, respiratory  rate of 11. Height is 4 feet 7 inches, weight is 106 pounds, SpO2 is  99%, BMI is 24.73 kg/m2. HEENT:  Cranium is atraumatic, normocephalic. There is no jugular  venous distention. Normal carotid upstrokes. No bruits. HEART:  Reveals S1, S2. Irregular. There is a grade 4-4/7 systolic  murmur at cardiac apex. LUNGS:  Reveal diminished air entry at bases. ABDOMEN:  Soft. Bowel sounds are present. EXTREMITIES:  Trace edema. Symmetric distal pulses are noted, which are  equal in volume bilaterally. LABS:  Blood glucose is 149, BUN 31, creatinine of 0.76. Sodium 135,  potassium 4.1. IMPRESSION:  1. Acutely decompensated congestive heart failure. 2.  Atrial fibrillation. 3.  Pulmonary hypertension. 4.  Respiratory failure. 5.  Probable pneumonitis. PLAN:  Continue current regimen. Echocardiogram done shows ejection  fraction of 40% with mild-to-moderate mitral insufficiency,  moderate-to-severe tricuspid insufficiency and moderate-to-severe  pulmonary hypertension. Given her advanced age of 80 and multiple comorbidities, we will  continue current regimen of diuretics as tolerated. Thank you for allowing us to participate in the care of the patient.         Joshua Bullock    D: 04/06/2019 11:00:41       T: 04/06/2019 12:11:33     [email or web address]  Job#: [id number]     Doc#: [id number]    CC: Statement Selected

## 2024-08-19 NOTE — PLAN OF CARE
Problem: Falls - Risk of:  Goal: Will remain free from falls  Description  Will remain free from falls  Outcome: Ongoing   Pt determined by Autumn Jackson fall risk assessment to be a fall risk; falling star, ID band and safety alarm activated and in use as needed. Hourly rounding performed, call light use encouraged; personal items within reach. Bed locked in low position. 1

## (undated) DEVICE — BASIN EMSIS 700ML GRAPHITE PLAS KID SHP GRAD

## (undated) DEVICE — BAG SPECIMEN BIOHAZARD 6IN X 9IN

## (undated) DEVICE — CO2 CANNULA,SUPERSOFT, ADLT,7'O2,7'CO2: Brand: MEDLINE

## (undated) DEVICE — ADAPTER TBNG LUER STUB 15 GA INTMED

## (undated) DEVICE — FORCEPS BX L240CM WRK CHN 2.8MM STD CAP W/ NDL MIC MESH

## (undated) DEVICE — MEDICINE CUP, GRADUATED, STER: Brand: MEDLINE

## (undated) DEVICE — BLOCK BITE 60FR RUBBER ADLT DENTAL